# Patient Record
Sex: FEMALE | Race: WHITE | Employment: UNEMPLOYED | URBAN - METROPOLITAN AREA
[De-identification: names, ages, dates, MRNs, and addresses within clinical notes are randomized per-mention and may not be internally consistent; named-entity substitution may affect disease eponyms.]

---

## 2021-01-18 LAB
PAP SMEAR, EXTERNAL: NEGATIVE
PAP SMEAR, EXTERNAL: NEGATIVE

## 2022-08-19 LAB
MAMMOGRAPHY, EXTERNAL: NEGATIVE
MAMMOGRAPHY, EXTERNAL: NORMAL

## 2022-08-22 LAB
MAMMOGRAPHY, EXTERNAL: NEGATIVE
MAMMOGRAPHY, EXTERNAL: NORMAL

## 2023-01-18 ENCOUNTER — OFFICE VISIT (OUTPATIENT)
Dept: FAMILY MEDICINE CLINIC | Age: 58
End: 2023-01-18
Payer: MEDICARE

## 2023-01-18 VITALS
OXYGEN SATURATION: 95 % | HEART RATE: 107 BPM | BODY MASS INDEX: 20.39 KG/M2 | RESPIRATION RATE: 16 BRPM | WEIGHT: 108 LBS | DIASTOLIC BLOOD PRESSURE: 75 MMHG | SYSTOLIC BLOOD PRESSURE: 125 MMHG | HEIGHT: 61 IN

## 2023-01-18 DIAGNOSIS — I10 PRIMARY HYPERTENSION: ICD-10-CM

## 2023-01-18 DIAGNOSIS — K74.60 CIRRHOSIS OF LIVER WITHOUT ASCITES, UNSPECIFIED HEPATIC CIRRHOSIS TYPE (HCC): ICD-10-CM

## 2023-01-18 DIAGNOSIS — F33.0 MILD EPISODE OF RECURRENT MAJOR DEPRESSIVE DISORDER (HCC): ICD-10-CM

## 2023-01-18 DIAGNOSIS — K21.9 GASTROESOPHAGEAL REFLUX DISEASE WITHOUT ESOPHAGITIS: ICD-10-CM

## 2023-01-18 DIAGNOSIS — Z11.4 ENCOUNTER FOR SCREENING FOR HUMAN IMMUNODEFICIENCY VIRUS (HIV): ICD-10-CM

## 2023-01-18 DIAGNOSIS — J44.1 CHRONIC OBSTRUCTIVE PULMONARY DISEASE WITH ACUTE EXACERBATION (HCC): Primary | ICD-10-CM

## 2023-01-18 DIAGNOSIS — E78.2 MIXED HYPERLIPIDEMIA: ICD-10-CM

## 2023-01-18 DIAGNOSIS — Z11.59 ENCOUNTER FOR HEPATITIS C SCREENING TEST FOR LOW RISK PATIENT: ICD-10-CM

## 2023-01-18 DIAGNOSIS — B37.0 THRUSH: ICD-10-CM

## 2023-01-18 DIAGNOSIS — Z13.1 ENCOUNTER FOR SCREENING EXAMINATION FOR IMPAIRED GLUCOSE REGULATION AND DIABETES MELLITUS: ICD-10-CM

## 2023-01-18 DIAGNOSIS — R53.83 OTHER FATIGUE: ICD-10-CM

## 2023-01-18 PROBLEM — L65.9 ALOPECIA: Status: ACTIVE | Noted: 2023-01-18

## 2023-01-18 PROBLEM — Z72.0 TOBACCO ABUSE: Status: ACTIVE | Noted: 2023-01-18

## 2023-01-18 PROBLEM — F32.9 MAJOR DEPRESSIVE DISORDER: Status: ACTIVE | Noted: 2023-01-18

## 2023-01-18 PROBLEM — E55.9 VITAMIN D DEFICIENCY: Status: ACTIVE | Noted: 2023-01-18

## 2023-01-18 PROBLEM — K44.9 HIATAL HERNIA: Status: ACTIVE | Noted: 2023-01-18

## 2023-01-18 PROBLEM — N83.202 CYST OF OVARY, LEFT: Status: ACTIVE | Noted: 2023-01-18

## 2023-01-18 PROBLEM — K42.9 UMBILICAL HERNIA: Status: ACTIVE | Noted: 2023-01-18

## 2023-01-18 LAB
A/G RATIO: 1.6 (ref 1.1–2.2)
ALBUMIN SERPL-MCNC: 4.6 G/DL (ref 3.4–5)
ALP BLD-CCNC: 87 U/L (ref 40–129)
ALT SERPL-CCNC: 53 U/L (ref 10–40)
ANION GAP SERPL CALCULATED.3IONS-SCNC: 12 MMOL/L (ref 3–16)
AST SERPL-CCNC: 44 U/L (ref 15–37)
BILIRUB SERPL-MCNC: 0.6 MG/DL (ref 0–1)
BUN BLDV-MCNC: 18 MG/DL (ref 7–20)
CALCIUM SERPL-MCNC: 9.8 MG/DL (ref 8.3–10.6)
CHLORIDE BLD-SCNC: 96 MMOL/L (ref 99–110)
CO2: 29 MMOL/L (ref 21–32)
CREAT SERPL-MCNC: 0.8 MG/DL (ref 0.6–1.1)
GFR SERPL CREATININE-BSD FRML MDRD: >60 ML/MIN/{1.73_M2}
GLUCOSE BLD-MCNC: 88 MG/DL (ref 70–99)
HCT VFR BLD CALC: 42.7 % (ref 36–48)
HEMOGLOBIN: 14.4 G/DL (ref 12–16)
HEPATITIS C ANTIBODY INTERPRETATION: NORMAL
MCH RBC QN AUTO: 33.7 PG (ref 26–34)
MCHC RBC AUTO-ENTMCNC: 33.7 G/DL (ref 31–36)
MCV RBC AUTO: 100 FL (ref 80–100)
PDW BLD-RTO: 13.4 % (ref 12.4–15.4)
PLATELET # BLD: 197 K/UL (ref 135–450)
PMV BLD AUTO: 8.7 FL (ref 5–10.5)
POTASSIUM SERPL-SCNC: 4.3 MMOL/L (ref 3.5–5.1)
RBC # BLD: 4.27 M/UL (ref 4–5.2)
REASON FOR REJECTION: NORMAL
REJECTED TEST: NORMAL
SODIUM BLD-SCNC: 137 MMOL/L (ref 136–145)
TOTAL PROTEIN: 7.5 G/DL (ref 6.4–8.2)
TSH REFLEX: 0.66 UIU/ML (ref 0.27–4.2)
WBC # BLD: 8.6 K/UL (ref 4–11)

## 2023-01-18 PROCEDURE — 1036F TOBACCO NON-USER: CPT

## 2023-01-18 PROCEDURE — 3023F SPIROM DOC REV: CPT

## 2023-01-18 PROCEDURE — 3017F COLORECTAL CA SCREEN DOC REV: CPT

## 2023-01-18 PROCEDURE — 36415 COLL VENOUS BLD VENIPUNCTURE: CPT

## 2023-01-18 PROCEDURE — 99204 OFFICE O/P NEW MOD 45 MIN: CPT

## 2023-01-18 PROCEDURE — G8482 FLU IMMUNIZE ORDER/ADMIN: HCPCS

## 2023-01-18 PROCEDURE — G8420 CALC BMI NORM PARAMETERS: HCPCS

## 2023-01-18 PROCEDURE — 3074F SYST BP LT 130 MM HG: CPT

## 2023-01-18 PROCEDURE — 3078F DIAST BP <80 MM HG: CPT

## 2023-01-18 PROCEDURE — G8427 DOCREV CUR MEDS BY ELIG CLIN: HCPCS

## 2023-01-18 RX ORDER — AZITHROMYCIN 250 MG/1
250 TABLET, FILM COATED ORAL SEE ADMIN INSTRUCTIONS
Qty: 6 TABLET | Refills: 0 | Status: SHIPPED | OUTPATIENT
Start: 2023-01-18 | End: 2023-01-23

## 2023-01-18 RX ORDER — AZELASTINE 1 MG/ML
SPRAY, METERED NASAL
COMMUNITY
Start: 2023-01-02

## 2023-01-18 RX ORDER — TRAZODONE HYDROCHLORIDE 100 MG/1
TABLET ORAL
COMMUNITY
Start: 2022-11-10

## 2023-01-18 RX ORDER — METHYLPREDNISOLONE 4 MG/1
TABLET ORAL
Qty: 1 KIT | Refills: 0 | Status: SHIPPED | OUTPATIENT
Start: 2023-01-18 | End: 2023-01-24

## 2023-01-18 RX ORDER — LEVALBUTEROL INHALATION SOLUTION 0.31 MG/3ML
0.31 SOLUTION RESPIRATORY (INHALATION) EVERY 4 HOURS PRN
Qty: 3 ML | Refills: 2 | Status: SHIPPED | OUTPATIENT
Start: 2023-01-18 | End: 2023-02-17

## 2023-01-18 RX ORDER — FLUTICASONE PROPIONATE AND SALMETEROL XINAFOATE 230; 21 UG/1; UG/1
AEROSOL, METERED RESPIRATORY (INHALATION)
COMMUNITY
Start: 2022-11-30

## 2023-01-18 RX ORDER — OMEPRAZOLE 20 MG/1
20 CAPSULE, DELAYED RELEASE ORAL DAILY
COMMUNITY

## 2023-01-18 RX ORDER — FUROSEMIDE 40 MG/1
40 TABLET ORAL DAILY
COMMUNITY
Start: 2022-11-09

## 2023-01-18 RX ORDER — SPIRONOLACTONE 25 MG/1
TABLET ORAL
COMMUNITY
Start: 2023-01-15

## 2023-01-18 RX ORDER — OMEPRAZOLE 20 MG/1
CAPSULE, DELAYED RELEASE ORAL
COMMUNITY
Start: 2022-11-13

## 2023-01-18 RX ORDER — TIOTROPIUM BROMIDE INHALATION SPRAY 3.12 UG/1
SPRAY, METERED RESPIRATORY (INHALATION)
COMMUNITY
Start: 2023-01-10

## 2023-01-18 RX ORDER — FLUTICASONE PROPIONATE 50 MCG
SPRAY, SUSPENSION (ML) NASAL
COMMUNITY
Start: 2023-01-10

## 2023-01-18 ASSESSMENT — PATIENT HEALTH QUESTIONNAIRE - PHQ9
10. IF YOU CHECKED OFF ANY PROBLEMS, HOW DIFFICULT HAVE THESE PROBLEMS MADE IT FOR YOU TO DO YOUR WORK, TAKE CARE OF THINGS AT HOME, OR GET ALONG WITH OTHER PEOPLE: 0
6. FEELING BAD ABOUT YOURSELF - OR THAT YOU ARE A FAILURE OR HAVE LET YOURSELF OR YOUR FAMILY DOWN: 0
7. TROUBLE CONCENTRATING ON THINGS, SUCH AS READING THE NEWSPAPER OR WATCHING TELEVISION: 0
SUM OF ALL RESPONSES TO PHQ9 QUESTIONS 1 & 2: 0
SUM OF ALL RESPONSES TO PHQ QUESTIONS 1-9: 0
2. FEELING DOWN, DEPRESSED OR HOPELESS: 0
1. LITTLE INTEREST OR PLEASURE IN DOING THINGS: 0
9. THOUGHTS THAT YOU WOULD BE BETTER OFF DEAD, OR OF HURTING YOURSELF: 0
8. MOVING OR SPEAKING SO SLOWLY THAT OTHER PEOPLE COULD HAVE NOTICED. OR THE OPPOSITE, BEING SO FIGETY OR RESTLESS THAT YOU HAVE BEEN MOVING AROUND A LOT MORE THAN USUAL: 0
4. FEELING TIRED OR HAVING LITTLE ENERGY: 0
3. TROUBLE FALLING OR STAYING ASLEEP: 0
5. POOR APPETITE OR OVEREATING: 0
SUM OF ALL RESPONSES TO PHQ QUESTIONS 1-9: 0

## 2023-01-18 ASSESSMENT — ENCOUNTER SYMPTOMS
DIARRHEA: 0
COLOR CHANGE: 0
SORE THROAT: 0
COUGH: 1
BLOOD IN STOOL: 0
CONSTIPATION: 0
SHORTNESS OF BREATH: 1
WHEEZING: 1
ABDOMINAL PAIN: 0

## 2023-01-18 NOTE — Clinical Note
She want to see if she can get a discount on her electric bill due to oxygen use? I have not heard of this one. Any help would be great.

## 2023-01-18 NOTE — PROGRESS NOTES
Britton Newton (:  1965) is a 62 y.o. female,New patient, here for evaluation of the following chief complaint(s):  Establish Care (Pt is here to establish care)         ASSESSMENT/PLAN:  1. Chronic obstructive pulmonary disease with acute exacerbation (HCC)  -     CBC; Future  -     [facility]  -     albuterol (PROVENTIL) (5 MG/ML) 0.5% nebulizer solution; Take 1 mL by nebulization 4 times daily as needed for Wheezing, Disp-120 each, R-3Normal  -     methylPREDNISolone (MEDROL DOSEPACK) 4 MG tablet; Take by mouth., Disp-1 kit, R-0Normal  -     azithromycin (ZITHROMAX) 250 MG tablet; Take 1 tablet by mouth See Admin Instructions for 5 days 500mg on day 1 followed by 250mg on days 2 - 5, Disp-6 tablet, R-0Normal  -     Handicap Placard MISC; Starting Wed 2023, Disp-1 each, R-0, Print  -     Comprehensive Metabolic Panel; Future  -Patient is currently dealing with OPD exacerbation we will plan to treat with medications listed above. Given patient's significant history of COPD we will plan to reestablish patient with pulmonology. Patient was previously seeing a pulmonologist when in [place]. At this time given patient's COVID past exposure we will plan to treat for COPD exacerbation with medications listed above. We will plan to reorder patient's nebulizer and home oxygen that she needs new orders and a new machine since moving to [place] from [place]. Patient understands plan at this time patient is agreeable at this time. Patient also would like to have disability paperwork filled out. Did have lengthy discussion about about filling these papers out. .  2. Mixed hyperlipidemia  -     Lipid Panel; Future  . Patient has not had lab work to check for her hyperlipidemia plan to recheck at this time. 3. Other fatigue  -     TSH with Reflex; Future  - Patient does endorse having some significant fatigue. Patient does have a history of depression.   Patient has not had recent thyroid level checked. We will plan to check thyroid at this time to further evaluate fatigue  4. Thrush  -     nystatin (MYCOSTATIN) 231693 UNIT/ML suspension; Take 5 mLs by mouth 4 times daily for 10 days Retain in mouth as long as possible, Oral, 4 TIMES DAILY Starting Wed 1/18/2023, Until Sat 1/28/2023, For 10 days, Disp-200 mL, R-0, Normal  - Patient has oral thrush for over use of albuterol inhaler. We will plan to use medication listed above. Patient was educated on proper technique of using medication. Patient is agreeable understands plan at this time  5. Mild episode of recurrent major depressive disorder (HCC)  - Depressive disorder is currently well managed without medication. Did spend time discussing with patient regarding symptoms. Patient is doing well without medication we will continue to reevaluate patient's mood. 6. Gastroesophageal reflux disease without esophagitis  - Patient's GERD is currently well managed on omeprazole 20 mg. We will continue current dose of medication at this time. 7. Primary hypertension  - Patient's hypertension in office is well managed today. Patient is currently not on any medications for blood pressure management. Patient does however remain on Lasix and spironolactone. After review of patient's past medical records it seems that patient was placed on these medications due to cirrhosis of liver. Plan to continue to monitor electrolytes and assess needs of medications. Patient was educated to continue checking her blood pressure at home and keeping a log and present these at the office at next appointment. 8. Cirrhosis of liver without ascites, unspecified hepatic cirrhosis type ([facility] [address]  - We will plan to draw a CMP at this time given patient has not had lab work. Patient remains on Lasix and spironolactone. We will plan to refer patient to GI specialist for further evaluation of liver cirrhosis.   9. Encounter for screening for human immunodeficiency virus (HIV)  -     HIV Screen; Future  10. Encounter for hepatitis C screening test for low risk patient  -     Hepatitis C Antibody; Future  11. Encounter for screening examination for impaired glucose regulation and diabetes mellitus  -     Hemoglobin A1C; Future      Return in about 1 week (around 1/25/2023) for COPD. Subjective   SUBJECTIVE/OBJECTIVE:  HPI  Patient presents the office today as a new patient here to establish care. Patient states that she is recently moved to [place] from [place]. Patient endorses having significant COPD which requires oxygen at baseline. Patient states that she is approximately on 4 to 5 L of oxygen at her home dose. Patient recently endorses having COVID. Patient endorses still having some respiratory issues following COVID. She endorses 6 significant coughing and shortness of breath. Patient states that she has been using her albuterol as prescribed. Patient states that she has been following and using her her routine maintenance inhalers as recommended. Patient has multiple complaints in office today. Patient would like to establish care. Patient states that she would also need a handicap placard given her recent move from [place] to [place]. Patient also has some significant hip discomfort. After review of chart patient was being seen by previous provider for these issues. Patient had imaging completed of her hip and pelvis. Patient states that she is not using any more oxygen than she normally would. Patient states that she needs refills on a few medications. Patient has no other acute concerns at this time. Review of Systems   HENT:  Negative for congestion and sore throat. Respiratory:  Positive for cough, shortness of breath and wheezing. Cardiovascular:  Negative for chest pain and leg swelling. Gastrointestinal:  Negative for abdominal pain, blood in stool, constipation and diarrhea.    Endocrine: Negative for cold intolerance and heat intolerance. Genitourinary:  Negative for difficulty urinating, hematuria and urgency. Musculoskeletal:  Negative for arthralgias and gait problem. Skin:  Negative for color change. Neurological:  Negative for dizziness, seizures, light-headedness and headaches. Psychiatric/Behavioral:  Negative for agitation and confusion. The patient is not nervous/anxious. Objective   Physical Exam  Constitutional:       General: She is not in acute distress. Appearance: Normal appearance. HENT:      Right Ear: Tympanic membrane normal.      Left Ear: Tympanic membrane normal.   Eyes:      Pupils: Pupils are equal, round, and reactive to light. Cardiovascular:      Rate and Rhythm: Normal rate and regular rhythm. Pulmonary:      Effort: Pulmonary effort is normal.      Breath sounds: Wheezing present. Abdominal:      General: Abdomen is flat. Bowel sounds are normal.      Palpations: Abdomen is soft. Musculoskeletal:         General: Normal range of motion. Skin:     General: Skin is warm. Neurological:      General: No focal deficit present. Mental Status: She is alert. Psychiatric:         Mood and Affect: Mood normal.         Behavior: Behavior normal.         Judgment: Judgment normal.          This note was generated completely or in part utilizing Dragon dictation speech recognition software. Occasionally, words are mistranscribed and despite editing, the text may contain inaccuracies due to incorrect word recognition. If further clarification is needed please contact the office at [phone]. An electronic signature was used to authenticate this note.     --ZARI Mauricio - CNP

## 2023-01-19 LAB
CHOLESTEROL, TOTAL: 251 MG/DL (ref 0–199)
ESTIMATED AVERAGE GLUCOSE: 105.4 MG/DL
HBA1C MFR BLD: 5.3 %
HDLC SERPL-MCNC: 71 MG/DL (ref 40–60)
LDL CHOLESTEROL CALCULATED: 163 MG/DL
TRIGL SERPL-MCNC: 83 MG/DL (ref 0–150)
VLDLC SERPL CALC-MCNC: 17 MG/DL

## 2023-01-20 LAB
HIV AG/AB: NORMAL
HIV ANTIGEN: NORMAL
HIV-1 ANTIBODY: NORMAL
HIV-2 AB: NORMAL

## 2023-01-26 ENCOUNTER — TELEPHONE (OUTPATIENT)
Dept: FAMILY MEDICINE CLINIC | Age: 58
End: 2023-01-26

## 2023-01-26 ENCOUNTER — OFFICE VISIT (OUTPATIENT)
Dept: FAMILY MEDICINE CLINIC | Age: 58
End: 2023-01-26
Payer: MEDICARE

## 2023-01-26 VITALS
BODY MASS INDEX: 20.39 KG/M2 | RESPIRATION RATE: 16 BRPM | DIASTOLIC BLOOD PRESSURE: 74 MMHG | OXYGEN SATURATION: 95 % | HEART RATE: 101 BPM | WEIGHT: 108 LBS | SYSTOLIC BLOOD PRESSURE: 123 MMHG | HEIGHT: 61 IN

## 2023-01-26 DIAGNOSIS — J43.9 PULMONARY EMPHYSEMA, UNSPECIFIED EMPHYSEMA TYPE (HCC): Primary | ICD-10-CM

## 2023-01-26 DIAGNOSIS — K74.60 CIRRHOSIS OF LIVER WITHOUT ASCITES, UNSPECIFIED HEPATIC CIRRHOSIS TYPE (HCC): ICD-10-CM

## 2023-01-26 PROCEDURE — G8420 CALC BMI NORM PARAMETERS: HCPCS

## 2023-01-26 PROCEDURE — 3017F COLORECTAL CA SCREEN DOC REV: CPT

## 2023-01-26 PROCEDURE — G8427 DOCREV CUR MEDS BY ELIG CLIN: HCPCS

## 2023-01-26 PROCEDURE — 1036F TOBACCO NON-USER: CPT

## 2023-01-26 PROCEDURE — 3074F SYST BP LT 130 MM HG: CPT

## 2023-01-26 PROCEDURE — 3023F SPIROM DOC REV: CPT

## 2023-01-26 PROCEDURE — G8482 FLU IMMUNIZE ORDER/ADMIN: HCPCS

## 2023-01-26 PROCEDURE — 99213 OFFICE O/P EST LOW 20 MIN: CPT

## 2023-01-26 PROCEDURE — 3078F DIAST BP <80 MM HG: CPT

## 2023-01-26 ASSESSMENT — ENCOUNTER SYMPTOMS
COLOR CHANGE: 0
ABDOMINAL PAIN: 0
CONSTIPATION: 0
COUGH: 0
SORE THROAT: 0
BLOOD IN STOOL: 0
SHORTNESS OF BREATH: 0
DIARRHEA: 0
WHEEZING: 0

## 2023-01-26 NOTE — TELEPHONE ENCOUNTER
Patient called in regards to her car handicap sticker. Patient stated that the [employer] would not except it because it needs to have a timeframe listed. Patient stated that if it stated \"permament\" then it would be approved. She stated if we print this off, she will come pick it up in the next few days.

## 2023-01-26 NOTE — PROGRESS NOTES
Chitra Ernst (:  1965) is a 62 y.o. female,Established patient, here for evaluation of the following chief complaint(s):  COPD (Pt is here for 1 week follow up )         ASSESSMENT/PLAN:  1. Pulmonary emphysema, unspecified emphysema type ([address]  -Patient is doing much better after being seen in the office 1 week ago for COPD exacerbation. Patient is breathing much easier. Patient endorses having minimal to no coughing. Patient still remains on her current dose of home oxygen of 4 to 5 L. Patient states that she is doing well with this currently. Patient does have an upcoming appointment with pulmonology. Do not plan to change medication and or inhaler regimen at this time. We will leave further adjustments to be had by pulmonology. Did consider breztri/Trelegy in this patient. Patient still having ongoing issues. Would consider switching inhaler regimen to include breztri/Trelegy. 2.  Hepatic cirrhosis  - Did give patient referral to GI specialist for previous diagnosis of hepatic cirrhosis. Patient still remains on Lasix and spironolactone. Patient's CMP was unremarkable. Patient did have some slight elevations in ALT and AST. Patient will continue to follow-up with referral placed to GI. Patient did notably have a negative hep C screening. Did spend time reviewing past and previous notes. Did spend time reviewing previous labs. Did spend time having lengthy discussion with patient regarding her previous specialist notes. Patient is still having medical records faxed office from her previous providers in [place]. Spent time reviewing the records. Return in about 3 months (around 2023) for COPD. Subjective   SUBJECTIVE/OBJECTIVE:  HPI  Patient presents the office today for follow-up regarding acute COPD exacerbation and thrush. Patient states that she is doing much better than she previously was. Patient is breathing much more easily.   Patient states that she is still can continue to use her nebulizer inhaler as well as inhalers. Patient states that she is currently doing well with her inhaler regimen. Patient states that her thrush has resolved with the nystatin. Overall patient is doing much better. Patient does endorse she has a pulmonology appointment upcoming. Patient has no other acute complaints at this time. Review of Systems   HENT:  Negative for congestion and sore throat. Respiratory:  Negative for cough, shortness of breath and wheezing. Cardiovascular:  Negative for chest pain and leg swelling. Gastrointestinal:  Negative for abdominal pain, blood in stool, constipation and diarrhea. Endocrine: Negative for cold intolerance and heat intolerance. Genitourinary:  Negative for difficulty urinating, hematuria and urgency. Musculoskeletal:  Negative for arthralgias and gait problem. Skin:  Negative for color change. Neurological:  Negative for dizziness, seizures, light-headedness and headaches. Psychiatric/Behavioral:  Negative for agitation and confusion. The patient is not nervous/anxious. Objective   Physical Exam  Vitals reviewed. Constitutional:       General: She is not in acute distress. Appearance: Normal appearance. HENT:      Right Ear: Tympanic membrane normal.      Left Ear: Tympanic membrane normal.   Eyes:      Pupils: Pupils are equal, round, and reactive to light. Cardiovascular:      Rate and Rhythm: Normal rate and regular rhythm. Pulmonary:      Effort: Pulmonary effort is normal.      Breath sounds: Normal breath sounds. Abdominal:      General: Abdomen is flat. Bowel sounds are normal.      Palpations: Abdomen is soft. Musculoskeletal:         General: Normal range of motion. Skin:     General: Skin is warm. Neurological:      General: No focal deficit present. Mental Status: She is alert.    Psychiatric:         Mood and Affect: Mood normal.         Behavior: Behavior normal. Judgment: Judgment normal.          This note was generated completely or in part utilizing Dragon dictation speech recognition software. Occasionally, words are mistranscribed and despite editing, the text may contain inaccuracies due to incorrect word recognition. If further clarification is needed please contact the office at [phone]. An electronic signature was used to authenticate this note.     --ZARI Casper - CNP

## 2023-01-27 ENCOUNTER — TELEMEDICINE (OUTPATIENT)
Dept: FAMILY MEDICINE CLINIC | Age: 58
End: 2023-01-27
Payer: MEDICARE

## 2023-01-27 DIAGNOSIS — Z00.00 INITIAL MEDICARE ANNUAL WELLNESS VISIT: Primary | ICD-10-CM

## 2023-01-27 PROCEDURE — G0438 PPPS, INITIAL VISIT: HCPCS

## 2023-01-27 PROCEDURE — 3017F COLORECTAL CA SCREEN DOC REV: CPT

## 2023-01-27 PROCEDURE — G8482 FLU IMMUNIZE ORDER/ADMIN: HCPCS

## 2023-01-27 RX ORDER — OMEPRAZOLE 20 MG/1
CAPSULE, DELAYED RELEASE ORAL
Qty: 90 CAPSULE | Refills: 3 | Status: SHIPPED | OUTPATIENT
Start: 2023-01-27

## 2023-01-27 RX ORDER — SPIRONOLACTONE 25 MG/1
25 TABLET ORAL DAILY
Qty: 90 TABLET | Refills: 0 | Status: SHIPPED | OUTPATIENT
Start: 2023-01-27

## 2023-01-27 ASSESSMENT — LIFESTYLE VARIABLES
HOW MANY STANDARD DRINKS CONTAINING ALCOHOL DO YOU HAVE ON A TYPICAL DAY: PATIENT DOES NOT DRINK
HOW OFTEN DO YOU HAVE A DRINK CONTAINING ALCOHOL: NEVER

## 2023-01-27 ASSESSMENT — PATIENT HEALTH QUESTIONNAIRE - PHQ9
SUM OF ALL RESPONSES TO PHQ QUESTIONS 1-9: 2
5. POOR APPETITE OR OVEREATING: 0
SUM OF ALL RESPONSES TO PHQ9 QUESTIONS 1 & 2: 2
4. FEELING TIRED OR HAVING LITTLE ENERGY: 0
9. THOUGHTS THAT YOU WOULD BE BETTER OFF DEAD, OR OF HURTING YOURSELF: 0
3. TROUBLE FALLING OR STAYING ASLEEP: 0
1. LITTLE INTEREST OR PLEASURE IN DOING THINGS: 1
2. FEELING DOWN, DEPRESSED OR HOPELESS: 1
10. IF YOU CHECKED OFF ANY PROBLEMS, HOW DIFFICULT HAVE THESE PROBLEMS MADE IT FOR YOU TO DO YOUR WORK, TAKE CARE OF THINGS AT HOME, OR GET ALONG WITH OTHER PEOPLE: 0
SUM OF ALL RESPONSES TO PHQ QUESTIONS 1-9: 2
6. FEELING BAD ABOUT YOURSELF - OR THAT YOU ARE A FAILURE OR HAVE LET YOURSELF OR YOUR FAMILY DOWN: 0
8. MOVING OR SPEAKING SO SLOWLY THAT OTHER PEOPLE COULD HAVE NOTICED. OR THE OPPOSITE, BEING SO FIGETY OR RESTLESS THAT YOU HAVE BEEN MOVING AROUND A LOT MORE THAN USUAL: 0
7. TROUBLE CONCENTRATING ON THINGS, SUCH AS READING THE NEWSPAPER OR WATCHING TELEVISION: 0

## 2023-01-27 NOTE — TELEPHONE ENCOUNTER
Refill Request       Last Seen: Last Seen Department: 1/26/2023  Last Seen by PCP: 1/26/2023    Last Written: 11/13/2022         Next Appointment:   Future Appointments   Date Time Provider Department Center   1/27/2023  8:00 AM Andrew Dillow, APRN - CNP [facility] [place] [facility]   3/29/2023  9:00 AM Markus Rhodes MD [facility]   5/1/2023  8:30 AM Andrew Dillow, APRN - CNP [facility] [place] [facility]           Requested Prescriptions     Pending Prescriptions Disp Refills    omeprazole (PRILOSEC) 20 MG delayed release capsule [Pharmacy Med Name: OMEPRAZOLE DR CAPS 20MG] 90 capsule 3     Sig: TAKE 1 CAPSULE DAILY

## 2023-01-27 NOTE — PATIENT INSTRUCTIONS
Preventing Falls: Care Instructions  Overview     Getting around your home safely can be a challenge if you have injuries or health problems that make it easy for you to fall. Loose rugs and furniture in walkways are among the dangers for many older people who have problems walking or who have poor eyesight. People who have conditions such as arthritis, osteoporosis, or dementia also have to be careful not to fall. You can make your home safer with a few simple measures. Follow-up care is a key part of your treatment and safety. Be sure to make and go to all appointments, and call your doctor if you are having problems. It's also a good idea to know your test results and keep a list of the medicines you take. How can you care for yourself at home? Taking care of yourself  Exercise regularly to improve your strength, muscle tone, and balance. Walk if you can. Swimming may be a good choice if you cannot walk easily. Have your vision and hearing checked each year or any time you notice a change. If you have trouble seeing and hearing, you might not be able to avoid objects and could lose your balance. Know the side effects of the medicines you take. Ask your doctor or pharmacist whether the medicines you take can affect your balance. Sleeping pills or sedatives can affect your balance. Limit the amount of alcohol you drink. Alcohol can impair your balance and other senses. Ask your doctor whether calluses or corns on your feet need to be removed. If you wear loose-fitting shoes because of calluses or corns, you can lose your balance and fall. Talk to your doctor if you have numbness in your feet. You may get dizzy if you do not drink enough water. To prevent dehydration, drink plenty of fluids. Choose water and other clear liquids. If you have kidney, heart, or liver disease and have to limit fluids, talk with your doctor before you increase the amount of fluids you drink.   Preventing falls at home  Remove raised doorway thresholds, throw rugs, and clutter. Repair loose carpet or raised areas in the floor. Move furniture and electrical cords to keep them out of walking paths. Use nonskid floor wax, and wipe up spills right away, especially on ceramic tile floors. If you use a walker or cane, put rubber tips on it. If you use crutches, clean the bottoms of them regularly with an abrasive pad, such as steel wool. Keep your house well lit, especially stairways, porches, and outside walkways. Use night-lights in areas such as hallways and bathrooms. Add extra light switches or use remote switches (such as switches that go on or off when you clap your hands) to make it easier to turn lights on if you have to get up during the night. Install sturdy handrails on stairways. Move items in your cabinets so that the things you use a lot are on the lower shelves (about waist level). Keep a cordless phone and a flashlight with new batteries by your bed. If possible, put a phone in each of the main rooms of your house, or carry a cell phone in case you fall and cannot reach a phone. Or, you can wear a device around your neck or wrist. You push a button that sends a signal for help. Wear low-heeled shoes that fit well and give your feet good support. Use footwear with nonskid soles. Check the heels and soles of your shoes for wear. Repair or replace worn heels or soles. Do not wear socks without shoes on smooth floors, such as wood. Walk on the grass when the sidewalks are slippery. If you live in an area that gets snow and ice in the winter, sprinkle salt on slippery steps and sidewalks. Or ask a family member or friend to do this for you. Preventing falls in the bath  Install grab bars and nonskid mats inside and outside your shower or tub and near the toilet and sinks. Use shower chairs and bath benches. Use a hand-held shower head that will allow you to sit while showering.   Get into a tub or shower by putting the weaker leg in first. Get out of a tub or shower with your strong side first.  Repair loose toilet seats and consider installing a raised toilet seat to make getting on and off the toilet easier. Keep your bathroom door unlocked while you are in the shower. Where can you learn more? Go to http://www.barrera.com/ and enter G117 to learn more about \"Preventing Falls: Care Instructions. \"  Current as of: May 4, 2022               Content Version: 13.5  © 5216-5617 Healthwise, Incorporated. Care instructions adapted under license by [facility] ([facility]). If you have questions about a medical condition or this instruction, always ask your healthcare professional. [address] any warranty or liability for your use of this information. Learning About Emotional Support  When do you need emotional support? You might find getting support from others helpful when you have a long-term health problem. Often people feel alone, confused, or scared when coping with an illness. But you aren't alone. Other people are going through the same thing you are and know how you feel. Talking with others about your feelings can help you feel better. Your family and friends can give you support. So can your doctor, a support group, or a Oriental orthodox. If you have a support network, you will not feel as alone. You will learn new ways to deal with your situation, and you may try harder to overcome it. Where you can get support  Family and friends: They can help you cope by giving you comfort and encouragement. Counseling: Professional counseling can help you cope with situations that interfere with your life and cause stress. Counseling can help you understand and deal with your illness. Your doctor: Find a doctor you trust and feel comfortable with. Be open and honest about your fears and concerns. Your doctor can help you get the right medical treatments, including counseling.   Spiritual or Confucianism groups: They can provide comfort and may be able to help you find counseling or other social support services. Social groups: They can help you meet new people and get involved in activities you enjoy. Community support groups: In a support group, you can talk to others who have dealt with the same problems or illness as you. You can encourage one another and learn ways to cope with tough emotions. How to find a support group  Ask your doctor, counselor, or other health professional for suggestions. Contact your local Lutheran, Hinduism, Mosque, or other Confucianism group. Ask your family and friends. Ask people who have the same health concerns. Go online. Forums and blogs let you read messages from others and leave your own messages. You can exchange stories, vent your frustrations, and ask and answer questions. Contact a city, state, or national group that provides support for your health concerns. Your library or community center may have a list of these groups. Or you can look for information online. Look for a support group that works for you. Ask yourself if you prefer structure and would like a , or if you would like a less formal group. Do you prefer face-to-face meetings? Or do you feel more secure in online chat rooms or forums? Supportive relationships  A supportive relationship includes emotional support such as love, trust, and understanding, as well as advice and concrete help, such as help managing your time. Reach out to others  Family and friends can help you. Ask them to:  Listen to you and give you encouragement. This can keep you from feeling hopeless or alone. Help with small daily tasks or with bigger problems. A helping hand can keep you from feeling overwhelmed. Help you manage a health problem. For example, ask them to go to doctor visits with you. Your loved ones can offer support by being involved in your medical care.   Respect your relationships  A good relationship is also a two-way street. You count on help from others, but they also count on you. Know your friends' limits. You don't have to see or call your friends every day. If you are going through a rough patch, ask friends if you can contact them outside of the usual boundaries. Don't always complain or talk about yourself. Know when it's time to stop talking and listen or just enjoy your friend's company. Know that good friends can be a bad influence. For example, if a friend encourages you to drink when you know it will harm you, you may want to end the friendship. Where can you learn more? Go to http://www.woods.com/ and enter G092 to learn more about \"Learning About Emotional Support. \"  Current as of: February 9, 2022               Content Version: 13.5  © 0986-3158 [employer]. Care instructions adapted under license by [facility] ([facility]). If you have questions about a medical condition or this instruction, always ask your healthcare professional. [address] any warranty or liability for your use of this information. Learning About Stress  What is stress? Stress is what you feel when you have to handle more than you are used to. Stress is a fact of life for most people, and it affects everyone differently. What causes stress for you may not be stressful for someone else. A lot of things can cause stress. You may feel stress when you go on a job interview, take a test, or run a race. This kind of short-term stress is normal and even useful. It can help you if you need to work hard or react quickly. For example, stress can help you finish an important job on time. Stress also can last a long time. Long-term stress is caused by stressful situations or events. Examples of long-term stress include long-term health problems, ongoing problems at work, or conflicts in your family. Long-term stress can harm your health.   How does stress affect your health? When you are stressed, your body responds as though you are in danger. It makes hormones that speed up your heart, make you breathe faster, and give you a burst of energy. This is called the fight-or-flight stress response. If the stress is over quickly, your body goes back to normal and no harm is done. But if stress happens too often or lasts too long, it can have bad effects. Long-term stress can make you more likely to get sick, and it can make symptoms of some diseases worse. If you tense up when you are stressed, you may develop neck, shoulder, or low back pain. Stress is linked to high blood pressure and heart disease. Stress also harms your emotional health. It can make you hensley, tense, or depressed. Your relationships may suffer, and you may not do well at work or school. What can you do to manage stress? How to relax your mind   Write. It may help to write about things that are bothering you. This helps you find out how much stress you feel and what is causing it. When you know this, you can find better ways to cope. Let your feelings out. Talk, laugh, cry, and express anger when you need to. Talking with friends, family, a counselor, or a member of the clergy about your feelings is a healthy way to relieve stress. Do something you enjoy. For example, listen to music or go to a movie. Practice your hobby or do volunteer work. Meditate. This can help you relax, because you are not worrying about what happened before or what may happen in the future. Do guided imagery. Imagine yourself in any setting that helps you feel calm. You can use audiotapes, books, or a teacher to guide you. How to relax your body   Do something active. Exercise or activity can help reduce stress. Walking is a great way to get started. Even everyday activities such as housecleaning or yard work can help. Do breathing exercises.  For example:  From a standing position, bend forward from the waist with your knees slightly bent. Let your arms dangle close to the floor. Breathe in slowly and deeply as you return to a standing position. Roll up slowly and lift your head last.  Hold your breath for just a few seconds in the standing position. Breathe out slowly and bend forward from the waist.  Try yoga or franklin chi. These techniques combine exercise and meditation. You may need some training at first to learn them. What can you do to prevent stress? Manage your time. This helps you find time to do the things you want and need to do. Get enough sleep. Your body recovers from the stresses of the day while you are sleeping. Get support. Your family, friends, and community can make a difference in how you experience stress. Where can you learn more? Go to http://www.barrera.com/ and enter N032 to learn more about \"Learning About Stress. \"  Current as of: October 6, 2021               Content Version: 13.5  © 3259-7969 [employer]. Care instructions adapted under license by [facility] ([facility]). If you have questions about a medical condition or this instruction, always ask your healthcare professional. [place] [address] any warranty or liability for your use of this information. Hearing Loss: Care Instructions  Overview     Hearing loss is a sudden or slow decrease in how well you hear. It can range from mild to severe. Permanent hearing loss can occur with aging. It also can happen when you are exposed long-term to loud noise. Examples include listening to loud music, riding motorcycles, or being around other loud machines. Hearing loss can affect your work and home life. It can make you feel lonely or depressed. You may feel that you have lost your independence. But hearing aids and other devices can help you hear better and feel connected to others. Follow-up care is a key part of your treatment and safety.  Be sure to make and go to all appointments, and call your doctor if you are having problems. It's also a good idea to know your test results and keep a list of the medicines you take. How can you care for yourself at home? Avoid loud noises whenever possible. This helps keep your hearing from getting worse. Always wear hearing protection around loud noises. Wear a hearing aid as directed. See a professional who can help you pick a hearing aid that fits you. Have hearing tests as your doctor suggests. They can show whether your hearing has changed. Your hearing aid may need to be adjusted. Use other devices as needed. These may include:  Telephone amplifiers and hearing aids that can connect to a television, stereo, radio, or microphone. Devices that use lights or vibrations. These alert you to the doorbell, a ringing telephone, or a baby monitor. Television closed-captioning. This shows the words at the bottom of the screen. Most new TVs can do this. TTY (text telephone). This lets you type messages back and forth on the telephone instead of talking or listening. These devices are also called TDD. When messages are typed on the keyboard, they are sent over the phone line to a receiving TTY. The message is shown on a monitor. Use text messaging, social media, and email if it is hard for you to communicate by telephone. Try to learn a listening technique called speechreading. It is not lipreading. You pay attention to people's gestures, expressions, posture, and tone of voice. These clues can help you understand what a person is saying. Face the person you are talking to, and have them face you. Make sure the lighting is good. You need to see the other person's face clearly. Think about counseling if you need help to adjust to your hearing loss. When should you call for help? Watch closely for changes in your health, and be sure to contact your doctor if:    You think your hearing is getting worse.     You have new symptoms, such as dizziness or nausea.    Where can you learn more? Go to http://www.barrera.com/ and enter R798 to learn more about \"Hearing Loss: Care Instructions. \"  Current as of: May 4, 2022               Content Version: 13.5  © 7972-1443 Healthwise, Incorporated. Care instructions adapted under license by [facility] ([facility]). If you have questions about a medical condition or this instruction, always ask your healthcare professional. [address] any warranty or liability for your use of this information. Learning About Vision Tests  What are vision tests? The four most common vision tests are visual acuity tests, refraction, visual field tests, and color vision tests. Visual acuity (sharpness) tests  These tests are used: To see if you need glasses or contact lenses. To monitor an eye problem. To check an eye injury. Visual acuity tests are done as part of routine exams. You may also have this test when you get your 's license or apply for some types of jobs. Visual field tests  These tests are used: To check for vision loss in any area of your range of vision. To screen for certain eye diseases. To look for nerve damage after a stroke, head injury, or other problem that could reduce blood flow to the brain. Refraction and color tests  A refraction test is done to find the right prescription for glasses and contact lenses. A color vision test is done to check for color blindness. Color vision is often tested as part of a routine exam. You may also have this test when you apply for a job where recognizing different colors is important, such as , electronics, or the [employer]. How are vision tests done? Visual acuity test   You cover one eye at a time. You read aloud from a wall chart across the room. You read aloud from a small card that you hold in your hand. Refraction   You look into a special device.   The device puts lenses of different strengths in front of each eye to see how strong your glasses or contact lenses need to be. Visual field tests   Your doctor may have you look through special machines. Or your doctor may simply have you stare straight ahead while they move a finger into and out of your field of vision. Color vision test   You look at pieces of printed test patterns in various colors. You say what number or symbol you see. Your doctor may have you trace the number or symbol using a pointer. How do these tests feel? There is very little chance of having a problem from this test. If dilating drops are used for a vision test, they may make the eyes sting and cause a medicine taste in the mouth. Follow-up care is a key part of your treatment and safety. Be sure to make and go to all appointments, and call your doctor if you are having problems. It's also a good idea to know your test results and keep a list of the medicines you take. Where can you learn more? Go to http://www.barrera.com/ and enter G551 to learn more about \"Learning About Vision Tests. \"  Current as of: October 12, 2022               Content Version: 13.5  © 0815-9009 Healthwise, [employer]. Care instructions adapted under license by [facility] ([facility]). If you have questions about a medical condition or this instruction, always ask your healthcare professional. [address] any warranty or liability for your use of this information. Advance Directives: Care Instructions  Overview  An advance directive is a legal way to state your wishes at the end of your life. It tells your family and your doctor what to do if you can't say what you want. There are two main types of advance directives. You can change them any time your wishes change. Living will. This form tells your family and your doctor your wishes about life support and other treatment. The form is also called a declaration. Medical power of .   This form lets you name a person to make treatment decisions for you when you can't speak for yourself. This person is called a health care agent (health care proxy, health care surrogate). The form is also called a durable power of  for health care. If you do not have an advance directive, decisions about your medical care may be made by a family member, or by a doctor or a  who doesn't know you. It may help to think of an advance directive as a gift to the people who care for you. If you have one, they won't have to make tough decisions by themselves. For more information, including forms for your state, see the [address] website (www.caringinfo.org/planning/advance-directives/). Follow-up care is a key part of your treatment and safety. Be sure to make and go to all appointments, and call your doctor if you are having problems. It's also a good idea to know your test results and keep a list of the medicines you take. What should you include in an advance directive? Many states have a unique advance directive form. (It may ask you to address specific issues.) Or you might use a universal form that's approved by many states. If your form doesn't tell you what to address, it may be hard to know what to include in your advance directive. Use the questions below to help you get started. Who do you want to make decisions about your medical care if you are not able to? What life-support measures do you want if you have a serious illness that gets worse over time or can't be cured? What are you most afraid of that might happen? (Maybe you're afraid of having pain, losing your independence, or being kept alive by machines.)  Where would you prefer to die? (Your home? A hospital? A nursing home?)  Do you want to donate your organs when you die? Do you want certain Yarsani practices performed before you die? When should you call for help? Be sure to contact your doctor if you have any questions. Where can you learn more?   Go to http://www.woods.com/ and enter R264 to learn more about \"Advance Directives: Care Instructions. \"  Current as of: June 16, 2022               Content Version: 13.5  © 4441-4532 Healthwise, Incorporated. Care instructions adapted under license by [facility] ([facility]). If you have questions about a medical condition or this instruction, always ask your healthcare professional. [place] [address] any warranty or liability for your use of this information. Personalized Preventive Plan for Isabelle Bustos - 1/27/2023  Medicare offers a range of preventive health benefits. Some of the tests and screenings are paid in full while other may be subject to a deductible, co-insurance, and/or copay. Some of these benefits include a comprehensive review of your medical history including lifestyle, illnesses that may run in your family, and various assessments and screenings as appropriate. After reviewing your medical record and screening and assessments performed today your provider may have ordered immunizations, labs, imaging, and/or referrals for you. A list of these orders (if applicable) as well as your Preventive Care list are included within your After Visit Summary for your review. Other Preventive Recommendations:    A preventive eye exam performed by an eye specialist is recommended every 1-2 years to screen for glaucoma; cataracts, macular degeneration, and other eye disorders. A preventive dental visit is recommended every 6 months. Try to get at least 150 minutes of exercise per week or 10,000 steps per day on a pedometer . Order or download the FREE \"Exercise & Physical Activity: Your Everyday Guide\" from [employer]. Call 4-189.451.5591 or search [employer] online. You need 9901-1981 mg of calcium and 3118-8070 IU of vitamin D per day.  It is possible to meet your calcium requirement with diet alone, but a vitamin D supplement is usually necessary to meet this goal.  When exposed to the sun, use a sunscreen that protects against both UVA and UVB radiation with an SPF of 30 or greater. Reapply every 2 to 3 hours or after sweating, drying off with a towel, or swimming. Always wear a seat belt when traveling in a car. Always wear a helmet when riding a bicycle or motorcycle.

## 2023-01-27 NOTE — PROGRESS NOTES
Medicare Annual Wellness Visit    Tony Lima is here for Medicare AWV    Assessment & Plan   Initial Medicare annual wellness visit    Recommendations for Preventive Services Due: see orders and patient instructions/AVS.  Recommended screening schedule for the next 5-10 years is provided to the patient in written form: see Patient Instructions/AVS.     Return for Medicare Annual Wellness Visit in 1 year. Subjective       Patient's complete Health Risk Assessment and screening values have been reviewed and are found in Flowsheets. The following problems were reviewed today and where indicated follow up appointments were made and/or referrals ordered. Positive Risk Factor Screenings with Interventions:    Fall Risk:  Do you feel unsteady or are you worried about falling? : no  2 or more falls in past year?: (!) yes  Fall with injury in past year?: no     Interventions:    See AVS for additional education material           Self-assessment of health: In general, how would you say your health is?: (!) Poor    Interventions:  Patient has multiple chronic illnesses. Patient is doing better at managing since she is moved to [place] and establish care with multiple specialist including PCP    General HRA Questions:  Select all that apply: (!) Stress    Stress Interventions:  Patient is currently going through traumatic life events. We will continue to monitor patient's mood and stress level. Patient was educated she can always reach out if she feels that she needs more help or support.        Weight and Activity:  Physical Activity: Inactive    Days of Exercise per Week: 0 days    Minutes of Exercise per Session: 0 min     On average, how many days per week do you engage in moderate to strenuous exercise (like a brisk walk)?: 0 days  Have you lost any weight without trying in the past 3 months?: No       Inactivity Interventions:  Recommendations: patient agrees to exercise for at least 150 minutes/week, patient agrees to wear a pedometer and walk at least 10,000 steps/day       Hearing Screen:  Do you or your family notice any trouble with your hearing that hasn't been managed with hearing aids?: (!) Yes    Interventions:  Referred to Audiology    Vision Screen:  Do you have difficulty driving, watching TV, or doing any of your daily activities because of your eyesight?: (!) Yes  Have you had an eye exam within the past year?: Yes  No results found. Interventions:   Patient encouraged to make appointment with their eye specialist      Advanced Directives:  Do you have a Living Will?: (!) No    Intervention:  has NO advanced directive - information provided                       Objective      Patient-Reported Vitals  BP Observations: No, remote/electronic monitoring device was not used or able to be verified  Patient-Reported Weight: 108lb  Patient-Reported Height: 5'1            Allergies   Allergen Reactions    Doxycycline Hyclate Nausea And Vomiting     Prior to Visit Medications    Medication Sig Taking?  Authorizing Provider   azelastine (ASTELIN) 0.1 % nasal spray  Yes Historical Provider, MD   fluticasone (FLONASE) 50 MCG/ACT nasal spray  Yes Historical Provider, MD Kvng Plunkett 721-37 MCG/ACT inhaler  Yes Historical Provider, MD   furosemide (LASIX) 40 MG tablet 40 mg daily Yes Historical Provider, MD   omeprazole (PRILOSEC) 20 MG delayed release capsule  Yes Historical Provider, MD   spironolactone (ALDACTONE) 25 MG tablet  Yes Historical Provider, MD Ibanez Pleasant 2.5 MCG/ACT AERS inhaler  Yes Historical Provider, MD   traZODone (DESYREL) 100 MG tablet  Yes Historical Provider, MD   albuterol (PROVENTIL) (5 MG/ML) 0.5% nebulizer solution Take 1 mL by nebulization 4 times daily as needed for Wheezing Yes ZARI Stokes CNP   Handicap Placard MISC by Does not apply route Yes ZARI Stokes CNP   nystatin (MYCOSTATIN) 927311 UNIT/ML suspension Take 5 mLs by mouth 4 times daily for 10 days Retain in mouth as long as possible Yes ZARI Galloway CNP   omeprazole (PRILOSEC) 20 MG delayed release capsule Take 20 mg by mouth daily Yes Historical Provider, MD   levalbuterol (XOPENEX) 0.31 MG/3ML nebulization Take 3 mLs by nebulization every 4 hours as needed for Wheezing Yes ZARI Galloway CNP       CareTeam (Including outside providers/suppliers regularly involved in providing care):   Patient Care Team:  ZARI Galloway CNP as PCP - General (Nurse Practitioner)  ZARI Galloway CNP as PCP - [facility] Empaneled Provider     Reviewed and updated this visit:  Tobacco  Allergies  Meds  Problems  Med Hx  Surg Hx  Soc Hx  Fam Hx               Clide Denton, was evaluated through a synchronous (real-time) audio-video encounter. The patient (or guardian if applicable) is aware that this is a billable service, which includes applicable co-pays. This Virtual Visit was conducted with patient's (and/or legal guardian's) consent. The visit was conducted pursuant to the emergency declaration under the [address] [place] [address], [address] [place] [address] authority and the [email or web address] General Act. Patient identification was verified, and a caregiver was present when appropriate. The patient was located at Home: [address] [place] [address]. Provider was located at [facility] ([place] [address]): [address] [place] [address],[address]  [place],  [facility].

## 2023-01-27 NOTE — TELEPHONE ENCOUNTER
Refill Request     Last Seen: Last Seen Department: 1/27/2023  Last Seen by PCP: 1/27/2023            Next Appointment:   Future Appointments   Date Time Provider Queta Asif   3/29/2023  9:00 AM MD COLLEEN Glass   5/1/2023  8:30 AM Dc Moore APRN - CNP [place] [facility] [facility]OL[facility]           Requested Prescriptions     Pending Prescriptions Disp Refills    spironolactone (ALDACTONE) 25 MG tablet 90 tablet 0     Sig: Take 1 tablet by mouth

## 2023-01-30 DIAGNOSIS — K74.60 CIRRHOSIS OF LIVER WITHOUT ASCITES, UNSPECIFIED HEPATIC CIRRHOSIS TYPE (HCC): ICD-10-CM

## 2023-01-30 DIAGNOSIS — J43.9 PULMONARY EMPHYSEMA, UNSPECIFIED EMPHYSEMA TYPE (HCC): Primary | ICD-10-CM

## 2023-01-30 RX ORDER — TRAZODONE HYDROCHLORIDE 100 MG/1
100 TABLET ORAL NIGHTLY
Qty: 90 TABLET | Refills: 0 | Status: SHIPPED | OUTPATIENT
Start: 2023-01-30

## 2023-02-18 ENCOUNTER — APPOINTMENT (OUTPATIENT)
Dept: GENERAL RADIOLOGY | Age: 58
DRG: 336 | End: 2023-02-18
Payer: MEDICARE

## 2023-02-18 ENCOUNTER — HOSPITAL ENCOUNTER (INPATIENT)
Age: 58
LOS: 9 days | Discharge: HOME HEALTH | DRG: 336 | End: 2023-02-27
Attending: STUDENT IN AN ORGANIZED HEALTH CARE EDUCATION/TRAINING PROGRAM | Admitting: INTERNAL MEDICINE
Payer: MEDICARE

## 2023-02-18 ENCOUNTER — APPOINTMENT (OUTPATIENT)
Dept: CT IMAGING | Age: 58
DRG: 336 | End: 2023-02-18
Payer: MEDICARE

## 2023-02-18 DIAGNOSIS — N17.9 AKI (ACUTE KIDNEY INJURY) (HCC): ICD-10-CM

## 2023-02-18 DIAGNOSIS — E87.1 HYPONATREMIA: ICD-10-CM

## 2023-02-18 DIAGNOSIS — K56.609 SMALL BOWEL OBSTRUCTION (HCC): Primary | ICD-10-CM

## 2023-02-18 DIAGNOSIS — R11.2 NAUSEA AND VOMITING, UNSPECIFIED VOMITING TYPE: ICD-10-CM

## 2023-02-18 DIAGNOSIS — D72.829 LEUKOCYTOSIS, UNSPECIFIED TYPE: ICD-10-CM

## 2023-02-18 LAB
A/G RATIO: 1.4 (ref 1.1–2.2)
ALBUMIN SERPL-MCNC: 4.9 G/DL (ref 3.4–5)
ALP BLD-CCNC: 96 U/L (ref 40–129)
ALT SERPL-CCNC: 42 U/L (ref 10–40)
ANION GAP SERPL CALCULATED.3IONS-SCNC: 19 MMOL/L (ref 3–16)
ANISOCYTOSIS: ABNORMAL
AST SERPL-CCNC: 42 U/L (ref 15–37)
BANDED NEUTROPHILS RELATIVE PERCENT: 27 % (ref 0–7)
BASOPHILS ABSOLUTE: 0 K/UL (ref 0–0.2)
BASOPHILS RELATIVE PERCENT: 0 %
BILIRUB SERPL-MCNC: 0.8 MG/DL (ref 0–1)
BUN BLDV-MCNC: 67 MG/DL (ref 7–20)
CALCIUM SERPL-MCNC: 11.3 MG/DL (ref 8.3–10.6)
CHLORIDE BLD-SCNC: 69 MMOL/L (ref 99–110)
CO2: 39 MMOL/L (ref 21–32)
CREAT SERPL-MCNC: 2.8 MG/DL (ref 0.6–1.1)
EOSINOPHILS ABSOLUTE: 0 K/UL (ref 0–0.6)
EOSINOPHILS RELATIVE PERCENT: 0 %
GFR SERPL CREATININE-BSD FRML MDRD: 19 ML/MIN/{1.73_M2}
GLUCOSE BLD-MCNC: 137 MG/DL (ref 70–99)
HCT VFR BLD CALC: 47.9 % (ref 36–48)
HEMOGLOBIN: 15.9 G/DL (ref 12–16)
INFLUENZA A: NOT DETECTED
INFLUENZA B: NOT DETECTED
LACTIC ACID: 1.7 MMOL/L (ref 0.4–2)
LYMPHOCYTES ABSOLUTE: 0.9 K/UL (ref 1–5.1)
LYMPHOCYTES RELATIVE PERCENT: 5 %
MACROCYTES: ABNORMAL
MCH RBC QN AUTO: 32.3 PG (ref 26–34)
MCHC RBC AUTO-ENTMCNC: 33.2 G/DL (ref 31–36)
MCV RBC AUTO: 97 FL (ref 80–100)
MICROCYTES: ABNORMAL
MONOCYTES ABSOLUTE: 1.8 K/UL (ref 0–1.3)
MONOCYTES RELATIVE PERCENT: 10 %
NEUTROPHILS ABSOLUTE: 15.4 K/UL (ref 1.7–7.7)
NEUTROPHILS RELATIVE PERCENT: 58 %
PDW BLD-RTO: 13.1 % (ref 12.4–15.4)
PLATELET # BLD: 231 K/UL (ref 135–450)
PMV BLD AUTO: 8.5 FL (ref 5–10.5)
POIKILOCYTES: ABNORMAL
POLYCHROMASIA: ABNORMAL
POTASSIUM REFLEX MAGNESIUM: 3.6 MMOL/L (ref 3.5–5.1)
PROCALCITONIN: 1.57 NG/ML (ref 0–0.15)
RBC # BLD: 4.93 M/UL (ref 4–5.2)
SARS-COV-2 RNA, RT PCR: NOT DETECTED
SODIUM BLD-SCNC: 127 MMOL/L (ref 136–145)
TOTAL PROTEIN: 8.5 G/DL (ref 6.4–8.2)
TROPONIN: 0.01 NG/ML
WBC # BLD: 18.1 K/UL (ref 4–11)

## 2023-02-18 PROCEDURE — 6360000002 HC RX W HCPCS: Performed by: HOSPITALIST

## 2023-02-18 PROCEDURE — 96375 TX/PRO/DX INJ NEW DRUG ADDON: CPT

## 2023-02-18 PROCEDURE — C9113 INJ PANTOPRAZOLE SODIUM, VIA: HCPCS | Performed by: HOSPITALIST

## 2023-02-18 PROCEDURE — 85025 COMPLETE CBC W/AUTO DIFF WBC: CPT

## 2023-02-18 PROCEDURE — 84484 ASSAY OF TROPONIN QUANT: CPT

## 2023-02-18 PROCEDURE — 94640 AIRWAY INHALATION TREATMENT: CPT

## 2023-02-18 PROCEDURE — 87040 BLOOD CULTURE FOR BACTERIA: CPT

## 2023-02-18 PROCEDURE — 36415 COLL VENOUS BLD VENIPUNCTURE: CPT

## 2023-02-18 PROCEDURE — 6360000002 HC RX W HCPCS: Performed by: STUDENT IN AN ORGANIZED HEALTH CARE EDUCATION/TRAINING PROGRAM

## 2023-02-18 PROCEDURE — 6370000000 HC RX 637 (ALT 250 FOR IP): Performed by: HOSPITALIST

## 2023-02-18 PROCEDURE — 96376 TX/PRO/DX INJ SAME DRUG ADON: CPT

## 2023-02-18 PROCEDURE — 74018 RADEX ABDOMEN 1 VIEW: CPT

## 2023-02-18 PROCEDURE — 2580000003 HC RX 258: Performed by: HOSPITALIST

## 2023-02-18 PROCEDURE — 2580000003 HC RX 258: Performed by: STUDENT IN AN ORGANIZED HEALTH CARE EDUCATION/TRAINING PROGRAM

## 2023-02-18 PROCEDURE — 96374 THER/PROPH/DIAG INJ IV PUSH: CPT

## 2023-02-18 PROCEDURE — 80053 COMPREHEN METABOLIC PANEL: CPT

## 2023-02-18 PROCEDURE — 1200000000 HC SEMI PRIVATE

## 2023-02-18 PROCEDURE — 2700000000 HC OXYGEN THERAPY PER DAY

## 2023-02-18 PROCEDURE — 71250 CT THORAX DX C-: CPT

## 2023-02-18 PROCEDURE — 84145 PROCALCITONIN (PCT): CPT

## 2023-02-18 PROCEDURE — 96361 HYDRATE IV INFUSION ADD-ON: CPT

## 2023-02-18 PROCEDURE — 87636 SARSCOV2 & INF A&B AMP PRB: CPT

## 2023-02-18 PROCEDURE — 83605 ASSAY OF LACTIC ACID: CPT

## 2023-02-18 PROCEDURE — 99285 EMERGENCY DEPT VISIT HI MDM: CPT

## 2023-02-18 RX ORDER — ACETAMINOPHEN 650 MG/1
650 SUPPOSITORY RECTAL EVERY 6 HOURS PRN
Status: DISCONTINUED | OUTPATIENT
Start: 2023-02-18 | End: 2023-02-27 | Stop reason: HOSPADM

## 2023-02-18 RX ORDER — 0.9 % SODIUM CHLORIDE 0.9 %
1000 INTRAVENOUS SOLUTION INTRAVENOUS ONCE
Status: COMPLETED | OUTPATIENT
Start: 2023-02-18 | End: 2023-02-18

## 2023-02-18 RX ORDER — ONDANSETRON 2 MG/ML
4 INJECTION INTRAMUSCULAR; INTRAVENOUS ONCE
Status: COMPLETED | OUTPATIENT
Start: 2023-02-18 | End: 2023-02-18

## 2023-02-18 RX ORDER — ACETAMINOPHEN 325 MG/1
650 TABLET ORAL EVERY 6 HOURS PRN
Status: DISCONTINUED | OUTPATIENT
Start: 2023-02-18 | End: 2023-02-27 | Stop reason: HOSPADM

## 2023-02-18 RX ORDER — SODIUM CHLORIDE 9 MG/ML
INJECTION, SOLUTION INTRAVENOUS CONTINUOUS
Status: DISCONTINUED | OUTPATIENT
Start: 2023-02-18 | End: 2023-02-21

## 2023-02-18 RX ORDER — SODIUM CHLORIDE 9 MG/ML
INJECTION, SOLUTION INTRAVENOUS PRN
Status: DISCONTINUED | OUTPATIENT
Start: 2023-02-18 | End: 2023-02-27 | Stop reason: HOSPADM

## 2023-02-18 RX ORDER — ONDANSETRON 4 MG/1
4 TABLET, ORALLY DISINTEGRATING ORAL EVERY 8 HOURS PRN
Status: DISCONTINUED | OUTPATIENT
Start: 2023-02-18 | End: 2023-02-27 | Stop reason: HOSPADM

## 2023-02-18 RX ORDER — TRAZODONE HYDROCHLORIDE 50 MG/1
100 TABLET ORAL NIGHTLY
Status: DISCONTINUED | OUTPATIENT
Start: 2023-02-18 | End: 2023-02-27 | Stop reason: HOSPADM

## 2023-02-18 RX ORDER — SODIUM CHLORIDE 0.9 % (FLUSH) 0.9 %
5-40 SYRINGE (ML) INJECTION EVERY 12 HOURS SCHEDULED
Status: DISCONTINUED | OUTPATIENT
Start: 2023-02-18 | End: 2023-02-27 | Stop reason: HOSPADM

## 2023-02-18 RX ORDER — LEVALBUTEROL 1.25 MG/.5ML
0.31 SOLUTION, CONCENTRATE RESPIRATORY (INHALATION) EVERY 4 HOURS PRN
Status: DISCONTINUED | OUTPATIENT
Start: 2023-02-18 | End: 2023-02-24

## 2023-02-18 RX ORDER — FLUTICASONE PROPIONATE 50 MCG
1 SPRAY, SUSPENSION (ML) NASAL DAILY
Status: DISCONTINUED | OUTPATIENT
Start: 2023-02-18 | End: 2023-02-27 | Stop reason: HOSPADM

## 2023-02-18 RX ORDER — MORPHINE SULFATE 2 MG/ML
2 INJECTION, SOLUTION INTRAMUSCULAR; INTRAVENOUS EVERY 4 HOURS PRN
Status: DISPENSED | OUTPATIENT
Start: 2023-02-18 | End: 2023-02-21

## 2023-02-18 RX ORDER — HEPARIN SODIUM 5000 [USP'U]/ML
5000 INJECTION, SOLUTION INTRAVENOUS; SUBCUTANEOUS 2 TIMES DAILY
Status: DISCONTINUED | OUTPATIENT
Start: 2023-02-18 | End: 2023-02-27 | Stop reason: HOSPADM

## 2023-02-18 RX ORDER — MORPHINE SULFATE 4 MG/ML
4 INJECTION, SOLUTION INTRAMUSCULAR; INTRAVENOUS ONCE
Status: COMPLETED | OUTPATIENT
Start: 2023-02-18 | End: 2023-02-18

## 2023-02-18 RX ORDER — PANTOPRAZOLE SODIUM 40 MG/10ML
40 INJECTION, POWDER, LYOPHILIZED, FOR SOLUTION INTRAVENOUS DAILY
Status: DISCONTINUED | OUTPATIENT
Start: 2023-02-18 | End: 2023-02-24

## 2023-02-18 RX ORDER — SODIUM CHLORIDE 0.9 % (FLUSH) 0.9 %
5-40 SYRINGE (ML) INJECTION PRN
Status: DISCONTINUED | OUTPATIENT
Start: 2023-02-18 | End: 2023-02-27 | Stop reason: HOSPADM

## 2023-02-18 RX ORDER — POLYETHYLENE GLYCOL 3350 17 G/17G
17 POWDER, FOR SOLUTION ORAL DAILY PRN
Status: DISCONTINUED | OUTPATIENT
Start: 2023-02-18 | End: 2023-02-27 | Stop reason: HOSPADM

## 2023-02-18 RX ORDER — ONDANSETRON 2 MG/ML
4 INJECTION INTRAMUSCULAR; INTRAVENOUS EVERY 6 HOURS PRN
Status: DISCONTINUED | OUTPATIENT
Start: 2023-02-18 | End: 2023-02-27 | Stop reason: HOSPADM

## 2023-02-18 RX ADMIN — HEPARIN SODIUM 5000 UNITS: 5000 INJECTION INTRAVENOUS; SUBCUTANEOUS at 20:00

## 2023-02-18 RX ADMIN — Medication 10 ML: at 20:01

## 2023-02-18 RX ADMIN — HYDROMORPHONE HYDROCHLORIDE 1 MG: 1 INJECTION, SOLUTION INTRAMUSCULAR; INTRAVENOUS; SUBCUTANEOUS at 12:31

## 2023-02-18 RX ADMIN — ONDANSETRON 4 MG: 2 INJECTION INTRAMUSCULAR; INTRAVENOUS at 12:31

## 2023-02-18 RX ADMIN — HYDROMORPHONE HYDROCHLORIDE 1 MG: 1 INJECTION, SOLUTION INTRAMUSCULAR; INTRAVENOUS; SUBCUTANEOUS at 14:02

## 2023-02-18 RX ADMIN — FLUTICASONE PROPIONATE 1 SPRAY: 50 SPRAY, METERED NASAL at 17:08

## 2023-02-18 RX ADMIN — MORPHINE SULFATE 2 MG: 2 INJECTION, SOLUTION INTRAMUSCULAR; INTRAVENOUS at 22:01

## 2023-02-18 RX ADMIN — MORPHINE SULFATE 2 MG: 2 INJECTION, SOLUTION INTRAMUSCULAR; INTRAVENOUS at 18:09

## 2023-02-18 RX ADMIN — PANTOPRAZOLE SODIUM 40 MG: 40 INJECTION, POWDER, FOR SOLUTION INTRAVENOUS at 17:08

## 2023-02-18 RX ADMIN — Medication 1 LOZENGE: at 19:57

## 2023-02-18 RX ADMIN — Medication 1 LOZENGE: at 22:02

## 2023-02-18 RX ADMIN — SODIUM CHLORIDE 1000 ML: 9 INJECTION, SOLUTION INTRAVENOUS at 10:38

## 2023-02-18 RX ADMIN — ONDANSETRON 4 MG: 2 INJECTION INTRAMUSCULAR; INTRAVENOUS at 10:41

## 2023-02-18 RX ADMIN — Medication 2 PUFF: at 19:39

## 2023-02-18 RX ADMIN — PIPERACILLIN AND TAZOBACTAM 3375 MG: 3; .375 INJECTION, POWDER, LYOPHILIZED, FOR SOLUTION INTRAVENOUS at 18:13

## 2023-02-18 RX ADMIN — MORPHINE SULFATE 4 MG: 4 INJECTION, SOLUTION INTRAMUSCULAR; INTRAVENOUS at 10:41

## 2023-02-18 RX ADMIN — VANCOMYCIN HYDROCHLORIDE 750 MG: 750 INJECTION, POWDER, LYOPHILIZED, FOR SOLUTION INTRAVENOUS at 16:27

## 2023-02-18 RX ADMIN — SODIUM CHLORIDE: 9 INJECTION, SOLUTION INTRAVENOUS at 16:23

## 2023-02-18 ASSESSMENT — PAIN DESCRIPTION - LOCATION
LOCATION: ABDOMEN
LOCATION: THROAT
LOCATION: THROAT

## 2023-02-18 ASSESSMENT — LIFESTYLE VARIABLES
HOW OFTEN DO YOU HAVE A DRINK CONTAINING ALCOHOL: NEVER
HOW MANY STANDARD DRINKS CONTAINING ALCOHOL DO YOU HAVE ON A TYPICAL DAY: PATIENT DOES NOT DRINK

## 2023-02-18 ASSESSMENT — PAIN DESCRIPTION - PAIN TYPE: TYPE: ACUTE PAIN

## 2023-02-18 ASSESSMENT — PAIN DESCRIPTION - ORIENTATION
ORIENTATION: MID
ORIENTATION: RIGHT;LEFT;LOWER;UPPER
ORIENTATION: MID
ORIENTATION: RIGHT;LEFT

## 2023-02-18 ASSESSMENT — PAIN SCALES - GENERAL
PAINLEVEL_OUTOF10: 6
PAINLEVEL_OUTOF10: 9
PAINLEVEL_OUTOF10: 5
PAINLEVEL_OUTOF10: 9
PAINLEVEL_OUTOF10: 9
PAINLEVEL_OUTOF10: 2
PAINLEVEL_OUTOF10: 9
PAINLEVEL_OUTOF10: 9
PAINLEVEL_OUTOF10: 10
PAINLEVEL_OUTOF10: 10

## 2023-02-18 ASSESSMENT — PAIN - FUNCTIONAL ASSESSMENT
PAIN_FUNCTIONAL_ASSESSMENT: 0-10
PAIN_FUNCTIONAL_ASSESSMENT: PREVENTS OR INTERFERES SOME ACTIVE ACTIVITIES AND ADLS
PAIN_FUNCTIONAL_ASSESSMENT: ACTIVITIES ARE NOT PREVENTED
PAIN_FUNCTIONAL_ASSESSMENT: ACTIVITIES ARE NOT PREVENTED

## 2023-02-18 ASSESSMENT — PAIN DESCRIPTION - DESCRIPTORS
DESCRIPTORS: DISCOMFORT
DESCRIPTORS: SORE;SHARP
DESCRIPTORS: ACHING;SHARP
DESCRIPTORS: DISCOMFORT

## 2023-02-18 NOTE — ED NOTES
SBAR report called to [facility] RN   Questions answered to satisfaction      Mckenzie Mukherjee, KRISTIN  02/18/23 4053

## 2023-02-18 NOTE — LETTER
Prudence Brunson accompanied Sean Cardona to the emergency department on 2/22/2023. Patient was seen in the hospital from 2/18/2023 - 2/27/2023. If you have any questions or concerns, please don't hesitate to call.       Dr Angela Palmer  [phone]

## 2023-02-18 NOTE — ED PROVIDER NOTES
Emergency Department Provider Note  Location: [facility]  ED  2/18/2023     Patient Identification  Jean-Pierre Mireles is a 62 y.o. female    Chief Complaint  Abdominal Pain, Nausea, and Chills (Patient reports symptoms to n/v abd cramping chills but no fevers since Tuesday. Patient hx of COPD baseline wears 5L)      Mode of Arrival  private car    HPI  (History provided by patient)  This is a 62 y.o. female with a PMH significant for HLD, COPD, cirrhosis  presented today for shortness of breath, generalized abdominal pain, nausea and vomiting. Symptoms have been ongoing since Tuesday. She was seen at urgent care earlier today and was sent here. She reports abdominal pain is cramping, generalized with no alleviating or aggravating factors. She is also feeling more short of breath than baseline. She wears 5 L of supplemental oxygen normally for COPD and has not had to increase. She reports having the sensation to cough but it was not productive. She denies any associated fever. Denies any chest pain. Reports that last bowel movement was Monday, still passing gas. Denies any dysuria, hematuria or change in urinary frequency. Denies any syncopal events. Denies any new numbness or tingling, or unilateral weakness. ROS  Review of Systems   All other systems reviewed and are negative. I have reviewed the following nursing documentation:  Allergies: Allergies   Allergen Reactions    Doxycycline Hyclate Nausea And Vomiting       Past medical history:  has no past medical history on file. Past surgical history:  has a past surgical history that includes Tubal ligation and Thumb arthroscopy. Home medications:   Prior to Admission medications    Medication Sig Start Date End Date Taking?  Authorizing Provider   traZODone (DESYREL) 100 MG tablet Take 1 tablet by mouth nightly 1/30/23   ZARI Sarabia CNP   omeprazole (PRILOSEC) 20 MG delayed release capsule TAKE 1 CAPSULE DAILY 1/27/23   ZARI Hannon CNP   spironolactone (ALDACTONE) 25 MG tablet Take 1 tablet by mouth daily 1/27/23   ZARI Hannon CNP   azelastine (ASTELIN) 0.1 % nasal spray  1/2/23   Historical Provider, MD   fluticasone [facility]) 50 MCG/ACT nasal spray  1/10/23   Historical Provider, MD Kvng Plunkett [phone] MCG/ACT inhaler  11/30/22   Historical Provider, MD   furosemide (LASIX) 40 MG tablet 40 mg daily 11/9/22   Historical Provider, MD   SPIRIVA RESPIMAT 2.5 MCG/ACT AERS inhaler  1/10/23   Historical Provider, MD   albuterol (PROVENTIL) (5 MG/ML) 0.5% nebulizer solution Take 1 mL by nebulization 4 times daily as needed for Wheezing 1/18/23   ZARI Hannon CNP   Handicap Placard MISC by Does not apply route 1/18/23   ZARI Hannon CNP   levalbuterol [facility]) 0.31 MG/3ML nebulization Take 3 mLs by nebulization every 4 hours as needed for Wheezing 1/18/23 2/17/23  ZARI Hannon CNP       Social history:  reports that she has quit smoking. Her smoking use included cigarettes. She has never used smokeless tobacco. She reports that she does not currently use alcohol. Family history:  No family history on file. Exam  ED Triage Vitals   BP Temp Temp src Pulse Resp SpO2 Height Weight   -- -- -- -- -- -- -- --     Physical Exam  Vitals and nursing note reviewed. Constitutional:       General: She is not in acute distress. HENT:      Head: Normocephalic and atraumatic. Mouth/Throat:      Pharynx: Oropharynx is clear. Eyes:      General: No scleral icterus. Cardiovascular:      Rate and Rhythm: Normal rate and regular rhythm. Heart sounds: No murmur heard. Pulmonary:      Breath sounds: Wheezing and rhonchi present. Abdominal:      General: Abdomen is flat. Bowel sounds are normal.      Palpations: There is no hepatomegaly. Tenderness: There is generalized abdominal tenderness. There is no guarding or rebound. Hernia: No hernia is present.    Skin: Findings: No erythema. Neurological:      General: No focal deficit present. Mental Status: She is alert. Cranial Nerves: No cranial nerve deficit.    Psychiatric:         Mood and Affect: Mood normal.         Behavior: Behavior normal.           MDM/ED Course  ED Medication Orders (From admission, onward)      Start Ordered     Status Ordering Provider    02/18/23 1245 02/18/23 1243  piperacillin-tazobactam (ZOSYN) 3,375 mg in sodium chloride 0.9 % 50 mL IVPB (mini-bag)  ONCE        Question:  Antimicrobial Indications  Answer:  Intra-Abdominal Infection    Acknowledged [facility]    02/18/23 1245 02/18/23 1243  vancomycin (VANCOCIN) 750 mg in sodium chloride 0.9 % 250 mL IVPB  ONCE        Question:  Antimicrobial Indications  Answer:  Sepsis of Unknown Etiology    Acknowledged [facility]    02/18/23 1230 02/18/23 1222  HYDROmorphone (DILAUDID) injection 1 mg  ONCE         Last MAR action: Given - by Tretha Riedel on 02/18/23 at [address] [facility]    02/18/23 1230 02/18/23 1222  ondansetron (ZOFRAN) injection 4 mg  ONCE         Last MAR action: Given - by Tretha Riedel on 02/18/23 at [address] [facility]    02/18/23 1045 02/18/23 1031  0.9 % sodium chloride bolus  ONCE         Last MAR action: Stopped - by Tretha Riedel on 02/18/23 at [facility]    02/18/23 1045 02/18/23 1031  ondansetron (ZOFRAN) injection 4 mg  ONCE         Last MAR action: Given - by Dino Nolasco on 02/18/23 at [address]    02/18/23 1045 02/18/23 1031  morphine sulfate (PF) injection 4 mg  ONCE         Last MAR action: Given - by Dino Killer on 02/18/23 at [facility] [address]              Radiology  CT CHEST ABDOMEN PELVIS WO CONTRAST Additional Contrast? None    Result Date: 2/18/2023  EXAMINATION: CT OF THE CHEST, ABDOMEN, AND PELVIS WITHOUT CONTRAST 2/18/2023 11:32 am TECHNIQUE: CT of the chest, abdomen and pelvis was performed without the administration of intravenous contrast. Multiplanar reformatted images are provided for review. Automated exposure control, iterative reconstruction, and/or weight based adjustment of the mA/kV was utilized to reduce the radiation dose to as low as reasonably achievable. COMPARISON: None HISTORY: ORDERING SYSTEM PROVIDED HISTORY: shortness of breath TECHNOLOGIST PROVIDED HISTORY: Reason for exam:->shortness of breath Additional Contrast?->None Reason for Exam: N/V/C since Tuesday, known COPD, sob Relevant Medical/Surgical History: ovaries removed, tubal ligation, smoked x 30 yrs. Quit 10 yrs ago FINDINGS: Chest: Mediastinum: Heart is normal in size. There is moderate atherosclerotic calcification coronary arteries. Aorta and pulmonary arteries are normal. No lymph node enlargement. Normal thyroid. Large hiatal hernia. Lungs/pleura: Peripheral opacification of bronchioles in the left lower lobe with no associated airspace change. There is atelectasis in the left lower lobe adjacent to the hiatal hernia. Additional bands of atelectasis in the right upper and middle lobe that are minimal.  No acute airspace finding is otherwise noted. No suspicious mass or nodule. Mild centrilobular emphysema. Soft Tissues/Bones: Age indeterminate compression fracture to the superior endplate of T8. No retropulsion or associated spinal canal stenosis. There is also a remote fracture of the lateral right 7th rib with callus formation at the adjacent 8th rib. Remote fractures are also seen at the left 6th through 8th ribs. Abdomen/Pelvis: Organs: Cholelithiasis without inflammation or biliary dilatation. The liver, pancreas and spleen are unremarkable. Kidneys and adrenal glands are normal. GI/Bowel: The distal stomach is normal.  There is mild dilation of the duodenum with diffuse dilation of the jejunum and proximal ileum with an apparent transition point in the right lower quadrant/pelvis. Definitive transition and etiology is difficult to confirm.   There is decompression of the distal ileum. No appendix is identified. Stool and air are seen throughout the colon with diffuse diverticular changes but no inflammation. Pelvis: The bladder is unremarkable. The uterus and adnexa are unremarkable. Peritoneum/Retroperitoneum: The aorta tapers normally. No lymph node enlargement. Bones/Soft Tissues: Questionable mild compression fracture to the superior endplate of D61. No acute skeletal finding in the abdomen or pelvis. There is a remote healed fracture of the left pelvis involving the superior and inferior pubic rami. 1. Small-bowel obstruction with indeterminate etiology and an apparent transition in the right lower quadrant/pelvis. There is no pattern of pneumatosis or perforation. 2. Decompression of the distal ileum with stool and air in the colon. This may indicate incomplete obstruction or a recently developing pattern of obstruction. 3. Large hiatal hernia contributing to atelectasis in the left lower lobe. Peripheral bronchial opacification in the left lower lobe could potentially be in association, although mucoid impaction is suspected. 4. Age indeterminate compression fractures that are likely remote at both T8 and T12. Additional remote fractures of the ribs and left pelvis are noted.         Labs  Results for orders placed or performed during the hospital encounter of 02/18/23   COVID-19 & Influenza Combo    Specimen: Nasopharyngeal Swab   Result Value Ref Range    SARS-CoV-2 RNA, RT PCR NOT DETECTED NOT DETECTED    INFLUENZA A NOT DETECTED NOT DETECTED    INFLUENZA B NOT DETECTED NOT DETECTED   CBC with Auto Differential   Result Value Ref Range    WBC 18.1 (H) 4.0 - 11.0 K/uL    RBC 4.93 4.00 - 5.20 M/uL    Hemoglobin 15.9 12.0 - 16.0 g/dL    Hematocrit 47.9 36.0 - 48.0 %    MCV 97.0 80.0 - 100.0 fL    MCH 32.3 26.0 - 34.0 pg    MCHC 33.2 31.0 - 36.0 g/dL    RDW 13.1 12.4 - 15.4 %    Platelets 289 003 - 197 K/uL    MPV 8.5 5.0 - 10.5 fL Neutrophils % 58.0 %    Lymphocytes % 5.0 %    Monocytes % 10.0 %    Eosinophils % 0.0 %    Basophils % 0.0 %    Neutrophils Absolute 15.4 (H) 1.7 - 7.7 K/uL    Lymphocytes Absolute 0.9 (L) 1.0 - 5.1 K/uL    Monocytes Absolute 1.8 (H) 0.0 - 1.3 K/uL    Eosinophils Absolute 0.0 0.0 - 0.6 K/uL    Basophils Absolute 0.0 0.0 - 0.2 K/uL    Bands Relative 27 (H) 0 - 7 %    Anisocytosis Occasional (A)     Macrocytes Occasional (A)     Microcytes Occasional (A)     Polychromasia Occasional (A)     Poikilocytes Occasional (A)    CMP w/ Reflex to MG   Result Value Ref Range    Sodium 127 (L) 136 - 145 mmol/L    Potassium reflex Magnesium 3.6 3.5 - 5.1 mmol/L    Chloride 69 (L) 99 - 110 mmol/L    CO2 39 (H) 21 - 32 mmol/L    Anion Gap 19 (H) 3 - 16    Glucose 137 (H) 70 - 99 mg/dL    BUN 67 (H) 7 - 20 mg/dL    Creatinine 2.8 (H) 0.6 - 1.1 mg/dL    Est, Glom Filt Rate 19 (A) >60    Calcium 11.3 (H) 8.3 - 10.6 mg/dL    Total Protein 8.5 (H) 6.4 - 8.2 g/dL    Albumin 4.9 3.4 - 5.0 g/dL    Albumin/Globulin Ratio 1.4 1.1 - 2.2    Total Bilirubin 0.8 0.0 - 1.0 mg/dL    Alkaline Phosphatase 96 40 - 129 U/L    ALT 42 (H) 10 - 40 U/L    AST 42 (H) 15 - 37 U/L   Lactic Acid   Result Value Ref Range    Lactic Acid 1.7 0.4 - 2.0 mmol/L   Troponin   Result Value Ref Range    Troponin 0.01 <0.01 ng/mL   Procalcitonin   Result Value Ref Range    Procalcitonin 1.57 (H) 0.00 - 0.15 ng/mL         - Patient seen and evaluated in room 25.  62 y.o. female presented for shortness of breath and vomiting  Given history and exam my differential diagnosis includes but is not limited to ACS, COPD exacerbation, pneumonia, COVID-19, influenza, pneumothorax, esophageal spasm, pericarditis. I also considered intra-abdominal infection or inflammatory process given generalized abdominal tenderness. I obtain labs and imaging studies as noted below    - Patient was placed on telemetry during her ED stay and no malignant dysrhythmia observed.   - Pertinent old records reviewed. -Patient has primary care follow-up  -Social determinants include former tobacco use. No history of alcohol use. No history of depression. At baseline she is not active. No food or housing insecurity at this time. - Patient was given 4 mg of IV morphine, 4 mg of IV Zofran, 1 L of IV fluids, 1 mg IV Dilaudid, 4 mg IV Zofran in the ED. Upon reassessment, patient reports improved symptoms. Denies any significant pain. She has improved. Diagnostic studies reviewed and interpreted by me   - CT chest abdomen pelvis without contrast obtained due to poor GFR of 19. This was notable for small bowel obstruction and apparent transition in the right lower quadrant and pelvis. No evidence of perforation. There is a large hiatal hernia contributing to atelectasis in the left lower lobe. Age-indeterminate compression fractures at both T8 and T12.   - Lab:   CBC with a leukocytosis to 18.1, normal hemoglobin and hematocrit, normal platelets  CMP with hyponatremia to 127, hyperglycemic to 137, low chloride at 69, CO2 of 39, elevated BUN to 67, creatinine of 2.8 and GFR of 19. She has elevated calcium to 11.3, chronically elevated and stable ALT and AST, normal bilirubin. COVID and influenza negative  Troponin normal at 0.01  Lactic acid is normal at 1.7  COVID and influenza not detected  Elevated procalcitonin to 1.57    I consulted general surgery and spoke with Dr. Con Francis. Recommending NG tube. SEP-1 CORE MEASURE DATA      Sepsis Criteria   Severe Sepsis Criteria   Septic Shock Criteria       Must meet 2:    []Temp >100.9 F (38.3 C) or < 96.8 F (36 C)  [x]HR > 90  []RR > 20  [x]WBC > 12 or < 4 or 10% bands    AND:    [] Infection Confirmed or Suspected.      Must meet 1:    []Lactate > 2       or   []Signs of Organ Dysfunction:    - SBP < 90 or MAP < 65  -Creatinine > 2 or increased from baseline  -Urine Output < 0.5 ml/kg/hr  -Bilirubin > 2  -INR > 1.5 (not anticoagulated)  -Platelets < 100,000  -Acute Respiratory Failure as evidenced by new need for NIPPV or mechanical ventilation   Must meet 1:    []Lactate > 4        or   []SBP < 90 or MAP < 65 for at least two readings in the first hour after fluid bolus administration    []Vasopressors initiated (if hypotension persists after fluid resuscitation)   Patient Vitals for the past 6 hrs:   BP Temp Pulse Resp SpO2 Height Weight Weight Method Percent Weight Change   02/18/23 1015 107/73 98 °F (36.7 °C) (!) 109 18 91 % 5' 1\" (1.549 m) 107 lb (48.5 kg) Stated 0   02/18/23 1209 113/75 -- 92 10 95 % -- -- -- --   02/18/23 1225 107/74 -- -- -- 93 % -- -- -- --      Recent Labs     02/18/23  1042   WBC 18.1*   LACTA 1.7   CREATININE 2.8*   BILITOT 0.8                 Repeat lactate level: not indicated due to initial lactate < 2    Reassessment Exam: I have reassessed tissue perfusion and hemodynamic status after fluid bolus at this date/time:  1:31 PM        - I discussed the results with patient. We agreed to admit. Patient with acute small bowel obstruction which is likely attributing to   To presentation. Tachycardia resolved after pain control. Patient is meeting SIRS criteria therefore he did start on broad-spectrum antibiotics although no localizing source of infection at this time. I feel that symptoms could be explained by small bowel obstruction and vomiting. Clinical Impression:  1. Small bowel obstruction ([facility] [address]    2. Nausea and vomiting, unspecified vomiting type    3. Leukocytosis, unspecified type    4. ELIJAH (acute kidney injury) ([facility] [address]    5. Hyponatremia          Disposition:  Admit to med/surg floor in stable condition. Blood pressure 97/74, pulse 94, temperature 98 °F (36.7 °C), temperature source Oral, resp. rate 11, height 5' 1\" (1.549 m), weight 107 lb (48.5 kg), SpO2 93 %. Patient was given scripts for the following medications. I counseled patient how to take these medications.    New Prescriptions    No medications on file       Disposition referral (if applicable):  No follow-up provider specified. Total critical care time is 33 minutes, which excludes separately billable procedures and updating family. Time spent is specifically for management of the presenting complaint and symptoms initially, direct bedside care, reevaluation, review of records, and consultation. There was a high probability of clinically significant life-threatening deterioration in the patient's condition, which required my urgent intervention. This chart was generated in part by using Dragon Dictation system and may contain errors related to that system including errors in grammar, punctuation, and spelling, as well as words and phrases that may be inappropriate. If there are any questions or concerns please feel free to contact the dictating provider for clarification.      Gibson Pacheco MD  [address] [facility]        Gibson Pacheco MD  02/18/23 5205

## 2023-02-18 NOTE — PROGRESS NOTES
Pt arrived to C326 via stretcher from ED. Admission assessment completed. Pt A&O x4. VSS. Pt educated on importance of calling out for help, pt verbalizes understanding. Pt oriented to room/call light/visiting hours. Denies any needs at this time. Bed locked and in lowest position. Call light within reach. Will continue to monitor.  Electronically signed by Mervat Hope RN on 2/18/2023 at 5:49 PM

## 2023-02-18 NOTE — RT PROTOCOL NOTE
RT Inhaler-Nebulizer Bronchodilator Protocol Note    There is a bronchodilator order in the chart from a provider indicating to follow the RT Bronchodilator Protocol and there is an Initiate RT Inhaler-Nebulizer Bronchodilator Protocol order as well (see protocol at bottom of note). CXR Findings:  No results found. The findings from the last RT Protocol Assessment were as follows:   History Pulmonary Disease: Chronic pulmonary disease  Respiratory Pattern: Regular pattern and RR 12-20 bpm  Breath Sounds: Clear breath sounds  Cough: Strong, spontaneous, non-productive  Indication for Bronchodilator Therapy: On home bronchodilators  Bronchodilator Assessment Score: 2    Aerosolized bronchodilator medication orders have been revised according to the RT Inhaler-Nebulizer Bronchodilator Protocol below. Respiratory Therapist to perform RT Therapy Protocol Assessment initially then follow the protocol. Repeat RT Therapy Protocol Assessment PRN for score 0-3 or on second treatment, BID, and PRN for scores above 3. No Indications - adjust the frequency to every 6 hours PRN wheezing or bronchospasm, if no treatments needed after 48 hours then discontinue using Per Protocol order mode. If indication present, adjust the RT bronchodilator orders based on the Bronchodilator Assessment Score as indicated below. Use Inhaler orders unless patient has one or more of the following: on home nebulizer, not able to hold breath for 10 seconds, is not alert and oriented, cannot activate and use MDI correctly, or respiratory rate 25 breaths per minute or more, then use the equivalent nebulizer order(s) with same Frequency and PRN reasons based on the score. If a patient is on this medication at home then do not decrease Frequency below that used at home.     0-3 - enter or revise RT bronchodilator order(s) to equivalent RT Bronchodilator order with Frequency of every 4 hours PRN for wheezing or increased work of breathing using Per Protocol order mode. 4-6 - enter or revise RT Bronchodilator order(s) to two equivalent RT bronchodilator orders with one order with BID Frequency and one order with Frequency of every 4 hours PRN wheezing or increased work of breathing using Per Protocol order mode. 7-10 - enter or revise RT Bronchodilator order(s) to two equivalent RT bronchodilator orders with one order with TID Frequency and one order with Frequency of every 4 hours PRN wheezing or increased work of breathing using Per Protocol order mode. 11-13 - enter or revise RT Bronchodilator order(s) to one equivalent RT bronchodilator order with QID Frequency and an Albuterol order with Frequency of every 4 hours PRN wheezing or increased work of breathing using Per Protocol order mode. Greater than 13 - enter or revise RT Bronchodilator order(s) to one equivalent RT bronchodilator order with every 4 hours Frequency and an Albuterol order with Frequency of every 2 hours PRN wheezing or increased work of breathing using Per Protocol order mode. RT to enter RT Home Evaluation for COPD & MDI Assessment order using Per Protocol order mode.     Electronically signed by Dontae Alvares RCP on 2/18/2023 at 3:28 PM

## 2023-02-18 NOTE — CONSULTS
Consult placed    Charles:shiv  Date:2/18/2023,  Time:3:25 PM        Electronically signed by Maurice Goodpasture on 2/18/2023 at 3:25 PM

## 2023-02-18 NOTE — H&P
Hospital Medicine History & Physical      PCP: ZARI Ordoñez CNP    Date of Admission: 2/18/2023    Date of Service: Pt seen/examined on 2/18/2023 and Admitted to Inpatient with expected LOS greater than two midnights due to medical therapy. Chief Complaint: Nausea vomiting      History Of Present Illness:      62 y.o. female who presented to [facility] with nausea vomiting since Tuesday patient stated she is having nausea vomiting every 1-2 hours and no BM since last Monday patient denies any fever complains of chills no chest pain chronic shortness of breath on oxygen 5 L no cough no sputum patient stated that usually she has a bowel movement 2-3 times per day. In the emergency room patient was noted to have leukocytosis, hyponatremia, acute kidney injury, elevated calcium, CT of the abdomen and pelvis with a small bowel obstruction hospitalist was consulted for admission General surgery consulted. Past Medical History:      No past medical history on file. Past Surgical History:          Procedure Laterality Date    THUMB ARTHROSCOPY      TUBAL LIGATION         Medications Prior to Admission:      Prior to Admission medications    Medication Sig Start Date End Date Taking?  Authorizing Provider   traZODone (DESYREL) 100 MG tablet Take 1 tablet by mouth nightly 1/30/23   Lawence Line, ZARI - CNP   omeprazole (PRILOSEC) 20 MG delayed release capsule TAKE 1 CAPSULE DAILY 1/27/23   [facility], ZARI - CNP   spironolactone (ALDACTONE) 25 MG tablet Take 1 tablet by mouth daily 1/27/23   Lawence Line, ZARI - CNP   azelastine (ASTELIN) 0.1 % nasal spray  1/2/23   Historical Provider, MD   fluticasone [facility]) 50 MCG/ACT nasal spray  1/10/23   Historical Provider, MD Kvng Plunkett 235-84 MCG/ACT inhaler  11/30/22   Historical Provider, MD   furosemide (LASIX) 40 MG tablet 40 mg daily 11/9/22   Historical Provider, MD   SPIRIVA RESPIMAT 2.5 MCG/ACT AERS inhaler  1/10/23   Historical Provider, MD   albuterol (PROVENTIL) (5 MG/ML) 0.5% nebulizer solution Take 1 mL by nebulization 4 times daily as needed for Wheezing 1/18/23   Michelle Jimenez APRN Gary ROWLAND   Handicap Placard MISC by Does not apply route 1/18/23   Michelle Jimenez APRN - CNP   levalbuterol Liddie Calico) 0.31 MG/3ML nebulization Take 3 mLs by nebulization every 4 hours as needed for Wheezing 1/18/23 2/17/23  ZARI Quinonez CNP       Allergies:  Doxycycline hyclate    Social History:      The patient currently lives alone    TOBACCO:   reports that she has quit smoking. Her smoking use included cigarettes. She has never used smokeless tobacco.  ETOH:   reports that she does not currently use alcohol. E-cigarette/Vaping       Questions Responses    E-cigarette/Vaping Use     Start Date     Passive Exposure     Quit Date     Counseling Given     Comments               Family History:       Reviewed and negative in regards to presenting illness/complaint. No family history on file. REVIEW OF SYSTEMS COMPLETED:   Pertinent positives as noted in the HPI. All other systems reviewed and negative. PHYSICAL EXAM PERFORMED:    BP 97/74   Pulse 94   Temp 98 °F (36.7 °C) (Oral)   Resp 11   Ht 5' 1\" (1.549 m)   Wt 107 lb (48.5 kg)   SpO2 93%   BMI 20.22 kg/m²     General appearance:  No apparent distress, appears stated age and cooperative. HEENT:  Normal cephalic, atraumatic without obvious deformity. Pupils equal, round, and reactive to light. Extra ocular muscles intact. Conjunctivae/corneas clear. Neck: Supple, with full range of motion. No jugular venous distention. Trachea midline. Respiratory:  Normal respiratory effort. Clear to auscultation, bilaterally without Rales/Wheezes/Rhonchi. Cardiovascular:  Regular rate and rhythm with normal S1/S2 without murmurs, rubs or gallops. Abdomen: Soft, non-tender, non-distended with normal bowel sounds. Musculoskeletal:  No clubbing, cyanosis or edema bilaterally.   Full range of motion without deformity. Skin: Skin color, texture, turgor normal.  No rashes or lesions. Neurologic:  Neurovascularly intact without any focal sensory/motor deficits. Cranial nerves: II-XII intact, grossly non-focal.  Psychiatric:  Alert and oriented, thought content appropriate, normal insight  Capillary Refill: Brisk,3 seconds, normal  Peripheral Pulses: +2 palpable, equal bilaterally       Labs:     Recent Labs     02/18/23  1042   WBC 18.1*   HGB 15.9   HCT 47.9        Recent Labs     02/18/23  1042   *   K 3.6   CL 69*   CO2 39*   BUN 67*   CREATININE 2.8*   CALCIUM 11.3*     Recent Labs     02/18/23  1042   AST 42*   ALT 42*   BILITOT 0.8   ALKPHOS 96     No results for input(s): INR in the last 72 hours. Recent Labs     02/18/23  1042   TROPONINI 0.01       Urinalysis:    No results found for: Gwynda Fess, BACTERIA, RBCUA, BLOODU, [address], [place] [address]    Radiology:     CXR: I have reviewed the CXR with the following interpretation:   EKG:  I have reviewed the EKG with the following interpretation:     CT CHEST ABDOMEN PELVIS WO CONTRAST Additional Contrast? None   Final Result   1. Small-bowel obstruction with indeterminate etiology and an apparent   transition in the right lower quadrant/pelvis. There is no pattern of   pneumatosis or perforation. 2. Decompression of the distal ileum with stool and air in the colon. This   may indicate incomplete obstruction or a recently developing pattern of   obstruction. 3. Large hiatal hernia contributing to atelectasis in the left lower lobe. Peripheral bronchial opacification in the left lower lobe could potentially   be in association, although mucoid impaction is suspected. 4. Age indeterminate compression fractures that are likely remote at both T8   and T12. Additional remote fractures of the ribs and left pelvis are noted.          XR ABDOMEN (KUB) (SINGLE AP VIEW)    (Results Pending)       Consults:    IP CONSULT TO GENERAL SURGERY  IP CONSULT TO GENERAL SURGERY    ASSESSMENT:    Active Hospital Problems    Diagnosis Date Noted    Small bowel obstruction [facility]) [Z96.814] 02/18/2023     Priority: Medium   Acute kidney injury  Hyponatremia  COPD with chronic respiratory failure on home oxygen      PLAN:  This is a 60-year-old female admitted with a nausea vomiting since Tuesday, no BM since last Monday diagnosis of small bowel obstruction we will admit patient to [facility] 5 floor continue with IV hydration, n.p.o., surgery consulted by ER physician. Acute kidney injury with hyponatremia, hypercalcemia check daily BMP continue with IV hydration. GERD continue with IV PPI. COPD with chronic respiratory failure on home oxygen 5 L continue with the supplemental oxygen continue with inhalers monitor closely. DVT Prophylaxis: Lovenox subcu  Diet: No diet orders on file  Code Status: No Order    PT/OT Eval Status: Not indicated    Zaida Helton MD    Thank you ZARI Adame CNP for the opportunity to be involved in this patient's care. If you have any questions or concerns please feel free to contact me at [phone].

## 2023-02-18 NOTE — ED NOTES
Writer called phlebotomy at this time for 2 sets of blood cultures d/t patient being a hard stick for blood work.      Devin Harvey RN  02/18/23 [id number]

## 2023-02-19 ENCOUNTER — APPOINTMENT (OUTPATIENT)
Dept: GENERAL RADIOLOGY | Age: 58
DRG: 336 | End: 2023-02-19
Payer: MEDICARE

## 2023-02-19 LAB
ANION GAP SERPL CALCULATED.3IONS-SCNC: 10 MMOL/L (ref 3–16)
BASOPHILS ABSOLUTE: 0 K/UL (ref 0–0.2)
BASOPHILS RELATIVE PERCENT: 0.4 %
BUN BLDV-MCNC: 46 MG/DL (ref 7–20)
CALCIUM SERPL-MCNC: 9.5 MG/DL (ref 8.3–10.6)
CHLORIDE BLD-SCNC: 81 MMOL/L (ref 99–110)
CO2: 37 MMOL/L (ref 21–32)
CREAT SERPL-MCNC: 1.1 MG/DL (ref 0.6–1.1)
EOSINOPHILS ABSOLUTE: 0 K/UL (ref 0–0.6)
EOSINOPHILS RELATIVE PERCENT: 0.1 %
GFR SERPL CREATININE-BSD FRML MDRD: 58 ML/MIN/{1.73_M2}
GLUCOSE BLD-MCNC: 83 MG/DL (ref 70–99)
HCT VFR BLD CALC: 40.3 % (ref 36–48)
HEMOGLOBIN: 13.5 G/DL (ref 12–16)
LYMPHOCYTES ABSOLUTE: 1.1 K/UL (ref 1–5.1)
LYMPHOCYTES RELATIVE PERCENT: 12.1 %
MAGNESIUM: 2.1 MG/DL (ref 1.8–2.4)
MCH RBC QN AUTO: 32.7 PG (ref 26–34)
MCHC RBC AUTO-ENTMCNC: 33.4 G/DL (ref 31–36)
MCV RBC AUTO: 97.7 FL (ref 80–100)
MONOCYTES ABSOLUTE: 1 K/UL (ref 0–1.3)
MONOCYTES RELATIVE PERCENT: 10.4 %
NEUTROPHILS ABSOLUTE: 7.1 K/UL (ref 1.7–7.7)
NEUTROPHILS RELATIVE PERCENT: 77 %
PDW BLD-RTO: 12.7 % (ref 12.4–15.4)
PLATELET # BLD: 158 K/UL (ref 135–450)
PMV BLD AUTO: 8.7 FL (ref 5–10.5)
POTASSIUM REFLEX MAGNESIUM: 2.7 MMOL/L (ref 3.5–5.1)
POTASSIUM SERPL-SCNC: 3 MMOL/L (ref 3.5–5.1)
RBC # BLD: 4.12 M/UL (ref 4–5.2)
SODIUM BLD-SCNC: 128 MMOL/L (ref 136–145)
WBC # BLD: 9.2 K/UL (ref 4–11)

## 2023-02-19 PROCEDURE — C9113 INJ PANTOPRAZOLE SODIUM, VIA: HCPCS | Performed by: HOSPITALIST

## 2023-02-19 PROCEDURE — 99222 1ST HOSP IP/OBS MODERATE 55: CPT | Performed by: SURGERY

## 2023-02-19 PROCEDURE — 6360000002 HC RX W HCPCS: Performed by: INTERNAL MEDICINE

## 2023-02-19 PROCEDURE — 94761 N-INVAS EAR/PLS OXIMETRY MLT: CPT

## 2023-02-19 PROCEDURE — 84132 ASSAY OF SERUM POTASSIUM: CPT

## 2023-02-19 PROCEDURE — 85025 COMPLETE CBC W/AUTO DIFF WBC: CPT

## 2023-02-19 PROCEDURE — 6370000000 HC RX 637 (ALT 250 FOR IP): Performed by: HOSPITALIST

## 2023-02-19 PROCEDURE — 74018 RADEX ABDOMEN 1 VIEW: CPT

## 2023-02-19 PROCEDURE — 94640 AIRWAY INHALATION TREATMENT: CPT

## 2023-02-19 PROCEDURE — 80048 BASIC METABOLIC PNL TOTAL CA: CPT

## 2023-02-19 PROCEDURE — 2700000000 HC OXYGEN THERAPY PER DAY

## 2023-02-19 PROCEDURE — 83735 ASSAY OF MAGNESIUM: CPT

## 2023-02-19 PROCEDURE — 36415 COLL VENOUS BLD VENIPUNCTURE: CPT

## 2023-02-19 PROCEDURE — 6360000002 HC RX W HCPCS: Performed by: HOSPITALIST

## 2023-02-19 PROCEDURE — 1200000000 HC SEMI PRIVATE

## 2023-02-19 PROCEDURE — 2580000003 HC RX 258: Performed by: HOSPITALIST

## 2023-02-19 RX ORDER — POTASSIUM CHLORIDE 7.45 MG/ML
10 INJECTION INTRAVENOUS
Status: COMPLETED | OUTPATIENT
Start: 2023-02-19 | End: 2023-02-19

## 2023-02-19 RX ADMIN — Medication 1 LOZENGE: at 20:18

## 2023-02-19 RX ADMIN — SODIUM CHLORIDE: 9 INJECTION, SOLUTION INTRAVENOUS at 02:09

## 2023-02-19 RX ADMIN — TIOTROPIUM BROMIDE INHALATION SPRAY 2 PUFF: 3.12 SPRAY, METERED RESPIRATORY (INHALATION) at 07:19

## 2023-02-19 RX ADMIN — HEPARIN SODIUM 5000 UNITS: 5000 INJECTION INTRAVENOUS; SUBCUTANEOUS at 08:53

## 2023-02-19 RX ADMIN — Medication 1 LOZENGE: at 14:37

## 2023-02-19 RX ADMIN — MORPHINE SULFATE 2 MG: 2 INJECTION, SOLUTION INTRAMUSCULAR; INTRAVENOUS at 08:34

## 2023-02-19 RX ADMIN — TRAZODONE HYDROCHLORIDE 100 MG: 50 TABLET ORAL at 20:13

## 2023-02-19 RX ADMIN — POTASSIUM CHLORIDE 10 MEQ: 7.46 INJECTION, SOLUTION INTRAVENOUS at 13:41

## 2023-02-19 RX ADMIN — ONDANSETRON 4 MG: 2 INJECTION INTRAMUSCULAR; INTRAVENOUS at 09:08

## 2023-02-19 RX ADMIN — POTASSIUM CHLORIDE 10 MEQ: 7.46 INJECTION, SOLUTION INTRAVENOUS at 11:19

## 2023-02-19 RX ADMIN — POTASSIUM CHLORIDE 10 MEQ: 7.46 INJECTION, SOLUTION INTRAVENOUS at 12:35

## 2023-02-19 RX ADMIN — MORPHINE SULFATE 2 MG: 2 INJECTION, SOLUTION INTRAMUSCULAR; INTRAVENOUS at 21:06

## 2023-02-19 RX ADMIN — POTASSIUM CHLORIDE 10 MEQ: 7.46 INJECTION, SOLUTION INTRAVENOUS at 15:06

## 2023-02-19 RX ADMIN — Medication 2 PUFF: at 07:19

## 2023-02-19 RX ADMIN — ONDANSETRON 4 MG: 2 INJECTION INTRAMUSCULAR; INTRAVENOUS at 14:35

## 2023-02-19 RX ADMIN — ONDANSETRON 4 MG: 2 INJECTION INTRAMUSCULAR; INTRAVENOUS at 03:19

## 2023-02-19 RX ADMIN — HEPARIN SODIUM 5000 UNITS: 5000 INJECTION INTRAVENOUS; SUBCUTANEOUS at 20:13

## 2023-02-19 RX ADMIN — POTASSIUM CHLORIDE 10 MEQ: 7.46 INJECTION, SOLUTION INTRAVENOUS at 08:48

## 2023-02-19 RX ADMIN — Medication 10 ML: at 20:13

## 2023-02-19 RX ADMIN — MORPHINE SULFATE 2 MG: 2 INJECTION, SOLUTION INTRAMUSCULAR; INTRAVENOUS at 16:56

## 2023-02-19 RX ADMIN — FLUTICASONE PROPIONATE 1 SPRAY: 50 SPRAY, METERED NASAL at 08:36

## 2023-02-19 RX ADMIN — Medication 2 PUFF: at 20:35

## 2023-02-19 RX ADMIN — PANTOPRAZOLE SODIUM 40 MG: 40 INJECTION, POWDER, FOR SOLUTION INTRAVENOUS at 08:53

## 2023-02-19 RX ADMIN — CEFTRIAXONE SODIUM 1000 MG: 1 INJECTION, POWDER, FOR SOLUTION INTRAMUSCULAR; INTRAVENOUS at 14:17

## 2023-02-19 RX ADMIN — MORPHINE SULFATE 2 MG: 2 INJECTION, SOLUTION INTRAMUSCULAR; INTRAVENOUS at 12:36

## 2023-02-19 RX ADMIN — SODIUM CHLORIDE: 9 INJECTION, SOLUTION INTRAVENOUS at 22:39

## 2023-02-19 RX ADMIN — SODIUM CHLORIDE: 9 INJECTION, SOLUTION INTRAVENOUS at 11:21

## 2023-02-19 RX ADMIN — POTASSIUM CHLORIDE 10 MEQ: 7.46 INJECTION, SOLUTION INTRAVENOUS at 09:53

## 2023-02-19 RX ADMIN — Medication 1 LOZENGE: at 03:20

## 2023-02-19 RX ADMIN — Medication 1 LOZENGE: at 09:04

## 2023-02-19 RX ADMIN — MORPHINE SULFATE 2 MG: 2 INJECTION, SOLUTION INTRAMUSCULAR; INTRAVENOUS at 02:09

## 2023-02-19 ASSESSMENT — PAIN DESCRIPTION - LOCATION
LOCATION: ABDOMEN;HIP;BACK
LOCATION: HIP
LOCATION: THROAT
LOCATION: ABDOMEN
LOCATION: THROAT

## 2023-02-19 ASSESSMENT — PAIN - FUNCTIONAL ASSESSMENT
PAIN_FUNCTIONAL_ASSESSMENT: PREVENTS OR INTERFERES SOME ACTIVE ACTIVITIES AND ADLS
PAIN_FUNCTIONAL_ASSESSMENT: ACTIVITIES ARE NOT PREVENTED
PAIN_FUNCTIONAL_ASSESSMENT: ACTIVITIES ARE NOT PREVENTED
PAIN_FUNCTIONAL_ASSESSMENT: PREVENTS OR INTERFERES SOME ACTIVE ACTIVITIES AND ADLS
PAIN_FUNCTIONAL_ASSESSMENT: ACTIVITIES ARE NOT PREVENTED

## 2023-02-19 ASSESSMENT — PAIN DESCRIPTION - DESCRIPTORS
DESCRIPTORS: SORE
DESCRIPTORS: SHARP
DESCRIPTORS: DISCOMFORT
DESCRIPTORS: SORE
DESCRIPTORS: DISCOMFORT
DESCRIPTORS: ACHING
DESCRIPTORS: SHARP

## 2023-02-19 ASSESSMENT — PAIN SCALES - GENERAL
PAINLEVEL_OUTOF10: 2
PAINLEVEL_OUTOF10: 6
PAINLEVEL_OUTOF10: 10
PAINLEVEL_OUTOF10: 6
PAINLEVEL_OUTOF10: 7
PAINLEVEL_OUTOF10: 9
PAINLEVEL_OUTOF10: 8
PAINLEVEL_OUTOF10: 8
PAINLEVEL_OUTOF10: 9

## 2023-02-19 ASSESSMENT — PAIN DESCRIPTION - PAIN TYPE: TYPE: ACUTE PAIN

## 2023-02-19 ASSESSMENT — PAIN DESCRIPTION - ORIENTATION
ORIENTATION: MID
ORIENTATION: LEFT
ORIENTATION: RIGHT
ORIENTATION: RIGHT;LEFT;MID
ORIENTATION: UPPER;LOWER;RIGHT;LEFT

## 2023-02-19 NOTE — PLAN OF CARE
Problem: Pain  Goal: Verbalizes/displays adequate comfort level or baseline comfort level  2/19/2023 0720 by Jacquelyn Pittman RN  Outcome: Progressing  Flowsheets (Taken 2/18/2023 1943 by Kate Vale RN)  Verbalizes/displays adequate comfort level or baseline comfort level:   Encourage patient to monitor pain and request assistance   Assess pain using appropriate pain scale  2/18/2023 1742 by Jacquelyn Pittman RN  Outcome: Progressing     Problem: Safety - Adult  Goal: Free from fall injury  2/19/2023 0720 by Jacquelyn Pittman RN  Outcome: Progressing  2/18/2023 1742 by Jacquelyn Pittman RN  Outcome: Progressing

## 2023-02-19 NOTE — PROGRESS NOTES
Reeducated pt on importance/reasoning of NPO diet. Pt also provided education regarding turing side to side in bed. Along with not pulling on NGT.  Electronically signed by Lokesh Arthur RN on 2/19/2023 at 3:37 PM

## 2023-02-19 NOTE — CONSULTS
Department of General Surgery Consult    PATIENT NAME: Eddie Malik   YOB: 1965    ADMISSION DATE: 2/18/2023 10:06 AM      TODAY'S DATE: 2/19/2023    Reason for Consult:  sbo    Chief Complaint: emesis    Requesting Physician:  Mitra Noel    HISTORY OF PRESENT ILLNESS:              The patient is a 62 y.o. female who presents with abdominal pain along with nausea and emesis. Reports symptoms started early last week and waxed/waned but worsened yesterday. No bms since early last week and typically goes several times per day.     Past Medical History:        Diagnosis Date    Cirrhosis ([facility] [address]     COPD (chronic obstructive pulmonary disease) ([facility] [address]     Hyperlipidemia        Past Surgical History:        Procedure Laterality Date    THUMB ARTHROSCOPY      TUBAL LIGATION         Current Medications:   Current Facility-Administered Medications: potassium chloride 10 mEq/100 mL IVPB (Peripheral Line), 10 mEq, IntraVENous, Q1H  phenol 1.4 % mouth spray 1 spray, 1 spray, Mouth/Throat, Q2H PRN  mometasone-formoterol (DULERA) 100-5 MCG/ACT inhaler 2 puff, 2 puff, Inhalation, BID  albuterol (PROVENTIL) nebulizer solution 5 mg, 5 mg, Nebulization, 4x Daily PRN  fluticasone (FLONASE) 50 MCG/ACT nasal spray 1 spray, 1 spray, Each Nostril, Daily  levalbuterol (XOPENEX) nebulizer solution 0.3 mg, 0.3 mg, Nebulization, Q4H PRN  tiotropium (SPIRIVA RESPIMAT) 2.5 MCG/ACT inhaler 2 puff, 2 puff, Inhalation, Daily  traZODone (DESYREL) tablet 100 mg, 100 mg, Oral, Nightly  sodium chloride flush 0.9 % injection 5-40 mL, 5-40 mL, IntraVENous, 2 times per day  sodium chloride flush 0.9 % injection 5-40 mL, 5-40 mL, IntraVENous, PRN  0.9 % sodium chloride infusion, , IntraVENous, PRN  heparin (porcine) injection 5,000 Units, 5,000 Units, SubCUTAneous, BID  ondansetron (ZOFRAN-ODT) disintegrating tablet 4 mg, 4 mg, Oral, Q8H PRN **OR** ondansetron (ZOFRAN) injection 4 mg, 4 mg, IntraVENous, Q6H PRN  polyethylene glycol (GLYCOLAX) packet 17 g, 17 g, Oral, Daily PRN  acetaminophen (TYLENOL) tablet 650 mg, 650 mg, Oral, Q6H PRN **OR** acetaminophen (TYLENOL) suppository 650 mg, 650 mg, Rectal, Q6H PRN  0.9 % sodium chloride infusion, , IntraVENous, Continuous  pantoprazole (PROTONIX) injection 40 mg, 40 mg, IntraVENous, Daily  cefTRIAXone (ROCEPHIN) 1,000 mg in sodium chloride 0.9 % 50 mL IVPB (mini-bag), 1,000 mg, IntraVENous, Q24H  morphine (PF) injection 2 mg, 2 mg, IntraVENous, Q4H PRN  Benzocaine-Menthol (CEPACOL) 1 lozenge, 1 lozenge, Oral, Q2H PRN  Prior to Admission medications    Medication Sig Start Date End Date Taking? Authorizing Provider   traZODone (DESYREL) 100 MG tablet Take 1 tablet by mouth nightly 1/30/23   ZARI Swanson CNP   omeprazole (PRILOSEC) 20 MG delayed release capsule TAKE 1 CAPSULE DAILY 1/27/23   ZARI Swanson CNP   spironolactone (ALDACTONE) 25 MG tablet Take 1 tablet by mouth daily 1/27/23   ZARI Swanson CNP   azelastine (ASTELIN) 0.1 % nasal spray  1/2/23   Historical Provider, MD   fluticasone Brittany Francis) 50 MCG/ACT nasal spray  1/10/23   Historical Provider, MD Kvng Plunkett [phone] MCG/ACT inhaler  11/30/22   Historical Provider, MD   furosemide (LASIX) 40 MG tablet 40 mg daily 11/9/22   Historical Provider, MD   SPIRIVA RESPIMAT 2.5 MCG/ACT AERS inhaler  1/10/23   Historical Provider, MD   albuterol (PROVENTIL) (5 MG/ML) 0.5% nebulizer solution Take 1 mL by nebulization 4 times daily as needed for Wheezing 1/18/23   ZARI Swanson CNP   Handicap Placard MISC by Does not apply route 1/18/23   ZARI Swanson CNP   levalbuterol Yenifer Garden) 0.31 MG/3ML nebulization Take 3 mLs by nebulization every 4 hours as needed for Wheezing 1/18/23 2/17/23  ZARI Swanson - CNP        Allergies:  Doxycycline hyclate    Social History:   Social History     Socioeconomic History    Marital status:       Spouse name: Not on file    Number of children: Not on file Years of education: Not on file    Highest education level: Not on file   Occupational History    Not on file   Tobacco Use    Smoking status: Former     Types: Cigarettes    Smokeless tobacco: Never   Vaping Use    Vaping Use: Unknown   Substance and Sexual Activity    Alcohol use: Not Currently    Drug use: Yes     Types: Marijuana Vanessa Wilson)    Sexual activity: Not Currently   Other Topics Concern    Not on file   Social History Narrative    Not on file     Social Determinants of Health     Financial Resource Strain: Not on file   Food Insecurity: Not on file   Transportation Needs: Not on file   Physical Activity: Inactive    Days of Exercise per Week: 0 days    Minutes of Exercise per Session: 0 min   Stress: Not on file   Social Connections: Not on file   Intimate Partner Violence: Not on file   Housing Stability: Not on file         Family History:    No family history on file. REVIEW OF SYSTEMS:  CONSTITUTIONAL:  negative  HEENT:  negative  RESPIRATORY:  negative  CARDIOVASCULAR:  negative  GASTROINTESTINAL:  negative except for nausea, vomiting, and abdominal pain  GENITOURINARY:  negative  HEMATOLOGIC/LYMPHATIC:  negative  NEUROLOGICAL:  Negative  * All other ROS reviewed and negative. PHYSICAL EXAM:  VITALS:  BP 92/62   Pulse 84   Temp 98.5 °F (36.9 °C) (Oral)   Resp 17   Ht 5' 1\" (1.549 m)   Wt 98 lb 14.4 oz (44.9 kg)   SpO2 90%   BMI 18.69 kg/m²   24HR INTAKE/OUTPUT:    I/O last 3 completed shifts: In: 230 [P.O.:220; I.V.:10]  Out: 800 [Emesis/NG output:800]  No intake/output data recorded.       CONSTITUTIONAL:  alert, no apparent distress and normal weight  EYES:  PERRL, sclera clear  ENT:  Normocephalic,atraumatic, without obvious abnormality  NECK:  supple, symmetrical, trachea midline  LUNGS: Resp effort easy and unlabored, no crackles or wheezing  CARDIOVASCULAR:  NO JVD, regular rate   ABDOMEN:  , normal bowel sounds, soft, non-distended, mild tender  MUSCULOSKELETAL: No clubbing or cyanosis, 0+ pitting edema lower extremities  NEUROLOGIC:  Mental Status Exam:  Level of Alertness:   awake  PSYCHIATRIC:   person, place, time  SKIN:  no rashes    DATA:    CBC:   Recent Labs     02/18/23  1042 02/19/23  0514   WBC 18.1* 9.2   HGB 15.9 13.5   HCT 47.9 40.3    158     BMP:    Recent Labs     02/18/23  1042 02/19/23  0514   * 128*   K 3.6 2.7*   CL 69* 81*   CO2 39* 37*   BUN 67* 46*   CREATININE 2.8* 1.1   GLUCOSE 137* 83     Hepatic:   Recent Labs     02/18/23  1042   AST 42*   ALT 42*   BILITOT 0.8   ALKPHOS 96     Mag:      Recent Labs     02/19/23  0514   MG 2.10      Phos:   No results for input(s): PHOS in the last 72 hours. INR: No results for input(s): INR in the last 72 hours. Radiology Review: Images personally reviewed by me.    CT - sbo       IMPRESSION/RECOMMENDATIONS:    63 yo with sbo  Ng to be placed   AXR tomorrow  Continue ivf and iv pain meds if needed    Electronically signed by Anand Reagan MD     [address] Surgery  [id number]

## 2023-02-19 NOTE — PROGRESS NOTES
Patient has been educated on reasoning for NPO status, and reasoning for NGT to low wall suction. Patient has asked multiple staff members, multiple times, for ice water since teachings. RN gave patient a cup with mouth swabs to keep mouth moistened and patient consumed the water for the swabs.  RN will continue to reiterate NPO orders, and swabs will be individually moistened for use upon request.

## 2023-02-19 NOTE — PROGRESS NOTES
Shift assessment completed.  Pt A&Ox4; tearful,noncompliant,anxious. VSS. AM medications admin, see MAR. Pt NPO per order. Pt educated on importance of a NPO diet, being mindful of amount of ice intake; pt states 'we have to choose our battles\". NGT measuring at 15cm, writer attempted to advance NGT to original 51cm; pt became tearful and grabbing writer wrist, NGT removed at this time.  Denies any needs at this time. Bed locked and in lowest position. Call light within reach. Will continue to monitor. Electronically signed by Gisell Jaquez RN on 2/19/2023 at 10:54 AM

## 2023-02-19 NOTE — PROGRESS NOTES
Pt reeducated on NPO diet and importance of amount of ice intake.  Electronically signed by Shannen Aguilera RN on 2/19/2023 at 12:20 PM

## 2023-02-19 NOTE — PROGRESS NOTES
Hospitalist Progress Note      PCP: ZARI Zarco - CNP    Date of Admission: 2/18/2023    Chief Complaint: Nausea vomiting    Hospital Course: This is a 29-year-old female with a chronic respiratory failure on oxygen 5 L at home admitted with a nausea vomiting diagnosis small bowel obstruction NG tube in place surgery consulted and following. Subjective: Patient is lying in the bed stated she had a rough night last night unable to sleep denies any flatus no BM. Medications:  Reviewed    Infusion Medications    sodium chloride      sodium chloride 125 mL/hr at 02/19/23 0209     Scheduled Medications    potassium chloride  10 mEq IntraVENous Q1H    mometasone-formoterol  2 puff Inhalation BID    fluticasone  1 spray Each Nostril Daily    tiotropium  2 puff Inhalation Daily    traZODone  100 mg Oral Nightly    sodium chloride flush  5-40 mL IntraVENous 2 times per day    heparin (porcine)  5,000 Units SubCUTAneous BID    pantoprazole  40 mg IntraVENous Daily    cefTRIAXone (ROCEPHIN) IV  1,000 mg IntraVENous Q24H     PRN Meds: albuterol, levalbuterol, sodium chloride flush, sodium chloride, ondansetron **OR** ondansetron, polyethylene glycol, acetaminophen **OR** acetaminophen, morphine, Benzocaine-Menthol      Intake/Output Summary (Last 24 hours) at 2/19/2023 0930  Last data filed at 2/19/2023 0239  Gross per 24 hour   Intake 230 ml   Output 800 ml   Net -570 ml       Physical Exam Performed:    BP 92/62   Pulse 84   Temp 98.5 °F (36.9 °C) (Oral)   Resp 17   Ht 5' 1\" (1.549 m)   Wt 98 lb 14.4 oz (44.9 kg)   SpO2 90%   BMI 18.69 kg/m²     General appearance: No apparent distress, appears stated age and cooperative. HEENT: Pupils equal, round, and reactive to light. Conjunctivae/corneas clear. Neck: Supple, with full range of motion. No jugular venous distention. Trachea midline. Respiratory:  Normal respiratory effort.  Clear to auscultation, bilaterally without Rales/Wheezes/Rhonchi. Cardiovascular: Regular rate and rhythm with normal S1/S2 without murmurs, rubs or gallops. Abdomen: Soft, non-tender, non-distended with normal bowel sounds. Musculoskeletal: No clubbing, cyanosis or edema bilaterally. Full range of motion without deformity. Skin: Skin color, texture, turgor normal.  No rashes or lesions. Neurologic:  Neurovascularly intact without any focal sensory/motor deficits. Cranial nerves: II-XII intact, grossly non-focal.  Psychiatric: Alert and oriented, thought content appropriate, normal insight  Capillary Refill: Brisk, 3 seconds, normal   Peripheral Pulses: +2 palpable, equal bilaterally       Labs:   Recent Labs     02/18/23  1042 02/19/23  0514   WBC 18.1* 9.2   HGB 15.9 13.5   HCT 47.9 40.3    158     Recent Labs     02/18/23  1042 02/19/23  0514   * 128*   K 3.6 2.7*   CL 69* 81*   CO2 39* 37*   BUN 67* 46*   CREATININE 2.8* 1.1   CALCIUM 11.3* 9.5     Recent Labs     02/18/23  1042   AST 42*   ALT 42*   BILITOT 0.8   ALKPHOS 96     No results for input(s): INR in the last 72 hours. Recent Labs     02/18/23  1042   TROPONINI 0.01       Urinalysis:    No results found for: Merl Holland, BACTERIA, RBCUA, BLOODU, SPECGRAV, GLUCOSEU    Radiology:  XR ABDOMEN (KUB) (SINGLE AP VIEW)   Final Result   Enteric tube with the tip in the side hole likely located within the large   hiatal hernia is seen on the same day CT. Small-bowel obstruction seen on   the same day CT not well appreciated due to relative lack of intraluminal gas. CT CHEST ABDOMEN PELVIS WO CONTRAST Additional Contrast? None   Final Result   1. Small-bowel obstruction with indeterminate etiology and an apparent   transition in the right lower quadrant/pelvis. There is no pattern of   pneumatosis or perforation. 2. Decompression of the distal ileum with stool and air in the colon.   This   may indicate incomplete obstruction or a recently developing pattern of obstruction. 3. Large hiatal hernia contributing to atelectasis in the left lower lobe. Peripheral bronchial opacification in the left lower lobe could potentially   be in association, although mucoid impaction is suspected. 4. Age indeterminate compression fractures that are likely remote at both T8   and T12. Additional remote fractures of the ribs and left pelvis are noted. IP CONSULT TO GENERAL SURGERY  IP CONSULT TO GENERAL SURGERY    Assessment/Plan:    Active Hospital Problems    Diagnosis     Small bowel obstruction ([facility] [address] [K56.609]      Priority: Medium     This is a 66-year-old female admitted with a small bowel obstruction surgery consult appreciated continue with NG tube to low suction continue with IV hydration, pain management. Acute kidney injury, due to dehydration, hyponatremia, hypercalcemia improving continue with IV hydration check daily BMP. GERD continue with IV PPI. COPD with chronic respiratory failure at home on 5 L oxygen continue with the supplemental oxygen and inhalers.     DVT Prophylaxis: Lovenox subcu  Diet: Diet NPO  Code Status: Full Code  PT/OT Eval Status: Not indicated    Dispo -     Appropriate for A1 Discharge Unit: No      Elizabeth Wagner MD

## 2023-02-19 NOTE — PROGRESS NOTES
4 Eyes Skin Assessment     The patient is being assess for  Admission    I agree that 2 RN's have performed a thorough Head to Toe Skin Assessment on the patient. ALL assessment sites listed below have been assessed. Areas assessed by both nurses: YES  [x]   Head, Face, and Ears   [x]   Shoulders, Back, and Chest  [x]   Arms, Elbows, and Hands   [x]   Coccyx, Sacrum, and IschIum  [x]   Legs, Feet, and Heels        Does the Patient have Skin Breakdown?   YES; See ADL (DTI coccyx)         Dedrick Prevention initiated:  Yes   Wound Care Orders initiated: n/a (wound care consulted)      [address]  nurse consulted for Pressure Injury (Stage 3,4, Unstageable, DTI, NWPT, and Complex wounds), New and Established Ostomies:  Yes      Nurse 1 eSignature: Electronically signed by Mackenzie Salguero RN on 2/18/23 at 7:39 PM EST    **SHARE this note so that the co-signing nurse is able to place an eSignature**    Nurse 2 eSignature: Electronically signed by Gerhard Libman, RN on 2/18/23 at 8:03 PM EST

## 2023-02-20 ENCOUNTER — APPOINTMENT (OUTPATIENT)
Dept: GENERAL RADIOLOGY | Age: 58
DRG: 336 | End: 2023-02-20
Payer: MEDICARE

## 2023-02-20 LAB
A/G RATIO: 1.6 (ref 1.1–2.2)
ALBUMIN SERPL-MCNC: 3.6 G/DL (ref 3.4–5)
ALP BLD-CCNC: 57 U/L (ref 40–129)
ALT SERPL-CCNC: 24 U/L (ref 10–40)
ANION GAP SERPL CALCULATED.3IONS-SCNC: 14 MMOL/L (ref 3–16)
AST SERPL-CCNC: 26 U/L (ref 15–37)
BILIRUB SERPL-MCNC: 0.9 MG/DL (ref 0–1)
BUN BLDV-MCNC: 17 MG/DL (ref 7–20)
CALCIUM SERPL-MCNC: 9 MG/DL (ref 8.3–10.6)
CHLORIDE BLD-SCNC: 91 MMOL/L (ref 99–110)
CO2: 29 MMOL/L (ref 21–32)
CREAT SERPL-MCNC: 0.6 MG/DL (ref 0.6–1.1)
EKG ATRIAL RATE: 93 BPM
EKG DIAGNOSIS: NORMAL
EKG P AXIS: 79 DEGREES
EKG P-R INTERVAL: 136 MS
EKG Q-T INTERVAL: 398 MS
EKG QRS DURATION: 132 MS
EKG QTC CALCULATION (BAZETT): 494 MS
EKG R AXIS: -65 DEGREES
EKG T AXIS: 59 DEGREES
EKG VENTRICULAR RATE: 93 BPM
GFR SERPL CREATININE-BSD FRML MDRD: >60 ML/MIN/{1.73_M2}
GLUCOSE BLD-MCNC: 74 MG/DL (ref 70–99)
HCT VFR BLD CALC: 35.3 % (ref 36–48)
HEMOGLOBIN: 11.9 G/DL (ref 12–16)
MAGNESIUM: 1.8 MG/DL (ref 1.8–2.4)
MCH RBC QN AUTO: 33.5 PG (ref 26–34)
MCHC RBC AUTO-ENTMCNC: 33.7 G/DL (ref 31–36)
MCV RBC AUTO: 99.3 FL (ref 80–100)
PDW BLD-RTO: 12.6 % (ref 12.4–15.4)
PLATELET # BLD: 116 K/UL (ref 135–450)
PMV BLD AUTO: 8 FL (ref 5–10.5)
POTASSIUM REFLEX MAGNESIUM: 3 MMOL/L (ref 3.5–5.1)
RBC # BLD: 3.56 M/UL (ref 4–5.2)
SODIUM BLD-SCNC: 134 MMOL/L (ref 136–145)
TOTAL PROTEIN: 5.9 G/DL (ref 6.4–8.2)
TROPONIN: <0.01 NG/ML
WBC # BLD: 6.6 K/UL (ref 4–11)

## 2023-02-20 PROCEDURE — 74250 X-RAY XM SM INT 1CNTRST STD: CPT

## 2023-02-20 PROCEDURE — 93005 ELECTROCARDIOGRAM TRACING: CPT | Performed by: NURSE PRACTITIONER

## 2023-02-20 PROCEDURE — 2700000000 HC OXYGEN THERAPY PER DAY

## 2023-02-20 PROCEDURE — 2580000003 HC RX 258: Performed by: HOSPITALIST

## 2023-02-20 PROCEDURE — 36415 COLL VENOUS BLD VENIPUNCTURE: CPT

## 2023-02-20 PROCEDURE — 6370000000 HC RX 637 (ALT 250 FOR IP): Performed by: HOSPITALIST

## 2023-02-20 PROCEDURE — 83735 ASSAY OF MAGNESIUM: CPT

## 2023-02-20 PROCEDURE — 6360000002 HC RX W HCPCS: Performed by: HOSPITALIST

## 2023-02-20 PROCEDURE — 94761 N-INVAS EAR/PLS OXIMETRY MLT: CPT

## 2023-02-20 PROCEDURE — C9113 INJ PANTOPRAZOLE SODIUM, VIA: HCPCS | Performed by: HOSPITALIST

## 2023-02-20 PROCEDURE — 84484 ASSAY OF TROPONIN QUANT: CPT

## 2023-02-20 PROCEDURE — 74019 RADEX ABDOMEN 2 VIEWS: CPT

## 2023-02-20 PROCEDURE — 99232 SBSQ HOSP IP/OBS MODERATE 35: CPT | Performed by: SURGERY

## 2023-02-20 PROCEDURE — 74018 RADEX ABDOMEN 1 VIEW: CPT

## 2023-02-20 PROCEDURE — 85027 COMPLETE CBC AUTOMATED: CPT

## 2023-02-20 PROCEDURE — 80053 COMPREHEN METABOLIC PANEL: CPT

## 2023-02-20 PROCEDURE — 93010 ELECTROCARDIOGRAM REPORT: CPT | Performed by: INTERNAL MEDICINE

## 2023-02-20 PROCEDURE — 94640 AIRWAY INHALATION TREATMENT: CPT

## 2023-02-20 PROCEDURE — 1200000000 HC SEMI PRIVATE

## 2023-02-20 RX ORDER — POTASSIUM CHLORIDE 7.45 MG/ML
10 INJECTION INTRAVENOUS
Status: COMPLETED | OUTPATIENT
Start: 2023-02-20 | End: 2023-02-20

## 2023-02-20 RX ADMIN — MORPHINE SULFATE 2 MG: 2 INJECTION, SOLUTION INTRAMUSCULAR; INTRAVENOUS at 13:11

## 2023-02-20 RX ADMIN — Medication 1 LOZENGE: at 14:21

## 2023-02-20 RX ADMIN — Medication 1 LOZENGE: at 00:44

## 2023-02-20 RX ADMIN — Medication 1 LOZENGE: at 22:24

## 2023-02-20 RX ADMIN — POTASSIUM CHLORIDE 10 MEQ: 7.46 INJECTION, SOLUTION INTRAVENOUS at 11:16

## 2023-02-20 RX ADMIN — TIOTROPIUM BROMIDE INHALATION SPRAY 2 PUFF: 3.12 SPRAY, METERED RESPIRATORY (INHALATION) at 08:03

## 2023-02-20 RX ADMIN — Medication 1 LOZENGE: at 21:05

## 2023-02-20 RX ADMIN — SODIUM CHLORIDE: 9 INJECTION, SOLUTION INTRAVENOUS at 06:38

## 2023-02-20 RX ADMIN — MORPHINE SULFATE 2 MG: 2 INJECTION, SOLUTION INTRAMUSCULAR; INTRAVENOUS at 00:44

## 2023-02-20 RX ADMIN — TRAZODONE HYDROCHLORIDE 100 MG: 50 TABLET ORAL at 21:05

## 2023-02-20 RX ADMIN — MORPHINE SULFATE 2 MG: 2 INJECTION, SOLUTION INTRAMUSCULAR; INTRAVENOUS at 04:27

## 2023-02-20 RX ADMIN — HEPARIN SODIUM 5000 UNITS: 5000 INJECTION INTRAVENOUS; SUBCUTANEOUS at 21:04

## 2023-02-20 RX ADMIN — Medication 10 ML: at 08:45

## 2023-02-20 RX ADMIN — Medication 1 LOZENGE: at 11:01

## 2023-02-20 RX ADMIN — PANTOPRAZOLE SODIUM 40 MG: 40 INJECTION, POWDER, FOR SOLUTION INTRAVENOUS at 08:45

## 2023-02-20 RX ADMIN — FLUTICASONE PROPIONATE 1 SPRAY: 50 SPRAY, METERED NASAL at 08:44

## 2023-02-20 RX ADMIN — Medication 2 PUFF: at 19:23

## 2023-02-20 RX ADMIN — Medication 1 LOZENGE: at 07:19

## 2023-02-20 RX ADMIN — Medication 2 PUFF: at 08:03

## 2023-02-20 RX ADMIN — HEPARIN SODIUM 5000 UNITS: 5000 INJECTION INTRAVENOUS; SUBCUTANEOUS at 08:45

## 2023-02-20 RX ADMIN — MORPHINE SULFATE 2 MG: 2 INJECTION, SOLUTION INTRAMUSCULAR; INTRAVENOUS at 17:47

## 2023-02-20 RX ADMIN — CEFTRIAXONE SODIUM 1000 MG: 1 INJECTION, POWDER, FOR SOLUTION INTRAMUSCULAR; INTRAVENOUS at 14:28

## 2023-02-20 RX ADMIN — MORPHINE SULFATE 2 MG: 2 INJECTION, SOLUTION INTRAMUSCULAR; INTRAVENOUS at 21:53

## 2023-02-20 RX ADMIN — POTASSIUM CHLORIDE 10 MEQ: 7.46 INJECTION, SOLUTION INTRAVENOUS at 12:36

## 2023-02-20 RX ADMIN — Medication 1 LOZENGE: at 04:28

## 2023-02-20 RX ADMIN — MORPHINE SULFATE 2 MG: 2 INJECTION, SOLUTION INTRAMUSCULAR; INTRAVENOUS at 08:53

## 2023-02-20 RX ADMIN — SODIUM CHLORIDE: 9 INJECTION, SOLUTION INTRAVENOUS at 16:29

## 2023-02-20 RX ADMIN — POTASSIUM CHLORIDE 10 MEQ: 7.46 INJECTION, SOLUTION INTRAVENOUS at 14:31

## 2023-02-20 RX ADMIN — Medication 1 LOZENGE: at 18:43

## 2023-02-20 ASSESSMENT — PAIN DESCRIPTION - LOCATION
LOCATION: SHOULDER
LOCATION: ARM;HIP
LOCATION: THROAT
LOCATION: SHOULDER
LOCATION: THROAT
LOCATION: THROAT

## 2023-02-20 ASSESSMENT — PAIN SCALES - GENERAL
PAINLEVEL_OUTOF10: 9
PAINLEVEL_OUTOF10: 8
PAINLEVEL_OUTOF10: 0
PAINLEVEL_OUTOF10: 9
PAINLEVEL_OUTOF10: 5
PAINLEVEL_OUTOF10: 8
PAINLEVEL_OUTOF10: 9

## 2023-02-20 ASSESSMENT — PAIN DESCRIPTION - DESCRIPTORS
DESCRIPTORS: ACHING;SORE
DESCRIPTORS: ACHING
DESCRIPTORS: ACHING;SORE
DESCRIPTORS: ACHING
DESCRIPTORS: BURNING
DESCRIPTORS: ACHING

## 2023-02-20 ASSESSMENT — PAIN DESCRIPTION - PAIN TYPE: TYPE: ACUTE PAIN;CHRONIC PAIN

## 2023-02-20 ASSESSMENT — PAIN DESCRIPTION - ORIENTATION
ORIENTATION: LEFT
ORIENTATION: LEFT

## 2023-02-20 ASSESSMENT — PAIN - FUNCTIONAL ASSESSMENT
PAIN_FUNCTIONAL_ASSESSMENT: ACTIVITIES ARE NOT PREVENTED
PAIN_FUNCTIONAL_ASSESSMENT: PREVENTS OR INTERFERES SOME ACTIVE ACTIVITIES AND ADLS

## 2023-02-20 NOTE — PROGRESS NOTES
Hospitalist Progress Note      PCP: ZARI Day - CNP    Date of Admission: 2/18/2023    Chief Complaint: Nausea vomiting    Hospital Course: This is a 51-year-old female with  chronic respiratory failure on oxygen 5 L at home admitted with  nausea vomiting diagnosis small bowel obstruction NG tube in place surgery consulted and following. Subjective: Patient is sitting in a chair NG tube in place positive flatus yesterday denies any BM. Medications:  Reviewed    Infusion Medications    sodium chloride      sodium chloride 125 mL/hr at 02/20/23 9163     Scheduled Medications    mometasone-formoterol  2 puff Inhalation BID    fluticasone  1 spray Each Nostril Daily    tiotropium  2 puff Inhalation Daily    traZODone  100 mg Oral Nightly    sodium chloride flush  5-40 mL IntraVENous 2 times per day    heparin (porcine)  5,000 Units SubCUTAneous BID    pantoprazole  40 mg IntraVENous Daily    cefTRIAXone (ROCEPHIN) IV  1,000 mg IntraVENous Q24H     PRN Meds: albuterol, levalbuterol, sodium chloride flush, sodium chloride, ondansetron **OR** ondansetron, polyethylene glycol, acetaminophen **OR** acetaminophen, morphine, Benzocaine-Menthol      Intake/Output Summary (Last 24 hours) at 2/20/2023 0955  Last data filed at 2/20/2023 0316  Gross per 24 hour   Intake 4847.79 ml   Output 1060 ml   Net 3787.79 ml       Physical Exam Performed:    /64   Pulse 83   Temp 97.7 °F (36.5 °C) (Oral)   Resp 16   Ht 5' 1\" (1.549 m)   Wt 103 lb (46.7 kg)   SpO2 94%   BMI 19.46 kg/m²     General appearance: No apparent distress, appears stated age and cooperative. HEENT: Pupils equal, round, and reactive to light. Conjunctivae/corneas clear. Neck: Supple, with full range of motion. No jugular venous distention. Trachea midline. Respiratory:  Normal respiratory effort. Clear to auscultation, bilaterally without Rales/Wheezes/Rhonchi.   Cardiovascular: Regular rate and rhythm with normal S1/S2 without murmurs, rubs or gallops. Abdomen: Soft, non-tender, non-distended with diminished bowel sounds. Musculoskeletal: No clubbing, cyanosis or edema bilaterally. Full range of motion without deformity. Skin: Skin color, texture, turgor normal.  No rashes or lesions. Neurologic:  Neurovascularly intact without any focal sensory/motor deficits. Cranial nerves: II-XII intact, grossly non-focal.  Psychiatric: Alert and oriented, thought content appropriate, normal insight  Capillary Refill: Brisk, 3 seconds, normal   Peripheral Pulses: +2 palpable, equal bilaterally       Labs:   Recent Labs     02/18/23  1042 02/19/23  0514 02/20/23  0516   WBC 18.1* 9.2 6.6   HGB 15.9 13.5 11.9*   HCT 47.9 40.3 35.3*    158 116*     Recent Labs     02/18/23  1042 02/19/23  0514 02/19/23  1752 02/20/23  0516   * 128*  --  134*   K 3.6 2.7* 3.0* 3.0*   CL 69* 81*  --  91*   CO2 39* 37*  --  29   BUN 67* 46*  --  17   CREATININE 2.8* 1.1  --  0.6   CALCIUM 11.3* 9.5  --  9.0     Recent Labs     02/18/23  1042 02/20/23  0516   AST 42* 26   ALT 42* 24   BILITOT 0.8 0.9   ALKPHOS 96 57     No results for input(s): INR in the last 72 hours. Recent Labs     02/18/23  1042 02/20/23  0516   TROPONINI 0.01 <0.01       Urinalysis:    No results found for: NITRU, [address], BACTERIA, RBCUA, BLOODU, SPECGRAV, GLUCOSEU    Radiology:  XR ABDOMEN (2 VIEWS)   Final Result   History of small-bowel obstruction with stable dilated air-filled loops of   small bowel in comparison to the prior radiograph. XR ABDOMEN (KUB) (SINGLE AP VIEW)   Final Result   Tip of NG tube projects in left upper quadrant, presumably in hiatal hernia,. There is decreased gaseous distention compared to prior         XR ABDOMEN (KUB) (SINGLE AP VIEW)   Final Result   Enteric tube with the tip in the side hole likely located within the large   hiatal hernia is seen on the same day CT.   Small-bowel obstruction seen on   the same day CT not well appreciated due to relative lack of intraluminal gas. CT CHEST ABDOMEN PELVIS WO CONTRAST Additional Contrast? None   Final Result   1. Small-bowel obstruction with indeterminate etiology and an apparent   transition in the right lower quadrant/pelvis. There is no pattern of   pneumatosis or perforation. 2. Decompression of the distal ileum with stool and air in the colon. This   may indicate incomplete obstruction or a recently developing pattern of   obstruction. 3. Large hiatal hernia contributing to atelectasis in the left lower lobe. Peripheral bronchial opacification in the left lower lobe could potentially   be in association, although mucoid impaction is suspected. 4. Age indeterminate compression fractures that are likely remote at both T8   and T12. Additional remote fractures of the ribs and left pelvis are noted. [place] SMALL BOWEL FOLLOW THROUGH ONLY    (Results Pending)       IP CONSULT TO GENERAL SURGERY  IP CONSULT TO GENERAL SURGERY    Assessment/Plan:    Active Hospital Problems    Diagnosis     Small bowel obstruction ([facility] [address] [K56.609]      Priority: Medium     This is a 69-year-old female admitted with a small bowel obstruction surgery consult appreciated continue with NG tube to low suction continue with IV hydration, pain management. Leukocytosis improving on IV Rocephin. Small bowel follow-through today per surgery. Acute kidney injury, due to dehydration, hyponatremia, hypercalcemia improving continue with IV hydration check daily BMP. GERD continue with IV PPI. COPD with chronic respiratory failure at home on 5 L oxygen continue with the supplemental oxygen and inhalers. Hypokalemia supplemented with IV potassium.     DVT Prophylaxis: Lovenox subcu  Diet: Diet NPO Exceptions are: Ice Chips  Code Status: Full Code  PT/OT Eval Status: Not indicated    Dispo -once okay with surgery    Appropriate for A1 Discharge Unit: No      Bhargav Morrell MD

## 2023-02-20 NOTE — CARE COORDINATION
Case Management Assessment  Initial Evaluation    Date/Time of Evaluation: 2/20/2023 2:15 PM  Assessment Completed by: FREDY Hunter    If patient is discharged prior to next notation, then this note serves as note for discharge by case management. Patient Name: Indra Marie                   YOB: 1965  Diagnosis: Hyponatremia [E87.1]  Small bowel obstruction ([facility] [address] [K56.609]  ELIJAH (acute kidney injury) ([facility] [address] [N17.9]  Leukocytosis, unspecified type [D72.829]  Nausea and vomiting, unspecified vomiting type [R11.2]                   Date / Time: 2/18/2023 10:06 AM    Patient Admission Status: Inpatient   Readmission Risk (Low < 19, Mod (19-27), High > 27): Readmission Risk Score: 9.8    Current PCP: ZARI Gottlieb CNP  PCP verified by CM? Yes    Chart Reviewed: Yes      History Provided by: Patient  Patient Orientation: Alert and Oriented, Person, Place, Situation, Self    Patient Cognition: Alert    Hospitalization in the last 30 days (Readmission):  No    If yes, Readmission Assessment in CM Navigator will be completed. Advance Directives:      Code Status: Full Code   Patient's Primary Decision Maker is: Legal Next of Kin    Primary Decision MakerSaudarvin Webster - [phone]    Secondary Decision Maker: Kinjal King - Son-in-Law - [phone]    Discharge Planning:    Patient lives with: Alone Type of Home: House  Primary Care Giver: Self  Patient Support Systems include: Children, Family Members   Current Financial resources: Medicare  Current community resources: None  Current services prior to admission: Durable Medical Equipment, Other (Comment) (has private , lawn care, etc)            Current DME: Oxygen Therapy (Comment) (5 L baseline [employer], also has integon)            Type of Home Care services:  None    ADLS  Prior functional level: Independent in ADLs/IADLs  Current functional level:  Independent in ADLs/IADLs    Family can provide assistance at DC: Yes  Would you like Case Management to discuss the discharge plan with any other family members/significant others, and if so, who? No  Plans to Return to Present Housing: Yes  Potential Assistance needed at discharge: Other (Comment) (referral to [employer] for possible increased supports)  Patient expects to discharge to: [address] [place] [address] for transportation at discharge: Family    Financial    Payor: MEDICARE / Plan: MEDICARE PART A AND B / Product Type: *No Product type* /     Does insurance require precert for SNF: No    Potential assistance Purchasing Medications: No  Meds-to-Beds request: Yes      [address], [address] [phone] - f [phone]  [address] [address]  Phone: [phone] Fax: [phone]    [facility] [address] [address] [place] [phone] - F [phone]  [address] [facility] [place] [address]  Phone: [phone] Fax: [phone]      Notes:    Factors facilitating achievement of predicted outcomes: Family support, Motivated, Cooperative, Pleasant, and Sense of humor    Barriers to discharge: Medical complications    Additional Case Management Notes: Patient reports moved to [address] [place] [address],[address] in the last 6 months, Dtr lives three mins away and eats dinner with them daily. Patient reports wears 02 baseline is 5 L has portability. Referral made to [facility] re possible increased community supports     The Plan for Transition of Care is related to the following treatment goals of Hyponatremia [E87.1]  Small bowel obstruction ([address] [K56.609]  ELIJAH (acute kidney injury) ([address] [N17.9]  Leukocytosis, unspecified type [D72.829]  Nausea and vomiting, unspecified vomiting type [R11.2]    The Patient and/or Patient Representative Agree with the Discharge Plan?  Yes      Jemima Gentile [place]  Case Management Department  Ph: [phone] Fax: [phone]

## 2023-02-20 NOTE — PROGRESS NOTES
Assessment complete and charted. Pt continues to require education on NPO with ice status and POC progression. VSS on RA. Pt does continue to need frequent PRN IV morphine though she states it is not for her abd pain but rather for her R hip pain. This AM pt began to c/o more L shoulder and neck pain. Writer notified PM NP of this and previous troponin (with ELIJAH). Pt denies further needs at this time. Call light within reach, will continue to monitor.

## 2023-02-20 NOTE — CONSULTS
[address] [address] Continence Nurse  Consult Note       NAME:  Olean Councilman  MEDICAL RECORD NUMBER:  [id number]  AGE: 62 y.o. GENDER: female  : 1965  TODAY'S DATE:  2023    Subjective; Yes my bottom is sore. Reason for WOCN Evaluation and Assessment: Possible DTI     Wound Care to sign off Care. Please call if need to re evaluate. Olean Councilman is a 62 y.o. female referred by:   [] Physician  [x] Nursing  [] Other:     Wound Identification:stage 2 with shearing  Wound Type: pressure  Contributing Factors: chronic pressure and shear force    Wound History: 62 y.o. female who presented to [facility] with nausea vomiting since Tuesday patient stated she is having nausea vomiting every 1-2 hours and no BM since last Monday patient denies any fever complains of chills no chest pain chronic shortness of breath on oxygen 5 L no cough no sputum patient stated that usually she has a bowel movement 2-3 times per day. In the emergency room patient was noted to have leukocytosis, hyponatremia, acute kidney injury, elevated calcium, CT of the abdomen and pelvis with a small bowel obstruction hospitalist was consulted for admission General surgery consulted  Current Wound Care Treatment:  sacral heart to sacrum bony prominence    Patient Goal of Care:  [x] Wound Healing  [] Odor Control  [] Palliative Care  [x] Pain Control   [] Other:         PAST MEDICAL HISTORY        Diagnosis Date    Cirrhosis ([facility] [address]     COPD (chronic obstructive pulmonary disease) ([facility] [address]     Hyperlipidemia        PAST SURGICAL HISTORY    Past Surgical History:   Procedure Laterality Date    THUMB ARTHROSCOPY      TUBAL LIGATION         FAMILY HISTORY    No family history on file.     SOCIAL HISTORY    Social History     Tobacco Use    Smoking status: Former     Types: Cigarettes    Smokeless tobacco: Never   Vaping Use    Vaping Use: Unknown   Substance Use Topics    Alcohol use: Not Currently    Drug use: Yes     Types: Marijuana (Weed)       ALLERGIES    Allergies   Allergen Reactions    Doxycycline Hyclate Nausea And Vomiting       MEDICATIONS    No current facility-administered medications on file prior to encounter. Current Outpatient Medications on File Prior to Encounter   Medication Sig Dispense Refill    traZODone (DESYREL) 100 MG tablet Take 1 tablet by mouth nightly 90 tablet 0    omeprazole (PRILOSEC) 20 MG delayed release capsule TAKE 1 CAPSULE DAILY 90 capsule 3    spironolactone (ALDACTONE) 25 MG tablet Take 1 tablet by mouth daily 90 tablet 0    azelastine (ASTELIN) 0.1 % nasal spray       fluticasone (FLONASE) 50 MCG/ACT nasal spray       ADVAIR -21 MCG/ACT inhaler       furosemide (LASIX) 40 MG tablet 40 mg daily      SPIRIVA RESPIMAT 2.5 MCG/ACT AERS inhaler       albuterol (PROVENTIL) (5 MG/ML) 0.5% nebulizer solution Take 1 mL by nebulization 4 times daily as needed for Wheezing 120 each 3    Handicap Placard MISC by Does not apply route 1 each 0    levalbuterol (XOPENEX) 0.31 MG/3ML nebulization Take 3 mLs by nebulization every 4 hours as needed for Wheezing 3 mL 2       Objective; sitting cross legged in bed. /64   Pulse 83   Temp 97.7 °F (36.5 °C) (Oral)   Resp 18   Ht 5' 1\" (1.549 m)   Wt 103 lb (46.7 kg)   SpO2 94%   BMI 19.46 kg/m²     LABS:  WBC:    Lab Results   Component Value Date/Time    WBC 6.6 02/20/2023 05:16 AM     H/H:    Lab Results   Component Value Date/Time    HGB 11.9 02/20/2023 05:16 AM    HCT 35.3 02/20/2023 05:16 AM     PTT:  No results found for: APTT, PTT[APTT}  PT/INR:  No results found for: PROTIME, INR  HgBA1c:    Lab Results   Component Value Date/Time    LABA1C 5.3 01/18/2023 09:50 AM       Assessment; buttocks bi lateral and over sacrum red irritated dry.    Dedrick Risk Score: Dedrick Scale Score: 20    Patient Active Problem List   Diagnosis    Vitamin D deficiency    Mixed hyperlipidemia    Chronic obstructive pulmonary disease with acute exacerbation ([facility] [address]    Alopecia    Tobacco abuse    Major depressive disorder    Cyst of ovary, left    Gastroesophageal reflux disease without esophagitis    Primary hypertension    Hepatic cirrhosis (HCC)    Hiatal hernia    Umbilical hernia    Small bowel obstruction (HCC)       Measurements:  Wound 02/18/23 Coccyx Mid DTI (Active)   Wound Image   02/20/23 1304   Wound Etiology Pressure Stage 2 02/20/23 1304   Dressing Status Other (Comment) 02/20/23 1304   Wound Cleansed Soap and water 02/19/23 0920   Dressing/Treatment Zinc paste 02/20/23 1304   Offloading for Diabetic Foot Ulcers Offloading ordered 02/20/23 1304   Dressing Change Due 02/20/23 02/20/23 1304   Wound Length (cm) 13 cm 02/20/23 1304   Wound Width (cm) 18 cm 02/20/23 1304   Wound Depth (cm) 0.1 cm 02/20/23 1304   Wound Surface Area (cm^2) 234 cm^2 02/20/23 1304   Wound Volume (cm^3) 23.4 cm^3 02/20/23 1304   Distance Tunneling (cm) 0 cm 02/20/23 1304   Tunneling Position ___ O'Clock 0 02/20/23 1304   Undermining Starts ___ O'Clock 0 02/20/23 1304   Undermining Ends___ O'Clock 0 02/20/23 1304   Undermining Maxium Distance (cm) 0 02/20/23 1304   Wound Assessment Pink/red 02/20/23 1304   Drainage Amount None 02/20/23 1304   Odor None 02/20/23 1304   Sandy-wound Assessment Blanchable erythema 02/20/23 1304   Margins Undefined edges 02/20/23 1304   Wound Thickness Description not for Pressure Injury Partial thickness 02/20/23 1304   Number of days: 1              Response to treatment:  Well tolerated by patient. Pain Assessment:  Severity:  3 / 10  Quality of pain: aching, tender  Wound Pain Timing/Severity: intermittent  Premedicated: No    Plan   Plan of Care: Wound 02/18/23 Coccyx Mid DTI-Dressing/Treatment: Zinc paste (Foam to sacrum bony prominance)    Recommend;  Daily and PRN with sandy care cleanse buttocks/sacrum with bath wipes/ or normal saline, pat dry. Apply Zinc paste to reddened areas. Can leave sacral heart to bony prominences.     Wound Care to sign off Care. Please call if need to re evaluate. Specialty Bed Required : Yes   [x] Low Air Loss   [x] Pressure Redistribution  [] Fluid Immersion  [] Bariatric  [] Total Pressure Relief  [] Other:     Current Diet: Diet NPO Exceptions are: Ice Chips  Dietician consult:  Yes    Discharge Plan:  Placement for patient upon discharge: home with support    Patient appropriate for Outpatient [address]: No    Referrals:  [x]  / discharge planner following  [] [address] [facility]  [] Supplies  [] Other    Patient/Caregiver Teaching:Educated patient in importance of repositioning to prevent pressure areas from forming.     Level of patient/caregiver understanding able to:   [] Indicates understanding       [] Needs reinforcement  [] Unsuccessful      [x] Verbal Understanding  [] Demonstrated understanding       [] No evidence of learning  [] Refused teaching         [] N/A       Electronically signed by Teresa Blank RN, BSN on 2/20/2023 at 1:14 PM

## 2023-02-20 NOTE — PROGRESS NOTES
[facility] SURGERY    PATIENT NAME: Betty Villalobos     TODAY'S DATE: 2/20/2023    CHIEF COMPLAINT: abd pain    INTERVAL HISTORY/HPI:    Pt with less pain, no nausea, having flatus, no fevers. REVIEW OF SYSTEMS:  Pertinent positives and negatives as per interval history section    OBJECTIVE:  VITALS:  /64   Pulse 83   Temp 97.7 °F (36.5 °C) (Oral)   Resp 16   Ht 5' 1\" (1.549 m)   Wt 103 lb (46.7 kg)   SpO2 94%   BMI 19.46 kg/m²     INTAKE/OUTPUT:    I/O last 3 completed shifts: In: 4957.8 [P.O.:440; I.V.:3855.1; IV Piggyback:662.7]  Out: 1160 [Emesis/NG output:1160]  No intake/output data recorded. CONSTITUTIONAL:  awake and alert  LUNGS:  Respirations easy and unlabored, clear to auscultation  CARD:  regular rate and rhythm  ABDOMEN:  hypoactive bowel sounds, soft, non-distended, less tender     Data:  CBC:   Recent Labs     02/18/23  1042 02/19/23  0514 02/20/23  0516   WBC 18.1* 9.2 6.6   HGB 15.9 13.5 11.9*   HCT 47.9 40.3 35.3*    158 116*     BMP:    Recent Labs     02/18/23  1042 02/19/23  0514 02/19/23  1752 02/20/23  0516   * 128*  --  134*   K 3.6 2.7* 3.0* 3.0*   CL 69* 81*  --  91*   CO2 39* 37*  --  29   BUN 67* 46*  --  17   CREATININE 2.8* 1.1  --  0.6   GLUCOSE 137* 83  --  74     Hepatic:   Recent Labs     02/18/23  1042 02/20/23  0516   AST 42* 26   ALT 42* 24   BILITOT 0.8 0.9   ALKPHOS 96 57     Mag:      Recent Labs     02/19/23  0514 02/20/23  0516   MG 2.10 1.80      Phos:   No results for input(s): PHOS in the last 72 hours. INR: No results for input(s): INR in the last 72 hours. Radiology Review:  *Imaging personally reviewed by me.    AXR - dilated small bowel      ASSESSMENT AND PLAN:  63 yo with sbo  Taking lots of ice chips and water   Though small bowel is dilated on AXR she has less pain and is having flatus  Will get SBFT to see if resolving     Electronically signed by Bong Martin MD     [id number]

## 2023-02-20 NOTE — PROGRESS NOTES
Writer updated pt on lab and EKG results. No new orders from PM hospitalist. Pt states she still has L shoulder pain despite morphine and heat packs. Writer asked if she would like me to page for a lidocaine patch to see if that would help. Pt again raised her voice stating she won't ask for anything because we won't help her with anything. Writer asked what she meant by this and she states that she has asked for phenol spray for two days because her throat pain is uncontrolled. Writer attempted to remind pt that yesterday morning I asked her if she would like phenol spray since she c/o throat pain and she stated that the lozenges were working great hence I did not ask for the phenol spray. Pt began to scream at writer stating that we are lying about what she says. Writer reassured pt that we can page and see if phenol spray is now in stock as pt was previously informed that it is out of stock. Rx states phenol spray is on backorder but hurricane spray is in stock if AM provider would like to order this. Pt updated and reassured that we do not want her to be uncomfortable but  instead vocalize what she would like. Charge RN notified of pt complaints and aggressive behavior toward staff as this has been a concern voiced by the last three primary RNs.

## 2023-02-20 NOTE — PROGRESS NOTES
Pt called out asking for a straw. Writer reiterated that two staff members had noted her drinking from her melted ice cup tonight and have have removed the straws from bedside multiple times as a reminder not to drink liquids until cleared by surgery. Pt voiced understanding and stated that she just wants straws so that she can rinse her mouth after her inhalers. Writer informed pt that straws were in her lock box for RT to provide when inhalers are provided. Pt became verbally aggressive with staff again stating that she doesn't want to argue and understands what she needs. Writer again explained that we are just attempting to help her remember not to drink until cleared by surgery as she has asked multiple staff for drinks, multiple cups of ice, and even to advance her diet despite multiple attempts at reeducation.

## 2023-02-21 ENCOUNTER — APPOINTMENT (OUTPATIENT)
Dept: GENERAL RADIOLOGY | Age: 58
DRG: 336 | End: 2023-02-21
Payer: MEDICARE

## 2023-02-21 LAB
A/G RATIO: 1.7 (ref 1.1–2.2)
ALBUMIN SERPL-MCNC: 2.7 G/DL (ref 3.4–5)
ALP BLD-CCNC: 42 U/L (ref 40–129)
ALT SERPL-CCNC: 16 U/L (ref 10–40)
ANION GAP SERPL CALCULATED.3IONS-SCNC: 10 MMOL/L (ref 3–16)
AST SERPL-CCNC: 18 U/L (ref 15–37)
BILIRUB SERPL-MCNC: 0.5 MG/DL (ref 0–1)
BUN BLDV-MCNC: 5 MG/DL (ref 7–20)
CALCIUM SERPL-MCNC: 6.9 MG/DL (ref 8.3–10.6)
CHLORIDE BLD-SCNC: 103 MMOL/L (ref 99–110)
CO2: 25 MMOL/L (ref 21–32)
CREAT SERPL-MCNC: <0.5 MG/DL (ref 0.6–1.1)
GFR SERPL CREATININE-BSD FRML MDRD: >60 ML/MIN/{1.73_M2}
GLUCOSE BLD-MCNC: 127 MG/DL (ref 70–99)
GLUCOSE BLD-MCNC: 140 MG/DL (ref 70–99)
GLUCOSE BLD-MCNC: 75 MG/DL (ref 70–99)
HCT VFR BLD CALC: 28.4 % (ref 36–48)
HEMOGLOBIN: 9.7 G/DL (ref 12–16)
MAGNESIUM: 1.4 MG/DL (ref 1.8–2.4)
MCH RBC QN AUTO: 33.7 PG (ref 26–34)
MCHC RBC AUTO-ENTMCNC: 34.1 G/DL (ref 31–36)
MCV RBC AUTO: 98.8 FL (ref 80–100)
PDW BLD-RTO: 12.5 % (ref 12.4–15.4)
PERFORMED ON: ABNORMAL
PERFORMED ON: ABNORMAL
PLATELET # BLD: 89 K/UL (ref 135–450)
PLATELET SLIDE REVIEW: ABNORMAL
PMV BLD AUTO: 8.2 FL (ref 5–10.5)
POTASSIUM REFLEX MAGNESIUM: 3 MMOL/L (ref 3.5–5.1)
RBC # BLD: 2.87 M/UL (ref 4–5.2)
SLIDE REVIEW: ABNORMAL
SODIUM BLD-SCNC: 138 MMOL/L (ref 136–145)
TOTAL PROTEIN: 4.3 G/DL (ref 6.4–8.2)
WBC # BLD: 4.7 K/UL (ref 4–11)

## 2023-02-21 PROCEDURE — C9113 INJ PANTOPRAZOLE SODIUM, VIA: HCPCS | Performed by: HOSPITALIST

## 2023-02-21 PROCEDURE — 94640 AIRWAY INHALATION TREATMENT: CPT

## 2023-02-21 PROCEDURE — 6370000000 HC RX 637 (ALT 250 FOR IP): Performed by: INTERNAL MEDICINE

## 2023-02-21 PROCEDURE — 6360000002 HC RX W HCPCS: Performed by: INTERNAL MEDICINE

## 2023-02-21 PROCEDURE — 6360000002 HC RX W HCPCS: Performed by: SURGERY

## 2023-02-21 PROCEDURE — 2580000003 HC RX 258: Performed by: HOSPITALIST

## 2023-02-21 PROCEDURE — 99232 SBSQ HOSP IP/OBS MODERATE 35: CPT | Performed by: SURGERY

## 2023-02-21 PROCEDURE — 2700000000 HC OXYGEN THERAPY PER DAY

## 2023-02-21 PROCEDURE — 6360000002 HC RX W HCPCS: Performed by: HOSPITALIST

## 2023-02-21 PROCEDURE — 85027 COMPLETE CBC AUTOMATED: CPT

## 2023-02-21 PROCEDURE — 94761 N-INVAS EAR/PLS OXIMETRY MLT: CPT

## 2023-02-21 PROCEDURE — 74018 RADEX ABDOMEN 1 VIEW: CPT

## 2023-02-21 PROCEDURE — 36415 COLL VENOUS BLD VENIPUNCTURE: CPT

## 2023-02-21 PROCEDURE — 6370000000 HC RX 637 (ALT 250 FOR IP): Performed by: HOSPITALIST

## 2023-02-21 PROCEDURE — 83735 ASSAY OF MAGNESIUM: CPT

## 2023-02-21 PROCEDURE — 80053 COMPREHEN METABOLIC PANEL: CPT

## 2023-02-21 PROCEDURE — 1200000000 HC SEMI PRIVATE

## 2023-02-21 RX ORDER — OXYCODONE HYDROCHLORIDE AND ACETAMINOPHEN 5; 325 MG/1; MG/1
1 TABLET ORAL EVERY 4 HOURS PRN
Status: DISCONTINUED | OUTPATIENT
Start: 2023-02-21 | End: 2023-02-24

## 2023-02-21 RX ORDER — POTASSIUM CHLORIDE 20 MEQ/1
40 TABLET, EXTENDED RELEASE ORAL 2 TIMES DAILY
Status: COMPLETED | OUTPATIENT
Start: 2023-02-21 | End: 2023-02-21

## 2023-02-21 RX ORDER — OXYCODONE HYDROCHLORIDE AND ACETAMINOPHEN 5; 325 MG/1; MG/1
2 TABLET ORAL EVERY 4 HOURS PRN
Status: DISCONTINUED | OUTPATIENT
Start: 2023-02-21 | End: 2023-02-24

## 2023-02-21 RX ORDER — MAGNESIUM SULFATE IN WATER 40 MG/ML
2000 INJECTION, SOLUTION INTRAVENOUS ONCE
Status: COMPLETED | OUTPATIENT
Start: 2023-02-21 | End: 2023-02-21

## 2023-02-21 RX ADMIN — POTASSIUM CHLORIDE 40 MEQ: 1500 TABLET, EXTENDED RELEASE ORAL at 20:16

## 2023-02-21 RX ADMIN — MORPHINE SULFATE 2 MG: 2 INJECTION, SOLUTION INTRAMUSCULAR; INTRAVENOUS at 10:57

## 2023-02-21 RX ADMIN — Medication 1 LOZENGE: at 08:30

## 2023-02-21 RX ADMIN — ACETAMINOPHEN 650 MG: 325 TABLET ORAL at 08:35

## 2023-02-21 RX ADMIN — Medication 1 LOZENGE: at 00:48

## 2023-02-21 RX ADMIN — MORPHINE SULFATE 2 MG: 2 INJECTION, SOLUTION INTRAMUSCULAR; INTRAVENOUS at 05:57

## 2023-02-21 RX ADMIN — TRAZODONE HYDROCHLORIDE 100 MG: 50 TABLET ORAL at 20:16

## 2023-02-21 RX ADMIN — Medication 10 ML: at 08:35

## 2023-02-21 RX ADMIN — Medication 10 ML: at 20:16

## 2023-02-21 RX ADMIN — ALBUTEROL SULFATE 2.5 MG: 2.5 SOLUTION RESPIRATORY (INHALATION) at 20:27

## 2023-02-21 RX ADMIN — OXYCODONE AND ACETAMINOPHEN 2 TABLET: 5; 325 TABLET ORAL at 20:16

## 2023-02-21 RX ADMIN — Medication 2 PUFF: at 20:26

## 2023-02-21 RX ADMIN — SODIUM CHLORIDE: 9 INJECTION, SOLUTION INTRAVENOUS at 08:45

## 2023-02-21 RX ADMIN — POTASSIUM CHLORIDE 40 MEQ: 1500 TABLET, EXTENDED RELEASE ORAL at 11:33

## 2023-02-21 RX ADMIN — HEPARIN SODIUM 5000 UNITS: 5000 INJECTION INTRAVENOUS; SUBCUTANEOUS at 08:32

## 2023-02-21 RX ADMIN — CEFTRIAXONE SODIUM 1000 MG: 1 INJECTION, POWDER, FOR SOLUTION INTRAMUSCULAR; INTRAVENOUS at 14:38

## 2023-02-21 RX ADMIN — OXYCODONE AND ACETAMINOPHEN 2 TABLET: 5; 325 TABLET ORAL at 15:51

## 2023-02-21 RX ADMIN — Medication 1 LOZENGE: at 10:42

## 2023-02-21 RX ADMIN — Medication 1 LOZENGE: at 03:57

## 2023-02-21 RX ADMIN — SODIUM CHLORIDE: 9 INJECTION, SOLUTION INTRAVENOUS at 00:45

## 2023-02-21 RX ADMIN — MORPHINE SULFATE 2 MG: 2 INJECTION, SOLUTION INTRAMUSCULAR; INTRAVENOUS at 01:59

## 2023-02-21 RX ADMIN — PANTOPRAZOLE SODIUM 40 MG: 40 INJECTION, POWDER, FOR SOLUTION INTRAVENOUS at 08:33

## 2023-02-21 RX ADMIN — TIOTROPIUM BROMIDE INHALATION SPRAY 2 PUFF: 3.12 SPRAY, METERED RESPIRATORY (INHALATION) at 08:15

## 2023-02-21 RX ADMIN — Medication 1 LOZENGE: at 19:50

## 2023-02-21 RX ADMIN — FLUTICASONE PROPIONATE 1 SPRAY: 50 SPRAY, METERED NASAL at 08:38

## 2023-02-21 RX ADMIN — ALBUTEROL SULFATE 5 MG: 2.5 SOLUTION RESPIRATORY (INHALATION) at 16:46

## 2023-02-21 RX ADMIN — Medication 2 PUFF: at 08:16

## 2023-02-21 RX ADMIN — ALBUTEROL SULFATE 2.5 MG: 2.5 SOLUTION RESPIRATORY (INHALATION) at 04:08

## 2023-02-21 RX ADMIN — MAGNESIUM SULFATE HEPTAHYDRATE 2000 MG: 40 INJECTION, SOLUTION INTRAVENOUS at 11:38

## 2023-02-21 ASSESSMENT — PAIN DESCRIPTION - ORIENTATION
ORIENTATION: RIGHT
ORIENTATION: MID
ORIENTATION: RIGHT;MID
ORIENTATION: LEFT;MID

## 2023-02-21 ASSESSMENT — PAIN DESCRIPTION - DESCRIPTORS
DESCRIPTORS: ACHING;SHARP;SHOOTING
DESCRIPTORS: ACHING;SORE
DESCRIPTORS: ACHING
DESCRIPTORS: ACHING;SORE

## 2023-02-21 ASSESSMENT — PAIN SCALES - GENERAL
PAINLEVEL_OUTOF10: 0
PAINLEVEL_OUTOF10: 9
PAINLEVEL_OUTOF10: 9
PAINLEVEL_OUTOF10: 4
PAINLEVEL_OUTOF10: 3
PAINLEVEL_OUTOF10: 7
PAINLEVEL_OUTOF10: 9
PAINLEVEL_OUTOF10: 8
PAINLEVEL_OUTOF10: 10

## 2023-02-21 ASSESSMENT — PAIN - FUNCTIONAL ASSESSMENT
PAIN_FUNCTIONAL_ASSESSMENT: ACTIVITIES ARE NOT PREVENTED
PAIN_FUNCTIONAL_ASSESSMENT: ACTIVITIES ARE NOT PREVENTED

## 2023-02-21 ASSESSMENT — PAIN DESCRIPTION - LOCATION
LOCATION: THROAT
LOCATION: HIP
LOCATION: HIP
LOCATION: THROAT;NECK;SHOULDER
LOCATION: HIP;NECK;SHOULDER

## 2023-02-21 ASSESSMENT — PAIN DESCRIPTION - PAIN TYPE: TYPE: ACUTE PAIN;CHRONIC PAIN

## 2023-02-21 NOTE — PLAN OF CARE
Problem: Pain  Goal: Verbalizes/displays adequate comfort level or baseline comfort level  Outcome: Progressing  Flowsheets (Taken 2/21/2023 0123)  Verbalizes/displays adequate comfort level or baseline comfort level:   Encourage patient to monitor pain and request assistance   Assess pain using appropriate pain scale   Administer analgesics based on type and severity of pain and evaluate response   Implement non-pharmacological measures as appropriate and evaluate response

## 2023-02-21 NOTE — PROGRESS NOTES
Hospitalist Progress Note      PCP: Teresa Hairston, APRN - CNP    Date of Admission: 2/18/2023    Chief Complaint: Nausea vomiting    Hospital Course: This is a 61-year-old female with  chronic respiratory failure on oxygen 5 L at home admitted with  nausea vomiting diagnosis small bowel obstruction NG tube in place surgery consulted and following. Subjective: Patient is sitting in bed. NG tube out. Tolerating liquids. Flatus. Medications:  Reviewed    Infusion Medications    sodium chloride       Scheduled Medications    potassium chloride  40 mEq Oral BID    mometasone-formoterol  2 puff Inhalation BID    fluticasone  1 spray Each Nostril Daily    tiotropium  2 puff Inhalation Daily    traZODone  100 mg Oral Nightly    sodium chloride flush  5-40 mL IntraVENous 2 times per day    heparin (porcine)  5,000 Units SubCUTAneous BID    pantoprazole  40 mg IntraVENous Daily    cefTRIAXone (ROCEPHIN) IV  1,000 mg IntraVENous Q24H     PRN Meds: oxyCODONE-acetaminophen **OR** oxyCODONE-acetaminophen, albuterol, levalbuterol, sodium chloride flush, sodium chloride, ondansetron **OR** ondansetron, polyethylene glycol, acetaminophen **OR** acetaminophen, Benzocaine-Menthol      Intake/Output Summary (Last 24 hours) at 2/21/2023 1542  Last data filed at 2/21/2023 1442  Gross per 24 hour   Intake 5125.82 ml   Output 1300 ml   Net 3825.82 ml         Physical Exam Performed:    /72   Pulse (!) 103   Temp 98.5 °F (36.9 °C) (Oral)   Resp 18   Ht 5' 1\" (1.549 m)   Wt 108 lb 4.8 oz (49.1 kg)   SpO2 95%   BMI 20.46 kg/m²     General appearance: No apparent distress, appears stated age and cooperative. HEENT: Pupils equal, round, and reactive to light. Conjunctivae/corneas clear. Neck: Supple, with full range of motion. No jugular venous distention. Trachea midline. Respiratory:  Normal respiratory effort. Clear to auscultation, bilaterally without Rales/Wheezes/Rhonchi.   Cardiovascular: Regular rate and rhythm with normal S1/S2 without murmurs, rubs or gallops. Abdomen: Soft, non-tender, non-distended with bowel sounds. Musculoskeletal: No clubbing, cyanosis or edema bilaterally. Full range of motion without deformity. Skin: Skin color, texture, turgor normal.  No rashes or lesions. Neurologic:  Neurovascularly intact without any focal sensory/motor deficits. Cranial nerves: II-XII intact, grossly non-focal.  Psychiatric: Alert and oriented, thought content appropriate, normal insight  Capillary Refill: Brisk, 3 seconds, normal   Peripheral Pulses: +2 palpable, equal bilaterally       Labs:   Recent Labs     02/19/23  0514 02/20/23  0516 02/21/23  0531   WBC 9.2 6.6 4.7   HGB 13.5 11.9* 9.7*   HCT 40.3 35.3* 28.4*    116* 89*       Recent Labs     02/19/23  0514 02/19/23  1752 02/20/23  0516 02/21/23  0531   *  --  134* 138   K 2.7* 3.0* 3.0* 3.0*   CL 81*  --  91* 103   CO2 37*  --  29 25   BUN 46*  --  17 5*   CREATININE 1.1  --  0.6 <0.5*   CALCIUM 9.5  --  9.0 6.9*       Recent Labs     02/20/23  0516 02/21/23  0531   AST 26 18   ALT 24 16   BILITOT 0.9 0.5   ALKPHOS 57 42       No results for input(s): INR in the last 72 hours. Recent Labs     02/20/23  0516   TROPONINI <0.01         Urinalysis:    No results found for: Jaynee Imus, BACTERIA, RBCUA, BLOODU, SPECGRAV, GLUCOSEU    Radiology:  XR ABDOMEN (KUB) (SINGLE AP VIEW)   Final Result   Slight decrease in small bowel dilatation. There is likely a small amount of contrast entering the cecum         XR ABDOMEN (KUB) (SINGLE AP VIEW)   Final Result   Dilated small bowel loops filled with contrast.  Nonvisualization of large   bowel yet. FL SMALL BOWEL FOLLOW THROUGH ONLY   Preliminary Result   1. Persistent findings consistent with presence of small bowel obstruction.    Plain film examination of the abdomen will be obtained at 10 PM this evening   and 7 AM on 2/21/2023 to evaluate movement of contrast material.   2. Moderate-sized hiatal hernia. 3. Prominence of folds within the proximal aspect of the stomach. While   finding in part may be on the basis of incomplete distention, true fold   thickening including changes of underlying gastritis also in the differential   diagnosis as described above. XR ABDOMEN (2 VIEWS)   Final Result   History of small-bowel obstruction with stable dilated air-filled loops of   small bowel in comparison to the prior radiograph. XR ABDOMEN (KUB) (SINGLE AP VIEW)   Final Result   Tip of NG tube projects in left upper quadrant, presumably in hiatal hernia,. There is decreased gaseous distention compared to prior         XR ABDOMEN (KUB) (SINGLE AP VIEW)   Final Result   Enteric tube with the tip in the side hole likely located within the large   hiatal hernia is seen on the same day CT. Small-bowel obstruction seen on   the same day CT not well appreciated due to relative lack of intraluminal gas. CT CHEST ABDOMEN PELVIS WO CONTRAST Additional Contrast? None   Final Result   1. Small-bowel obstruction with indeterminate etiology and an apparent   transition in the right lower quadrant/pelvis. There is no pattern of   pneumatosis or perforation. 2. Decompression of the distal ileum with stool and air in the colon. This   may indicate incomplete obstruction or a recently developing pattern of   obstruction. 3. Large hiatal hernia contributing to atelectasis in the left lower lobe. Peripheral bronchial opacification in the left lower lobe could potentially   be in association, although mucoid impaction is suspected. 4. Age indeterminate compression fractures that are likely remote at both T8   and T12. Additional remote fractures of the ribs and left pelvis are noted.          XR ABDOMEN (2 VIEWS)    (Results Pending)       IP CONSULT TO GENERAL SURGERY  IP CONSULT TO GENERAL SURGERY    Assessment/Plan:    Active Hospital Problems    Diagnosis     Small bowel obstruction ([facility] [address] [U57.201]      Priority: Medium   small bowel obstruction surgery consult   Conservative management at this time  SBO, minimally improving clinically and radiographic   Patient insistent that she wants to go home ASAP  NGT removed for trial and clears     Acute kidney injury, due to dehydration,   Resolved, on clears, stop IVF    HypoKalemia - etiology clinically unable to determine. Follow serial labs and replace PRN. Reviewed and documented as above. HypoMagnesemia - etiology clinically unable to determine. Follow serial labs and replace PRN. Reviewed and documented as above. GERD continue with IV PPI. COPD with chronic respiratory failure at home on 5 L oxygen continue with the supplemental oxygen and inhalers. Normocytic anemia - recommend workup outpatient      DVT Prophylaxis: Lovenox subcu  Diet: ADULT DIET;  Clear Liquid  Code Status: Full Code  PT/OT Eval Status: Not indicated    Dispo -Goal to tolerate diet and to have BMs    Appropriate for A1 Discharge Unit: No      Tisha Coil, DO

## 2023-02-21 NOTE — PLAN OF CARE
Problem: Pain  Goal: Verbalizes/displays adequate comfort level or baseline comfort level  2/21/2023 1441 by Jose Alberto Loja RN  Outcome: Progressing  Flowsheets (Taken 2/21/2023 0123 by Virgie Harden RN)  Verbalizes/displays adequate comfort level or baseline comfort level:   Encourage patient to monitor pain and request assistance   Assess pain using appropriate pain scale   Administer analgesics based on type and severity of pain and evaluate response   Implement non-pharmacological measures as appropriate and evaluate response       Problem: Safety - Adult  Goal: Free from fall injury  Outcome: Progressing  Flowsheets (Taken 2/21/2023 1441)  Free From Fall Injury: Instruct family/caregiver on patient safety

## 2023-02-21 NOTE — PROGRESS NOTES
NGT removed from R nare. Tolerated well. Clear liquid diet ordered. Patient drinking without c/o nausea or abdominal pain.

## 2023-02-21 NOTE — PROGRESS NOTES
[facility] SURGERY    PATIENT NAME: Lily Raman     TODAY'S DATE: 2/21/2023    CHIEF COMPLAINT: hungry    INTERVAL HISTORY/HPI:    Pt c/o NGT discomfort. States she is passing small flatus, and feels urge to have BM but hasn't yet. Denies nausea/bloating when NGT clamped. She is anxious to go home. OBJECTIVE:  VITALS:  /77   Pulse 89   Temp 98.3 °F (36.8 °C) (Oral)   Resp 18   Ht 5' 1\" (1.549 m)   Wt 108 lb 4.8 oz (49.1 kg)   SpO2 95%   BMI 20.46 kg/m²     INTAKE/OUTPUT:    I/O last 3 completed shifts: In: 8381.7 [P.O.:240; I.V.:6942.7; NG/GT:100; IV Piggyback:1099.1]  Out: 2300 [Emesis/NG output:2300]  I/O this shift:  In: 1120.6 [P.O.:540; I.V.:580.6]  Out: 0     CONSTITUTIONAL:  awake and alert  LUNGS:     ABDOMEN:   , soft, distended, non-tender     Data:  CBC:   Recent Labs     02/19/23  0514 02/20/23  0516 02/21/23  0531   WBC 9.2 6.6 4.7   HGB 13.5 11.9* 9.7*   HCT 40.3 35.3* 28.4*    116* 89*     BMP:    Recent Labs     02/19/23  0514 02/19/23  1752 02/20/23  0516 02/21/23  0531   *  --  134* 138   K 2.7* 3.0* 3.0* 3.0*   CL 81*  --  91* 103   CO2 37*  --  29 25   BUN 46*  --  17 5*   CREATININE 1.1  --  0.6 <0.5*   GLUCOSE 83  --  74 75     Hepatic:   Recent Labs     02/20/23  0516 02/21/23  0531   AST 26 18   ALT 24 16   BILITOT 0.9 0.5   ALKPHOS 57 42     Mag:      Recent Labs     02/19/23  0514 02/20/23  0516 02/21/23  0531   MG 2.10 1.80 1.40*      Phos:   No results for input(s): PHOS in the last 72 hours. INR: No results for input(s): INR in the last 72 hours. Radiology Review:  Images reviewed by me  SBFT: 2/20/23  1. Persistent findings consistent with presence of small bowel obstruction. Plain film examination of the abdomen will be obtained at 10 PM this evening   and 7 AM on 2/21/2023 to evaluate movement of contrast material.   2. Moderate-sized hiatal hernia. 3. Prominence of folds within the proximal aspect of the stomach.   While   finding in part may be on the basis of incomplete distention, true fold   thickening including changes of underlying gastritis also in the differential   diagnosis as described above     AXR 2/21/23  ONE SUPINE XRAY VIEW(S) OF THE ABDOMEN       2/21/2023 7:10 am       COMPARISON:   2/20/2023       HISTORY:   ORDERING SYSTEM PROVIDED HISTORY: Small bowel obstruction. TECHNOLOGIST PROVIDED HISTORY:   Reason for exam:->Small bowel obstruction. Reason for Exam: sbft obstruction       FINDINGS:   Dilated small bowel loops are seen centrally filled with contrast.       Small bowel dilatation is slightly less pronounced. There is likely a small amount of contrast entering the cecum in the right   lower quadrant. Impression   Slight decrease in small bowel dilatation. There is likely a small amount of contrast entering the cecum       ASSESSMENT AND PLAN:  SBO, minimally improving clinically and radiographically. However, pt is insistent that she is improving and wants to go home as soon as possible   - will trial removing NGT and starting clears   - if tolerated, she might be able to d/c home soon. However, she could certainly fail to improve and need to consider additional interventions including surgery.     Electronically signed by Jaja Alejo MD

## 2023-02-21 NOTE — FLOWSHEET NOTE
02/21/23 0830   Assessment   Charting Type Shift assessment   Psychosocial   Psychosocial (WDL) WDL   Neurological   Neuro (WDL) WDL   Level of Consciousness 0   Cerro Coma Scale   Eye Opening 4   Best Verbal Response 5   Best Motor Response 6   Cerro Coma Scale Score 15   HEENT (Head, Ears, Eyes, Nose, & Throat)   HEENT (WDL) X   Right Eye Glasses; Impaired vision   Left Eye Glasses; Impaired vision   Nose Other (comment)  (NGT)   Throat Sore   Respiratory   Respiratory (WDL) X  (COPD baseline 02 5L)   Respiratory Pattern Regular   Respiratory Depth Normal   Respiratory Quality/Effort Unlabored;Dyspnea with exertion   Chest Assessment Chest expansion symmetrical;Trachea midline   L Breath Sounds Clear;Diminished   R Breath Sounds Clear;Diminished   Level of Activity/Mobility 0   Breath Sounds   Right Upper Lobe Clear;Diminished   Right Middle Lobe Diminished;Clear   Right Lower Lobe Clear;Diminished   Left Upper Lobe Diminished;Clear   Left Lower Lobe Clear;Diminished   Cardiac   Cardiac (WDL) X   Cardiac Regularity Regular   Heart Sounds S1, S2   Cardiac Rhythm Sinus rhythm;Sinus tachy   Gastrointestinal   Abdominal (WDL) X  (SBO with NGT)   Abdomen Inspection Flat;Soft   RUQ Bowel Sounds Active   LUQ Bowel Sounds Active   RLQ Bowel Sounds Active   LLQ Bowel Sounds Active   Tenderness Soft; No guarding;Tenderness   Passing Flatus Y   [REMOVED] NG/OG/NJ/NE Tube Nasogastric Right nostril   Removal Date/Time: 02/21/23 1049  Placement Date/Time: 02/19/23 1003   Present on Admission/Arrival: No  Inserted by: Paul Doe RN  Insertion attempts: 1  Tube Type: Nasogastric  Tube Location: Right nostril  Removal Reason: Per order  Post Removal Site Asse. ..    Securement device Tape   Status Suction-low continuous   NG/OG/NJ/NE External Measurement (cm) 54 cm   Drainage Appearance Green;Milky   Output (mL) 0 ml   Genitourinary   Genitourinary (WDL) WDL  (ELIJAH resolved)   Flank Tenderness   (none)   Suprapubic Tenderness No Dysuria (Pain/Burning w/Urination) No   Peripheral Vascular   Peripheral Vascular (WDL) WDL   Edema None   Skin Integumentary    Skin Integumentary (WDL) X   Skin Color Pale   Skin Condition/Temp Dry; Warm   Skin Integrity Wound (see LDA)   Wound Prevention/Protection Method Yes   Multiple Skin Integrity Sites No   Location Coccyx   Wound Offloading (Prevention Methods) Pillows;Repositioning   Dressing Present  Yes; Intact, not due to be changed   Location of Wound Prevention Coccyx   Orientation of Wound Prevention Mid   Skin Assessed Underneath Dressing This Shift Yes   Musculoskeletal   Musculoskeletal (WDL) X  (generalized weakness)   Wound 02/18/23 Coccyx Mid DTI   Date First Assessed/Time First Assessed: 02/18/23 1600   Present on Hospital Admission: Yes  Primary Wound Type: Pressure Injury  Location: Coccyx  Wound Location Orientation: Mid  Wound Description (Comments): DTI   Wound Etiology Pressure Stage 2   Dressing Status Clean;Dry; Intact   Dressing/Treatment Foam  (zinc)   Wound Assessment Pink/red   Drainage Amount None   Odor None   Lizeth-wound Assessment Blanchable erythema  (slow to emerald)   Margins Undefined edges   Wound Thickness Description not for Pressure Injury Partial thickness

## 2023-02-21 NOTE — PROGRESS NOTES
Shift assessment completed. Pt A&O, VSS on 5L oxygen per nasal cannula. Pt c/o pain in throat/neck/shoulder, PRN medication being given per MAR, along w/ PRN lozenges. NGT @ 54, intact, connected to continuous low wall suction, draining green/milky fluid. Mepilex applied to excoriation on coccyx, skin assessed underneath, intact redness, slow to emerald. Denies any further needs at this time. Bed locked and in lowest position. Call light & bedside table are within reach.

## 2023-02-21 NOTE — CARE COORDINATION
Hospital day 3: Patient on C3 re SBO care managed by IM and General sug. Patient from home alone, with good family support, dtr lives less than 5 mins away. Patient reports 5 L 02 baseline has portability. Plans to remove NG and advance diet with repeat A XR tomorrow. SW will follow for possible needs. FREDY Forde

## 2023-02-21 NOTE — PROGRESS NOTES
4 Eyes Skin Assessment     The patient is being assess for  Low aida    I agree that 2 RN's have performed a thorough Head to Toe Skin Assessment on the patient. ALL assessment sites listed below have been assessed. Areas assessed by both nurses: Prashant Jama RN and Lilian Austin RN   [x]   Head, Face, and Ears   [x]   Shoulders, Back, and Chest  [x]   Arms, Elbows, and Hands   [x]   Coccyx, Sacrum, and Ischum  [x]   Legs, Feet, and Heels        Does the Patient have Skin Breakdown?   Yes a wound was noted on the Admission Assessment and an WOUND LDA was Initiated documentation include the Lizeth-wound, Wound Assessment, Measurements, Dressing Treatment, Drainage, and Color\",         Aida Prevention initiated:  Yes   Wound Care Orders initiated:  Yes      [id number]  nurse consulted for Pressure Injury (Stage 3,4, Unstageable, DTI, NWPT, and Complex wounds):  Yes      Nurse 1 eSignature: Electronically signed by Mary Villegas RN on 2/21/23 at 1:07 PM EST    **SHARE this note so that the co-signing nurse is able to place an eSignature**    Nurse 2 eSignature: Electronically signed by Job Younger RN on 2/21/23 at 1:07 PM EST

## 2023-02-22 ENCOUNTER — TELEPHONE (OUTPATIENT)
Dept: FAMILY MEDICINE CLINIC | Age: 58
End: 2023-02-22

## 2023-02-22 ENCOUNTER — APPOINTMENT (OUTPATIENT)
Dept: GENERAL RADIOLOGY | Age: 58
DRG: 336 | End: 2023-02-22
Payer: MEDICARE

## 2023-02-22 ENCOUNTER — ANESTHESIA (OUTPATIENT)
Dept: OPERATING ROOM | Age: 58
End: 2023-02-22
Payer: MEDICARE

## 2023-02-22 ENCOUNTER — ANESTHESIA EVENT (OUTPATIENT)
Dept: OPERATING ROOM | Age: 58
End: 2023-02-22
Payer: MEDICARE

## 2023-02-22 LAB
A/G RATIO: 1.3 (ref 1.1–2.2)
ALBUMIN SERPL-MCNC: 3.6 G/DL (ref 3.4–5)
ALP BLD-CCNC: 65 U/L (ref 40–129)
ALT SERPL-CCNC: 24 U/L (ref 10–40)
ANION GAP SERPL CALCULATED.3IONS-SCNC: 7 MMOL/L (ref 3–16)
AST SERPL-CCNC: 26 U/L (ref 15–37)
BILIRUB SERPL-MCNC: 0.6 MG/DL (ref 0–1)
BLOOD CULTURE, ROUTINE: NORMAL
BUN BLDV-MCNC: 5 MG/DL (ref 7–20)
CALCIUM SERPL-MCNC: 9 MG/DL (ref 8.3–10.6)
CHLORIDE BLD-SCNC: 95 MMOL/L (ref 99–110)
CO2: 28 MMOL/L (ref 21–32)
CREAT SERPL-MCNC: <0.5 MG/DL (ref 0.6–1.1)
CULTURE, BLOOD 2: NORMAL
GFR SERPL CREATININE-BSD FRML MDRD: >60 ML/MIN/{1.73_M2}
GLUCOSE BLD-MCNC: 100 MG/DL (ref 70–99)
GLUCOSE BLD-MCNC: 114 MG/DL (ref 70–99)
GLUCOSE BLD-MCNC: 122 MG/DL (ref 70–99)
GLUCOSE BLD-MCNC: 129 MG/DL (ref 70–99)
GLUCOSE BLD-MCNC: 130 MG/DL (ref 70–99)
GLUCOSE BLD-MCNC: 134 MG/DL (ref 70–99)
HCT VFR BLD CALC: 38.7 % (ref 36–48)
HEMOGLOBIN: 13.1 G/DL (ref 12–16)
MAGNESIUM: 1.6 MG/DL (ref 1.8–2.4)
MCH RBC QN AUTO: 33.4 PG (ref 26–34)
MCHC RBC AUTO-ENTMCNC: 33.7 G/DL (ref 31–36)
MCV RBC AUTO: 98.9 FL (ref 80–100)
PDW BLD-RTO: 12.8 % (ref 12.4–15.4)
PERFORMED ON: ABNORMAL
PLATELET # BLD: 106 K/UL (ref 135–450)
PMV BLD AUTO: 8.1 FL (ref 5–10.5)
POTASSIUM REFLEX MAGNESIUM: 3.9 MMOL/L (ref 3.5–5.1)
RBC # BLD: 3.91 M/UL (ref 4–5.2)
SODIUM BLD-SCNC: 130 MMOL/L (ref 136–145)
TOTAL PROTEIN: 6.3 G/DL (ref 6.4–8.2)
WBC # BLD: 8.8 K/UL (ref 4–11)

## 2023-02-22 PROCEDURE — 94640 AIRWAY INHALATION TREATMENT: CPT

## 2023-02-22 PROCEDURE — 6370000000 HC RX 637 (ALT 250 FOR IP): Performed by: INTERNAL MEDICINE

## 2023-02-22 PROCEDURE — 2580000003 HC RX 258: Performed by: HOSPITALIST

## 2023-02-22 PROCEDURE — 83735 ASSAY OF MAGNESIUM: CPT

## 2023-02-22 PROCEDURE — 74019 RADEX ABDOMEN 2 VIEWS: CPT

## 2023-02-22 PROCEDURE — 2580000003 HC RX 258: Performed by: INTERNAL MEDICINE

## 2023-02-22 PROCEDURE — 6370000000 HC RX 637 (ALT 250 FOR IP): Performed by: HOSPITALIST

## 2023-02-22 PROCEDURE — 6360000002 HC RX W HCPCS: Performed by: NURSE ANESTHETIST, CERTIFIED REGISTERED

## 2023-02-22 PROCEDURE — 6360000002 HC RX W HCPCS: Performed by: SURGERY

## 2023-02-22 PROCEDURE — 36415 COLL VENOUS BLD VENIPUNCTURE: CPT

## 2023-02-22 PROCEDURE — C9113 INJ PANTOPRAZOLE SODIUM, VIA: HCPCS | Performed by: HOSPITALIST

## 2023-02-22 PROCEDURE — 0WQF0ZZ REPAIR ABDOMINAL WALL, OPEN APPROACH: ICD-10-PCS | Performed by: SURGERY

## 2023-02-22 PROCEDURE — 88313 SPECIAL STAINS GROUP 2: CPT

## 2023-02-22 PROCEDURE — 88307 TISSUE EXAM BY PATHOLOGIST: CPT

## 2023-02-22 PROCEDURE — 99232 SBSQ HOSP IP/OBS MODERATE 35: CPT | Performed by: SURGERY

## 2023-02-22 PROCEDURE — 6360000002 HC RX W HCPCS: Performed by: ANESTHESIOLOGY

## 2023-02-22 PROCEDURE — 6360000002 HC RX W HCPCS: Performed by: INTERNAL MEDICINE

## 2023-02-22 PROCEDURE — 85027 COMPLETE CBC AUTOMATED: CPT

## 2023-02-22 PROCEDURE — 2580000003 HC RX 258: Performed by: NURSE ANESTHETIST, CERTIFIED REGISTERED

## 2023-02-22 PROCEDURE — 1200000000 HC SEMI PRIVATE

## 2023-02-22 PROCEDURE — 7100000001 HC PACU RECOVERY - ADDTL 15 MIN: Performed by: SURGERY

## 2023-02-22 PROCEDURE — 6360000002 HC RX W HCPCS: Performed by: HOSPITALIST

## 2023-02-22 PROCEDURE — 44005 FREEING OF BOWEL ADHESION: CPT | Performed by: SURGERY

## 2023-02-22 PROCEDURE — 0DN80ZZ RELEASE SMALL INTESTINE, OPEN APPROACH: ICD-10-PCS | Performed by: SURGERY

## 2023-02-22 PROCEDURE — 3600000004 HC SURGERY LEVEL 4 BASE: Performed by: SURGERY

## 2023-02-22 PROCEDURE — 2500000003 HC RX 250 WO HCPCS: Performed by: NURSE ANESTHETIST, CERTIFIED REGISTERED

## 2023-02-22 PROCEDURE — 3700000000 HC ANESTHESIA ATTENDED CARE: Performed by: SURGERY

## 2023-02-22 PROCEDURE — 7100000000 HC PACU RECOVERY - FIRST 15 MIN: Performed by: SURGERY

## 2023-02-22 PROCEDURE — 2700000000 HC OXYGEN THERAPY PER DAY

## 2023-02-22 PROCEDURE — 94761 N-INVAS EAR/PLS OXIMETRY MLT: CPT

## 2023-02-22 PROCEDURE — 2709999900 HC NON-CHARGEABLE SUPPLY: Performed by: SURGERY

## 2023-02-22 PROCEDURE — 3600000014 HC SURGERY LEVEL 4 ADDTL 15MIN: Performed by: SURGERY

## 2023-02-22 PROCEDURE — 3700000001 HC ADD 15 MINUTES (ANESTHESIA): Performed by: SURGERY

## 2023-02-22 PROCEDURE — 80053 COMPREHEN METABOLIC PANEL: CPT

## 2023-02-22 RX ORDER — SODIUM CHLORIDE 9 MG/ML
INJECTION, SOLUTION INTRAVENOUS CONTINUOUS
Status: DISCONTINUED | OUTPATIENT
Start: 2023-02-22 | End: 2023-02-23

## 2023-02-22 RX ORDER — MAGNESIUM SULFATE IN WATER 40 MG/ML
2000 INJECTION, SOLUTION INTRAVENOUS ONCE
Status: COMPLETED | OUTPATIENT
Start: 2023-02-22 | End: 2023-02-22

## 2023-02-22 RX ORDER — PHENYLEPHRINE HCL IN 0.9% NACL 1 MG/10 ML
SYRINGE (ML) INTRAVENOUS PRN
Status: DISCONTINUED | OUTPATIENT
Start: 2023-02-22 | End: 2023-02-22 | Stop reason: SDUPTHER

## 2023-02-22 RX ORDER — MIDAZOLAM HYDROCHLORIDE 1 MG/ML
INJECTION INTRAMUSCULAR; INTRAVENOUS PRN
Status: DISCONTINUED | OUTPATIENT
Start: 2023-02-22 | End: 2023-02-22 | Stop reason: SDUPTHER

## 2023-02-22 RX ORDER — LIDOCAINE HYDROCHLORIDE 20 MG/ML
INJECTION, SOLUTION EPIDURAL; INFILTRATION; INTRACAUDAL; PERINEURAL PRN
Status: DISCONTINUED | OUTPATIENT
Start: 2023-02-22 | End: 2023-02-22 | Stop reason: SDUPTHER

## 2023-02-22 RX ORDER — ONDANSETRON 2 MG/ML
INJECTION INTRAMUSCULAR; INTRAVENOUS PRN
Status: DISCONTINUED | OUTPATIENT
Start: 2023-02-22 | End: 2023-02-22 | Stop reason: SDUPTHER

## 2023-02-22 RX ORDER — SODIUM CHLORIDE 9 MG/ML
INJECTION, SOLUTION INTRAVENOUS CONTINUOUS PRN
Status: DISCONTINUED | OUTPATIENT
Start: 2023-02-22 | End: 2023-02-22 | Stop reason: SDUPTHER

## 2023-02-22 RX ORDER — SUCCINYLCHOLINE CHLORIDE 20 MG/ML
INJECTION INTRAMUSCULAR; INTRAVENOUS PRN
Status: DISCONTINUED | OUTPATIENT
Start: 2023-02-22 | End: 2023-02-22 | Stop reason: SDUPTHER

## 2023-02-22 RX ORDER — SODIUM CHLORIDE 9 MG/ML
25 INJECTION, SOLUTION INTRAVENOUS PRN
Status: DISCONTINUED | OUTPATIENT
Start: 2023-02-22 | End: 2023-02-22 | Stop reason: HOSPADM

## 2023-02-22 RX ORDER — PROPOFOL 10 MG/ML
INJECTION, EMULSION INTRAVENOUS PRN
Status: DISCONTINUED | OUTPATIENT
Start: 2023-02-22 | End: 2023-02-22 | Stop reason: SDUPTHER

## 2023-02-22 RX ORDER — MIDAZOLAM HYDROCHLORIDE 1 MG/ML
1 INJECTION INTRAMUSCULAR; INTRAVENOUS EVERY 30 MIN PRN
Status: DISCONTINUED | OUTPATIENT
Start: 2023-02-22 | End: 2023-02-24

## 2023-02-22 RX ORDER — SODIUM CHLORIDE 0.9 % (FLUSH) 0.9 %
5-40 SYRINGE (ML) INJECTION EVERY 12 HOURS SCHEDULED
Status: DISCONTINUED | OUTPATIENT
Start: 2023-02-22 | End: 2023-02-22 | Stop reason: HOSPADM

## 2023-02-22 RX ORDER — CEFAZOLIN SODIUM IN 0.9 % NACL 2 G/100 ML
2000 PLASTIC BAG, INJECTION (ML) INTRAVENOUS ONCE
Status: COMPLETED | OUTPATIENT
Start: 2023-02-22 | End: 2023-02-22

## 2023-02-22 RX ORDER — OXYCODONE HYDROCHLORIDE 5 MG/1
5 TABLET ORAL PRN
Status: DISCONTINUED | OUTPATIENT
Start: 2023-02-22 | End: 2023-02-22 | Stop reason: HOSPADM

## 2023-02-22 RX ORDER — ONDANSETRON 2 MG/ML
4 INJECTION INTRAMUSCULAR; INTRAVENOUS
Status: DISCONTINUED | OUTPATIENT
Start: 2023-02-22 | End: 2023-02-22 | Stop reason: HOSPADM

## 2023-02-22 RX ORDER — ROCURONIUM BROMIDE 10 MG/ML
INJECTION, SOLUTION INTRAVENOUS PRN
Status: DISCONTINUED | OUTPATIENT
Start: 2023-02-22 | End: 2023-02-22 | Stop reason: SDUPTHER

## 2023-02-22 RX ORDER — DEXAMETHASONE SODIUM PHOSPHATE 4 MG/ML
INJECTION, SOLUTION INTRA-ARTICULAR; INTRALESIONAL; INTRAMUSCULAR; INTRAVENOUS; SOFT TISSUE PRN
Status: DISCONTINUED | OUTPATIENT
Start: 2023-02-22 | End: 2023-02-22 | Stop reason: SDUPTHER

## 2023-02-22 RX ORDER — MEPERIDINE HYDROCHLORIDE 50 MG/ML
12.5 INJECTION INTRAMUSCULAR; INTRAVENOUS; SUBCUTANEOUS EVERY 5 MIN PRN
Status: DISCONTINUED | OUTPATIENT
Start: 2023-02-22 | End: 2023-02-22 | Stop reason: HOSPADM

## 2023-02-22 RX ORDER — FENTANYL CITRATE 50 UG/ML
INJECTION, SOLUTION INTRAMUSCULAR; INTRAVENOUS PRN
Status: DISCONTINUED | OUTPATIENT
Start: 2023-02-22 | End: 2023-02-22 | Stop reason: SDUPTHER

## 2023-02-22 RX ORDER — SODIUM CHLORIDE 0.9 % (FLUSH) 0.9 %
5-40 SYRINGE (ML) INJECTION PRN
Status: DISCONTINUED | OUTPATIENT
Start: 2023-02-22 | End: 2023-02-22 | Stop reason: HOSPADM

## 2023-02-22 RX ORDER — OXYCODONE HYDROCHLORIDE 5 MG/1
10 TABLET ORAL PRN
Status: DISCONTINUED | OUTPATIENT
Start: 2023-02-22 | End: 2023-02-22 | Stop reason: HOSPADM

## 2023-02-22 RX ADMIN — LIDOCAINE HYDROCHLORIDE 60 MG: 20 INJECTION, SOLUTION EPIDURAL; INFILTRATION; INTRACAUDAL; PERINEURAL at 16:18

## 2023-02-22 RX ADMIN — Medication 100 MCG: at 16:43

## 2023-02-22 RX ADMIN — TIOTROPIUM BROMIDE INHALATION SPRAY 2 PUFF: 3.12 SPRAY, METERED RESPIRATORY (INHALATION) at 08:30

## 2023-02-22 RX ADMIN — MIDAZOLAM HYDROCHLORIDE 2 MG: 2 INJECTION, SOLUTION INTRAMUSCULAR; INTRAVENOUS at 16:09

## 2023-02-22 RX ADMIN — LEVALBUTEROL HYDROCHLORIDE 0.3 MG: 1.25 SOLUTION, CONCENTRATE RESPIRATORY (INHALATION) at 03:58

## 2023-02-22 RX ADMIN — Medication 1 LOZENGE: at 05:46

## 2023-02-22 RX ADMIN — FENTANYL CITRATE 50 MCG: 50 INJECTION INTRAMUSCULAR; INTRAVENOUS at 17:17

## 2023-02-22 RX ADMIN — ONDANSETRON 4 MG: 2 INJECTION INTRAMUSCULAR; INTRAVENOUS at 03:07

## 2023-02-22 RX ADMIN — CEFTRIAXONE SODIUM 1000 MG: 1 INJECTION, POWDER, FOR SOLUTION INTRAMUSCULAR; INTRAVENOUS at 15:37

## 2023-02-22 RX ADMIN — SODIUM CHLORIDE: 9 INJECTION, SOLUTION INTRAVENOUS at 15:35

## 2023-02-22 RX ADMIN — OXYCODONE AND ACETAMINOPHEN 2 TABLET: 5; 325 TABLET ORAL at 09:09

## 2023-02-22 RX ADMIN — Medication 2 PUFF: at 08:30

## 2023-02-22 RX ADMIN — ONDANSETRON 4 MG: 2 INJECTION INTRAMUSCULAR; INTRAVENOUS at 09:09

## 2023-02-22 RX ADMIN — MAGNESIUM SULFATE HEPTAHYDRATE 2000 MG: 40 INJECTION, SOLUTION INTRAVENOUS at 13:24

## 2023-02-22 RX ADMIN — OXYCODONE AND ACETAMINOPHEN 2 TABLET: 5; 325 TABLET ORAL at 00:18

## 2023-02-22 RX ADMIN — Medication 100 MCG: at 16:27

## 2023-02-22 RX ADMIN — Medication 2 PUFF: at 19:45

## 2023-02-22 RX ADMIN — Medication 1 LOZENGE: at 20:52

## 2023-02-22 RX ADMIN — SODIUM CHLORIDE: 9 INJECTION, SOLUTION INTRAVENOUS at 23:26

## 2023-02-22 RX ADMIN — Medication 100 MCG: at 16:33

## 2023-02-22 RX ADMIN — Medication 100 MCG: at 16:57

## 2023-02-22 RX ADMIN — FENTANYL CITRATE 50 MCG: 50 INJECTION INTRAMUSCULAR; INTRAVENOUS at 16:34

## 2023-02-22 RX ADMIN — Medication 10 ML: at 09:10

## 2023-02-22 RX ADMIN — Medication 100 MCG: at 16:45

## 2023-02-22 RX ADMIN — HYDROMORPHONE HYDROCHLORIDE 0.5 MG: 0.5 INJECTION, SOLUTION INTRAMUSCULAR; INTRAVENOUS; SUBCUTANEOUS at 17:56

## 2023-02-22 RX ADMIN — OXYCODONE AND ACETAMINOPHEN 2 TABLET: 5; 325 TABLET ORAL at 22:15

## 2023-02-22 RX ADMIN — FLUTICASONE PROPIONATE 1 SPRAY: 50 SPRAY, METERED NASAL at 09:10

## 2023-02-22 RX ADMIN — SUCCINYLCHOLINE CHLORIDE 140 MG: 20 INJECTION, SOLUTION INTRAMUSCULAR; INTRAVENOUS at 16:18

## 2023-02-22 RX ADMIN — Medication 100 MCG: at 16:52

## 2023-02-22 RX ADMIN — Medication 100 MCG: at 16:23

## 2023-02-22 RX ADMIN — HYDROMORPHONE HYDROCHLORIDE 0.5 MG: 0.5 INJECTION, SOLUTION INTRAMUSCULAR; INTRAVENOUS; SUBCUTANEOUS at 17:34

## 2023-02-22 RX ADMIN — ROCURONIUM BROMIDE 10 MG: 10 SOLUTION INTRAVENOUS at 16:18

## 2023-02-22 RX ADMIN — Medication 100 MCG: at 16:22

## 2023-02-22 RX ADMIN — ONDANSETRON 4 MG: 2 INJECTION INTRAMUSCULAR; INTRAVENOUS at 16:45

## 2023-02-22 RX ADMIN — DEXAMETHASONE SODIUM PHOSPHATE 8 MG: 4 INJECTION, SOLUTION INTRAMUSCULAR; INTRAVENOUS at 16:45

## 2023-02-22 RX ADMIN — PANTOPRAZOLE SODIUM 40 MG: 40 INJECTION, POWDER, FOR SOLUTION INTRAVENOUS at 09:09

## 2023-02-22 RX ADMIN — Medication 2000 MG: at 16:30

## 2023-02-22 RX ADMIN — MIDAZOLAM 1 MG: 1 INJECTION INTRAMUSCULAR; INTRAVENOUS at 14:58

## 2023-02-22 RX ADMIN — SUGAMMADEX 200 MG: 100 INJECTION, SOLUTION INTRAVENOUS at 17:17

## 2023-02-22 RX ADMIN — ROCURONIUM BROMIDE 40 MG: 10 SOLUTION INTRAVENOUS at 16:32

## 2023-02-22 RX ADMIN — LEVALBUTEROL HYDROCHLORIDE 0.3 MG: 1.25 SOLUTION, CONCENTRATE RESPIRATORY (INHALATION) at 08:31

## 2023-02-22 RX ADMIN — SODIUM CHLORIDE: 9 INJECTION, SOLUTION INTRAVENOUS at 16:30

## 2023-02-22 RX ADMIN — PROPOFOL 150 MG: 10 INJECTION, EMULSION INTRAVENOUS at 16:18

## 2023-02-22 RX ADMIN — HYDROMORPHONE HYDROCHLORIDE 0.5 MG: 0.5 INJECTION, SOLUTION INTRAMUSCULAR; INTRAVENOUS; SUBCUTANEOUS at 17:43

## 2023-02-22 ASSESSMENT — PAIN DESCRIPTION - DESCRIPTORS
DESCRIPTORS: ACHING;STABBING
DESCRIPTORS: ACHING;SHARP
DESCRIPTORS: ACHING
DESCRIPTORS: STABBING;ACHING
DESCRIPTORS: ACHING
DESCRIPTORS: ACHING

## 2023-02-22 ASSESSMENT — PAIN SCALES - GENERAL
PAINLEVEL_OUTOF10: 8
PAINLEVEL_OUTOF10: 10
PAINLEVEL_OUTOF10: 9
PAINLEVEL_OUTOF10: 9
PAINLEVEL_OUTOF10: 10
PAINLEVEL_OUTOF10: 8

## 2023-02-22 ASSESSMENT — PAIN DESCRIPTION - PAIN TYPE
TYPE: ACUTE PAIN;CHRONIC PAIN
TYPE: ACUTE PAIN;SURGICAL PAIN

## 2023-02-22 ASSESSMENT — PAIN DESCRIPTION - LOCATION
LOCATION: THROAT
LOCATION: HIP;ABDOMEN
LOCATION: ABDOMEN
LOCATION: HIP
LOCATION: THROAT
LOCATION: ABDOMEN

## 2023-02-22 ASSESSMENT — ENCOUNTER SYMPTOMS: SHORTNESS OF BREATH: 1

## 2023-02-22 ASSESSMENT — PAIN DESCRIPTION - ORIENTATION
ORIENTATION: RIGHT;MID;LEFT
ORIENTATION: MID
ORIENTATION: RIGHT;LEFT;ANTERIOR
ORIENTATION: MID
ORIENTATION: RIGHT

## 2023-02-22 ASSESSMENT — LIFESTYLE VARIABLES: SMOKING_STATUS: 0

## 2023-02-22 ASSESSMENT — PAIN DESCRIPTION - ONSET: ONSET: ON-GOING

## 2023-02-22 ASSESSMENT — PAIN DESCRIPTION - FREQUENCY: FREQUENCY: CONTINUOUS

## 2023-02-22 NOTE — ANESTHESIA POSTPROCEDURE EVALUATION
Department of Anesthesiology  Postprocedure Note    Patient: Teddy Peters  MRN: [id number]  YOB: 1965  Date of evaluation: 2/22/2023      Procedure Summary     Date: 02/22/23 Room / Location: [address] [place] [address]    Anesthesia Start: 4262 Anesthesia Stop: 1658    Procedure: UMBILLICAL HERNIA REPAIR (Abdomen) Diagnosis:       Small bowel obstruction ([address]      (SMALL BOWEL OBSTRUCTION)    Surgeons: Lauryn Meadows MD Responsible Provider: Davidson Rizzo MD    Anesthesia Type: general ASA Status: 3          Anesthesia Type: No value filed.     Ladonna Phase I: Ladonna Score: 9    Ladonna Phase II:    Vitals:    02/22/23 0833 02/22/23 0845 02/22/23 1138 02/22/23 1145   BP:  103/68  97/62   Pulse: (!) 104 (!) 105  92   Resp:  18  18   Temp:  98.9 °F (37.2 °C)  99.1 °F (37.3 °C)   TempSrc:  Oral  Oral   SpO2: 94% 93%  93%   Weight:       Height:   5' 1\" (1.549 m)        Anesthesia Post Evaluation    Patient location during evaluation: bedside  Patient participation: complete - patient participated  Level of consciousness: awake and alert  Airway patency: patent  Nausea & Vomiting: no nausea  Complications: no  Cardiovascular status: hemodynamically stable  Respiratory status: acceptable and nasal cannula (AS PREOP)  Hydration status: euvolemic

## 2023-02-22 NOTE — PROGRESS NOTES
Hospitalist Progress Note      PCP: Michelle Jimenez, APRN - CNP    Date of Admission: 2/18/2023    Chief Complaint: Nausea vomiting    Hospital Course: This is a 66-year-old female with  chronic respiratory failure on oxygen 5 L at home admitted with  nausea vomiting diagnosis small bowel obstruction NG tube in place surgery consulted and following. Subjective: Patient with worsening abdominal pain. Nausea and unable to drink any clears for breakfast. Hypoactive bowel sounds. Vomiting as well this AM     Overnight also had nose bleed from R nare    Medications:  Reviewed    Infusion Medications    sodium chloride       Scheduled Medications    ceFAZolin  2,000 mg IntraVENous Once    mometasone-formoterol  2 puff Inhalation BID    fluticasone  1 spray Each Nostril Daily    tiotropium  2 puff Inhalation Daily    traZODone  100 mg Oral Nightly    sodium chloride flush  5-40 mL IntraVENous 2 times per day    heparin (porcine)  5,000 Units SubCUTAneous BID    pantoprazole  40 mg IntraVENous Daily    cefTRIAXone (ROCEPHIN) IV  1,000 mg IntraVENous Q24H     PRN Meds: midazolam, oxyCODONE-acetaminophen **OR** oxyCODONE-acetaminophen, albuterol, levalbuterol, sodium chloride flush, sodium chloride, ondansetron **OR** ondansetron, polyethylene glycol, acetaminophen **OR** acetaminophen, Benzocaine-Menthol      Intake/Output Summary (Last 24 hours) at 2/22/2023 1608  Last data filed at 2/22/2023 1586  Gross per 24 hour   Intake 1293.4 ml   Output --   Net 1293.4 ml         Physical Exam Performed:    BP 97/62   Pulse 92   Temp 99.1 °F (37.3 °C) (Oral)   Resp 18   Ht 5' 1\" (1.549 m)   Wt 111 lb 3.2 oz (50.4 kg)   SpO2 93%   BMI 21.01 kg/m²     General appearance: mild distress, appears stated age and cooperative. HEENT: Pupils equal, round, and reactive to light. Conjunctivae/corneas clear. Neck: Supple, with full range of motion. No jugular venous distention. Trachea midline.   Respiratory:  Normal respiratory effort. Clear to auscultation, bilaterally without Rales/Wheezes/Rhonchi. Cardiovascular: Regular rate and rhythm with normal S1/S2 without murmurs, rubs or gallops. Abdomen: Distended, tenderness, hypoactive bowel sounds, soft   Musculoskeletal: No clubbing, cyanosis or edema bilaterally. Full range of motion without deformity. Skin: Skin color, texture, turgor normal.  No rashes or lesions. Neurologic:  Neurovascularly intact without any focal sensory/motor deficits. Cranial nerves: II-XII intact, grossly non-focal.  Psychiatric: Alert and oriented, thought content appropriate, normal insight  Capillary Refill: Brisk, 3 seconds, normal   Peripheral Pulses: +2 palpable, equal bilaterally       Labs:   Recent Labs     02/20/23  0516 02/21/23  0531 02/22/23  0504   WBC 6.6 4.7 8.8   HGB 11.9* 9.7* 13.1   HCT 35.3* 28.4* 38.7   * 89* 106*       Recent Labs     02/20/23 0516 02/21/23  0531 02/22/23  0504   * 138 130*   K 3.0* 3.0* 3.9   CL 91* 103 95*   CO2 29 25 28   BUN 17 5* 5*   CREATININE 0.6 <0.5* <0.5*   CALCIUM 9.0 6.9* 9.0       Recent Labs     02/20/23  0516 02/21/23  0531 02/22/23  0504   AST 26 18 26   ALT 24 16 24   BILITOT 0.9 0.5 0.6   ALKPHOS 57 42 65       No results for input(s): INR in the last 72 hours. Recent Labs     02/20/23  0516   TROPONINI <0.01         Urinalysis:    No results found for: Oral Clapper, BACTERIA, RBCUA, BLOODU, SPECGRAV, GLUCOSEU    Radiology:  XR ABDOMEN (2 VIEWS)   Final Result   Slow progressive transit of enteric contrast into the colon with dilution of   the more proximal contrast column. Persistent dilation may represent ileus   or a partial small bowel obstruction. XR ABDOMEN (KUB) (SINGLE AP VIEW)   Final Result   Slight decrease in small bowel dilatation.       There is likely a small amount of contrast entering the cecum         XR ABDOMEN (KUB) (SINGLE AP VIEW)   Final Result   Dilated small bowel loops filled with contrast. Nonvisualization of large   bowel yet. FL SMALL BOWEL FOLLOW THROUGH ONLY   Preliminary Result   1. Persistent findings consistent with presence of small bowel obstruction. Plain film examination of the abdomen will be obtained at 10 PM this evening   and 7 AM on 2/21/2023 to evaluate movement of contrast material.   2. Moderate-sized hiatal hernia. 3. Prominence of folds within the proximal aspect of the stomach. While   finding in part may be on the basis of incomplete distention, true fold   thickening including changes of underlying gastritis also in the differential   diagnosis as described above. XR ABDOMEN (2 VIEWS)   Final Result   History of small-bowel obstruction with stable dilated air-filled loops of   small bowel in comparison to the prior radiograph. XR ABDOMEN (KUB) (SINGLE AP VIEW)   Final Result   Tip of NG tube projects in left upper quadrant, presumably in hiatal hernia,. There is decreased gaseous distention compared to prior         XR ABDOMEN (KUB) (SINGLE AP VIEW)   Final Result   Enteric tube with the tip in the side hole likely located within the large   hiatal hernia is seen on the same day CT. Small-bowel obstruction seen on   the same day CT not well appreciated due to relative lack of intraluminal gas. CT CHEST ABDOMEN PELVIS WO CONTRAST Additional Contrast? None   Final Result   1. Small-bowel obstruction with indeterminate etiology and an apparent   transition in the right lower quadrant/pelvis. There is no pattern of   pneumatosis or perforation. 2. Decompression of the distal ileum with stool and air in the colon. This   may indicate incomplete obstruction or a recently developing pattern of   obstruction. 3. Large hiatal hernia contributing to atelectasis in the left lower lobe. Peripheral bronchial opacification in the left lower lobe could potentially   be in association, although mucoid impaction is suspected.    4. Age indeterminate compression fractures that are likely remote at both T8   and T12. Additional remote fractures of the ribs and left pelvis are noted. IP CONSULT TO GENERAL SURGERY  IP CONSULT TO GENERAL SURGERY    Assessment/Plan:    Active Hospital Problems    Diagnosis     Small bowel obstruction ([facility] [address] [K56.609]      Priority: Medium   small bowel obstruction surgery consult   Failed to resolve with bowel rest  High degree of obstruction on CT a/p  Surgery planned this afternoon       Acute kidney injury, due to dehydration,   Resolved  May need to resume IVF after surgery     HypoKalemia - etiology clinically unable to determine. Follow serial labs and replace PRN. Reviewed and documented as above. HypoMagnesemia - etiology clinically unable to determine. Follow serial labs and replace PRN. Reviewed and documented as above. GERD continue with IV PPI. COPD with chronic respiratory failure at home on 5 L oxygen continue with the supplemental oxygen and inhalers.     Normocytic anemia - recommend workup outpatient      DVT Prophylaxis: Lovenox subcu  Diet: Diet NPO Exceptions are: Sips of Water with Meds  Code Status: Full Code  PT/OT Eval Status: Not indicated    Dispo -Pending surgery results     Appropriate for A1 Discharge Unit: Yue Rajput, DO

## 2023-02-22 NOTE — CARE COORDINATION
Hospital day 4: Patient on C3 re SBO care managed by IM and General surgery. Patient plans for OR this date. SW will follow for post Op needs 5 L 02 baseline, has supportive family. FREDY Pritchard

## 2023-02-22 NOTE — PROGRESS NOTES
Perfect served Dr. Anthony Collier if we are going to continue accuchecks every 4 hours as ordered. Patient is already on Clear liquid diet and is not diabetic. Awaiting response.

## 2023-02-22 NOTE — FLOWSHEET NOTE
Patient c/o Nausea, Unable to drink any clears for breakfast. Abdominal soft, rounded with pain 9/10; cramping, bloating. Bowel sounds hypoactive x4. Abdomen tender across lower quadrants and at umbilicus. Zofran given, patient vomited after she was turned on her side for a skin assessment. HOB 45 degrees. Patient states she is having reflux. Protonix administered as ordered. Patient wants to know if she can have omeprazole ordered. Heparin held d/t low platelets. Patient reports she awoke last night with a nose bleed from RICHARD petersen. Writer received in report this am that RN did not see any blood when patient called her into the room. Message sent to Dr. Khushi Lentz, [place].    02/22/23 0686   Assessment   Charting Type Shift assessment   Psychosocial   Psychosocial (WDL) WDL   Neurological   Neuro (WDL) WDL   Level of Consciousness 0   [place] Coma Scale   Eye Opening 4   Best Verbal Response 5   Best Motor Response 6   [place] Coma Scale Score 15   HEENT (Head, Ears, Eyes, Nose, & Throat)   HEENT (WDL) X   Right Eye Glasses; Impaired vision   Left Eye Glasses; Impaired vision   Nose Other (comment)  (soreness from NGT.)   Throat Sore   Respiratory   Respiratory (WDL) X  (COPD baseline 02 5L)   Respiratory Interventions H.O.B. elevated   Respiratory Pattern Regular   Respiratory Depth Normal   Respiratory Quality/Effort Unlabored;Dyspnea with exertion   Chest Assessment Chest expansion symmetrical;Trachea midline   L Breath Sounds Clear;Diminished   R Breath Sounds Clear;Diminished   Level of Activity/Mobility 0   Breath Sounds   Right Upper Lobe Clear;Diminished   Right Middle Lobe Diminished;Clear   Right Lower Lobe Clear;Diminished   Left Upper Lobe Diminished;Clear   Left Lower Lobe Clear;Diminished   Cardiac   Cardiac (WDL) X   Cardiac Regularity Regular   Heart Sounds S1, S2   Cardiac Rhythm Sinus rhythm;Sinus tachy   Gastrointestinal   Abdominal (WDL) X  (SBO)   Abdomen Inspection Soft;Rounded   RUQ Bowel Sounds Hypoactive   LUQ Bowel Sounds Hypoactive   RLQ Bowel Sounds Hypoactive   LLQ Bowel Sounds Hypoactive   GI Symptoms Constipation;Cramping;Bloating; Other (Comment); Nausea  (\"acid reflux\")   Tenderness Soft; No guarding;Tenderness  (overall abdomen tender, especially across lower abdomen and umbilicus)   Passing Flatus Y   Genitourinary   Genitourinary (WDL) WDL   Flank Tenderness   (none)   Suprapubic Tenderness No   Dysuria (Pain/Burning w/Urination) No   Difficulty Urinating/Starting Stream No   Urine Assessment   Urinary Status Voiding; Bathroom privileges   Urinary Incontinence Absent   Peripheral Vascular   Peripheral Vascular (WDL) WDL   Edema None   Skin Integumentary    Skin Integumentary (WDL) X   Skin Color Pink   Skin Condition/Temp Dry; Warm   Skin Integrity Wound (see LDA)   Wound Prevention/Protection Method Yes   Multiple Skin Integrity Sites Yes   Location coccyx   Wound Offloading (Prevention Methods) Repositioning; Foam silicone   Location of Wound Prevention Coccyx   Orientation of Wound Prevention Mid   Skin Assessed Underneath Dressing This Shift Yes   Skin Integrity Site 2   Skin Integrity Location 2 Ecchymosis   Location 2 scattered   Assessed this shift Yes   Care Plan - Skin/Tissue Integrity Goals   Skin Integrity Remains Intact Monitor for areas of redness and/or skin breakdown   Musculoskeletal   Musculoskeletal (WDL) X  (generalized weakness)   Wound 02/18/23 Coccyx Mid DTI   Date First Assessed/Time First Assessed: 02/18/23 1600   Present on Hospital Admission: Yes  Primary Wound Type: Pressure Injury  Location: Coccyx  Wound Location Orientation: Mid  Wound Description (Comments): DTI   Wound Etiology Pressure Stage 2   Dressing Status Clean;Dry; Intact   Dressing/Treatment Foam  (zinc)   Wound Assessment Pink/red   Drainage Amount None   Odor None   Lizeth-wound Assessment Blanchable erythema  (slow to emerald)   Margins Undefined edges   Wound Thickness Description not for Pressure Injury Partial thickness

## 2023-02-22 NOTE — PROGRESS NOTES
Comprehensive Nutrition Assessment    Type and Reason for Visit:  Wound    Nutrition Recommendations/Plan:   NPO per MD, advance diet as tolerated  Recommend check TG, Mg, Phos, CMP now if not done in last 24 hours. Bag 1: Clinimix 5/20 starting at 42 mL/hr  Physician/LIP to monitor closely and correct lytes (Phos,Mg,K+) d/t risk of refeeding syndrome  Bag 2: As long as electrolytes WNL, advance Clinimix 5/20 to goal rate of 60 mL/hr  Recommend 250 mL 20% lipids 2 times per week  Recommend FSBS, monitor glucose, need for insulin  Pharmacy to adjust MVI and Trace Elements as needed   When PN to be discontinued, cut rate by 50% and let current bag run out. Monitor nutrition adequacy, pertinent labs, bowel habits, wt changes, and clinical progress     Malnutrition Assessment:  Malnutrition Status: At risk for malnutrition (Comment) (02/22/23 1156)    Context:  Acute Illness     Findings of the 6 clinical characteristics of malnutrition:  Energy Intake:  75% or less of estimated energy requirements for 7 or more days    Nutrition Assessment:    Positive Wound Screen: Pt with hx of COPD admitted d/t small bowel obstruction. Also has a Stage 2 pressure injury to mid coccyx. NGT was place 2/19 for suction, removed 2/21. Pt reports no appetite currently and 1.5 weeks PTA. Stating before she got sick her appetite was great. NPO today, previously on clear liquid diet 2/21. Pt reports not tolerating clear liquids well, causing nausea. Wt trending up since admission, +8.3L since admit. Pt reports -107lb. Stating her wrists feel smaller than usual when wrapping her other hand around it. Monitor d/t NPO/clear liquid status x 4 day and xlap today. Likely not to advance PO diet, consider TPN if needed. Will continue to monitor. Nutrition Related Findings:    Na 130. Stage 2 pressure injury to coccyx.  Wound Type: Pressure Injury, Stage II       Current Nutrition Intake & Therapies:    Average Meal Intake: NPO  Average Supplements Intake: NPO  Diet NPO Exceptions are: Sips of Water with Meds  Parenteral Nutrition Recommendations: Clinimix 5/20 with a goal rate of 60ml/hr to provide 1440ml TV, 1267kcals, and 72g protein with additonal 250ml of lipids twice a week to add an additional 143kcals per day. GIR = 4.0. Anthropometric Measures:  Height: 5' 1\" (154.9 cm)  Ideal Body Weight (IBW): 105 lbs (48 kg)       Current Body Weight: 111 lb 3.2 oz (50.4 kg), 105.9 % IBW. Weight Source: Standing Scale  Current BMI (kg/m2): 21  Usual Body Weight:  (103-107lb per pt)                   BMI Categories: Normal Weight (BMI 18.5-24. 9)    Estimated Daily Nutrient Needs:  Energy Requirements Based On: Kcal/kg (25-30kcal/kg)  Weight Used for Energy Requirements: Current  Energy (kcal/day): 9362-2345  Weight Used for Protein Requirements: Current (1.2-1.5g/kg)  Protein (g/day): 60-76g  Method Used for Fluid Requirements: 1 ml/kcal  Fluid (ml/day): 2244-7249 ml    Nutrition Diagnosis:   Inadequate oral intake related to inadequate protein-energy intake as evidenced by NPO or clear liquid status due to medical condition, poor intake prior to admission, constipation, wounds, nausea    Nutrition Interventions:   Food and/or Nutrient Delivery: Continue NPO (Start oral diet per MD as tolerated)  Nutrition Education/Counseling: No recommendation at this time  Coordination of Nutrition Care: Continue to monitor while inpatient       Goals:     Goals: PO intake 50% or greater, prior to discharge       Nutrition Monitoring and Evaluation:   Behavioral-Environmental Outcomes: None Identified  Food/Nutrient Intake Outcomes: Food and Nutrient Intake, Supplement Intake, Diet Advancement/Tolerance  Physical Signs/Symptoms Outcomes: Biochemical Data, Nutrition Focused Physical Findings, Weight, Nausea or Vomiting    Discharge Planning:     Too soon to determine     Jolynnkenan Maloney: Office: [phone]; [address]: [id number]

## 2023-02-22 NOTE — PROGRESS NOTES
[facility] SURGERY    PATIENT NAME: Olean Councilman     TODAY'S DATE: 2/22/2023    CHIEF COMPLAINT: I feel worse    INTERVAL HISTORY/HPI:    Pt notes worsening abdominal pain, nausea, anorexia. No BM or flatus. OBJECTIVE:  VITALS:  BP 97/62   Pulse 92   Temp 99.1 °F (37.3 °C) (Oral)   Resp 18   Ht 5' 1\" (1.549 m)   Wt 111 lb 3.2 oz (50.4 kg)   SpO2 93%   BMI 21.01 kg/m²     INTAKE/OUTPUT:    I/O last 3 completed shifts: In: 4918.9 [P.O.:2150; I.V.:2184. 3; NG/GT:100; IV Piggyback:484.5]  Out: 1300 [Emesis/NG output:1300]  No intake/output data recorded. CONSTITUTIONAL:  fatigued and alert  LUNGS:     ABDOMEN:   , soft, distended, tenderness noted diffusely but no rebound or guarding     Data:  CBC:   Recent Labs     02/20/23  0516 02/21/23  0531 02/22/23  0504   WBC 6.6 4.7 8.8   HGB 11.9* 9.7* 13.1   HCT 35.3* 28.4* 38.7   * 89* 106*     BMP:    Recent Labs     02/20/23  0516 02/21/23  0531 02/22/23  0504   * 138 130*   K 3.0* 3.0* 3.9   CL 91* 103 95*   CO2 29 25 28   BUN 17 5* 5*   CREATININE 0.6 <0.5* <0.5*   GLUCOSE 74 75 134*     Hepatic:   Recent Labs     02/20/23  0516 02/21/23  0531 02/22/23  0504   AST 26 18 26   ALT 24 16 24   BILITOT 0.9 0.5 0.6   ALKPHOS 57 42 65     Mag:      Recent Labs     02/20/23  0516 02/21/23  0531 02/22/23  0504   MG 1.80 1.40* 1.60*      Phos:   No results for input(s): PHOS in the last 72 hours. INR: No results for input(s): INR in the last 72 hours. Radiology Review:  Images reviewed by hans ZEPEDA -  Slow progressive transit of enteric contrast into the colon with dilution of   the more proximal contrast column. Persistent dilation may represent ileus   or a partial small bowel obstruction. ASSESSMENT AND PLAN:  Persistent SBO, failing to resolve w/ bowel rest.  I think, based on lack of improvement and the fairly high-degree of obstruction seen on her admission CT, that she should proceed with surgical management of this obstruction. We discussed the goals of surgical therapy, including lysis of adhesions and possible need for bowel resection. She inquires about repairing her umbilical incisional hernia as well as her left inguinal hernia. I indicated that we should be able to address the umbilical defect with our laparotomy, but I would not recommend attempting inguinal repair (with usual mesh placement) due to fear of mesh infection. Patient appears to understand these issues, asks appropriate questions (including about risks and complications such as bleeding, infection, injury to surrounding structure, and need for further surgery ), and agrees to proceed.     Electronically signed by Adeola Rivera MD

## 2023-02-22 NOTE — PROGRESS NOTES
Pt a/o. VSS. Shift assessment updated and documented. Patient is taking Prilosec 20mg daily at home, left a note for the doctor to order it during morning rounds; will also hand this information over to the dayshift nurse.

## 2023-02-22 NOTE — PROGRESS NOTES
4 Eyes Skin Assessment     The patient is being assess for  Low aida    I agree that 2 RN's have performed a thorough Head to Toe Skin Assessment on the patient. ALL assessment sites listed below have been assessed. Areas assessed by both nurses:   Razia Arellano RN  [x]   Head, Face, and Ears   [x]   Shoulders, Back, and Chest  [x]   Arms, Elbows, and Hands   [x]   Coccyx, Sacrum, and Ischum  [x]   Legs, Feet, and Heels        Does the Patient have Skin Breakdown?   Yes a wound was noted on the Admission Assessment and an WOUND LDA was Initiated documentation include the Lizeth-wound, Wound Assessment, Measurements, Dressing Treatment, Drainage, and Color\",         Aida Prevention initiated:  Yes   Wound Care Orders initiated:  Yes      [id number]  nurse consulted for Pressure Injury (Stage 3,4, Unstageable, DTI, NWPT, and Complex wounds):  Yes      Nurse 1 eSignature: Electronically signed by Marino Puckett RN on 2/22/23 at 9:29 AM EST    **SHARE this note so that the co-signing nurse is able to place an eSignature**    Nurse 2 eSignature: Electronically signed by Kedar Akins RN on 2/22/23 at 2:57 PM EST

## 2023-02-22 NOTE — ANESTHESIA PRE PROCEDURE
Department of Anesthesiology  Preprocedure Note       Name:  Andrea Pablo   Age:  62 y.o.  :  1965                                          MRN:  [id number]         Date:  2023      Surgeon: Margot Voss):  Eliza Bone MD    Procedure: Procedure(s):  EXPLORATORY LAPAROTOMY LYSIS OF ADHESIONS POSSIBLE SMALL BOWEL RESECTION    Medications prior to admission:   Prior to Admission medications    Medication Sig Start Date End Date Taking?  Authorizing Provider   traZODone (DESYREL) 100 MG tablet Take 1 tablet by mouth nightly 23   ZARI Quiñones CNP   omeprazole (PRILOSEC) 20 MG delayed release capsule TAKE 1 CAPSULE DAILY 23   ZARI Quiñones CNP   spironolactone (ALDACTONE) 25 MG tablet Take 1 tablet by mouth daily 23   ZARI Quiñones CNP   azelastine (ASTELIN) 0.1 % nasal spray  23   Historical Provider, MD   fluticasone [facility]) 50 MCG/ACT nasal spray  1/10/23   Historical Provider, MD Kvng Plunkett 844-15 MCG/ACT inhaler  22   Historical Provider, MD   furosemide (LASIX) 40 MG tablet 40 mg daily 22   Historical Provider, MD   SPIRIVA RESPIMAT 2.5 MCG/ACT AERS inhaler  1/10/23   Historical Provider, MD   albuterol (PROVENTIL) (5 MG/ML) 0.5% nebulizer solution Take 1 mL by nebulization 4 times daily as needed for Wheezing 23   ZARI Quiñones CNP   Handicap Placard MISC by Does not apply route 23   ZARI Quiñones CNP   levalbuterol Dolly Ramirez) 0.31 MG/3ML nebulization Take 3 mLs by nebulization every 4 hours as needed for Wheezing 23  ZARI Quiñones CNP       Current medications:    Current Facility-Administered Medications   Medication Dose Route Frequency Provider Last Rate Last Admin    magnesium sulfate 2000 mg in 50 mL IVPB premix  2,000 mg IntraVENous Once Abel Elijahess, DO 25 mL/hr at 23 1324 2,000 mg at 23 1324    oxyCODONE-acetaminophen (PERCOCET) 5-325 MG per tablet 1 tablet  1 tablet Oral Q4H PRN Osmar Espinosa DO        Or    oxyCODONE-acetaminophen (PERCOCET) 5-325 MG per tablet 2 tablet  2 tablet Oral Q4H PRN Abel Wiggins DO   2 tablet at 02/22/23 0909    mometasone-formoterol (DULERA) 100-5 MCG/ACT inhaler 2 puff  2 puff Inhalation BID Lieutenant Luly MD   2 puff at 02/22/23 0830    albuterol (PROVENTIL) nebulizer solution 5 mg  5 mg Nebulization 4x Daily PRN Lieutenant Luly MD   2.5 mg at 02/21/23 2027    fluticasone (FLONASE) 50 MCG/ACT nasal spray 1 spray  1 spray Each Nostril Daily Lieutenant Luly MD   1 spray at 02/22/23 0910    levalbuterol (XOPENEX) nebulizer solution 0.3 mg  0.3 mg Nebulization Q4H PRN Lieutenant Luly MD   0.3 mg at 02/22/23 0831    tiotropium (SPIRIVA RESPIMAT) 2.5 MCG/ACT inhaler 2 puff  2 puff Inhalation Daily Lieutenant Luly MD   2 puff at 02/22/23 0830    traZODone (DESYREL) tablet 100 mg  100 mg Oral Nightly Lieutenant Luly MD   100 mg at 02/21/23 2016    sodium chloride flush 0.9 % injection 5-40 mL  5-40 mL IntraVENous 2 times per day Lieutenant Luly MD   10 mL at 02/22/23 0910    sodium chloride flush 0.9 % injection 5-40 mL  5-40 mL IntraVENous PRN Lieutenant Luly MD        0.9 % sodium chloride infusion   IntraVENous PRN Lieutenant Luly MD        heparin (porcine) injection 5,000 Units  5,000 Units SubCUTAneous BID Lieutenant Luly MD   5,000 Units at 02/21/23 0832    ondansetron (ZOFRAN-ODT) disintegrating tablet 4 mg  4 mg Oral Q8H PRN Lieutenant Luly MD        Or    [facility]ron [facility]) injection 4 mg  4 mg IntraVENous Q6H PRN Lieutenant Luly MD   4 mg at 02/22/23 0909    polyethylene glycol (GLYCOLAX) packet 17 g  17 g Oral Daily PRN Lieutenant Luly MD        acetaminophen (TYLENOL) tablet 650 mg  650 mg Oral Q6H PRN Lieutenant Luly MD   650 mg at 02/21/23 5858    Or    acetaminophen (TYLENOL) suppository 650 mg  650 mg Rectal Q6H PRN Lieutenant Luly MD        pantoprazole ([address]) injection 40 mg  40 mg IntraVENous Daily Ynes Martell MD   40 mg at 02/22/23 0909    cefTRIAXone (ROCEPHIN) 1,000 mg in sodium chloride 0.9 % 50 mL IVPB (mini-bag)  1,000 mg IntraVENous Q24H Ynes Martell MD   Stopped at 02/21/23 1508    Benzocaine-Menthol (CEPACOL) 1 lozenge  1 lozenge Oral Q2H PRN Ynes Martell MD   1 lozenge at 02/22/23 0546       Allergies:     Allergies   Allergen Reactions    Doxycycline Hyclate Nausea And Vomiting       Problem List:    Patient Active Problem List   Diagnosis Code    Vitamin D deficiency E55.9    Mixed hyperlipidemia E78.2    Chronic obstructive pulmonary disease with acute exacerbation ([address] J44.1    Alopecia L65.9    Tobacco abuse Z72.0    Major depressive disorder F32.9    Cyst of ovary, left N83.202    Gastroesophageal reflux disease without esophagitis K21.9    Primary hypertension I10    Hepatic cirrhosis ([address] K74.60    Hiatal hernia D43.9    Umbilical hernia G27.2    Small bowel obstruction ([address] K56.609       Past Medical History:        Diagnosis Date    Cirrhosis ([address]     COPD (chronic obstructive pulmonary disease) ([address]     Hyperlipidemia        Past Surgical History:        Procedure Laterality Date    THUMB ARTHROSCOPY      TUBAL LIGATION         Social History:    Social History     Tobacco Use    Smoking status: Former     Types: Cigarettes    Smokeless tobacco: Never   Substance Use Topics    Alcohol use: Not Currently                                Counseling given: Not Answered      Vital Signs (Current):   Vitals:    02/22/23 0833 02/22/23 0845 02/22/23 1138 02/22/23 1145   BP:  103/68  97/62   Pulse: (!) 104 (!) 105  92   Resp:  18  18   Temp:  98.9 °F (37.2 °C)  99.1 °F (37.3 °C)   TempSrc:  Oral  Oral   SpO2: 94% 93%  93%   Weight:       Height:   5' 1\" (1.549 m)                                               BP Readings from Last 3 Encounters:   02/22/23 97/62   01/26/23 123/74   01/18/23 125/75       NPO Status: MN+, SEE MAR                                                                               BMI:   Wt Readings from Last 3 Encounters:   02/22/23 111 lb 3.2 oz (50.4 kg)   01/26/23 108 lb (49 kg)   01/18/23 108 lb (49 kg)     Body mass index is 21.01 kg/m².     CBC:   Lab Results   Component Value Date/Time    WBC 8.8 02/22/2023 05:04 AM    RBC 3.91 02/22/2023 05:04 AM    HGB 13.1 02/22/2023 05:04 AM    HCT 38.7 02/22/2023 05:04 AM    MCV 98.9 02/22/2023 05:04 AM    RDW 12.8 02/22/2023 05:04 AM     02/22/2023 05:04 AM       CMP:   Lab Results   Component Value Date/Time     02/22/2023 05:04 AM    K 3.9 02/22/2023 05:04 AM    CL 95 02/22/2023 05:04 AM    CO2 28 02/22/2023 05:04 AM    BUN 5 02/22/2023 05:04 AM    CREATININE <0.5 02/22/2023 05:04 AM    AGRATIO 1.3 02/22/2023 05:04 AM    LABGLOM >60 02/22/2023 05:04 AM    GLUCOSE 134 02/22/2023 05:04 AM    PROT 6.3 02/22/2023 05:04 AM    CALCIUM 9.0 02/22/2023 05:04 AM    BILITOT 0.6 02/22/2023 05:04 AM    ALKPHOS 65 02/22/2023 05:04 AM    AST 26 02/22/2023 05:04 AM    ALT 24 02/22/2023 05:04 AM       POC Tests:   Recent Labs     02/22/23  1224   POCGLU 122*       Coags: No results found for: PROTIME, INR, APTT    HCG (If Applicable): No results found for: PREGTESTUR, PREGSERUM, HCG, HCGQUANT     ABGs: No results found for: PHART, PO2ART, VIU8SGD, KWI6PEN, BEART, N4GBJJNC     Type & Screen (If Applicable):  No results found for: LABABO, LABRH    Drug/Infectious Status (If Applicable):  No results found for: HIV, HEPCAB    COVID-19 Screening (If Applicable):   Lab Results   Component Value Date/Time    COVID19 NOT DETECTED 02/18/2023 10:42 AM           Anesthesia Evaluation  Patient summary reviewed and Nursing notes reviewed no history of anesthetic complications:   Airway: Mallampati: III  TM distance: >3 FB   Neck ROM: full  Mouth opening: < 3 FB   Dental:          Pulmonary: breath sounds clear to auscultation  (+) COPD (HOME O2 4-5 CONT, DAILY/PRN INHALERS AND NEBS):  shortness of breath:      (-) asthma, recent URI, sleep apnea and not a current smoker          Patient did not smoke on day of surgery. Cardiovascular:    (+) hypertension:, LACKEY:, hyperlipidemia    (-) pacemaker, past MI, CAD, CABG/stent, dysrhythmias,  angina and  CHF    ECG reviewed  Rhythm: regular  Rate: normal                    Neuro/Psych:   (+) neuromuscular disease (CHRONIC PAIN R HIP):, psychiatric history (MJ USE, MEDICAL):depression/anxiety  (HX DEP.)   (-) seizures, TIA and CVA           GI/Hepatic/Renal:   (+) hiatal hernia, GERD: well controlled, liver disease (CIRRHOSIS/ FATTY):, renal disease (MEIR RESOLVED SINCE ADMIT):,      (-) bowel prep and no morbid obesity       Endo/Other:    (+) : arthritis:., electrolyte abnormalities, no malignancy/cancer. (-) diabetes mellitus, hypothyroidism, hyperthyroidism, blood dyscrasia, no malignancy/cancer               Abdominal:       Abdomen: soft and tender. Vascular: negative vascular ROS. Other Findings: NCO2          Anesthesia Plan      general     ASA 3     (DISCUSSED  POSS POP VENT/ICU, BRITT,NGT, SEC PIV/TRANSFUSION)  Induction: intravenous and rapid sequence. MIPS: Postoperative opioids intended and Prophylactic antiemetics administered. Anesthetic plan and risks discussed with patient and child/children. Use of blood products discussed with patient whom consented to blood products. Plan discussed with CRNA. This pre-anesthesia assessment may be used as a history and physical.    DOS STAFF ADDENDUM:    Pt seen and examined, chart reviewed (including anesthesia, drug and allergy history). No interval changes to history and physical examination. Anesthetic plan, risks, benefits, alternatives, and personnel involved discussed with patient. Patient verbalized an understanding and agrees to proceed.       Reyna Lovelace MD  February 22, 2023  2:11 PM      Indio Edwards Jenelle Villar MD   2/22/2023

## 2023-02-22 NOTE — PROGRESS NOTES
4 Eyes Skin Assessment     The patient is being assess for  Shift Handoff    I agree that 2 RN's have performed a thorough Head to Toe Skin Assessment on the patient. ALL assessment sites listed below have been assessed. Areas assessed by both nurses: RN Jordy Boothe and KRISTIN Bee  [x]   Head, Face, and Ears   [x]   Shoulders, Back, and Chest  [x]   Arms, Elbows, and Hands   [x]   Coccyx, Sacrum, and IschIum  [x]   Legs, Feet, and Heels        Does the Patient have Skin Breakdown?   Yes a wound was noted on the Admission Assessment and an WOUND LDA was Initiated documentation include the Lizeth-wound, Wound Assessment, Measurements, Dressing Treatment, Drainage, and Color\",         Dedrick Prevention initiated:  Yes   Wound Care Orders initiated:  Yes      [address]  nurse consulted for Pressure Injury (Stage 3,4, Unstageable, DTI, NWPT, and Complex wounds), New and Established Ostomies:  Yes      Nurse 1 eSignature: Electronically signed by Zoraida Tripathi RN on 2/21/23 at 10:09 PM EST    **SHARE this note so that the co-signing nurse is able to place an eSignature**    Nurse 2 eSignature: Electronically signed by Dimitri Moreno RN on 2/21/23 at 10:26 PM EST

## 2023-02-22 NOTE — PROGRESS NOTES
D: Patient here from OR via bed, taken to bay 2 in PACU, patient is awake and alert x 4, C/O of Abd pain 8/10. A: Medicated per doctors order, see MAR, call light is in reach, assessment completed and documented, discussed plan of care with patient who agreed.

## 2023-02-22 NOTE — TELEPHONE ENCOUNTER
Patient called to just update us on her hospital status. She stated that she is current at [facility], had a bowel obstruction, and will be having surgery today, 2/22/23.      TEAGAN

## 2023-02-22 NOTE — PLAN OF CARE
Problem: Pain  Goal: Verbalizes/displays adequate comfort level or baseline comfort level  2/21/2023 2228 by Manuela Wheeler RN  Outcome: Progressing  Flowsheets (Taken 2/21/2023 2228)  Verbalizes/displays adequate comfort level or baseline comfort level:   Encourage patient to monitor pain and request assistance   Administer analgesics based on type and severity of pain and evaluate response   Assess pain using appropriate pain scale   Implement non-pharmacological measures as appropriate and evaluate response     Problem: Safety - Adult  Goal: Free from fall injury  2/21/2023 2228 by Manuela Wheeler RN  Outcome: Progressing  Flowsheets (Taken 2/21/2023 2228)  Free From Fall Injury: Instruct family/caregiver on patient safety     Problem: Skin/Tissue Integrity  Goal: Absence of new skin breakdown  Description: 1. Monitor for areas of redness and/or skin breakdown  2. Assess vascular access sites hourly  3. Every 4-6 hours minimum:  Change oxygen saturation probe site  4. Every 4-6 hours:  If on nasal continuous positive airway pressure, respiratory therapy assess nares and determine need for appliance change or resting period.   Outcome: Progressing  Note: Applied zinc oxide paste and mepiplex on the coccyx

## 2023-02-23 LAB
ANION GAP SERPL CALCULATED.3IONS-SCNC: 10 MMOL/L (ref 3–16)
BASOPHILS ABSOLUTE: 0 K/UL (ref 0–0.2)
BASOPHILS RELATIVE PERCENT: 0 %
BUN BLDV-MCNC: 7 MG/DL (ref 7–20)
CALCIUM SERPL-MCNC: 8 MG/DL (ref 8.3–10.6)
CHLORIDE BLD-SCNC: 99 MMOL/L (ref 99–110)
CO2: 24 MMOL/L (ref 21–32)
CREAT SERPL-MCNC: <0.5 MG/DL (ref 0.6–1.1)
EOSINOPHILS ABSOLUTE: 0 K/UL (ref 0–0.6)
EOSINOPHILS RELATIVE PERCENT: 0 %
GFR SERPL CREATININE-BSD FRML MDRD: >60 ML/MIN/{1.73_M2}
GLUCOSE BLD-MCNC: 119 MG/DL (ref 70–99)
GLUCOSE BLD-MCNC: 135 MG/DL (ref 70–99)
GLUCOSE BLD-MCNC: 140 MG/DL (ref 70–99)
GLUCOSE BLD-MCNC: 140 MG/DL (ref 70–99)
GLUCOSE BLD-MCNC: 153 MG/DL (ref 70–99)
GLUCOSE BLD-MCNC: 162 MG/DL (ref 70–99)
GLUCOSE BLD-MCNC: 96 MG/DL (ref 70–99)
HCT VFR BLD CALC: 38 % (ref 36–48)
HEMOGLOBIN: 12.7 G/DL (ref 12–16)
LYMPHOCYTES ABSOLUTE: 0.4 K/UL (ref 1–5.1)
LYMPHOCYTES RELATIVE PERCENT: 4.8 %
MAGNESIUM: 1.7 MG/DL (ref 1.8–2.4)
MCH RBC QN AUTO: 33.3 PG (ref 26–34)
MCHC RBC AUTO-ENTMCNC: 33.5 G/DL (ref 31–36)
MCV RBC AUTO: 99.3 FL (ref 80–100)
MONOCYTES ABSOLUTE: 0.5 K/UL (ref 0–1.3)
MONOCYTES RELATIVE PERCENT: 5.5 %
NEUTROPHILS ABSOLUTE: 7.7 K/UL (ref 1.7–7.7)
NEUTROPHILS RELATIVE PERCENT: 89.7 %
PDW BLD-RTO: 12.9 % (ref 12.4–15.4)
PERFORMED ON: ABNORMAL
PERFORMED ON: NORMAL
PLATELET # BLD: 100 K/UL (ref 135–450)
PMV BLD AUTO: 8.4 FL (ref 5–10.5)
POTASSIUM SERPL-SCNC: 3.9 MMOL/L (ref 3.5–5.1)
RBC # BLD: 3.83 M/UL (ref 4–5.2)
SODIUM BLD-SCNC: 133 MMOL/L (ref 136–145)
WBC # BLD: 8.6 K/UL (ref 4–11)

## 2023-02-23 PROCEDURE — 2700000000 HC OXYGEN THERAPY PER DAY

## 2023-02-23 PROCEDURE — 2580000003 HC RX 258: Performed by: SURGERY

## 2023-02-23 PROCEDURE — 6360000002 HC RX W HCPCS: Performed by: SURGERY

## 2023-02-23 PROCEDURE — 83735 ASSAY OF MAGNESIUM: CPT

## 2023-02-23 PROCEDURE — 6370000000 HC RX 637 (ALT 250 FOR IP): Performed by: SURGERY

## 2023-02-23 PROCEDURE — 1200000000 HC SEMI PRIVATE

## 2023-02-23 PROCEDURE — 99024 POSTOP FOLLOW-UP VISIT: CPT | Performed by: SURGERY

## 2023-02-23 PROCEDURE — C9113 INJ PANTOPRAZOLE SODIUM, VIA: HCPCS | Performed by: SURGERY

## 2023-02-23 PROCEDURE — 94761 N-INVAS EAR/PLS OXIMETRY MLT: CPT

## 2023-02-23 PROCEDURE — 36415 COLL VENOUS BLD VENIPUNCTURE: CPT

## 2023-02-23 PROCEDURE — APPSS30 APP SPLIT SHARED TIME 16-30 MINUTES: Performed by: CLINICAL NURSE SPECIALIST

## 2023-02-23 PROCEDURE — 85025 COMPLETE CBC W/AUTO DIFF WBC: CPT

## 2023-02-23 PROCEDURE — 94640 AIRWAY INHALATION TREATMENT: CPT

## 2023-02-23 PROCEDURE — 80048 BASIC METABOLIC PNL TOTAL CA: CPT

## 2023-02-23 RX ADMIN — Medication 1 LOZENGE: at 02:30

## 2023-02-23 RX ADMIN — FLUTICASONE PROPIONATE 1 SPRAY: 50 SPRAY, METERED NASAL at 08:29

## 2023-02-23 RX ADMIN — Medication 1 LOZENGE: at 20:46

## 2023-02-23 RX ADMIN — Medication 10 ML: at 08:27

## 2023-02-23 RX ADMIN — HYDROMORPHONE HYDROCHLORIDE 0.5 MG: 0.5 INJECTION, SOLUTION INTRAMUSCULAR; INTRAVENOUS; SUBCUTANEOUS at 08:41

## 2023-02-23 RX ADMIN — OXYCODONE AND ACETAMINOPHEN 2 TABLET: 5; 325 TABLET ORAL at 06:29

## 2023-02-23 RX ADMIN — LEVALBUTEROL HYDROCHLORIDE 0.3 MG: 1.25 SOLUTION, CONCENTRATE RESPIRATORY (INHALATION) at 02:58

## 2023-02-23 RX ADMIN — HYDROMORPHONE HYDROCHLORIDE 0.5 MG: 0.5 INJECTION, SOLUTION INTRAMUSCULAR; INTRAVENOUS; SUBCUTANEOUS at 17:41

## 2023-02-23 RX ADMIN — Medication 1 LOZENGE: at 04:59

## 2023-02-23 RX ADMIN — HYDROMORPHONE HYDROCHLORIDE 0.5 MG: 0.5 INJECTION, SOLUTION INTRAMUSCULAR; INTRAVENOUS; SUBCUTANEOUS at 20:45

## 2023-02-23 RX ADMIN — Medication 10 ML: at 20:46

## 2023-02-23 RX ADMIN — ALBUTEROL SULFATE 2.5 MG: 2.5 SOLUTION RESPIRATORY (INHALATION) at 20:13

## 2023-02-23 RX ADMIN — Medication 2 PUFF: at 08:48

## 2023-02-23 RX ADMIN — CEFTRIAXONE SODIUM 1000 MG: 1 INJECTION, POWDER, FOR SOLUTION INTRAMUSCULAR; INTRAVENOUS at 14:07

## 2023-02-23 RX ADMIN — Medication 1 LOZENGE: at 00:10

## 2023-02-23 RX ADMIN — Medication 1 LOZENGE: at 12:08

## 2023-02-23 RX ADMIN — TIOTROPIUM BROMIDE INHALATION SPRAY 2 PUFF: 3.12 SPRAY, METERED RESPIRATORY (INHALATION) at 08:48

## 2023-02-23 RX ADMIN — OXYCODONE AND ACETAMINOPHEN 2 TABLET: 5; 325 TABLET ORAL at 02:29

## 2023-02-23 RX ADMIN — HYDROMORPHONE HYDROCHLORIDE 0.5 MG: 0.5 INJECTION, SOLUTION INTRAMUSCULAR; INTRAVENOUS; SUBCUTANEOUS at 12:10

## 2023-02-23 RX ADMIN — TRAZODONE HYDROCHLORIDE 100 MG: 50 TABLET ORAL at 20:45

## 2023-02-23 RX ADMIN — SODIUM CHLORIDE: 9 INJECTION, SOLUTION INTRAVENOUS at 08:33

## 2023-02-23 RX ADMIN — PANTOPRAZOLE SODIUM 40 MG: 40 INJECTION, POWDER, FOR SOLUTION INTRAVENOUS at 08:27

## 2023-02-23 RX ADMIN — Medication 2 PUFF: at 20:10

## 2023-02-23 ASSESSMENT — PAIN SCALES - GENERAL
PAINLEVEL_OUTOF10: 8
PAINLEVEL_OUTOF10: 10
PAINLEVEL_OUTOF10: 10
PAINLEVEL_OUTOF10: 9
PAINLEVEL_OUTOF10: 10
PAINLEVEL_OUTOF10: 2
PAINLEVEL_OUTOF10: 7
PAINLEVEL_OUTOF10: 10

## 2023-02-23 ASSESSMENT — PAIN DESCRIPTION - LOCATION
LOCATION: THROAT
LOCATION: ABDOMEN
LOCATION: THROAT
LOCATION: ABDOMEN;HIP
LOCATION: ABDOMEN;HIP
LOCATION: THROAT

## 2023-02-23 ASSESSMENT — PAIN DESCRIPTION - DESCRIPTORS
DESCRIPTORS: ACHING
DESCRIPTORS: ACHING
DESCRIPTORS: SORE
DESCRIPTORS: ACHING
DESCRIPTORS: ACHING

## 2023-02-23 ASSESSMENT — PAIN DESCRIPTION - ORIENTATION
ORIENTATION: RIGHT;LEFT
ORIENTATION: MID
ORIENTATION: RIGHT;LEFT

## 2023-02-23 ASSESSMENT — PAIN - FUNCTIONAL ASSESSMENT: PAIN_FUNCTIONAL_ASSESSMENT: ACTIVITIES ARE NOT PREVENTED

## 2023-02-23 ASSESSMENT — PAIN DESCRIPTION - PAIN TYPE: TYPE: ACUTE PAIN;SURGICAL PAIN

## 2023-02-23 NOTE — PROGRESS NOTES
Received patient on bed, with ongoing IV fluid on the left hand, normal saline locked on the right hand. NG tube at 62cm on the left nare connected to low suction draining bile color. On oxygen at 6LPM via nasal cannula. Assisted patient to the commode back into bed. Kept comfortable. Needs attended.

## 2023-02-23 NOTE — PROGRESS NOTES
[facility] GENERAL SURGERY    Surgery Progress Note           POD # 1    PATIENT NAME: Ynes De Paz     TODAY'S DATE: 2/23/2023    INTERVAL HISTORY:    Pt  reports she is feeling much better - abd pain much improved. Thirsty. OBJECTIVE:   VITALS:  /70   Pulse 91   Temp 98.2 °F (36.8 °C) (Oral)   Resp 18   Ht 5' 1\" (1.549 m)   Wt 111 lb 3.2 oz (50.4 kg)   SpO2 94%   BMI 21.01 kg/m²     INTAKE/OUTPUT:    I/O last 3 completed shifts: In: 2175 [P.O.:975; I.V.:1200]  Out: 125 [Urine:100; Blood:25]  No intake/output data recorded. CONSTITUTIONAL:  awake and alert  LUNGS:  no crackles or wheezing  ABDOMEN:   hypoactive bowel sounds, soft, non-distended, mildy tender   INCISION: clean, dry    Data:  CBC:   Recent Labs     02/21/23  0531 02/22/23  0504 02/23/23  0521   WBC 4.7 8.8 8.6   HGB 9.7* 13.1 12.7   HCT 28.4* 38.7 38.0   PLT 89* 106* 100*     BMP:    Recent Labs     02/21/23  0531 02/22/23  0504 02/23/23  0521    130* 133*   K 3.0* 3.9 3.9    95* 99   CO2 25 28 24   BUN 5* 5* 7   CREATININE <0.5* <0.5* <0.5*   GLUCOSE 75 134* 162*     Hepatic:   Recent Labs     02/21/23  0531 02/22/23  0504   AST 18 26   ALT 16 24   BILITOT 0.5 0.6   ALKPHOS 42 65     Mag:      Recent Labs     02/21/23  0531 02/22/23  0504 02/23/23  0521   MG 1.40* 1.60* 1.70*        ASSESSMENT AND PLAN:  62 y.o. female status post Exploratory laparotomy, lysis of adhesions, and  umbilical herniorrhaphy. GI: trial ngt clamping today - sips clears  Pain: continue with current orders  Activity: ambulate with assistance  Prophy: heparin, protonix    Electronically signed by ZARI Bartholomew - CNP     Surgery Staff    I have examined this patient, and read and agree with the note by Rj Leung CNP from today; more than half of the total time was spent by me on the encounter. Await return of bowel function.     Jaja Alejo MD

## 2023-02-23 NOTE — PROGRESS NOTES
4 Eyes Skin Assessment     The patient is being assess for  Post-Op Surgical    I agree that 2 RN's have performed a thorough Head to Toe Skin Assessment on the patient. ALL assessment sites listed below have been assessed. Areas assessed by both nurses: Eugene Rao RN student, Jessie Fall RN  [x]   Head, Face, and Ears   [x]   Shoulders, Back, and Chest  [x]   Arms, Elbows, and Hands   [x]   Coccyx, Sacrum, and Ischum  [x]   Legs, Feet, and Heels    No new areas. See Head to toe assessment. Does the Patient have Skin Breakdown?   Yes a wound was noted on the Admission Assessment and an WOUND LDA was Initiated documentation include the Lizeth-wound, Wound Assessment, Measurements, Dressing Treatment, Drainage, and Color\",         Dedrick Prevention initiated:  Yes   Wound Care Orders initiated:  Yes      [id number]  nurse consulted for Pressure Injury (Stage 3,4, Unstageable, DTI, NWPT, and Complex wounds):  Yes      Nurse 1 eSignature: Electronically signed by Sarthak Ordonez RN on 2/22/23 at 7:02 PM EST    **SHARE this note so that the co-signing nurse is able to place an eSignature**    Nurse 2 eSignature: Electronically signed by Radhames Urban RN on 2/22/23 at 7:15 PM EST

## 2023-02-23 NOTE — PLAN OF CARE
Problem: Pain  Goal: Verbalizes/displays adequate comfort level or baseline comfort level  Outcome: Progressing  Flowsheets (Taken 2/22/2023 1928)  Verbalizes/displays adequate comfort level or baseline comfort level:   Encourage patient to monitor pain and request assistance   Assess pain using appropriate pain scale   Administer analgesics based on type and severity of pain and evaluate response   Implement non-pharmacological measures as appropriate and evaluate response   Consider cultural and social influences on pain and pain management   Notify Licensed Independent Practitioner if interventions unsuccessful or patient reports new pain     Problem: Safety - Adult  Goal: Free from fall injury  Outcome: Progressing  Flowsheets (Taken 2/22/2023 1928)  Free From Fall Injury: Instruct family/caregiver on patient safety     Problem: Skin/Tissue Integrity  Goal: Absence of new skin breakdown  Description: 1. Monitor for areas of redness and/or skin breakdown  2.   Assess vascular access sites hourly  Outcome: Progressing     Problem: Nutrition Deficit:  Goal: Optimize nutritional status  Outcome: Progressing  Flowsheets (Taken 2/22/2023 1928)  Nutrient intake appropriate for improving, restoring, or maintaining nutritional needs:   Assess nutritional status and recommend course of action   Monitor oral intake, labs, and treatment plans   Recommend appropriate diets, oral nutritional supplements, and vitamin/mineral supplements   Order, calculate, and assess calorie counts as needed   Provide specific nutrition education to patient or family as appropriate     Problem: Discharge Planning  Goal: Discharge to home or other facility with appropriate resources  Outcome: Progressing  Flowsheets (Taken 2/22/2023 1841)  Discharge to home or other facility with appropriate resources:   Identify barriers to discharge with patient and caregiver   Arrange for needed discharge resources and transportation as appropriate Identify discharge learning needs (meds, wound care, etc)   Arrange for interpreters to assist at discharge as needed   Refer to discharge planning if patient needs post-hospital services based on physician order or complex needs related to functional status, cognitive ability or social support system

## 2023-02-23 NOTE — PROGRESS NOTES
As per TU Berg, give the heparin tonight, and Dr. Wiggins and the dayshift can discuss the parameters tomorrow.

## 2023-02-23 NOTE — CARE COORDINATION
Hospital day 5, POD 1: Patient on C3 re SBO care managed by IM and General Surgery. Patient on baseline 02. Patient with NG/NPO/IV ATB/ IV Dilaudid for pain control. Soto. SW will follow for DCP needs. FREDY Mohr

## 2023-02-23 NOTE — OP NOTE
[facility]                 [facility]                                OPERATIVE REPORT    PATIENT NAME: Bebe Sorensen                       :        1965  MED REC NO:   [id number]                          ROOM:       0326  ACCOUNT NO:   [de-identified]                           ADMIT DATE: 2023  PROVIDER:     Ashok Bernheim, MD    DATE OF PROCEDURE:  2023    PREOPERATIVE DIAGNOSIS:  Small bowel obstruction. POSTOPERATIVE DIAGNOSIS:  Small bowel obstruction. PROCEDURE PERFORMED:  Exploratory laparotomy, lysis of adhesions, and  umbilical herniorrhaphy. SURGEON:  Ashok Bernheim, MD    ANESTHESIA:  General.    ESTIMATED BLOOD LOSS:  Less than 50 mL. SPECIMENS:  None. COUNTS:  Sponge and needle counts were correct. INDICATIONS:  The patient is a 59-year-old female, who was admitted with  several days of symptomatic high-grade small bowel obstruction that was  attempted to be managed conservatively. However, she failed to  significantly improve and we now recommended proceeding with surgical  intervention. FINDINGS:  1. Distended, proximal small bowel loops with two adjacent thick band  adhesions causing a complete small bowel obstruction, lysed. No bowel  resection was required. 2.  Small 5 mm umbilical fascial defect with small amount of  incarcerated omentum. DESCRIPTION OF THE PROCEDURE:  After informed consent was obtained, the  patient was taken to the operating room and placed in the supine  position. Preoperative antibiotics were initiated in the holding area. Athrombic pumps were placed in the legs. General anesthesia was  administered without difficulty. The abdomen was prepped and draped in  a sterile fashion. A periumbilical midline incision was created sharply  and carried down through the abdominal wall layers into the peritoneal  cavity.   I immediately see a very thin almost string like band of  omentum being drawn up into a very small umbilical fascial defect. I  was able to reduce the incarcerated segment of omentum from the  umbilical hernia. Continued to explore the abdomen, demonstrated  significantly distended loops of bowel visible. The small bowel was  mobilized out of the abdominal cavity until eventually I could identify  a point of transition to completely collapsed distal ileum in the right  lower quadrant. The area of transition was notable for having thick cord like bands in  two separate spots adjacent to each other causing the obstruction. These bands were transected to freed the bowel and now the obstruction  was completely resolved. There was no ischemia or any other need for  bowel resection. The assistant returned the bowel back into the  abdominal cavity. I elected to decompress the proximal dilated small  bowel. A 3-0 pursestring was placed on the anterior mesenteric surface  of the jejunoileal junction region. An enterotomy was made inside the  pursestring and we now used the suction to decompress the bowel distally  and proximally of copious amounts of succus. The suction was then  removed and the pursestring suture was secured. Overlying 3-0 silk  Lembert sutures were then placed to further seal the enterotomy site. The bowels were returned back to the abdominal cavity. The nasogastric  tube was confirmed to be in position in the stomach. The omentum was  pulled down over the bowels. The midline fascia was then closed with a  running looped 0 PDS suture. The wound was copiously irrigated. Hemostasis was excellent. The incision was closed with 3-0 Vicryl  subcutaneous sutures, followed by a running 4-0 Monocryl subcuticular  stitch, and Dermabond. The patient was then extubated and taken to the  recovery room in stable condition.         Bolivar Franco MD    D: 02/22/2023 22:31:44       T: 02/23/2023 1:06:09     [id number]  Job#: [id number]     Doc#: [id number]    CC:

## 2023-02-23 NOTE — PROGRESS NOTES
Comprehensive Nutrition Assessment    Type and Reason for Visit:  Reassess    Nutrition Recommendations/Plan:   Clear liquids per general surgery  Ensure clear bid  Monitor need for education  Will monitor nutritional adequacy, nutrition-related labs, weights, BMs, and clinical progress      Malnutrition Assessment:  Malnutrition Status: At risk for malnutrition (Comment) (02/22/23 1156)    Context:  Acute Illness       Nutrition Assessment:    Follow up: NG clamped this am, clear liquids ordered by general surgery. Will monitor tolerance and progression. Due to low protein content of CL diet, will add Ensure clear bid. Nutrition Related Findings:    Na 133, Mg 1.7 on 2/23/23; last BM on 2/13/23 Wound Type: Pressure Injury, Stage II       Current Nutrition Intake & Therapies:    Average Meal Intake: Unable to assess  Average Supplements Intake: Unable to assess  ADULT DIET; Clear Liquid  ADULT ORAL NUTRITION SUPPLEMENT; Breakfast, Dinner; Clear Liquid Oral Supplement    Anthropometric Measures:  Height: 5' 1\" (154.9 cm)  Ideal Body Weight (IBW): 105 lbs (48 kg)       Current Body Weight: 111 lb 3.2 oz (50.4 kg), 105.9 % IBW. Weight Source: Standing Scale  Current BMI (kg/m2): 21  Usual Body Weight:  (103-107lb per pt)     Weight Adjustment For: No Adjustment                 BMI Categories: Normal Weight (BMI 18.5-24. 9)    Estimated Daily Nutrient Needs:  Energy Requirements Based On: Kcal/kg (25-30kcal/kg)  Weight Used for Energy Requirements: Current  Energy (kcal/day): 6174-3314  Weight Used for Protein Requirements: Current (1.2-1.5g/kg)  Protein (g/day): 60-76g  Method Used for Fluid Requirements: 1 ml/kcal  Fluid (ml/day): 3734-2970 ml    Nutrition Diagnosis:   Increased nutrient needs related to increase demand for energy/nutrients as evidenced by  (extended hospital stay)    Nutrition Interventions:   Food and/or Nutrient Delivery: Continue Current Diet, Start Oral Nutrition Supplement  Nutrition Education/Counseling: No recommendation at this time  Coordination of Nutrition Care: Continue to monitor while inpatient       Goals:     Goals: PO intake 75% or greater       Nutrition Monitoring and Evaluation:   Behavioral-Environmental Outcomes: None Identified  Food/Nutrient Intake Outcomes: Supplement Intake, Food and Nutrient Intake  Physical Signs/Symptoms Outcomes: Nutrition Focused Physical Findings, Biochemical Data    Discharge Planning:     Too soon to determine     MADDIE [address] [facility], [address] [place] [address],   Contact: [phone]

## 2023-02-23 NOTE — PROGRESS NOTES
Hospitalist Progress Note      PCP: Carlos Alberto Thompson, APRN - CNP    Date of Admission: 2/18/2023    Chief Complaint: Nausea vomiting    Hospital Course: This is a 15-year-old female with  chronic respiratory failure on oxygen 5 L at home admitted with  nausea vomiting diagnosis small bowel obstruction NG tube in place surgery consulted and following. Subjective:  Abdominal pain has much improved after surgery. Tolerating liquids. Medications:  Reviewed    Infusion Medications    sodium chloride 100 mL/hr at 02/23/23 0997    sodium chloride       Scheduled Medications    mometasone-formoterol  2 puff Inhalation BID    fluticasone  1 spray Each Nostril Daily    tiotropium  2 puff Inhalation Daily    traZODone  100 mg Oral Nightly    sodium chloride flush  5-40 mL IntraVENous 2 times per day    heparin (porcine)  5,000 Units SubCUTAneous BID    pantoprazole  40 mg IntraVENous Daily    cefTRIAXone (ROCEPHIN) IV  1,000 mg IntraVENous Q24H     PRN Meds: HYDROmorphone **OR** HYDROmorphone, midazolam, oxyCODONE-acetaminophen **OR** oxyCODONE-acetaminophen, albuterol, levalbuterol, sodium chloride flush, sodium chloride, ondansetron **OR** ondansetron, polyethylene glycol, acetaminophen **OR** acetaminophen, Benzocaine-Menthol      Intake/Output Summary (Last 24 hours) at 2/23/2023 0911  Last data filed at 2/23/2023 0631  Gross per 24 hour   Intake 1225 ml   Output 125 ml   Net 1100 ml         Physical Exam Performed:    /70   Pulse 91   Temp 98.2 °F (36.8 °C) (Oral)   Resp 18   Ht 5' 1\" (1.549 m)   Wt 111 lb 3.2 oz (50.4 kg)   SpO2 94%   BMI 21.01 kg/m²     General appearance: No distress, appears stated age and cooperative. HEENT: Pupils equal, round, and reactive to light. Conjunctivae/corneas clear. Neck: Supple, with full range of motion. No jugular venous distention. Trachea midline. Respiratory:  Normal respiratory effort.  Clear to auscultation, bilaterally without Rales/Wheezes/Rhonchi. Cardiovascular: Regular rate and rhythm with normal S1/S2 without murmurs, rubs or gallops. Abdomen: Not distended, nontender, regular bowel sounds, soft   Musculoskeletal: No clubbing, cyanosis or edema bilaterally. Full range of motion without deformity. Skin: Skin color, texture, turgor normal.  No rashes or lesions. Neurologic:  Neurovascularly intact without any focal sensory/motor deficits. Cranial nerves: II-XII intact, grossly non-focal.  Psychiatric: Alert and oriented, thought content appropriate, normal insight  Capillary Refill: Brisk, 3 seconds, normal   Peripheral Pulses: +2 palpable, equal bilaterally       Labs:   Recent Labs     02/21/23  0531 02/22/23  0504 02/23/23  0521   WBC 4.7 8.8 8.6   HGB 9.7* 13.1 12.7   HCT 28.4* 38.7 38.0   PLT 89* 106* 100*       Recent Labs     02/21/23  0531 02/22/23  0504 02/23/23  0521    130* 133*   K 3.0* 3.9 3.9    95* 99   CO2 25 28 24   BUN 5* 5* 7   CREATININE <0.5* <0.5* <0.5*   CALCIUM 6.9* 9.0 8.0*       Recent Labs     02/21/23  0531 02/22/23  0504   AST 18 26   ALT 16 24   BILITOT 0.5 0.6   ALKPHOS 42 65       No results for input(s): INR in the last 72 hours. No results for input(s): Janora Sauger in the last 72 hours. Urinalysis:    No results found for: Aries Fothergill, BACTERIA, RBCUA, BLOODU, [address]    Radiology:  XR ABDOMEN (2 VIEWS)   Final Result   Slow progressive transit of enteric contrast into the colon with dilution of   the more proximal contrast column. Persistent dilation may represent ileus   or a partial small bowel obstruction. XR ABDOMEN (KUB) (SINGLE AP VIEW)   Final Result   Slight decrease in small bowel dilatation. There is likely a small amount of contrast entering the cecum         XR ABDOMEN (KUB) (SINGLE AP VIEW)   Final Result   Dilated small bowel loops filled with contrast.  Nonvisualization of large   bowel yet.          FL SMALL BOWEL FOLLOW THROUGH ONLY   Preliminary Result   1. Persistent findings consistent with presence of small bowel obstruction.   Plain film examination of the abdomen will be obtained at 10 PM this evening   and 7 AM on 2/21/2023 to evaluate movement of contrast material.   2. Moderate-sized hiatal hernia.   3. Prominence of folds within the proximal aspect of the stomach.  While   finding in part may be on the basis of incomplete distention, true fold   thickening including changes of underlying gastritis also in the differential   diagnosis as described above.         XR ABDOMEN (2 VIEWS)   Final Result   History of small-bowel obstruction with stable dilated air-filled loops of   small bowel in comparison to the prior radiograph.         XR ABDOMEN (KUB) (SINGLE AP VIEW)   Final Result   Tip of NG tube projects in left upper quadrant, presumably in hiatal hernia,.   There is decreased gaseous distention compared to prior         XR ABDOMEN (KUB) (SINGLE AP VIEW)   Final Result   Enteric tube with the tip in the side hole likely located within the large   hiatal hernia is seen on the same day CT.  Small-bowel obstruction seen on   the same day CT not well appreciated due to relative lack of intraluminal gas.         CT CHEST ABDOMEN PELVIS WO CONTRAST Additional Contrast? None   Final Result   1. Small-bowel obstruction with indeterminate etiology and an apparent   transition in the right lower quadrant/pelvis.  There is no pattern of   pneumatosis or perforation.   2. Decompression of the distal ileum with stool and air in the colon.  This   may indicate incomplete obstruction or a recently developing pattern of   obstruction.   3. Large hiatal hernia contributing to atelectasis in the left lower lobe.   Peripheral bronchial opacification in the left lower lobe could potentially   be in association, although mucoid impaction is suspected.   4. Age indeterminate compression fractures that are likely remote at both T8   and T12.   Additional remote fractures of the ribs and left pelvis are noted. IP CONSULT TO GENERAL SURGERY  IP CONSULT TO GENERAL SURGERY    Assessment/Plan:    Active Hospital Problems    Diagnosis     Small bowel obstruction ([facility] [address] [K56.279]      Priority: Medium   small bowel obstruction surgery consult   Failed to resolve with bowel rest  High degree of obstruction on CT a/p  S/p exploratory laparotomy, lysis of adhesions and umbilical herniorrhaphy   NGT clamping today, sips clears    Acute kidney injury, due to dehydration,   Resolved  Hold on additional fluids today     HypoKalemia - etiology clinically unable to determine. Follow serial labs and replace PRN. Reviewed and documented as above. HypoMagnesemia - etiology clinically unable to determine. Follow serial labs and replace PRN. Reviewed and documented as above. GERD continue with IV PPI. COPD with chronic respiratory failure at home on 5 L oxygen continue with the supplemental oxygen and inhalers.     Normocytic anemia - recommend workup outpatient      DVT Prophylaxis: Lovenox subcu  Diet: Diet NPO Exceptions are: Sips of Water with Meds, Ice Chips, Popsicles  Code Status: Full Code  PT/OT Eval Status: Not indicated    Dispo -Pending surgery recs, patient needs to tolerate meals     Appropriate for A1 Discharge Unit: No      Jari Dakins, DO

## 2023-02-23 NOTE — PROGRESS NOTES
Opens eyes to name. Oriented x4. Titrated 02 to 6LPM NC d/t 02 sat 81% 5L. Currently 91 to 92% 6L. Patient taking oxygen off and 02 sat drops down to mid 80s. Redirected. Holding NGT and nodding off to sleep while in hand. instructed not to manipulate NGT; redirection ineffective d/t patient is sedated. Writer advised video monitoring will be turned on d/t her sedation and pulling at lines. Patient reports \"I can't be trusted\". Complete bed change d/t bile secretions. NGT L nare at 62cm at CLWS. IVF NS 100ml/hr.

## 2023-02-23 NOTE — PROGRESS NOTES
4 Eyes Skin Assessment     The patient is being assess for  Shift Handoff    I agree that 2 RN's have performed a thorough Head to Toe Skin Assessment on the patient. ALL assessment sites listed below have been assessed. Areas assessed by both nurses: KRISTIN Ugalde/KRISTIN Bee  [x]   Head, Face, and Ears   [x]   Shoulders, Back, and Chest  [x]   Arms, Elbows, and Hands   [x]   Coccyx, Sacrum, and IschIum  [x]   Legs, Feet, and Heels        Does the Patient have Skin Breakdown?   Yes LDA WOUND CARE was Initiated documentation include the Lizeth-wound, Wound Assessment, Measurements, Dressing Treatment, Drainage, and Color\",         Dedrick Prevention initiated:  Yes   Wound Care Orders initiated:  Yes      [address] nurse consulted for Pressure Injury (Stage 3,4, Unstageable, DTI, NWPT, and Complex wounds), New and Established Ostomies:  Yes      Nurse 1 eSignature: Electronically signed by Lizandro To RN on 2/23/23 at 2:52 AM EST    **SHARE this note so that the co-signing nurse is able to place an eSignature**    Nurse 2 eSignature: Electronically signed by Ruma Guzman RN on 2/24/23 at 10:45 PM EST

## 2023-02-23 NOTE — PROGRESS NOTES
Timoteo served NP Natalie Roberts if I will continue to give Heparin tonight since  told the dayshift that she will set parameters for platelet count. Current platelet count is 919. Awaiting response.

## 2023-02-23 NOTE — BRIEF OP NOTE
Brief Postoperative Note      Patient: Indra Marie  YOB: 1965  MRN: [id number]    Date of Procedure: 2/22/2023    Pre-Op Diagnosis: SMALL BOWEL OBSTRUCTION    Post-Op Diagnosis: Same       Procedure:  Exploratory laparotomy, lysis of adhesions, umbilical herniorrhaphy    Surgeon(s):  Chase Crow MD    Assistant:  * No surgical staff found *    Anesthesia: General    Estimated Blood Loss (mL): less than 50     Complications: None    Specimens:   ID Type Source Tests Collected by Time Destination   A : LEFT LIVER LOBE BIOPSY Specimen Abdomen SURGICAL PATHOLOGY Chase Crow MD 2/22/2023 1650        Implants:  * No implants in log *      Drains:   NG/OG/NJ/NE Tube Nasogastric 18 fr Left nostril (Active)   Surrounding Skin Clean, dry & intact 02/22/23 1841   Securement device Tape 02/22/23 1841   Status Suction-low intermittent 02/22/23 1841   NG/OG/NJ/NE External Measurement (cm) 62 cm 02/22/23 1841   Drainage Appearance Bile 02/22/23 1841       Urinary Catheter 02/22/23 2 Way (Active)       [REMOVED] NG/OG/NJ/NE Tube Nasogastric 16 fr Right nostril (Removed)   Surrounding Skin Clean, dry & intact 02/18/23 1943   Securement device Adhesive based [place] 02/18/23 1943   Status Suction-low continuous 02/18/23 1943   Placement Verified X-Ray (Initial) 02/18/23 1943   NG/OG/NJ/NE External Measurement (cm) 51 cm 02/18/23 1943   Drainage Appearance Brown 02/18/23 1943   Output (mL) 100 ml 02/18/23 1943       [REMOVED] NG/OG/NJ/NE Tube Nasogastric Right nostril (Removed)   Surrounding Skin Clean, dry & intact 02/21/23 0555   Securement device Tape 02/21/23 0830   Status Suction-low continuous 02/21/23 0830   Placement Verified X-Ray (Initial) 02/19/23 1440   NG/OG/NJ/NE External Measurement (cm) 54 cm 02/21/23 0830   Drainage Appearance Green;Milky 02/21/23 0830   Free Water/Flush (mL) 45 mL 02/21/23 0050   Output (mL) 0 ml 02/21/23 0848       Findings: distended proximal small bowel loops w/ two adjacent thick band adhesions causing complete SBO, lysed  Small, 5mm umbilical fascial defect with small amount of incarcerated omentum    Job#: [id number]    Electronically signed by Jimmy Fischer MD on 2/22/2023 at 10:19 PM

## 2023-02-23 NOTE — PLAN OF CARE
Problem: Pain  Goal: Verbalizes/displays adequate comfort level or baseline comfort level  2/23/2023 0121 by Kari Raza RN  Outcome: Progressing  Flowsheets (Taken 2/23/2023 0121)  Verbalizes/displays adequate comfort level or baseline comfort level:   Encourage patient to monitor pain and request assistance   Assess pain using appropriate pain scale   Administer analgesics based on type and severity of pain and evaluate response   Implement non-pharmacological measures as appropriate and evaluate response  Note: PRN Percocet given for hip and abdominal pain; PRN Cepacol given for sore throat     Problem: Safety - Adult  Goal: Free from fall injury  2/23/2023 0121 by Kari Raza RN  Outcome: Progressing  Flowsheets (Taken 2/23/2023 0121)  Free From Fall Injury: Instruct family/caregiver on patient safety     Problem: Skin/Tissue Integrity  Goal: Absence of new skin breakdown  Description: 1. Monitor for areas of redness and/or skin breakdown  2. Assess vascular access sites hourly  3. Every 4-6 hours minimum:  Change oxygen saturation probe site  4. Every 4-6 hours:  If on nasal continuous positive airway pressure, respiratory therapy assess nares and determine need for appliance change or resting period.   2/23/2023 0121 by Kari Raza RN  Outcome: Progressing  Note: Zinc oxide paste and Mepiplex applied

## 2023-02-24 LAB
ANION GAP SERPL CALCULATED.3IONS-SCNC: 8 MMOL/L (ref 3–16)
BASOPHILS ABSOLUTE: 0 K/UL (ref 0–0.2)
BASOPHILS RELATIVE PERCENT: 0.1 %
BUN BLDV-MCNC: 5 MG/DL (ref 7–20)
CALCIUM SERPL-MCNC: 8.4 MG/DL (ref 8.3–10.6)
CHLORIDE BLD-SCNC: 96 MMOL/L (ref 99–110)
CO2: 25 MMOL/L (ref 21–32)
CREAT SERPL-MCNC: <0.5 MG/DL (ref 0.6–1.1)
EOSINOPHILS ABSOLUTE: 0 K/UL (ref 0–0.6)
EOSINOPHILS RELATIVE PERCENT: 0.2 %
GFR SERPL CREATININE-BSD FRML MDRD: >60 ML/MIN/{1.73_M2}
GLUCOSE BLD-MCNC: 101 MG/DL (ref 70–99)
GLUCOSE BLD-MCNC: 116 MG/DL (ref 70–99)
GLUCOSE BLD-MCNC: 121 MG/DL (ref 70–99)
GLUCOSE BLD-MCNC: 93 MG/DL (ref 70–99)
GLUCOSE BLD-MCNC: 94 MG/DL (ref 70–99)
HCT VFR BLD CALC: 36.4 % (ref 36–48)
HEMOGLOBIN: 11.9 G/DL (ref 12–16)
LYMPHOCYTES ABSOLUTE: 1 K/UL (ref 1–5.1)
LYMPHOCYTES RELATIVE PERCENT: 11.6 %
MCH RBC QN AUTO: 32.7 PG (ref 26–34)
MCHC RBC AUTO-ENTMCNC: 32.8 G/DL (ref 31–36)
MCV RBC AUTO: 99.7 FL (ref 80–100)
MONOCYTES ABSOLUTE: 0.8 K/UL (ref 0–1.3)
MONOCYTES RELATIVE PERCENT: 8.9 %
NEUTROPHILS ABSOLUTE: 7 K/UL (ref 1.7–7.7)
NEUTROPHILS RELATIVE PERCENT: 79.2 %
PDW BLD-RTO: 12.8 % (ref 12.4–15.4)
PERFORMED ON: ABNORMAL
PERFORMED ON: NORMAL
PLATELET # BLD: 123 K/UL (ref 135–450)
PMV BLD AUTO: 8.8 FL (ref 5–10.5)
POTASSIUM SERPL-SCNC: 3.1 MMOL/L (ref 3.5–5.1)
RBC # BLD: 3.65 M/UL (ref 4–5.2)
SODIUM BLD-SCNC: 129 MMOL/L (ref 136–145)
WBC # BLD: 8.8 K/UL (ref 4–11)

## 2023-02-24 PROCEDURE — 6370000000 HC RX 637 (ALT 250 FOR IP): Performed by: SURGERY

## 2023-02-24 PROCEDURE — 6360000002 HC RX W HCPCS: Performed by: SURGERY

## 2023-02-24 PROCEDURE — 2700000000 HC OXYGEN THERAPY PER DAY

## 2023-02-24 PROCEDURE — C9113 INJ PANTOPRAZOLE SODIUM, VIA: HCPCS | Performed by: SURGERY

## 2023-02-24 PROCEDURE — 94640 AIRWAY INHALATION TREATMENT: CPT

## 2023-02-24 PROCEDURE — 94669 MECHANICAL CHEST WALL OSCILL: CPT

## 2023-02-24 PROCEDURE — 36415 COLL VENOUS BLD VENIPUNCTURE: CPT

## 2023-02-24 PROCEDURE — 94761 N-INVAS EAR/PLS OXIMETRY MLT: CPT

## 2023-02-24 PROCEDURE — 2580000003 HC RX 258: Performed by: SURGERY

## 2023-02-24 PROCEDURE — 6360000002 HC RX W HCPCS: Performed by: INTERNAL MEDICINE

## 2023-02-24 PROCEDURE — 99024 POSTOP FOLLOW-UP VISIT: CPT | Performed by: SURGERY

## 2023-02-24 PROCEDURE — 80048 BASIC METABOLIC PNL TOTAL CA: CPT

## 2023-02-24 PROCEDURE — 85025 COMPLETE CBC W/AUTO DIFF WBC: CPT

## 2023-02-24 PROCEDURE — 6370000000 HC RX 637 (ALT 250 FOR IP): Performed by: INTERNAL MEDICINE

## 2023-02-24 PROCEDURE — 1200000000 HC SEMI PRIVATE

## 2023-02-24 RX ORDER — LEVALBUTEROL 1.25 MG/.5ML
0.31 SOLUTION, CONCENTRATE RESPIRATORY (INHALATION)
Status: DISCONTINUED | OUTPATIENT
Start: 2023-02-24 | End: 2023-02-27 | Stop reason: HOSPADM

## 2023-02-24 RX ORDER — OXYCODONE HYDROCHLORIDE AND ACETAMINOPHEN 5; 325 MG/1; MG/1
2 TABLET ORAL EVERY 4 HOURS PRN
Status: DISCONTINUED | OUTPATIENT
Start: 2023-02-24 | End: 2023-02-27 | Stop reason: HOSPADM

## 2023-02-24 RX ORDER — OXYCODONE HYDROCHLORIDE AND ACETAMINOPHEN 5; 325 MG/1; MG/1
1 TABLET ORAL EVERY 4 HOURS PRN
Status: DISCONTINUED | OUTPATIENT
Start: 2023-02-24 | End: 2023-02-27 | Stop reason: HOSPADM

## 2023-02-24 RX ORDER — POLYVINYL ALCOHOL 14 MG/ML
1 SOLUTION/ DROPS OPHTHALMIC PRN
Status: DISCONTINUED | OUTPATIENT
Start: 2023-02-24 | End: 2023-02-27 | Stop reason: HOSPADM

## 2023-02-24 RX ORDER — PANTOPRAZOLE SODIUM 40 MG/10ML
40 INJECTION, POWDER, LYOPHILIZED, FOR SOLUTION INTRAVENOUS DAILY
Status: DISCONTINUED | OUTPATIENT
Start: 2023-02-25 | End: 2023-02-27 | Stop reason: HOSPADM

## 2023-02-24 RX ORDER — PANTOPRAZOLE SODIUM 40 MG/10ML
40 INJECTION, POWDER, LYOPHILIZED, FOR SOLUTION INTRAVENOUS ONCE
Status: DISCONTINUED | OUTPATIENT
Start: 2023-02-24 | End: 2023-02-24

## 2023-02-24 RX ADMIN — HEPARIN SODIUM 5000 UNITS: 5000 INJECTION INTRAVENOUS; SUBCUTANEOUS at 21:18

## 2023-02-24 RX ADMIN — HYDROMORPHONE HYDROCHLORIDE 0.5 MG: 0.5 INJECTION, SOLUTION INTRAMUSCULAR; INTRAVENOUS; SUBCUTANEOUS at 04:26

## 2023-02-24 RX ADMIN — OXYCODONE AND ACETAMINOPHEN 2 TABLET: 5; 325 TABLET ORAL at 18:01

## 2023-02-24 RX ADMIN — Medication 10 ML: at 21:10

## 2023-02-24 RX ADMIN — OXYCODONE AND ACETAMINOPHEN 2 TABLET: 5; 325 TABLET ORAL at 14:10

## 2023-02-24 RX ADMIN — LEVALBUTEROL HYDROCHLORIDE 1.25 MG: 1.25 SOLUTION, CONCENTRATE RESPIRATORY (INHALATION) at 11:59

## 2023-02-24 RX ADMIN — TRAZODONE HYDROCHLORIDE 100 MG: 50 TABLET ORAL at 21:19

## 2023-02-24 RX ADMIN — HYDROMORPHONE HYDROCHLORIDE 0.5 MG: 0.5 INJECTION, SOLUTION INTRAMUSCULAR; INTRAVENOUS; SUBCUTANEOUS at 11:52

## 2023-02-24 RX ADMIN — CEFTRIAXONE SODIUM 1000 MG: 1 INJECTION, POWDER, FOR SOLUTION INTRAMUSCULAR; INTRAVENOUS at 13:33

## 2023-02-24 RX ADMIN — NYSTATIN 500000 UNITS: 100000 SUSPENSION ORAL at 17:04

## 2023-02-24 RX ADMIN — Medication 10 ML: at 09:05

## 2023-02-24 RX ADMIN — NYSTATIN 500000 UNITS: 100000 SUSPENSION ORAL at 21:19

## 2023-02-24 RX ADMIN — Medication 2 PUFF: at 08:05

## 2023-02-24 RX ADMIN — NYSTATIN 500000 UNITS: 100000 SUSPENSION ORAL at 11:52

## 2023-02-24 RX ADMIN — LEVALBUTEROL HYDROCHLORIDE 0.3 MG: 1.25 SOLUTION, CONCENTRATE RESPIRATORY (INHALATION) at 16:07

## 2023-02-24 RX ADMIN — Medication 1 LOZENGE: at 01:23

## 2023-02-24 RX ADMIN — Medication 1 LOZENGE: at 11:51

## 2023-02-24 RX ADMIN — Medication 2 PUFF: at 19:34

## 2023-02-24 RX ADMIN — HEPARIN SODIUM 5000 UNITS: 5000 INJECTION INTRAVENOUS; SUBCUTANEOUS at 11:36

## 2023-02-24 RX ADMIN — LEVALBUTEROL HYDROCHLORIDE 0.3 MG: 1.25 SOLUTION, CONCENTRATE RESPIRATORY (INHALATION) at 19:26

## 2023-02-24 RX ADMIN — LEVALBUTEROL HYDROCHLORIDE 1.25 MG: 1.25 SOLUTION, CONCENTRATE RESPIRATORY (INHALATION) at 08:02

## 2023-02-24 RX ADMIN — Medication 1 LOZENGE: at 09:03

## 2023-02-24 RX ADMIN — HYDROMORPHONE HYDROCHLORIDE 0.5 MG: 0.5 INJECTION, SOLUTION INTRAMUSCULAR; INTRAVENOUS; SUBCUTANEOUS at 09:04

## 2023-02-24 RX ADMIN — HYDROMORPHONE HYDROCHLORIDE 0.5 MG: 0.5 INJECTION, SOLUTION INTRAMUSCULAR; INTRAVENOUS; SUBCUTANEOUS at 21:09

## 2023-02-24 RX ADMIN — Medication 1 LOZENGE: at 04:27

## 2023-02-24 RX ADMIN — PANTOPRAZOLE SODIUM 40 MG: 40 INJECTION, POWDER, FOR SOLUTION INTRAVENOUS at 09:03

## 2023-02-24 RX ADMIN — FLUTICASONE PROPIONATE 1 SPRAY: 50 SPRAY, METERED NASAL at 09:05

## 2023-02-24 RX ADMIN — HYDROMORPHONE HYDROCHLORIDE 0.5 MG: 0.5 INJECTION, SOLUTION INTRAMUSCULAR; INTRAVENOUS; SUBCUTANEOUS at 00:36

## 2023-02-24 RX ADMIN — POLYETHYLENE GLYCOL 3350 17 G: 17 POWDER, FOR SOLUTION ORAL at 09:02

## 2023-02-24 RX ADMIN — TIOTROPIUM BROMIDE INHALATION SPRAY 2 PUFF: 3.12 SPRAY, METERED RESPIRATORY (INHALATION) at 08:05

## 2023-02-24 RX ADMIN — POTASSIUM BICARBONATE 40 MEQ: 782 TABLET, EFFERVESCENT ORAL at 11:27

## 2023-02-24 ASSESSMENT — PAIN SCALES - GENERAL
PAINLEVEL_OUTOF10: 10
PAINLEVEL_OUTOF10: 10
PAINLEVEL_OUTOF10: 9
PAINLEVEL_OUTOF10: 10
PAINLEVEL_OUTOF10: 7
PAINLEVEL_OUTOF10: 8
PAINLEVEL_OUTOF10: 7
PAINLEVEL_OUTOF10: 8
PAINLEVEL_OUTOF10: 10
PAINLEVEL_OUTOF10: 9
PAINLEVEL_OUTOF10: 7
PAINLEVEL_OUTOF10: 7

## 2023-02-24 ASSESSMENT — PAIN DESCRIPTION - LOCATION
LOCATION: ABDOMEN
LOCATION: THROAT;ABDOMEN
LOCATION: ABDOMEN;THROAT
LOCATION: ABDOMEN;THROAT

## 2023-02-24 ASSESSMENT — PAIN DESCRIPTION - DESCRIPTORS
DESCRIPTORS: ACHING

## 2023-02-24 NOTE — PLAN OF CARE
Problem: Pain  Goal: Verbalizes/displays adequate comfort level or baseline comfort level  Outcome: Progressing  Flowsheets (Taken 2/24/2023 5630)  Verbalizes/displays adequate comfort level or baseline comfort level:   Encourage patient to monitor pain and request assistance   Assess pain using appropriate pain scale   Administer analgesics based on type and severity of pain and evaluate response     Problem: Safety - Adult  Goal: Free from fall injury  Outcome: Progressing

## 2023-02-24 NOTE — CARE COORDINATION
Hospital day 6/ POD 2: Patient on C3 re SBO care managed by IM and General Surgery. Patient from home with dtr whom lives close by. Patient with NGT dc this date. Patient on clear diet.  FREDY Arauz

## 2023-02-24 NOTE — PROGRESS NOTES
Pt A&Ox4. VSS. Shift assessment completed. No reports of nausea, emesis- tolerates clear liquids. NG clamped all night. Some weeping note from abdominal midline incision- dry dressing on. Denies other needs. Wound to sacrum not open and blanchable. Call light within reach, bed in lowest position, wheels locked.

## 2023-02-24 NOTE — PROGRESS NOTES
Hospitalist Progress Note      PCP: ZARI Black - CNP    Date of Admission: 2/18/2023    Chief Complaint: Nausea vomiting    Hospital Course: This is a 60-year-old female with  chronic respiratory failure on oxygen 5 L at home admitted with  nausea vomiting diagnosis small bowel obstruction NG tube in place surgery consulted and following. Subjective:  Continues to tolerate liquid diet. NG tube pulled out during encounter. Complaining of throat pain. Medications:  Reviewed    Infusion Medications    sodium chloride       Scheduled Medications    nystatin  5 mL Oral 4x Daily    [START ON 2/25/2023] pantoprazole  40 mg IntraVENous Daily    levalbuterol  0.3 mg Nebulization Q4H WA    mometasone-formoterol  2 puff Inhalation BID    fluticasone  1 spray Each Nostril Daily    tiotropium  2 puff Inhalation Daily    traZODone  100 mg Oral Nightly    sodium chloride flush  5-40 mL IntraVENous 2 times per day    heparin (porcine)  5,000 Units SubCUTAneous BID    cefTRIAXone (ROCEPHIN) IV  1,000 mg IntraVENous Q24H     PRN Meds: polyvinyl alcohol, oxyCODONE-acetaminophen **OR** oxyCODONE-acetaminophen, HYDROmorphone **OR** HYDROmorphone, midazolam, albuterol, sodium chloride flush, sodium chloride, ondansetron **OR** ondansetron, polyethylene glycol, acetaminophen **OR** acetaminophen, Benzocaine-Menthol      Intake/Output Summary (Last 24 hours) at 2/24/2023 1353  Last data filed at 2/24/2023 0857  Gross per 24 hour   Intake 610 ml   Output --   Net 610 ml         Physical Exam Performed:    /69   Pulse (!) 107   Temp 99.8 °F (37.7 °C) (Oral)   Resp 18   Ht 5' 1\" (1.549 m)   Wt 111 lb 3.2 oz (50.4 kg)   SpO2 91%   BMI 21.01 kg/m²     General appearance: No distress, appears stated age and cooperative. HEENT: Pupils equal, round, and reactive to light. Conjunctivae/corneas clear. Early signs of thrush  Neck: Supple, with full range of motion. No jugular venous distention.  Trachea midline. Respiratory:  Normal respiratory effort. Clear to auscultation, bilaterally without Rales/Wheezes/Rhonchi. Cardiovascular: tachycardia with normal S1/S2 without murmurs, rubs or gallops. Abdomen: Not distended, nontender, regular bowel sounds, soft   Musculoskeletal: No clubbing, cyanosis or edema bilaterally. Full range of motion without deformity. Skin: clean incision. Serosanguinous drainage   Neurologic:  Neurovascularly intact without any focal sensory/motor deficits. Cranial nerves: II-XII intact, grossly non-focal.  Psychiatric: Alert and oriented, thought content appropriate, normal insight  Capillary Refill: Brisk, 3 seconds, normal   Peripheral Pulses: +2 palpable, equal bilaterally       Labs:   Recent Labs     02/22/23  0504 02/23/23  0521 02/24/23  0513   WBC 8.8 8.6 8.8   HGB 13.1 12.7 11.9*   HCT 38.7 38.0 36.4   * 100* 123*       Recent Labs     02/22/23  0504 02/23/23  0521 02/24/23  0513   * 133* 129*   K 3.9 3.9 3.1*   CL 95* 99 96*   CO2 28 24 25   BUN 5* 7 5*   CREATININE <0.5* <0.5* <0.5*   CALCIUM 9.0 8.0* 8.4       Recent Labs     02/22/23  0504   AST 26   ALT 24   BILITOT 0.6   ALKPHOS 65       No results for input(s): INR in the last 72 hours. No results for input(s): Robert Branden in the last 72 hours. Urinalysis:    No results found for: Oral Daciaer, BACTERIA, RBCUA, BLOODU, [address], [address]    Radiology:  XR ABDOMEN (2 VIEWS)   Final Result   Slow progressive transit of enteric contrast into the colon with dilution of   the more proximal contrast column. Persistent dilation may represent ileus   or a partial small bowel obstruction. XR ABDOMEN (KUB) (SINGLE AP VIEW)   Final Result   Slight decrease in small bowel dilatation. There is likely a small amount of contrast entering the cecum         XR ABDOMEN (KUB) (SINGLE AP VIEW)   Final Result   Dilated small bowel loops filled with contrast.  Nonvisualization of large   bowel yet. FL SMALL BOWEL FOLLOW THROUGH ONLY   Final Result   1. Persistent findings consistent with presence of small bowel obstruction. Plain film examination of the abdomen will be obtained at 10 PM this evening   and 7 AM on 2/21/2023 to evaluate movement of contrast material.   2. Moderate-sized hiatal hernia. 3. Prominence of folds within the proximal aspect of the stomach. While   finding in part may be on the basis of incomplete distention, true fold   thickening including changes of underlying gastritis also in the differential   diagnosis as described above. XR ABDOMEN (2 VIEWS)   Final Result   History of small-bowel obstruction with stable dilated air-filled loops of   small bowel in comparison to the prior radiograph. XR ABDOMEN (KUB) (SINGLE AP VIEW)   Final Result   Tip of NG tube projects in left upper quadrant, presumably in hiatal hernia,. There is decreased gaseous distention compared to prior         XR ABDOMEN (KUB) (SINGLE AP VIEW)   Final Result   Enteric tube with the tip in the side hole likely located within the large   hiatal hernia is seen on the same day CT. Small-bowel obstruction seen on   the same day CT not well appreciated due to relative lack of intraluminal gas. CT CHEST ABDOMEN PELVIS WO CONTRAST Additional Contrast? None   Final Result   1. Small-bowel obstruction with indeterminate etiology and an apparent   transition in the right lower quadrant/pelvis. There is no pattern of   pneumatosis or perforation. 2. Decompression of the distal ileum with stool and air in the colon. This   may indicate incomplete obstruction or a recently developing pattern of   obstruction. 3. Large hiatal hernia contributing to atelectasis in the left lower lobe. Peripheral bronchial opacification in the left lower lobe could potentially   be in association, although mucoid impaction is suspected.    4. Age indeterminate compression fractures that are likely remote at both T8   and T12. Additional remote fractures of the ribs and left pelvis are noted. IP CONSULT TO GENERAL SURGERY  IP CONSULT TO GENERAL SURGERY    Assessment/Plan:    Active Hospital Problems    Diagnosis     Small bowel obstruction ([facility] [address] [K56.609]      Priority: Medium   small bowel obstruction surgery consult   Failed to resolve with bowel rest  High degree of obstruction on CT a/p  S/p exploratory laparotomy, lysis of adhesions and umbilical herniorrhaphy   NGT, DC, continue clears for now     Acute kidney injury, due to dehydration,   Resolved  Hold on additional fluids today     HypoKalemia - etiology clinically unable to determine. Follow serial labs and replace PRN. Reviewed and documented as above. HypoMagnesemia - etiology clinically unable to determine. Follow serial labs and replace PRN. Reviewed and documented as above. GERD continue with IV PPI. COPD with chronic respiratory failure at home on 5 L oxygen continue with the supplemental oxygen and inhalers. Normocytic anemia - recommend workup outpatient    Thrush - nystatin oral rinse       DVT Prophylaxis: Lovenox subcu  Diet: ADULT DIET;  Clear Liquid  ADULT ORAL NUTRITION SUPPLEMENT; Breakfast, Dinner; Clear Liquid Oral Supplement  Code Status: Full Code  PT/OT Eval Status: Not indicated    Dispo -Pending surgery recs, patient needs to tolerate meals     Appropriate for A1 Discharge Unit: No      Nelia Abdirizak, DO

## 2023-02-24 NOTE — PROGRESS NOTES
Pt negra NG clamping schedule. Negra clear liquid diet w/out increase abd pain or nausea, pt states passing flatus and belching.

## 2023-02-24 NOTE — PROGRESS NOTES
[facility] GENERAL SURGERY    Surgery Progress Note           POD # 2    PATIENT NAME: Jina Amor     TODAY'S DATE: 2/24/2023    INTERVAL HISTORY:    Pt  continues to feel better, passing gas but no BM yet.  Good pain control.     OBJECTIVE:   VITALS:  /69   Pulse (!) 107   Temp 99.8 °F (37.7 °C) (Oral)   Resp 18   Ht 5' 1\" (1.549 m)   Wt 111 lb 3.2 oz (50.4 kg)   SpO2 91%   BMI 21.01 kg/m²     INTAKE/OUTPUT:    I/O last 3 completed shifts:  In: 1235 [P.O.:625; I.V.:610]  Out: -   I/O this shift:  In: 240 [P.O.:240]  Out: -               CONSTITUTIONAL:  awake and alert  LUNGS:  clear to auscultation  ABDOMEN:   hypoactive bowel sounds, soft, non-distended, non-tender   INCISION: clean, healing, sero-sanguinous drainage from central portion of incision, no erythema    Data:  CBC:   Recent Labs     02/22/23  0504 02/23/23  0521 02/24/23  0513   WBC 8.8 8.6 8.8   HGB 13.1 12.7 11.9*   HCT 38.7 38.0 36.4   * 100* 123*     BMP:    Recent Labs     02/22/23  0504 02/23/23  0521 02/24/23  0513   * 133* 129*   K 3.9 3.9 3.1*   CL 95* 99 96*   CO2 28 24 25   BUN 5* 7 5*   CREATININE <0.5* <0.5* <0.5*   GLUCOSE 134* 162* 94     Hepatic:   Recent Labs     02/22/23  0504   AST 26   ALT 24   BILITOT 0.6   ALKPHOS 65     Mag:      Recent Labs     02/22/23  0504 02/23/23  0521   MG 1.60* 1.70*      Phos:   No results for input(s): PHOS in the last 72 hours.   INR: No results for input(s): INR in the last 72 hours.      Radiology Review:       ASSESSMENT AND PLAN:  58 y.o. female status post Exploratory laparotomy, lysis of adhesions, and  umbilical herniorrhaphy.   - d/c NGT, continue clears for now   - cont up OOB   - Prophy - cont heparin, protonix         Electronically signed by JOSE ROBERTO CAMPBELL MD

## 2023-02-25 LAB
ANION GAP SERPL CALCULATED.3IONS-SCNC: 6 MMOL/L (ref 3–16)
BASOPHILS ABSOLUTE: 0 K/UL (ref 0–0.2)
BASOPHILS RELATIVE PERCENT: 0.3 %
BUN BLDV-MCNC: 6 MG/DL (ref 7–20)
CALCIUM SERPL-MCNC: 8.4 MG/DL (ref 8.3–10.6)
CHLORIDE BLD-SCNC: 98 MMOL/L (ref 99–110)
CO2: 27 MMOL/L (ref 21–32)
CREAT SERPL-MCNC: <0.5 MG/DL (ref 0.6–1.1)
EOSINOPHILS ABSOLUTE: 0 K/UL (ref 0–0.6)
EOSINOPHILS RELATIVE PERCENT: 0.4 %
GFR SERPL CREATININE-BSD FRML MDRD: >60 ML/MIN/{1.73_M2}
GLUCOSE BLD-MCNC: 115 MG/DL (ref 70–99)
GLUCOSE BLD-MCNC: 145 MG/DL (ref 70–99)
GLUCOSE BLD-MCNC: 94 MG/DL (ref 70–99)
GLUCOSE BLD-MCNC: 95 MG/DL (ref 70–99)
GLUCOSE BLD-MCNC: 96 MG/DL (ref 70–99)
GLUCOSE BLD-MCNC: 98 MG/DL (ref 70–99)
HCT VFR BLD CALC: 32.4 % (ref 36–48)
HEMOGLOBIN: 10.6 G/DL (ref 12–16)
LYMPHOCYTES ABSOLUTE: 0.7 K/UL (ref 1–5.1)
LYMPHOCYTES RELATIVE PERCENT: 7.9 %
MCH RBC QN AUTO: 32.6 PG (ref 26–34)
MCHC RBC AUTO-ENTMCNC: 32.7 G/DL (ref 31–36)
MCV RBC AUTO: 99.8 FL (ref 80–100)
MONOCYTES ABSOLUTE: 0.8 K/UL (ref 0–1.3)
MONOCYTES RELATIVE PERCENT: 9.6 %
NEUTROPHILS ABSOLUTE: 6.9 K/UL (ref 1.7–7.7)
NEUTROPHILS RELATIVE PERCENT: 81.8 %
PDW BLD-RTO: 12.9 % (ref 12.4–15.4)
PERFORMED ON: ABNORMAL
PERFORMED ON: ABNORMAL
PERFORMED ON: NORMAL
PLATELET # BLD: 126 K/UL (ref 135–450)
PMV BLD AUTO: 8.8 FL (ref 5–10.5)
POTASSIUM SERPL-SCNC: 4.4 MMOL/L (ref 3.5–5.1)
RBC # BLD: 3.25 M/UL (ref 4–5.2)
SODIUM BLD-SCNC: 131 MMOL/L (ref 136–145)
WBC # BLD: 8.5 K/UL (ref 4–11)

## 2023-02-25 PROCEDURE — 94761 N-INVAS EAR/PLS OXIMETRY MLT: CPT

## 2023-02-25 PROCEDURE — 2700000000 HC OXYGEN THERAPY PER DAY

## 2023-02-25 PROCEDURE — 6360000002 HC RX W HCPCS: Performed by: INTERNAL MEDICINE

## 2023-02-25 PROCEDURE — 6370000000 HC RX 637 (ALT 250 FOR IP): Performed by: SURGERY

## 2023-02-25 PROCEDURE — 94640 AIRWAY INHALATION TREATMENT: CPT

## 2023-02-25 PROCEDURE — C9113 INJ PANTOPRAZOLE SODIUM, VIA: HCPCS | Performed by: INTERNAL MEDICINE

## 2023-02-25 PROCEDURE — 99024 POSTOP FOLLOW-UP VISIT: CPT | Performed by: SURGERY

## 2023-02-25 PROCEDURE — 36415 COLL VENOUS BLD VENIPUNCTURE: CPT

## 2023-02-25 PROCEDURE — 80048 BASIC METABOLIC PNL TOTAL CA: CPT

## 2023-02-25 PROCEDURE — 2580000003 HC RX 258: Performed by: SURGERY

## 2023-02-25 PROCEDURE — 6360000002 HC RX W HCPCS: Performed by: SURGERY

## 2023-02-25 PROCEDURE — 6370000000 HC RX 637 (ALT 250 FOR IP): Performed by: INTERNAL MEDICINE

## 2023-02-25 PROCEDURE — 1200000000 HC SEMI PRIVATE

## 2023-02-25 PROCEDURE — 94669 MECHANICAL CHEST WALL OSCILL: CPT

## 2023-02-25 PROCEDURE — 85025 COMPLETE CBC W/AUTO DIFF WBC: CPT

## 2023-02-25 RX ADMIN — LEVALBUTEROL HYDROCHLORIDE 0.3 MG: 1.25 SOLUTION, CONCENTRATE RESPIRATORY (INHALATION) at 16:07

## 2023-02-25 RX ADMIN — HEPARIN SODIUM 5000 UNITS: 5000 INJECTION INTRAVENOUS; SUBCUTANEOUS at 20:19

## 2023-02-25 RX ADMIN — PANTOPRAZOLE SODIUM 40 MG: 40 INJECTION, POWDER, FOR SOLUTION INTRAVENOUS at 10:07

## 2023-02-25 RX ADMIN — TRAZODONE HYDROCHLORIDE 100 MG: 50 TABLET ORAL at 20:19

## 2023-02-25 RX ADMIN — NYSTATIN 500000 UNITS: 100000 SUSPENSION ORAL at 20:19

## 2023-02-25 RX ADMIN — Medication 10 ML: at 10:05

## 2023-02-25 RX ADMIN — LEVALBUTEROL HYDROCHLORIDE 0.3 MG: 1.25 SOLUTION, CONCENTRATE RESPIRATORY (INHALATION) at 08:42

## 2023-02-25 RX ADMIN — NYSTATIN 500000 UNITS: 100000 SUSPENSION ORAL at 14:10

## 2023-02-25 RX ADMIN — HYDROMORPHONE HYDROCHLORIDE 0.5 MG: 0.5 INJECTION, SOLUTION INTRAMUSCULAR; INTRAVENOUS; SUBCUTANEOUS at 15:28

## 2023-02-25 RX ADMIN — OXYCODONE AND ACETAMINOPHEN 2 TABLET: 5; 325 TABLET ORAL at 17:17

## 2023-02-25 RX ADMIN — TIOTROPIUM BROMIDE INHALATION SPRAY 2 PUFF: 3.12 SPRAY, METERED RESPIRATORY (INHALATION) at 08:42

## 2023-02-25 RX ADMIN — OXYCODONE AND ACETAMINOPHEN 2 TABLET: 5; 325 TABLET ORAL at 02:02

## 2023-02-25 RX ADMIN — HYDROMORPHONE HYDROCHLORIDE 0.5 MG: 0.5 INJECTION, SOLUTION INTRAMUSCULAR; INTRAVENOUS; SUBCUTANEOUS at 20:20

## 2023-02-25 RX ADMIN — HEPARIN SODIUM 5000 UNITS: 5000 INJECTION INTRAVENOUS; SUBCUTANEOUS at 10:20

## 2023-02-25 RX ADMIN — POLYETHYLENE GLYCOL 3350 17 G: 17 POWDER, FOR SOLUTION ORAL at 05:44

## 2023-02-25 RX ADMIN — Medication 1 LOZENGE: at 10:02

## 2023-02-25 RX ADMIN — Medication 2 PUFF: at 19:38

## 2023-02-25 RX ADMIN — NYSTATIN 500000 UNITS: 100000 SUSPENSION ORAL at 17:17

## 2023-02-25 RX ADMIN — POLYVINYL ALCOHOL 1 DROP: 14 SOLUTION/ DROPS OPHTHALMIC at 14:10

## 2023-02-25 RX ADMIN — LEVALBUTEROL HYDROCHLORIDE 0.3 MG: 1.25 SOLUTION, CONCENTRATE RESPIRATORY (INHALATION) at 19:38

## 2023-02-25 RX ADMIN — CEFTRIAXONE SODIUM 1000 MG: 1 INJECTION, POWDER, FOR SOLUTION INTRAMUSCULAR; INTRAVENOUS at 14:13

## 2023-02-25 RX ADMIN — LEVALBUTEROL HYDROCHLORIDE 0.3 MG: 1.25 SOLUTION, CONCENTRATE RESPIRATORY (INHALATION) at 11:50

## 2023-02-25 RX ADMIN — Medication 10 ML: at 20:28

## 2023-02-25 RX ADMIN — FLUTICASONE PROPIONATE 1 SPRAY: 50 SPRAY, METERED NASAL at 10:12

## 2023-02-25 RX ADMIN — Medication 2 PUFF: at 08:42

## 2023-02-25 RX ADMIN — OXYCODONE AND ACETAMINOPHEN 2 TABLET: 5; 325 TABLET ORAL at 10:04

## 2023-02-25 RX ADMIN — Medication 1 LOZENGE: at 05:47

## 2023-02-25 RX ADMIN — NYSTATIN 500000 UNITS: 100000 SUSPENSION ORAL at 10:06

## 2023-02-25 RX ADMIN — Medication 1 LOZENGE: at 15:35

## 2023-02-25 ASSESSMENT — PAIN DESCRIPTION - DESCRIPTORS
DESCRIPTORS: SORE
DESCRIPTORS: SORE

## 2023-02-25 ASSESSMENT — PAIN SCALES - GENERAL
PAINLEVEL_OUTOF10: 7
PAINLEVEL_OUTOF10: 4
PAINLEVEL_OUTOF10: 9
PAINLEVEL_OUTOF10: 10
PAINLEVEL_OUTOF10: 0
PAINLEVEL_OUTOF10: 3
PAINLEVEL_OUTOF10: 8
PAINLEVEL_OUTOF10: 7
PAINLEVEL_OUTOF10: 0

## 2023-02-25 ASSESSMENT — PAIN DESCRIPTION - LOCATION
LOCATION: INCISION;THROAT
LOCATION: THROAT;ABDOMEN
LOCATION: ABDOMEN;PELVIS
LOCATION: ABDOMEN
LOCATION: THROAT

## 2023-02-25 ASSESSMENT — PAIN DESCRIPTION - ORIENTATION: ORIENTATION: MID

## 2023-02-25 ASSESSMENT — PAIN - FUNCTIONAL ASSESSMENT: PAIN_FUNCTIONAL_ASSESSMENT: ACTIVITIES ARE NOT PREVENTED

## 2023-02-25 NOTE — PLAN OF CARE
Problem: Pain  Goal: Verbalizes/displays adequate comfort level or baseline comfort level  2/25/2023 0629 by Kiesha Marquez RN  Outcome: Progressing    Problem: Safety - Adult  Goal: Free from fall injury  2/25/2023 0629 by Kiesha Marquez RN  Outcome: Progressing     Problem: Skin/Tissue Integrity  Goal: Absence of new skin breakdown  Description: 1. Monitor for areas of redness and/or skin breakdown  2. Assess vascular access sites hourly  3. Every 4-6 hours minimum:  Change oxygen saturation probe site  4. Every 4-6 hours:  If on nasal continuous positive airway pressure, respiratory therapy assess nares and determine need for appliance change or resting period.   2/25/2023 4106 by Kiesha Marquez RN  Outcome: Progressing    Problem: Nutrition Deficit:  Goal: Optimize nutritional status  2/25/2023 0629 by Kiesha Marquez RN  Outcome: Progressing     Problem: Discharge Planning  Goal: Discharge to home or other facility with appropriate resources  2/25/2023 0629 by Kiesha Marquez RN  Outcome: Progressing     Problem: Respiratory - Adult  Goal: Achieves optimal ventilation and oxygenation  Outcome: Progressing     Problem: Skin/Tissue Integrity - Adult  Goal: Skin integrity remains intact  Outcome: Progressing     Problem: Musculoskeletal - Adult  Goal: Return ADL status to a safe level of function  Outcome: Progressing

## 2023-02-25 NOTE — PLAN OF CARE
Problem: Safety - Adult  Goal: Free from fall injury  2/25/2023 1247 by Donnell Guidry RN  Outcome: Progressing  Note: Pt is high fall risk. No falls sustained thus far this shift. Pt will remain free from falls. Call light within reach. Bed side table within reach. Wheels locked. Bed in lowest position. Pt declined bed alarm; independent with ADLs. Pt instructed to call out for assistance. Pt expressed understanding & calls out appropriately. Pt independent with toileting needs.   2/25/2023 0629 by Yanique Yu RN  Outcome: Progressing  2/25/2023 0627 by Yanique Yu RN  Outcome: Progressing

## 2023-02-25 NOTE — PROGRESS NOTES
Dressing to midline abd incision saturated. Purulent drainage noted. Cleansed. Replaced dressing with time and date. Surgery MD notified while rounding.     Electronically signed by Josse Figueroa RN on 2/25/2023 at 12:38 PM

## 2023-02-25 NOTE — PROGRESS NOTES
VSS, afebrile. Currently on 5L n/c saturating around 95%. Alert and oriented x4. Denies nausea. Tolerating diet. PRN pain med given with some relief. Abdominal incision dressing changed. No BM this shift. Voiding adequately. Up with standby assist.  Bed in lowest position, bed alarm in place, call light and belongings within reach, nonskid socks in place.

## 2023-02-25 NOTE — PROGRESS NOTES
[facility] GENERAL SURGERY    Surgery Progress Note           POD # 3    PATIENT NAME: Marylin Kong     TODAY'S DATE: 2/25/2023    INTERVAL HISTORY:    Pt  c/o some incisional drainage. Passing gas but no BM yet. OBJECTIVE:   VITALS:  /63   Pulse 99   Temp 98.7 °F (37.1 °C) (Oral)   Resp 17   Ht 5' 1\" (1.549 m)   Wt 111 lb 3.2 oz (50.4 kg)   SpO2 93%   BMI 21.01 kg/m²     INTAKE/OUTPUT:    I/O last 3 completed shifts: In: 992 [P.O.:840; I.V.:30]  Out: -   I/O this shift:  In: 10 [I.V.:10]  Out: -               CONSTITUTIONAL:  awake and alert  LUNGS:  clear to auscultation  ABDOMEN:   hypoactive bowel sounds, soft, non-distended   INCISION: clean with some serous drainage    Data:  CBC:   Recent Labs     02/23/23  0521 02/24/23  0513 02/25/23  0516   WBC 8.6 8.8 8.5   HGB 12.7 11.9* 10.6*   HCT 38.0 36.4 32.4*   * 123* 126*     BMP:    Recent Labs     02/23/23  0521 02/24/23  0513 02/25/23  0516   * 129* 131*   K 3.9 3.1* 4.4   CL 99 96* 98*   CO2 24 25 27   BUN 7 5* 6*   CREATININE <0.5* <0.5* <0.5*   GLUCOSE 162* 94 95     Hepatic: No results for input(s): AST, ALT, ALB, BILITOT, ALKPHOS in the last 72 hours. Mag:      Recent Labs     02/23/23  0521   MG 1.70*      Phos:   No results for input(s): PHOS in the last 72 hours. INR: No results for input(s): INR in the last 72 hours. Radiology Review:  NA    ASSESSMENT AND PLAN:  62 y.o. female status post EDSON. Advance to full iquids. Encourage activity.  Await better GI function to advance diet and discharge    Electronically signed by Jorge Mcclelland MD

## 2023-02-25 NOTE — PROGRESS NOTES
Hospitalist Progress Note      PCP: ZARI Rich - CNP    Date of Admission: 2/18/2023    Chief Complaint: Nausea vomiting    Hospital Course: This is a 79-year-old female with  chronic respiratory failure on oxygen 5 L at home admitted with  nausea vomiting diagnosis small bowel obstruction NG tube in place surgery consulted and following. Subjective:  Continues to tolerate liquid diet. Throat pain present but improving     Medications:  Reviewed    Infusion Medications    sodium chloride       Scheduled Medications    nystatin  5 mL Oral 4x Daily    pantoprazole  40 mg IntraVENous Daily    levalbuterol  0.3 mg Nebulization Q4H WA    mometasone-formoterol  2 puff Inhalation BID    fluticasone  1 spray Each Nostril Daily    tiotropium  2 puff Inhalation Daily    traZODone  100 mg Oral Nightly    sodium chloride flush  5-40 mL IntraVENous 2 times per day    heparin (porcine)  5,000 Units SubCUTAneous BID    cefTRIAXone (ROCEPHIN) IV  1,000 mg IntraVENous Q24H     PRN Meds: polyvinyl alcohol, oxyCODONE-acetaminophen **OR** oxyCODONE-acetaminophen, HYDROmorphone **OR** HYDROmorphone, albuterol, sodium chloride flush, sodium chloride, ondansetron **OR** ondansetron, polyethylene glycol, acetaminophen **OR** acetaminophen, Benzocaine-Menthol      Intake/Output Summary (Last 24 hours) at 2/25/2023 0955  Last data filed at 2/24/2023 2115  Gross per 24 hour   Intake 620 ml   Output --   Net 620 ml         Physical Exam Performed:    /73   Pulse (!) 101   Temp 98.8 °F (37.1 °C) (Oral)   Resp 17   Ht 5' 1\" (1.549 m)   Wt 111 lb 3.2 oz (50.4 kg)   SpO2 96%   BMI 21.01 kg/m²     General appearance: No distress, appears stated age and cooperative. HEENT: Pupils equal, round, and reactive to light. Conjunctivae/corneas clear. Early signs of thrush  Neck: Supple, with full range of motion. No jugular venous distention. Trachea midline. Respiratory:  Normal respiratory effort.  Clear to auscultation, bilaterally without Rales/Wheezes/Rhonchi. Cardiovascular: RRR with normal S1/S2 without murmurs, rubs or gallops. Abdomen: Not distended, nontender, regular bowel sounds, soft   Musculoskeletal: No clubbing, cyanosis or edema bilaterally. Full range of motion without deformity. Skin: clean incision. Serosanguinous drainage   Neurologic:  Neurovascularly intact without any focal sensory/motor deficits. Cranial nerves: II-XII intact, grossly non-focal.  Psychiatric: Alert and oriented, thought content appropriate, normal insight  Capillary Refill: Brisk, 3 seconds, normal   Peripheral Pulses: +2 palpable, equal bilaterally       Labs:   Recent Labs     02/23/23  0521 02/24/23  0513 02/25/23  0516   WBC 8.6 8.8 8.5   HGB 12.7 11.9* 10.6*   HCT 38.0 36.4 32.4*   * 123* 126*       Recent Labs     02/23/23  0521 02/24/23  0513 02/25/23  0516   * 129* 131*   K 3.9 3.1* 4.4   CL 99 96* 98*   CO2 24 25 27   BUN 7 5* 6*   CREATININE <0.5* <0.5* <0.5*   CALCIUM 8.0* 8.4 8.4       No results for input(s): AST, ALT, BILIDIR, BILITOT, ALKPHOS in the last 72 hours. No results for input(s): INR in the last 72 hours. No results for input(s): Rivera Fitting in the last 72 hours. Urinalysis:    No results found for: Bonilla Oliver, BACTERIA, RBCUA, BLOODU, [address], [place] [place] [address]    Radiology:  XR ABDOMEN (2 VIEWS)   Final Result   Slow progressive transit of enteric contrast into the colon with dilution of   the more proximal contrast column. Persistent dilation may represent ileus   or a partial small bowel obstruction. XR ABDOMEN (KUB) (SINGLE AP VIEW)   Final Result   Slight decrease in small bowel dilatation. There is likely a small amount of contrast entering the cecum         XR ABDOMEN (KUB) (SINGLE AP VIEW)   Final Result   Dilated small bowel loops filled with contrast.  Nonvisualization of large   bowel yet. FL SMALL BOWEL FOLLOW THROUGH ONLY   Final Result   1.  Persistent findings consistent with presence of small bowel obstruction. Plain film examination of the abdomen will be obtained at 10 PM this evening   and 7 AM on 2/21/2023 to evaluate movement of contrast material.   2. Moderate-sized hiatal hernia. 3. Prominence of folds within the proximal aspect of the stomach. While   finding in part may be on the basis of incomplete distention, true fold   thickening including changes of underlying gastritis also in the differential   diagnosis as described above. XR ABDOMEN (2 VIEWS)   Final Result   History of small-bowel obstruction with stable dilated air-filled loops of   small bowel in comparison to the prior radiograph. XR ABDOMEN (KUB) (SINGLE AP VIEW)   Final Result   Tip of NG tube projects in left upper quadrant, presumably in hiatal hernia,. There is decreased gaseous distention compared to prior         XR ABDOMEN (KUB) (SINGLE AP VIEW)   Final Result   Enteric tube with the tip in the side hole likely located within the large   hiatal hernia is seen on the same day CT. Small-bowel obstruction seen on   the same day CT not well appreciated due to relative lack of intraluminal gas. CT CHEST ABDOMEN PELVIS WO CONTRAST Additional Contrast? None   Final Result   1. Small-bowel obstruction with indeterminate etiology and an apparent   transition in the right lower quadrant/pelvis. There is no pattern of   pneumatosis or perforation. 2. Decompression of the distal ileum with stool and air in the colon. This   may indicate incomplete obstruction or a recently developing pattern of   obstruction. 3. Large hiatal hernia contributing to atelectasis in the left lower lobe. Peripheral bronchial opacification in the left lower lobe could potentially   be in association, although mucoid impaction is suspected. 4. Age indeterminate compression fractures that are likely remote at both T8   and T12.   Additional remote fractures of the ribs and left pelvis are noted. IP CONSULT TO GENERAL SURGERY  IP CONSULT TO GENERAL SURGERY    Assessment/Plan:    Active Hospital Problems    Diagnosis     Small bowel obstruction ([facility] [address] [K56.352]      Priority: Medium   small bowel obstruction surgery consult   Failed to resolve with bowel rest  High degree of obstruction on CT a/p  S/p exploratory laparotomy, lysis of adhesions and umbilical herniorrhaphy   NGT, DC, continue clears for now     Acute kidney injury, due to dehydration,   Resolved  Hold on additional fluids today     HypoKalemia - etiology clinically unable to determine. Follow serial labs and replace PRN. Reviewed and documented as above. HypoMagnesemia - etiology clinically unable to determine. Follow serial labs and replace PRN. Reviewed and documented as above. Hyponatremia - improving, continue to encourage PO intake     GERD continue with IV PPI. COPD with chronic respiratory failure at home on 5 L oxygen continue with the supplemental oxygen and inhalers. Normocytic anemia - recommend workup outpatient    Thrush - nystatin oral rinse       DVT Prophylaxis: Lovenox subcu  Diet: ADULT DIET; Clear Liquid  ADULT ORAL NUTRITION SUPPLEMENT; Breakfast, Dinner; Clear Liquid Oral Supplement  Code Status: Full Code  PT/OT Eval Status: Not indicated    Dispo -Pending surgery recs, patient needs to tolerate meals.  Likely here through the weekend with discharge Monday     Appropriate for A1 Discharge Unit: No      Deric Fitzpatrick, DO

## 2023-02-26 LAB
GLUCOSE BLD-MCNC: 109 MG/DL (ref 70–99)
GLUCOSE BLD-MCNC: 128 MG/DL (ref 70–99)
GLUCOSE BLD-MCNC: 128 MG/DL (ref 70–99)
GLUCOSE BLD-MCNC: 90 MG/DL (ref 70–99)
PERFORMED ON: ABNORMAL
PERFORMED ON: NORMAL

## 2023-02-26 PROCEDURE — C9113 INJ PANTOPRAZOLE SODIUM, VIA: HCPCS | Performed by: INTERNAL MEDICINE

## 2023-02-26 PROCEDURE — 6370000000 HC RX 637 (ALT 250 FOR IP): Performed by: SURGERY

## 2023-02-26 PROCEDURE — 6360000002 HC RX W HCPCS: Performed by: SURGERY

## 2023-02-26 PROCEDURE — 97116 GAIT TRAINING THERAPY: CPT

## 2023-02-26 PROCEDURE — 2700000000 HC OXYGEN THERAPY PER DAY

## 2023-02-26 PROCEDURE — 6360000002 HC RX W HCPCS: Performed by: INTERNAL MEDICINE

## 2023-02-26 PROCEDURE — 94761 N-INVAS EAR/PLS OXIMETRY MLT: CPT

## 2023-02-26 PROCEDURE — 94669 MECHANICAL CHEST WALL OSCILL: CPT

## 2023-02-26 PROCEDURE — 94640 AIRWAY INHALATION TREATMENT: CPT

## 2023-02-26 PROCEDURE — 97162 PT EVAL MOD COMPLEX 30 MIN: CPT

## 2023-02-26 PROCEDURE — 6370000000 HC RX 637 (ALT 250 FOR IP): Performed by: INTERNAL MEDICINE

## 2023-02-26 PROCEDURE — 1200000000 HC SEMI PRIVATE

## 2023-02-26 PROCEDURE — 2580000003 HC RX 258: Performed by: SURGERY

## 2023-02-26 PROCEDURE — 97530 THERAPEUTIC ACTIVITIES: CPT

## 2023-02-26 PROCEDURE — 99024 POSTOP FOLLOW-UP VISIT: CPT | Performed by: SURGERY

## 2023-02-26 RX ADMIN — Medication 2 PUFF: at 20:16

## 2023-02-26 RX ADMIN — NYSTATIN 500000 UNITS: 100000 SUSPENSION ORAL at 13:38

## 2023-02-26 RX ADMIN — PANTOPRAZOLE SODIUM 40 MG: 40 INJECTION, POWDER, FOR SOLUTION INTRAVENOUS at 07:35

## 2023-02-26 RX ADMIN — OXYCODONE AND ACETAMINOPHEN 2 TABLET: 5; 325 TABLET ORAL at 11:16

## 2023-02-26 RX ADMIN — LEVALBUTEROL HYDROCHLORIDE 0.3 MG: 1.25 SOLUTION, CONCENTRATE RESPIRATORY (INHALATION) at 20:11

## 2023-02-26 RX ADMIN — HEPARIN SODIUM 5000 UNITS: 5000 INJECTION INTRAVENOUS; SUBCUTANEOUS at 21:06

## 2023-02-26 RX ADMIN — Medication 10 ML: at 07:31

## 2023-02-26 RX ADMIN — Medication 1 LOZENGE: at 21:05

## 2023-02-26 RX ADMIN — HEPARIN SODIUM 5000 UNITS: 5000 INJECTION INTRAVENOUS; SUBCUTANEOUS at 07:59

## 2023-02-26 RX ADMIN — NYSTATIN 500000 UNITS: 100000 SUSPENSION ORAL at 16:56

## 2023-02-26 RX ADMIN — TIOTROPIUM BROMIDE INHALATION SPRAY 2 PUFF: 3.12 SPRAY, METERED RESPIRATORY (INHALATION) at 07:35

## 2023-02-26 RX ADMIN — LEVALBUTEROL HYDROCHLORIDE 0.3 MG: 1.25 SOLUTION, CONCENTRATE RESPIRATORY (INHALATION) at 07:35

## 2023-02-26 RX ADMIN — HYDROMORPHONE HYDROCHLORIDE 0.5 MG: 0.5 INJECTION, SOLUTION INTRAMUSCULAR; INTRAVENOUS; SUBCUTANEOUS at 17:29

## 2023-02-26 RX ADMIN — Medication 1 LOZENGE: at 15:49

## 2023-02-26 RX ADMIN — LEVALBUTEROL HYDROCHLORIDE 0.3 MG: 1.25 SOLUTION, CONCENTRATE RESPIRATORY (INHALATION) at 11:46

## 2023-02-26 RX ADMIN — FLUTICASONE PROPIONATE 1 SPRAY: 50 SPRAY, METERED NASAL at 08:01

## 2023-02-26 RX ADMIN — Medication 10 ML: at 21:07

## 2023-02-26 RX ADMIN — NYSTATIN 500000 UNITS: 100000 SUSPENSION ORAL at 07:59

## 2023-02-26 RX ADMIN — CEFTRIAXONE SODIUM 1000 MG: 1 INJECTION, POWDER, FOR SOLUTION INTRAMUSCULAR; INTRAVENOUS at 13:36

## 2023-02-26 RX ADMIN — LEVALBUTEROL HYDROCHLORIDE 0.3 MG: 1.25 SOLUTION, CONCENTRATE RESPIRATORY (INHALATION) at 15:51

## 2023-02-26 RX ADMIN — Medication 2 PUFF: at 07:35

## 2023-02-26 RX ADMIN — OXYCODONE AND ACETAMINOPHEN 2 TABLET: 5; 325 TABLET ORAL at 21:02

## 2023-02-26 RX ADMIN — HYDROMORPHONE HYDROCHLORIDE 0.5 MG: 0.5 INJECTION, SOLUTION INTRAMUSCULAR; INTRAVENOUS; SUBCUTANEOUS at 07:29

## 2023-02-26 RX ADMIN — TRAZODONE HYDROCHLORIDE 100 MG: 50 TABLET ORAL at 21:04

## 2023-02-26 RX ADMIN — POLYETHYLENE GLYCOL 3350 17 G: 17 POWDER, FOR SOLUTION ORAL at 09:19

## 2023-02-26 RX ADMIN — OXYCODONE AND ACETAMINOPHEN 2 TABLET: 5; 325 TABLET ORAL at 01:39

## 2023-02-26 RX ADMIN — NYSTATIN 500000 UNITS: 100000 SUSPENSION ORAL at 21:02

## 2023-02-26 RX ADMIN — HYDROMORPHONE HYDROCHLORIDE 0.5 MG: 0.5 INJECTION, SOLUTION INTRAMUSCULAR; INTRAVENOUS; SUBCUTANEOUS at 03:53

## 2023-02-26 RX ADMIN — OXYCODONE AND ACETAMINOPHEN 2 TABLET: 5; 325 TABLET ORAL at 15:38

## 2023-02-26 ASSESSMENT — PAIN DESCRIPTION - DESCRIPTORS
DESCRIPTORS: ACHING
DESCRIPTORS: SORE;STABBING
DESCRIPTORS: ACHING;SHARP
DESCRIPTORS: STABBING
DESCRIPTORS: ACHING;STABBING
DESCRIPTORS: ACHING
DESCRIPTORS: STABBING
DESCRIPTORS: ACHING

## 2023-02-26 ASSESSMENT — PAIN SCALES - GENERAL
PAINLEVEL_OUTOF10: 1
PAINLEVEL_OUTOF10: 3
PAINLEVEL_OUTOF10: 0
PAINLEVEL_OUTOF10: 10
PAINLEVEL_OUTOF10: 10
PAINLEVEL_OUTOF10: 1
PAINLEVEL_OUTOF10: 8
PAINLEVEL_OUTOF10: 10
PAINLEVEL_OUTOF10: 0
PAINLEVEL_OUTOF10: 9
PAINLEVEL_OUTOF10: 10

## 2023-02-26 ASSESSMENT — PAIN DESCRIPTION - LOCATION
LOCATION: ABDOMEN;INCISION
LOCATION: ABDOMEN;HIP
LOCATION: ABDOMEN
LOCATION: THROAT
LOCATION: THROAT
LOCATION: ABDOMEN;HIP
LOCATION: ABDOMEN;INCISION
LOCATION: HIP

## 2023-02-26 ASSESSMENT — PAIN DESCRIPTION - ORIENTATION
ORIENTATION: LOWER
ORIENTATION: MID
ORIENTATION: RIGHT
ORIENTATION: MID

## 2023-02-26 ASSESSMENT — PAIN - FUNCTIONAL ASSESSMENT
PAIN_FUNCTIONAL_ASSESSMENT: ACTIVITIES ARE NOT PREVENTED

## 2023-02-26 NOTE — PLAN OF CARE
Problem: Pain  Goal: Verbalizes/displays adequate comfort level or baseline comfort level  Outcome: Progressing     Problem: Safety - Adult  Goal: Free from fall injury  Outcome: Progressing     Problem: Skin/Tissue Integrity  Goal: Absence of new skin breakdown  Description: 1. Monitor for areas of redness and/or skin breakdown  2. Assess vascular access sites hourly  3. Every 4-6 hours minimum:  Change oxygen saturation probe site  4. Every 4-6 hours:  If on nasal continuous positive airway pressure, respiratory therapy assess nares and determine need for appliance change or resting period.   Outcome: Progressing     Problem: Nutrition Deficit:  Goal: Optimize nutritional status  Outcome: Progressing     Problem: Discharge Planning  Goal: Discharge to home or other facility with appropriate resources  Outcome: Progressing     Problem: Respiratory - Adult  Goal: Achieves optimal ventilation and oxygenation  Outcome: Progressing     Problem: Skin/Tissue Integrity - Adult  Goal: Skin integrity remains intact  Outcome: Progressing     Problem: Musculoskeletal - Adult  Goal: Return ADL status to a safe level of function  Outcome: Progressing

## 2023-02-26 NOTE — PROGRESS NOTES
Shift assessment completed and charted. VSS. A/O x4. Pt is independent. Pt has not had a bowel movement since 2/13/2023. All meds given per [facility]. No further needs at this time.

## 2023-02-26 NOTE — PROGRESS NOTES
[facility] GENERAL SURGERY    Surgery Progress Note           POD # 4    PATIENT NAME: Betty Villalobos     TODAY'S DATE: 2/26/2023    INTERVAL HISTORY:    Pt feels well except for some incisional drainage that is ongoing. OBJECTIVE:   VITALS:  /72   Pulse (!) 105   Temp 98.5 °F (36.9 °C) (Oral)   Resp 18   Ht 5' 1\" (1.549 m)   Wt 111 lb (50.3 kg)   SpO2 96%   BMI 20.97 kg/m²     INTAKE/OUTPUT:    I/O last 3 completed shifts: In: 150 [P.O.:120; I.V.:30]  Out: -   I/O this shift:  In: 10 [I.V.:10]  Out: -               CONSTITUTIONAL:  awake and alert  LUNGS:  clear to auscultation  ABDOMEN:   hypoactive bowel sounds, soft, non-distended   INCISION: Clean with serous drainage. I open her incision a little more to allow for better drainage. The fascia is intact    Data:  CBC:   Recent Labs     02/24/23  0513 02/25/23  0516   WBC 8.8 8.5   HGB 11.9* 10.6*   HCT 36.4 32.4*   * 126*     BMP:    Recent Labs     02/24/23  0513 02/25/23  0516   * 131*   K 3.1* 4.4   CL 96* 98*   CO2 25 27   BUN 5* 6*   CREATININE <0.5* <0.5*   GLUCOSE 94 95     Hepatic: No results for input(s): AST, ALT, ALB, BILITOT, ALKPHOS in the last 72 hours. Mag:    No results for input(s): MG in the last 72 hours. Phos:   No results for input(s): PHOS in the last 72 hours. INR: No results for input(s): INR in the last 72 hours. Radiology Review: N/A    ASSESSMENT AND PLAN:  62 y.o. female status post lysis of adhesion. Encourage activity. Hold diet as is awaiting better GI function.   Continue local wound care        Electronically signed by Hola Pate MD

## 2023-02-26 NOTE — PROGRESS NOTES
Hospitalist Progress Note      PCP: ZARI Cooney - CNP    Date of Admission: 2/18/2023    Chief Complaint: Nausea vomiting    Hospital Course: This is a 60-year-old female with  chronic respiratory failure on oxygen 5 L at home admitted with  nausea vomiting diagnosis small bowel obstruction NG tube in place surgery consulted and following. Subjective: Tolerating advanced diet. No BM yet    Medications:  Reviewed    Infusion Medications    sodium chloride       Scheduled Medications    nystatin  5 mL Oral 4x Daily    pantoprazole  40 mg IntraVENous Daily    levalbuterol  0.3 mg Nebulization Q4H WA    mometasone-formoterol  2 puff Inhalation BID    fluticasone  1 spray Each Nostril Daily    tiotropium  2 puff Inhalation Daily    traZODone  100 mg Oral Nightly    sodium chloride flush  5-40 mL IntraVENous 2 times per day    heparin (porcine)  5,000 Units SubCUTAneous BID    cefTRIAXone (ROCEPHIN) IV  1,000 mg IntraVENous Q24H     PRN Meds: polyvinyl alcohol, oxyCODONE-acetaminophen **OR** oxyCODONE-acetaminophen, HYDROmorphone **OR** HYDROmorphone, albuterol, sodium chloride flush, sodium chloride, ondansetron **OR** ondansetron, polyethylene glycol, acetaminophen **OR** acetaminophen, Benzocaine-Menthol      Intake/Output Summary (Last 24 hours) at 2/26/2023 1138  Last data filed at 2/26/2023 0731  Gross per 24 hour   Intake 10 ml   Output --   Net 10 ml         Physical Exam Performed:    /72   Pulse (!) 105   Temp 98.5 °F (36.9 °C) (Oral)   Resp 18   Ht 5' 1\" (1.549 m)   Wt 111 lb (50.3 kg)   SpO2 96%   BMI 20.97 kg/m²     General appearance: No distress, appears stated age and cooperative. HEENT: Pupils equal, round, and reactive to light. Conjunctivae/corneas clear. Early signs of thrush  Neck: Supple, with full range of motion. No jugular venous distention. Trachea midline. Respiratory:  Normal respiratory effort.  Clear to auscultation, bilaterally without Rales/Wheezes/Rhonchi. Cardiovascular: RRR with normal S1/S2 without murmurs, rubs or gallops. Abdomen: Not distended, nontender, regular bowel sounds, soft   Musculoskeletal: No clubbing, cyanosis or edema bilaterally. Full range of motion without deformity. Skin: clean incision. Serosanguinous drainage   Neurologic:  Neurovascularly intact without any focal sensory/motor deficits. Cranial nerves: II-XII intact, grossly non-focal.  Psychiatric: Alert and oriented, thought content appropriate, normal insight  Capillary Refill: Brisk, 3 seconds, normal   Peripheral Pulses: +2 palpable, equal bilaterally       Labs:   Recent Labs     02/24/23 0513 02/25/23  0516   WBC 8.8 8.5   HGB 11.9* 10.6*   HCT 36.4 32.4*   * 126*       Recent Labs     02/24/23 0513 02/25/23  0516   * 131*   K 3.1* 4.4   CL 96* 98*   CO2 25 27   BUN 5* 6*   CREATININE <0.5* <0.5*   CALCIUM 8.4 8.4       No results for input(s): AST, ALT, BILIDIR, BILITOT, ALKPHOS in the last 72 hours. No results for input(s): INR in the last 72 hours. No results for input(s): Murlene [place] in the last 72 hours. Urinalysis:    No results found for: Caicedo Graces, BACTERIA, RBCUA, BLOODU, [address], [address]    Radiology:  XR ABDOMEN (2 VIEWS)   Final Result   Slow progressive transit of enteric contrast into the colon with dilution of   the more proximal contrast column. Persistent dilation may represent ileus   or a partial small bowel obstruction. XR ABDOMEN (KUB) (SINGLE AP VIEW)   Final Result   Slight decrease in small bowel dilatation. There is likely a small amount of contrast entering the cecum         XR ABDOMEN (KUB) (SINGLE AP VIEW)   Final Result   Dilated small bowel loops filled with contrast.  Nonvisualization of large   bowel yet. FL SMALL BOWEL FOLLOW THROUGH ONLY   Final Result   1. Persistent findings consistent with presence of small bowel obstruction.    Plain film examination of the abdomen will be obtained at 10 PM this evening   and 7 AM on 2/21/2023 to evaluate movement of contrast material.   2. Moderate-sized hiatal hernia. 3. Prominence of folds within the proximal aspect of the stomach. While   finding in part may be on the basis of incomplete distention, true fold   thickening including changes of underlying gastritis also in the differential   diagnosis as described above. XR ABDOMEN (2 VIEWS)   Final Result   History of small-bowel obstruction with stable dilated air-filled loops of   small bowel in comparison to the prior radiograph. XR ABDOMEN (KUB) (SINGLE AP VIEW)   Final Result   Tip of NG tube projects in left upper quadrant, presumably in hiatal hernia,. There is decreased gaseous distention compared to prior         XR ABDOMEN (KUB) (SINGLE AP VIEW)   Final Result   Enteric tube with the tip in the side hole likely located within the large   hiatal hernia is seen on the same day CT. Small-bowel obstruction seen on   the same day CT not well appreciated due to relative lack of intraluminal gas. CT CHEST ABDOMEN PELVIS WO CONTRAST Additional Contrast? None   Final Result   1. Small-bowel obstruction with indeterminate etiology and an apparent   transition in the right lower quadrant/pelvis. There is no pattern of   pneumatosis or perforation. 2. Decompression of the distal ileum with stool and air in the colon. This   may indicate incomplete obstruction or a recently developing pattern of   obstruction. 3. Large hiatal hernia contributing to atelectasis in the left lower lobe. Peripheral bronchial opacification in the left lower lobe could potentially   be in association, although mucoid impaction is suspected. 4. Age indeterminate compression fractures that are likely remote at both T8   and T12. Additional remote fractures of the ribs and left pelvis are noted.              IP CONSULT TO GENERAL SURGERY  IP CONSULT TO GENERAL SURGERY    Assessment/Plan:    Active Hospital Problems    Diagnosis     Small bowel obstruction ([facility]) [K56.609]      Priority: Medium   small bowel obstruction surgery consult   Failed to resolve with bowel rest  High degree of obstruction on CT a/p  S/p exploratory laparotomy, lysis of adhesions and umbilical herniorrhaphy   NGT, DC, advancing diet  Bowel regimen per surgery     Acute kidney injury, due to dehydration,   Resolved  Hold on additional fluids     HypoKalemia - etiology clinically unable to determine.  Follow serial labs and replace PRN.  Reviewed and documented as above.    HypoMagnesemia - etiology clinically unable to determine.  Follow serial labs and replace PRN.  Reviewed and documented as above.     Hyponatremia - improving, continue to encourage PO intake     GERD continue with IV PPI.    COPD with chronic respiratory failure at home on 5 L oxygen continue with the supplemental oxygen and inhalers.    Normocytic anemia - recommend workup outpatient    Thrush - nystatin oral rinse       DVT Prophylaxis: Lovenox subcu  Diet: ADULT ORAL NUTRITION SUPPLEMENT; Breakfast, Dinner; Clear Liquid Oral Supplement  ADULT DIET; Regular  Code Status: Full Code  PT/OT Eval Status: ordered    Dispo -Pending surgery recs, patient needs to tolerate meals. Likely here through the weekend with discharge Monday     Appropriate for A1 Discharge Unit: No      Abel Wiggins, DO

## 2023-02-26 NOTE — PROGRESS NOTES
Physical Therapy  Facility/Department: [facility] C3 TELE/MED SURG/ONC  Physical Therapy Initial Assessment    Name: Jeny Royal  : 1965  MRN: [id number]  Date of Service: 2023    Discharge Recommendations:  Home with assist PRN   PT Equipment Recommendations  Equipment Needed: Yes  Mobility Devices: Wheelchair  Wheelchair: Light Weight (16 inch with leg rests)      Patient Diagnosis(es): The primary encounter diagnosis was Small bowel obstruction ([address] Diagnoses of Nausea and vomiting, unspecified vomiting type, Leukocytosis, unspecified type, ELIJAH (acute kidney injury) ([facility] [address] and Hyponatremia were also pertinent to this visit. Past Medical History:  has a past medical history of Cirrhosis ([facility] [address] COPD (chronic obstructive pulmonary disease) ([facility] [address] and Hyperlipidemia. Past Surgical History:  has a past surgical history that includes Tubal ligation; Thumb arthroscopy; and laparotomy (N/A, 2023). Assessment   Body Structures, Functions, Activity Limitations Requiring Skilled Therapeutic Intervention: Decreased functional mobility ; Decreased endurance;Decreased balance; Increased pain;Decreased strength  Assessment: Pt is a 63 y/o female who presents with SBO and s/p ex lap with EDSON. Pt typically lives alone but plans to discharge to her daughters home with 12 ACE. Pt currently requires SBA for transfers and short distance ambulation without device. Pt would benefit from continued skilled PT to address these limitations. Anticipate pt will be safe to return home with PRN A. Pt would benefit from a wheelchair for longer distances secondary to COPD (baseline 5L O2) and chronic hip pain.   Treatment Diagnosis: impaired functional mobility  Therapy Prognosis: Fair  Decision Making: Medium Complexity  Requires PT Follow-Up: Yes  Activity Tolerance  Activity Tolerance: Patient tolerated evaluation without incident     Plan   Physcial Therapy Plan  General Plan: 2-3 times per week  Current Treatment Recommendations: Strengthening, Balance training, Functional mobility training, Gait training, Safety education & training, Therapeutic activities, Patient/Caregiver education & training, Equipment evaluation, education, & procurement, Endurance training, Home exercise program, Stair training  Safety Devices  Type of Devices: Left in bed, Nurse notified, Patient at risk for falls, Call light within reach     Restrictions  Restrictions/Precautions  Restrictions/Precautions: Fall Risk     Subjective   Pain: Reports pain in R hip and abdomen but does not rate. \"It's much better now since I've had pain medicine. \"  General  Chart Reviewed: Yes  Patient assessed for rehabilitation services?: Yes  Family / Caregiver Present: No  Referring Practitioner: Aaliyah Ng DO  Referral Date : 02/26/23  Diagnosis: SBO s/p EDSON  Follows Commands: Within Functional Limits  Subjective  Subjective: Pt up in bathroom upon arrival. Agreeable to therapy. Reports a need for a wheelchair.          Social/Functional History  Social/Functional History  Lives With: Alone (plans to return to daughter's home at discharge)  Type of Home: House  Home Layout: One level  Home Access: Stairs to enter with rails  Entrance Stairs - Number of Steps: enters through garage; full flight of stairs up to main level  Entrance Stairs - Rails: Right  Bathroom Shower/Tub: Tub/Shower unit  Bathroom Toilet: Standard  Bathroom Equipment: Hand-held shower  Home Equipment: Cane (electric scooter)  Has the patient had two or more falls in the past year or any fall with injury in the past year?: No  ADL Assistance: Independent  Homemaking Responsibilities: Yes  Meal Prep Responsibility: Primary  Laundry Responsibility: Primary (has help if needed)  Cleaning Responsibility:  (has a )  [address]: Independent (short distances in house)  Transfer Assistance: Independent  Active : Yes (daughter can drive)  Vision/Hearing  Vision  Vision: Impaired  Vision Exceptions: Wears glasses at all times  Hearing  Hearing: Within functional limits      Objective   Heart Rate: (!) 110  Heart Rate Source: Monitor  BP: 107/71  BP Location: Right upper arm  BP Method: Automatic  Patient Position: Semi fowlers  MAP (Calculated): 83  Resp: 18  SpO2: 96 %  O2 Device: Nasal cannula  Comment: , SpO2 94% on 5L        Gross Assessment  AROM: Generally decreased, functional  Strength: Generally decreased, functional  Sensation: Intact                 Bed Mobility Training  Bed Mobility Training: No (up in bathroom upon approach and sitting EOB at end of session)  Balance  Sitting: Intact  Standing: High guard  Transfer Training  Transfer Training: Yes  Overall Level of Assistance: Supervision  Sit to Stand: Supervision (from commode and EOB)  Stand to Sit: Supervision  Gait Training  Gait Training: Yes  Gait  Overall Level of Assistance: Stand-by assistance  Speed/Ene: Pace decreased (< 100 feet/min)  Step Length: Left shortened;Right shortened  Gait Abnormalities: Path deviations (forward flexed posture, unsteady at times)  Distance (ft): 15 Feet (and 25 ft)  Assistive Device:  (no device; gait distance limited by fatigue and pain)                            AM-PAC Score  AM-PAC Inpatient Mobility Raw Score : 21 (02/26/23 1432)  AM-PAC Inpatient T-Scale Score : 50.25 (02/26/23 1432)  Mobility Inpatient CMS 0-100% Score: 28.97 (02/26/23 1432)  Mobility Inpatient CMS G-Code Modifier : CJ (02/26/23 1432)          Goals  Short Term Goals  Time Frame for Short Term Goals: 7 days (3/4/23)  Short Term Goal 1: Pt will perform bed mobility mod I  Short Term Goal 2: Pt will perform transfers mod I  Short Term Goal 3: Pt will ambulate 50 ft with appropriate AD and supervision  Short Term Goal 4: By 3/1/23, pt will tolerate 15 reps of LE ther ex for improved strength and activity tolerance  Short Term Goal 5: Pt will negotiate 12 steps with HR and supervision  Patient Goals Patient Goals : \"to go home and be able to get a wheelchair\"       Education  Patient Education  Education Given To: Patient  Education Provided: Role of Therapy;Plan of Care;Transfer Training;Equipment  Education Provided Comments: safety with mobility  Education Method: Verbal;Demonstration  Barriers to Learning: None  Education Outcome: Verbalized understanding      Therapy Time   Individual Concurrent Group Co-treatment   Time In 1250         Time Out 1323         Minutes 33         Timed Code Treatment Minutes: [address] [facility],   [facility]

## 2023-02-26 NOTE — PROGRESS NOTES
Resumed care from [facility]. Assessment completed and documented. VSS 5L o2 baseline. A/ox4. Denies pain. Redressed midline incision, did not remove packing- only removed old dressing and replaced it. Bladder scanned patient after patient voided which showed 68ml. Patient states \"it was easier to urinate that time. \" IV infiltrated again during rocephin administration. New IV placed on right forearm. Patient states she would rather not have fluids running [facility] since she has to carry her O2 cord around. Ambulates independently without assistive devices. Bed locked and in lowest position. Bedside table and call light within reach. Denies further needs at this time.

## 2023-02-27 VITALS
HEART RATE: 126 BPM | DIASTOLIC BLOOD PRESSURE: 78 MMHG | OXYGEN SATURATION: 91 % | RESPIRATION RATE: 18 BRPM | SYSTOLIC BLOOD PRESSURE: 134 MMHG | WEIGHT: 111 LBS | BODY MASS INDEX: 20.96 KG/M2 | HEIGHT: 61 IN | TEMPERATURE: 98.1 F

## 2023-02-27 PROBLEM — E44.0 MODERATE MALNUTRITION (HCC): Status: ACTIVE | Noted: 2023-02-27

## 2023-02-27 LAB
ANION GAP SERPL CALCULATED.3IONS-SCNC: 10 MMOL/L (ref 3–16)
BUN BLDV-MCNC: 7 MG/DL (ref 7–20)
CALCIUM SERPL-MCNC: 8.7 MG/DL (ref 8.3–10.6)
CHLORIDE BLD-SCNC: 97 MMOL/L (ref 99–110)
CO2: 26 MMOL/L (ref 21–32)
CREAT SERPL-MCNC: <0.5 MG/DL (ref 0.6–1.1)
GFR SERPL CREATININE-BSD FRML MDRD: >60 ML/MIN/{1.73_M2}
GLUCOSE BLD-MCNC: 103 MG/DL (ref 70–99)
POTASSIUM SERPL-SCNC: 3.9 MMOL/L (ref 3.5–5.1)
SODIUM BLD-SCNC: 133 MMOL/L (ref 136–145)

## 2023-02-27 PROCEDURE — 2580000003 HC RX 258: Performed by: SURGERY

## 2023-02-27 PROCEDURE — 2700000000 HC OXYGEN THERAPY PER DAY

## 2023-02-27 PROCEDURE — 94761 N-INVAS EAR/PLS OXIMETRY MLT: CPT

## 2023-02-27 PROCEDURE — 6370000000 HC RX 637 (ALT 250 FOR IP): Performed by: INTERNAL MEDICINE

## 2023-02-27 PROCEDURE — C9113 INJ PANTOPRAZOLE SODIUM, VIA: HCPCS | Performed by: INTERNAL MEDICINE

## 2023-02-27 PROCEDURE — 36415 COLL VENOUS BLD VENIPUNCTURE: CPT

## 2023-02-27 PROCEDURE — 6360000002 HC RX W HCPCS: Performed by: INTERNAL MEDICINE

## 2023-02-27 PROCEDURE — 97116 GAIT TRAINING THERAPY: CPT

## 2023-02-27 PROCEDURE — 6370000000 HC RX 637 (ALT 250 FOR IP): Performed by: SURGERY

## 2023-02-27 PROCEDURE — 97530 THERAPEUTIC ACTIVITIES: CPT

## 2023-02-27 PROCEDURE — 97535 SELF CARE MNGMENT TRAINING: CPT

## 2023-02-27 PROCEDURE — 99024 POSTOP FOLLOW-UP VISIT: CPT | Performed by: SURGERY

## 2023-02-27 PROCEDURE — 94640 AIRWAY INHALATION TREATMENT: CPT

## 2023-02-27 PROCEDURE — 97165 OT EVAL LOW COMPLEX 30 MIN: CPT

## 2023-02-27 PROCEDURE — 80048 BASIC METABOLIC PNL TOTAL CA: CPT

## 2023-02-27 PROCEDURE — 6360000002 HC RX W HCPCS: Performed by: SURGERY

## 2023-02-27 PROCEDURE — 94669 MECHANICAL CHEST WALL OSCILL: CPT

## 2023-02-27 RX ORDER — OXYCODONE HYDROCHLORIDE 5 MG/1
5 TABLET ORAL EVERY 6 HOURS PRN
Qty: 28 TABLET | Refills: 0 | Status: ON HOLD | OUTPATIENT
Start: 2023-02-27 | End: 2023-03-06

## 2023-02-27 RX ADMIN — OXYCODONE AND ACETAMINOPHEN 2 TABLET: 5; 325 TABLET ORAL at 12:10

## 2023-02-27 RX ADMIN — NYSTATIN 500000 UNITS: 100000 SUSPENSION ORAL at 12:10

## 2023-02-27 RX ADMIN — ONDANSETRON 4 MG: 2 INJECTION INTRAMUSCULAR; INTRAVENOUS at 00:43

## 2023-02-27 RX ADMIN — Medication 10 ML: at 08:32

## 2023-02-27 RX ADMIN — Medication 2 PUFF: at 08:07

## 2023-02-27 RX ADMIN — OXYCODONE AND ACETAMINOPHEN 2 TABLET: 5; 325 TABLET ORAL at 08:08

## 2023-02-27 RX ADMIN — POLYETHYLENE GLYCOL 3350 17 G: 17 POWDER, FOR SOLUTION ORAL at 09:47

## 2023-02-27 RX ADMIN — LEVALBUTEROL HYDROCHLORIDE 1.25 MG: 1.25 SOLUTION, CONCENTRATE RESPIRATORY (INHALATION) at 11:50

## 2023-02-27 RX ADMIN — FLUTICASONE PROPIONATE 1 SPRAY: 50 SPRAY, METERED NASAL at 08:32

## 2023-02-27 RX ADMIN — HYDROMORPHONE HYDROCHLORIDE 0.5 MG: 0.5 INJECTION, SOLUTION INTRAMUSCULAR; INTRAVENOUS; SUBCUTANEOUS at 00:44

## 2023-02-27 RX ADMIN — HEPARIN SODIUM 5000 UNITS: 5000 INJECTION INTRAVENOUS; SUBCUTANEOUS at 08:31

## 2023-02-27 RX ADMIN — LEVALBUTEROL HYDROCHLORIDE 1.25 MG: 1.25 SOLUTION, CONCENTRATE RESPIRATORY (INHALATION) at 08:07

## 2023-02-27 RX ADMIN — PANTOPRAZOLE SODIUM 40 MG: 40 INJECTION, POWDER, FOR SOLUTION INTRAVENOUS at 09:55

## 2023-02-27 RX ADMIN — TIOTROPIUM BROMIDE INHALATION SPRAY 2 PUFF: 3.12 SPRAY, METERED RESPIRATORY (INHALATION) at 08:07

## 2023-02-27 RX ADMIN — CEFTRIAXONE SODIUM 1000 MG: 1 INJECTION, POWDER, FOR SOLUTION INTRAMUSCULAR; INTRAVENOUS at 14:55

## 2023-02-27 RX ADMIN — NYSTATIN 500000 UNITS: 100000 SUSPENSION ORAL at 08:31

## 2023-02-27 ASSESSMENT — PAIN SCALES - GENERAL
PAINLEVEL_OUTOF10: 10
PAINLEVEL_OUTOF10: 9
PAINLEVEL_OUTOF10: 8
PAINLEVEL_OUTOF10: 10
PAINLEVEL_OUTOF10: 8

## 2023-02-27 ASSESSMENT — PAIN - FUNCTIONAL ASSESSMENT
PAIN_FUNCTIONAL_ASSESSMENT: ACTIVITIES ARE NOT PREVENTED

## 2023-02-27 ASSESSMENT — PAIN DESCRIPTION - LOCATION
LOCATION: ABDOMEN;BACK
LOCATION: ABDOMEN
LOCATION: ABDOMEN
LOCATION: ABDOMEN;BACK
LOCATION: ABDOMEN;BACK

## 2023-02-27 ASSESSMENT — PAIN DESCRIPTION - DESCRIPTORS
DESCRIPTORS: ACHING
DESCRIPTORS: CRAMPING;ACHING
DESCRIPTORS: ACHING

## 2023-02-27 ASSESSMENT — PAIN DESCRIPTION - ORIENTATION
ORIENTATION: MID

## 2023-02-27 NOTE — PROGRESS NOTES
Hospitalist Progress Note      PCP: Sourav Guajardo, APRN - CNP    Date of Admission: 2/18/2023    Chief Complaint: Nausea vomiting    Hospital Course: This is a 51-year-old female with  chronic respiratory failure on oxygen 5 L at home admitted with  nausea vomiting diagnosis small bowel obstruction NG tube in place surgery consulted and following. Subjective: Tolerating advanced diet. No BM yet    Medications:  Reviewed    Infusion Medications    sodium chloride       Scheduled Medications    nystatin  5 mL Oral 4x Daily    pantoprazole  40 mg IntraVENous Daily    levalbuterol  0.3 mg Nebulization Q4H WA    mometasone-formoterol  2 puff Inhalation BID    fluticasone  1 spray Each Nostril Daily    tiotropium  2 puff Inhalation Daily    traZODone  100 mg Oral Nightly    sodium chloride flush  5-40 mL IntraVENous 2 times per day    heparin (porcine)  5,000 Units SubCUTAneous BID    cefTRIAXone (ROCEPHIN) IV  1,000 mg IntraVENous Q24H     PRN Meds: polyvinyl alcohol, oxyCODONE-acetaminophen **OR** oxyCODONE-acetaminophen, HYDROmorphone **OR** HYDROmorphone, albuterol, sodium chloride flush, sodium chloride, ondansetron **OR** ondansetron, polyethylene glycol, acetaminophen **OR** acetaminophen, Benzocaine-Menthol      Intake/Output Summary (Last 24 hours) at 2/27/2023 1701  Last data filed at 2/27/2023 0403  Gross per 24 hour   Intake --   Output 550 ml   Net -550 ml       Physical Exam Performed:    /78   Pulse (!) 126   Temp 98.1 °F (36.7 °C) (Oral)   Resp 18   Ht 5' 1\" (1.549 m)   Wt 111 lb (50.3 kg)   SpO2 91%   BMI 20.97 kg/m²     General appearance: No distress, appears stated age and cooperative. HEENT: Pupils equal, round, and reactive to light. Conjunctivae/corneas clear. Early signs of thrush  Neck: Supple, with full range of motion. No jugular venous distention. Trachea midline. Respiratory:  Normal respiratory effort.  Clear to auscultation, bilaterally without Rales/Wheezes/Rhonchi. Cardiovascular: RRR with normal S1/S2 without murmurs, rubs or gallops. Abdomen: Not distended, nontender, regular bowel sounds, soft   Musculoskeletal: No clubbing, cyanosis or edema bilaterally. Full range of motion without deformity. Skin: clean incision. Serosanguinous drainage   Neurologic:  Neurovascularly intact without any focal sensory/motor deficits. Cranial nerves: II-XII intact, grossly non-focal.  Psychiatric: Alert and oriented, thought content appropriate, normal insight  Capillary Refill: Brisk, 3 seconds, normal   Peripheral Pulses: +2 palpable, equal bilaterally       Labs:   Recent Labs     02/25/23  0516   WBC 8.5   HGB 10.6*   HCT 32.4*   *     Recent Labs     02/25/23  0516 02/27/23  0432   * 133*   K 4.4 3.9   CL 98* 97*   CO2 27 26   BUN 6* 7   CREATININE <0.5* <0.5*   CALCIUM 8.4 8.7     No results for input(s): AST, ALT, BILIDIR, BILITOT, ALKPHOS in the last 72 hours. No results for input(s): INR in the last 72 hours. No results for input(s): Jame Rifle in the last 72 hours. Urinalysis:    No results found for: Merl Hibbs, BACTERIA, RBCUA, BLOODU, [address], [address]    Radiology:  XR ABDOMEN (2 VIEWS)   Final Result   Slow progressive transit of enteric contrast into the colon with dilution of   the more proximal contrast column. Persistent dilation may represent ileus   or a partial small bowel obstruction. XR ABDOMEN (KUB) (SINGLE AP VIEW)   Final Result   Slight decrease in small bowel dilatation. There is likely a small amount of contrast entering the cecum         XR ABDOMEN (KUB) (SINGLE AP VIEW)   Final Result   Dilated small bowel loops filled with contrast.  Nonvisualization of large   bowel yet. FL SMALL BOWEL FOLLOW THROUGH ONLY   Final Result   1. Persistent findings consistent with presence of small bowel obstruction.    Plain film examination of the abdomen will be obtained at 10 PM this evening and 7 AM on 2/21/2023 to evaluate movement of contrast material.   2. Moderate-sized hiatal hernia. 3. Prominence of folds within the proximal aspect of the stomach. While   finding in part may be on the basis of incomplete distention, true fold   thickening including changes of underlying gastritis also in the differential   diagnosis as described above. XR ABDOMEN (2 VIEWS)   Final Result   History of small-bowel obstruction with stable dilated air-filled loops of   small bowel in comparison to the prior radiograph. XR ABDOMEN (KUB) (SINGLE AP VIEW)   Final Result   Tip of NG tube projects in left upper quadrant, presumably in hiatal hernia,. There is decreased gaseous distention compared to prior         XR ABDOMEN (KUB) (SINGLE AP VIEW)   Final Result   Enteric tube with the tip in the side hole likely located within the large   hiatal hernia is seen on the same day CT. Small-bowel obstruction seen on   the same day CT not well appreciated due to relative lack of intraluminal gas. CT CHEST ABDOMEN PELVIS WO CONTRAST Additional Contrast? None   Final Result   1. Small-bowel obstruction with indeterminate etiology and an apparent   transition in the right lower quadrant/pelvis. There is no pattern of   pneumatosis or perforation. 2. Decompression of the distal ileum with stool and air in the colon. This   may indicate incomplete obstruction or a recently developing pattern of   obstruction. 3. Large hiatal hernia contributing to atelectasis in the left lower lobe. Peripheral bronchial opacification in the left lower lobe could potentially   be in association, although mucoid impaction is suspected. 4. Age indeterminate compression fractures that are likely remote at both T8   and T12. Additional remote fractures of the ribs and left pelvis are noted.              IP CONSULT TO GENERAL SURGERY  IP CONSULT TO GENERAL SURGERY  IP CONSULT TO HOME CARE NEEDS    Assessment/Plan:    Active Hospital Problems    Diagnosis     Moderate malnutrition ([facility] [address] [E44.0]      Priority: Medium    [facility] bowel obstruction ([facility] [K56.506]      Priority: Medium     Small bowel obstruction  Surgery consult   Failed to resolve with bowel rest  High degree of obstruction on CT a/p  S/p exploratory laparotomy, lysis of adhesions and umbilical herniorrhaphy   Tolerating diet  Bowel regimen per surgery     Acute kidney injury, due to dehydration,   Resolved  Hold on additional fluids     HypoKalemia - monitor and replete    HypoMagnesemia - monitor and replete    Hyponatremia - improving, continue to encourage PO intake     GERD continue with IV PPI. COPD with chronic respiratory failure at home on 5 L oxygen continue with the supplemental oxygen and inhalers. Normocytic anemia - recommend workup outpatient    Thrush - nystatin oral rinse     DVT Prophylaxis: Lovenox subcu  Diet: ADULT DIET;  Regular  ADULT ORAL NUTRITION SUPPLEMENT; Breakfast, Dinner; Standard High Calorie/High Protein Oral Supplement  Code Status: Full Code  PT/OT Eval Status: ordered    Dispo - home today per surgery    Appropriate for A1 Discharge Unit: No      Jerryl Aschoff, MD

## 2023-02-27 NOTE — PLAN OF CARE
Problem: Pain  Goal: Verbalizes/displays adequate comfort level or baseline comfort level  2/27/2023 1001 by Malachi John LPN  Outcome: Progressing  2/26/2023 2351 by Jones Solo RN  Outcome: Progressing  2/26/2023 2351 by Jones Solo RN  Outcome: Progressing     Problem: Safety - Adult  Goal: Free from fall injury  2/27/2023 1001 by Malachi John LPN  Outcome: Progressing  2/26/2023 2351 by Jones Solo RN  Outcome: Progressing  2/26/2023 2351 by Jones Solo RN  Outcome: Progressing     Problem: Skin/Tissue Integrity  Goal: Absence of new skin breakdown  Description: 1. Monitor for areas of redness and/or skin breakdown  2. Assess vascular access sites hourly  3. Every 4-6 hours minimum:  Change oxygen saturation probe site  4. Every 4-6 hours:  If on nasal continuous positive airway pressure, respiratory therapy assess nares and determine need for appliance change or resting period.   2/27/2023 1001 by Malachi John LPN  Outcome: Progressing  2/26/2023 2351 by Jones Solo RN  Outcome: Progressing  2/26/2023 2351 by Jones Solo RN  Outcome: Progressing     Problem: Nutrition Deficit:  Goal: Optimize nutritional status  2/27/2023 1001 by Malachi John LPN  Outcome: Progressing  2/26/2023 2351 by Jones Solo RN  Outcome: Progressing  2/26/2023 2351 by Jones Solo RN  Outcome: Progressing     Problem: Discharge Planning  Goal: Discharge to home or other facility with appropriate resources  2/27/2023 1001 by Malachi John LPN  Outcome: Progressing  2/26/2023 2351 by Jones Solo RN  Outcome: Progressing  2/26/2023 2351 by Jones Solo RN  Outcome: Progressing     Problem: Respiratory - Adult  Goal: Achieves optimal ventilation and oxygenation  2/27/2023 1001 by Malachi John LPN  Outcome: Progressing  2/26/2023 2351 by Jones Solo RN  Outcome: Progressing  2/26/2023 2351 by Jones Solo RN  Outcome: Progressing     Problem: Skin/Tissue Integrity - Adult  Goal: Skin integrity remains intact  2/27/2023 1001 by Parmjit Gtz LPN  Outcome: Progressing  2/26/2023 2351 by Symone Hutchison RN  Outcome: Progressing  2/26/2023 2351 by Symone Hutchison RN  Outcome: Progressing  Flowsheets (Taken 2/26/2023 1613 by Marina Ventura RN)  Skin Integrity Remains Intact: Monitor for areas of redness and/or skin breakdown     Problem: Musculoskeletal - Adult  Goal: Return ADL status to a safe level of function  2/27/2023 1001 by Parmjit Gtz LPN  Outcome: Progressing  2/26/2023 2351 by Symone Hutchison RN  Outcome: Progressing  2/26/2023 2351 by Symone Hutchison RN  Outcome: Progressing

## 2023-02-27 NOTE — PROGRESS NOTES
Shift assessment completed and charted. VSS. A/O x4. Pt c/o abd pain. Still no bowel movement since 2/13/2023. All meds given per [facility]. No further needs at this time.

## 2023-02-27 NOTE — PROGRESS NOTES
Patient sitting up on the toilet, transitioning back to bed. Once back to bed, note that dressing to abdomen was dry and intact with no leaking at the time of assessment. Discussed light edema noted to the left foot opposed to the right. No pain. No redness. Denies any discomfort in the extremity. Will pass for surgeon in the a.m. Requested food that was in the refrigerator. Food was provided. Pain medication was given for abdomen and back. Comfort measures was provided. Denies any additional needs at this time. Will continue to monitor. Call light in reach.

## 2023-02-27 NOTE — PROGRESS NOTES
Patient had requested pain medication earlier in the shift. Rested for a while around midnight the patient called out again and seemed more anxious requesting pain medication. She does seem to be dozing off in between asking for pain medication. She requests pain medication voicing her pain is a 10/10, however by the time you go back she had auditory snoring in which the pain medication was held. She has slept approximately 4 hours and expressed this morning that she was feeling better. Report given to Jimmie Nyhan, LPN in [employer].

## 2023-02-27 NOTE — CARE COORDINATION
CASE MANAGEMENT DISCHARGE SUMMARY    Discharge to: Home with PRN family supports, when son leave will dc to dtr home [address], [place] [id number] and skilled [facility] thru [address], PT, OT    IMM given: (date) 02/27/2023    [place] [id number] [facility] ordered/agency: WC to be delivered to room thru [employer], Patient has own portability in room     Transportation: via private car    Confirmed discharge plan with: Patient     Facility/Agency, name: [address] [place] [address] faxed Alba Hinds to pull    Confirmed with Reshma Bess LPN reports will send with start kit of wound care supplies, and 4 inch NC as report we discard her home tubing. Note: Discharging nurse to complete BOGDAN, reconcile AVS, and place final copy with patient's discharge packet.    FREDY Haddad

## 2023-02-27 NOTE — PLAN OF CARE
Problem: Pain  Goal: Verbalizes/displays adequate comfort level or baseline comfort level  2/27/2023 1546 by Parmjit Human LPN  Outcome: Completed  2/27/2023 1001 by Parmjit Human LPN  Outcome: Progressing     Problem: Safety - Adult  Goal: Free from fall injury  2/27/2023 1546 by Parmjit Human, LPN  Outcome: Completed  2/27/2023 1001 by Parmjit Human LPN  Outcome: Progressing     Problem: Skin/Tissue Integrity  Goal: Absence of new skin breakdown  Description: 1. Monitor for areas of redness and/or skin breakdown  2. Assess vascular access sites hourly  3. Every 4-6 hours minimum:  Change oxygen saturation probe site  4. Every 4-6 hours:  If on nasal continuous positive airway pressure, respiratory therapy assess nares and determine need for appliance change or resting period.   2/27/2023 1546 by Parmjit Human LPN  Outcome: Completed  2/27/2023 1001 by Parmjit Human LPN  Outcome: Progressing     Problem: Nutrition Deficit:  Goal: Optimize nutritional status  2/27/2023 1546 by Parmjit Human LPN  Outcome: Completed  2/27/2023 1001 by Parmjit Human LPN  Outcome: Progressing     Problem: Discharge Planning  Goal: Discharge to home or other facility with appropriate resources  2/27/2023 1546 by Parmjit Human LPN  Outcome: Completed  2/27/2023 1001 by Parmjit Human LPN  Outcome: Progressing     Problem: Respiratory - Adult  Goal: Achieves optimal ventilation and oxygenation  2/27/2023 1546 by Parmjit Human LPN  Outcome: Completed  2/27/2023 1001 by Parmjit Human LPN  Outcome: Progressing     Problem: Skin/Tissue Integrity - Adult  Goal: Skin integrity remains intact  2/27/2023 1546 by Parmjit Human, LPN  Outcome: Completed  2/27/2023 1001 by Parmjit Human LPN  Outcome: Progressing     Problem: Musculoskeletal - Adult  Goal: Return ADL status to a safe level of function  2/27/2023 1546 by Parmjit Human LPN  Outcome: Completed  2/27/2023 1001 by Parmjit Human LPN  Outcome: Progressing

## 2023-02-27 NOTE — PROGRESS NOTES
Physical Therapy  Facility/Department: [facility] C3 TELE/MED SURG/ONC  Daily Treatment Note  NAME: Deep Cuevas  : 1965  MRN: [id number]    Date of Service: 2023    Discharge Recommendations:  Home with assist PRN   PT Equipment Recommendations  Equipment Needed: Yes  Mobility Devices: Wheelchair  Wheelchair: Light Weight (16\" with leg rests)  Other: Recommend manual w/c for community mobility (pt currently unable to amb distances >40 feet 2* decreased endurance and increased work of breathing). Patient Diagnosis(es): The primary encounter diagnosis was Small bowel obstruction ([facility] [address] Diagnoses of Nausea and vomiting, unspecified vomiting type, Leukocytosis, unspecified type, ELIJAH (acute kidney injury) ([facility] [address] and Hyponatremia were also pertinent to this visit. Assessment   Assessment: Pt participated well with PT this session, ambulating increased distance compared to yesterday's PT session (~40 feet) at SBA/supervision level. Pt fatigues more quickly than usual with standing activity. Pt would benefit from manual w/c for community mobility, as she is unable to amb distances >40 feet without becoming very SOB. Recommend pt return home with assist PRN from family. Likely no home PT needs at D/C, as pt is able to perform transfers and amb x short distances at SBA/supervision level with good safety awareness. Activity Tolerance: Patient tolerated treatment well;Patient limited by endurance  Equipment Needed: Yes  Mobility Devices: Wheelchair  Other: Recommend manual w/c for community mobility (pt currently unable to amb distances >40 feet 2* decreased endurance and increased work of breathing). Plan    General Plan: 2-3 times per week  Specific Instructions for Next Treatment: Progress mobility as tolerated  Current Treatment Recommendations: Strengthening;Balance training;Functional mobility training;Gait training; Safety education & training; Therapeutic activities; Patient/Caregiver education & training;Equipment evaluation, education, & procurement; Endurance training;Home exercise program;Stair training     Restrictions  Restrictions/Precautions  Restrictions/Precautions: General Precautions  Position Activity Restriction  Other position/activity restrictions: Supplemental O2     Subjective    Subjective: Pt agreeable to work with PT this morning, pleasant and cooperative. Pain: Pt c/o acute-on-chronic pain in R hip, did not rate on 0-10 scale. Overall Orientation Status: Within Functional Limits    Objective   Vitals  Heart Rate: (!) 126  Heart Rate Source: Monitor  BP: 134/78  BP Location: Right upper arm  BP Method: Automatic  Patient Position: Sitting  MAP (Calculated): 97  Resp: 18  SpO2: 91 %  O2 Device: Nasal cannula (5L)  Comment: O2 dropped to 90% on 5L. Bed Mobility Training  Supine to Sit: Modified independent  Sit to Supine: Modified independent  Scooting: Modified independent    Balance  Sitting: Intact  Standing: Impaired  Standing - Static: Good  Standing - Dynamic: Fair (without AD)    Transfer Training  Sit to Stand: Supervision  Stand to Sit: Supervision    Gait Training  Overall Level of Assistance: Stand-by assistance;Supervision  Speed/Ene: Pace decreased (< 100 feet/min)  Step Length: Left shortened;Right shortened  Gait Abnormalities: Path deviations (forward flexed posture, mildly unsteady at times but no overt LOB, pt declines use of RW when offered)  Distance (ft): 40 Feet (amb distance limited by increased SOB/work of breathing and decreased stamina)  Assistive Device: Other (comment) (no AD)    Safety Devices  Type of Devices: Left in bed;Nurse notified; Patient at risk for falls;Call light within reach     Goals  Short Term Goals  Time Frame for Short Term Goals: 7 days (3/4/23)  Short Term Goal 1: Pt will perform bed mobility mod I - MET 2/27/23  Short Term Goal 2: Pt will perform transfers mod I  Short Term Goal 3: Pt will ambulate 50 ft with appropriate AD and supervision  Short Term Goal 4: By 3/1/23, pt will tolerate 15 reps of LE ther ex for improved strength and activity tolerance  Short Term Goal 5: Pt will negotiate 12 steps with HR and supervision  Patient Goals   Patient Goals : \"to go home and be able to get a wheelchair\"    Education  Patient Education  Education Given To: Patient  Education Provided: Role of Therapy;Plan of Care;Transfer Training;Equipment; Energy Conservation  Education Method: Demonstration;Verbal  Barriers to Learning: None  Education Outcome: Verbalized understanding;Demonstrated understanding    Therapy Time   Individual Concurrent Group Co-treatment   Time In 0833         Time Out 0900         Minutes 27         Timed Code Treatment Minutes: [id number] [address] #[id number]    If pt is unable to be seen after this session, please let this note serve as discharge summary. Please see case management note for discharge disposition. Thank you.

## 2023-02-27 NOTE — DISCHARGE INSTRUCTIONS
[facility] AND [place]    Deena Chavez. Christofer Acevedo M.D. [facility] [id number] [place] [address] [place]                [address] Morales M.D. [address] [facility], [address] [place] [address]         [place] [address] [facility] Deya Barboza M.D                         [phone]        [facility] Zuleyma Vela M.D. [facility]                                                          POST-OPERATIVE INSTRUCTIONS FOLLOWING ABDOMINAL SURGERY    Call the office to schedule your post-operative appointment with your surgeon for two (2) weeks. You will have pain medicine ordered. Take as directed. Your incision is closed with: White steri-strips; these will peel away in 7-10 days. Surgical glue   Staples, these will be removed in the office during your post-op  appointment. You may shower. Wash incisions gently, and pat them dry. Do not rub your incisions. General guidelines for activity:   Avoid strenuous activity or lifting anything heavier than 15 pounds. It is OK to be up walking around, and up and down stairs. Do what is comfortable: stop and rest when you feel tired. Drink plenty of fluids and stay on a bland diet for 2-3 days after surgery. Do NOT drive until after your first post-operative appointment and while taking your narcotic pain medicine     Watch for signs of infection:  Excessive warmth or bright redness around your incisions  Fever over 101.5    If you experience constipation:  Increase your water intake  Increase your activity, walking is best.  A stool softener or mild laxative may be necessary if you still have not had a bowel  movement ; call the office for further instructions.       Please take note: IF you do not take all of your narcotic pain medication, we ask that you dispose of these responsibly. Drug drop off boxes are located in the [facility] and [facility] emergency departments. These Med Safe return cabinets are available 24/7 in the emergency department lobbies. Hospital of office staff may NOT accept any medications to drop off in the cabinet.

## 2023-02-27 NOTE — PROGRESS NOTES
Occupational Therapy  Facility/Department: [facility] C3 TELE/MED SURG/ONC  Occupational Therapy Initial Assessment/Treatment/Discharge Summary  1x only    Name: Patricia Wheat  : 1965  MRN: [id number]  Date of Service: 2023    Discharge Recommendations:  Home with assist PRN      Equipment recs: Light Weight (16 inch with leg rests)  Pt would benefit from a w/c for seated ADLs as pt with poor standing level activity tolerance d/t COPD exacerbation and 5L 02 requirements. Patient Diagnosis(es): The primary encounter diagnosis was Small bowel obstruction ([facility] [address] Diagnoses of Nausea and vomiting, unspecified vomiting type, Leukocytosis, unspecified type, ELIJAH (acute kidney injury) ([facility] [address] and Hyponatremia were also pertinent to this visit. Past Medical History:  has a past medical history of Cirrhosis ([facility] [address] COPD (chronic obstructive pulmonary disease) ([facility] [address] and Hyperlipidemia. Past Surgical History:  has a past surgical history that includes Tubal ligation; Thumb arthroscopy; and laparotomy (N/A, 2023). Assessment   Performance deficits / Impairments: Decreased endurance  Patient admitted with SBO, s/p EDSON. Pt lives alone with daughter near by, states rather IPTA, however gets SOB and fatigues easy on 5L O2, sats 90% throughout session, cues for PBL. Pt reports does not want a shower chair for energy conservation/rest breaks with showers, but reports interested in w/c from community distances and seated rest breaks during cooking tasks. Patient with no further OT needs, will sign off.    Prognosis: Good  Decision Making: Low Complexity  No Skilled OT: No OT goals identified  REQUIRES OT FOLLOW-UP: No  Activity Tolerance  Activity Tolerance: Patient Tolerated treatment well;Patient limited by fatigue        Plan   Occupational Therapy Plan  Times Per Week: 1x  only     Restrictions  Restrictions/Precautions  Restrictions/Precautions: Fall Risk    Subjective   General  Chart Reviewed: Yes, Orders, Progress Notes, History and Physical  Patient assessed for rehabilitation services?: Yes  Family / Caregiver Present: No  Referring Practitioner: Magdiel Smith DO 2/26/23  Diagnosis: SBO s/p EDSON  Subjective  Subjective: Pt walking around room trying to move her tray table to eat breakfast in bed. Pt declined OOB to chair. Social/Functional History  Social/Functional History  Lives With: Spouse  Type of Home: House  Home Layout: One level  Home Access: Stairs to enter with rails  Entrance Stairs - Number of Steps: enters through garage; full flight of stairs up to main level  Entrance Stairs - Rails: Right  Bathroom Shower/Tub: Tub/Shower unit  Bathroom Toilet: Standard  Bathroom Equipment: Hand-held shower  Home Equipment: Cane (electric scooter)  Has the patient had two or more falls in the past year or any fall with injury in the past year?: No  ADL Assistance: Independent  Homemaking Responsibilities: Yes  Meal Prep Responsibility: Primary  Laundry Responsibility: Primary (has help if needed)  Cleaning Responsibility:  (has a )  Ambulation Assistance: Independent (short distances in house)  Transfer Assistance: Independent  Active : Yes (daughter can drive)  Occupation: Retired  IADL Comments: Pt reports have used shower chairs in the past, but reports feels like she doesnt need it and its more in the way. Objective   Heart Rate: (!) 126  Heart Rate Source: Monitor  BP: 134/78  BP Location: Right upper arm  BP Method: Automatic  Patient Position: Sitting  MAP (Calculated): 97  Resp: 18  SpO2: 91 %  O2 Device: Nasal cannula (5L)  Comment: O2 dropped to 90% on 5L. Pain in R hip, but does not rate numerically, does report its better than yesterday. Safety Devices  Type of Devices: Left in bed;Nurse notified; Patient at risk for falls;Call light within reach    Bed Mobility Training  Bed Mobility Training: Yes  Supine to Sit: Modified independent  Sit to Supine: Modified independent    Balance  Sitting: Intact  Standing: High guard  Transfer Training  Transfer Training: Yes  Sit to Stand: Supervision  Stand to Sit: Supervision  Gait  Overall Level of Assistance: Stand-by assistance     AROM: Within functional limits  PROM: Within functional limits  Strength: Within functional limits  Coordination: Within functional limits  Tone: Normal  Sensation: Intact    ADL  Feeding: Independent  Grooming: Independent  UE Dressing: Independent  LE Dressing: Independent  Toileting: Independent      Cognition  Overall Cognitive Status: WFL  Orientation  Overall Orientation Status: Within Functional Limits          Education Given To: Patient  Education Provided: Role of Therapy; ADL Adaptive Strategies  Education Method: Demonstration;Verbal  Barriers to Learning: None  Education Outcome: Verbalized understanding;Demonstrated understanding;Continued education needed      Disease specific:  Patient educated on pursed lip breathing to aid in recovery and energy conservation s/p therapeutic activity as related to patient's respiratory condition. Patient verbalized understanding of above education.       AM-PAC Score        AM-PAC Inpatient Daily Activity Raw Score: 20 (02/27/23 1004)  AM-PAC Inpatient ADL T-Scale Score : 42.03 (02/27/23 1004)  ADL Inpatient CMS 0-100% Score: 38.32 (02/27/23 1004)  ADL Inpatient CMS G-Code Modifier : CJ (02/27/23 1004)     Goals  Short Term Goals  Time Frame for Short Term Goals: 1 session  Short Term Goal 1: Pt will complete functional transfers with supervision-stg met 2/27  Patient Goals   Patient goals : \"to go home and qualify for a w/c for longer distance mobility and for rest breaks when cooking\"       Therapy Time   Individual Concurrent Group Co-treatment   Time In 0835         Time Out 0910         Minutes 35         Timed Code Treatment Minutes: 25 Minutes       Piyush Reaves OTR/L  Co-tx collaboration this date to safely meet goals and will have better occupational performance outcomes with in a co-treatment than 1:1 session.

## 2023-02-27 NOTE — CARE COORDINATION
[facility]    DC order noted, all docs needed have been faxed to [facility] for home care services.     Home care to see patient within 24-48 hrs    Orders faxed [facility]  Patient would like SN/PT; declined OT    Kaley Sharp RN, BSN CTN  [facility] [phone]

## 2023-02-27 NOTE — PROGRESS NOTES
Comprehensive Nutrition Assessment    Type and Reason for Visit:  Reassess    Nutrition Recommendations/Plan:   Continue regular diet  Encourage po intakes  Continue Ensure ONS  Monitor po intakes, nutrition adequacy, weights, pertinent labs, BMs     Malnutrition Assessment:  Malnutrition Status: Moderate malnutrition (02/27/23 1314)    Context:  Acute Illness     Findings of the 6 clinical characteristics of malnutrition:  Energy Intake:  75% or less of estimated energy requirements for 7 or more days  Weight Loss:  Unable to assess     Body Fat Loss:  Mild body fat loss Orbital   Muscle Mass Loss:  Mild muscle mass loss Temples (temporalis), Clavicles (pectoralis & deltoids)  Fluid Accumulation:  No significant fluid accumulation     Strength:  Not Performed    Nutrition Assessment:    Follow up: Pt improving some from a nutrition standpoint AEB pt NG d/c'd and diet advanced to regular. Pt reports that her appetite is not large. PO intakes 1-75% per EMR. Pt tolerating diet. Pt reports that she has been drinking her Ensure. Provided coupons for Ensure. Plans to d/c today. Answered pt's diet questions and provided contact information. Will monitor. Nutrition Related Findings:    Na 133 mmol/L. Last BM 2/13 Wound Type: Pressure Injury, Stage II       Current Nutrition Intake & Therapies:    Average Meal Intake: 1-25%, 26-50%, 51-75%  Average Supplements Intake: 1-25%, 26-50%, 51-75%  ADULT DIET; Regular  ADULT ORAL NUTRITION SUPPLEMENT; Breakfast, Dinner; Standard High Calorie/High Protein Oral Supplement    Anthropometric Measures:  Height: 5' 1\" (154.9 cm)  Ideal Body Weight (IBW): 105 lbs (48 kg)       Current Body Weight: 111 lb (50.3 kg), 105.9 % IBW. Weight Source: Bed Scale  Current BMI (kg/m2): 21  Usual Body Weight:  (103-107lb per pt)     Weight Adjustment For: No Adjustment                 BMI Categories: Normal Weight (BMI 18.5-24. 9)    Estimated Daily Nutrient Needs:  Energy Requirements Based On: Kcal/kg (25-30kcal/kg)  Weight Used for Energy Requirements: Current  Energy (kcal/day): 8837-5745  Weight Used for Protein Requirements: Current (1.2-1.5g/kg)  Protein (g/day): 60-76g  Method Used for Fluid Requirements: 1 ml/kcal  Fluid (ml/day): 1233-5332 ml    Nutrition Diagnosis:   Increased nutrient needs related to increase demand for energy/nutrients as evidenced by  (extended hospital stay)    Nutrition Interventions:   Food and/or Nutrient Delivery: Continue Current Diet, Continue Oral Nutrition Supplement  Nutrition Education/Counseling: No recommendation at this time  Coordination of Nutrition Care: Continue to monitor while inpatient       Goals:  Previous Goal Met: Progressing toward Goal(s)  Goals: PO intake 50% or greater, prior to discharge       Nutrition Monitoring and Evaluation:   Behavioral-Environmental Outcomes: None Identified  Food/Nutrient Intake Outcomes: Food and Nutrient Intake, Supplement Intake  Physical Signs/Symptoms Outcomes: Weight, Biochemical Data, Constipation    Discharge Planning:    Continue current diet, Continue Oral Nutrition Supplement     Claire Ortega RD, LD  Contact: [phone]

## 2023-02-27 NOTE — PLAN OF CARE
Sitting up in bed, requested a sandwich that she had in the frig. Food provided as requested.      Problem: Nutrition Deficit:  Goal: Optimize nutritional status  2/26/2023 2351 by Stoney Mark RN  Outcome: Progressing  2/26/2023 2351 by Sotney Mark RN  Outcome: Progressing  2/26/2023 1009 by Angela Mclain LPN  Outcome: Progressing

## 2023-02-27 NOTE — PLAN OF CARE
Patient requires a wheelchair due to inability to complete ADLs at a standing level even with the assistance of cane, crutch or walker.

## 2023-02-27 NOTE — DISCHARGE INSTR - COC
Continuity of Care Form    Patient Name: Alex Bhakta   :  1965  MRN:  [id number]    Admit date:  2023  Discharge date:  ***    Code Status Order: Full Code   Advance Directives:   Advance Care Flowsheet Documentation       Date/Time Healthcare Directive Type of Healthcare Directive Copy in [address] Agent's Name Healthcare Agent's Phone Number    [phone] No, patient does not have an advance directive for healthcare treatment -- -- -- -- --            Admitting Physician:  No admitting provider for patient encounter. PCP: ZARI Chapin CNP    Discharging Nurse: [facility] Unit/Room#: 2666/8738-39  Discharging Unit Phone Number: ***    Emergency Contact:   Extended Emergency Contact Information  Primary Emergency Contact: [address] Phone: [phone]  Relation: Child   needed?  No  Secondary Emergency Contact: [address] [facility] Phone: 21 [phone]  Relation: Son-in-Law    Past Surgical History:  Past Surgical History:   Procedure Laterality Date    LAPAROTOMY N/A     UMBILLICAL HERNIA REPAIR performed by Tere Murcia MD at [address] [place] [address] ARTHROSCOPY      TUBAL LIGATION         Immunization History:   Immunization History   Administered Date(s) Administered    COVID-19, [address] [facility], (age 12y+), IM, 48 mcg/0.5 mL 2022    Influenza, FLUARIX, FLULAVAL, FLUZONE (age 10 mo+) AND AFLURIA, (age 1 y+), PF, 0.5mL 09/10/2022       Active Problems:  Patient Active Problem List   Diagnosis Code    Vitamin D deficiency E55.9    Mixed hyperlipidemia E78.2    Chronic obstructive pulmonary disease with acute exacerbation ([facility] [address] J44.1    Alopecia L65.9    Tobacco abuse Z72.0    Major depressive disorder F32.9    Cyst of ovary, left N83.202    Gastroesophageal reflux disease without esophagitis K21.9    Primary hypertension I10    Hepatic cirrhosis ([facility] [address] K74.60    Hiatal hernia L82.0    Umbilical hernia K42.9    Small bowel obstruction ([facility] [address] K56.609       Isolation/Infection:   Isolation            No Isolation          Patient Infection Status       Infection Onset Added Last Indicated Last Indicated By Review Planned Expiration Resolved Resolved By    None active    Resolved    COVID-19 (Rule Out) 23 COVID-19 & Influenza Combo (Ordered)   23 Rule-Out Test Resulted            Nurse Assessment:  Last Vital Signs: /78   Pulse (!) 126   Temp 98.1 °F (36.7 °C) (Oral)   Resp 18   Ht 5' 1\" (1.549 m)   Wt 111 lb (50.3 kg)   SpO2 91%   BMI 20.97 kg/m²     Last documented pain score (0-10 scale): Pain Level: 8  Last Weight:   Wt Readings from Last 1 Encounters:   23 111 lb (50.3 kg)     Mental Status:  {IP PT MENTAL STATUS:70979}    IV Access:  { BOGDAN IV ACCESS:342485992}    Nursing Mobility/ADLs:  Walking   {P DME DCSD:918457965}  Transfer  {P DME QGZA:167566930}  Bathing  {P DME YNSK:425566828}  Dressing  {P DME ZLT}  Toileting  {CHP DME EDNK:034357863}  Feeding  {P DME SUBR:738713646}  Med Admin  {P DME KTIA:464069662}  Med Delivery   { BOGDAN MED Delivery:253858042}    Wound Care Documentation and Therapy:  Wound 23 Coccyx Mid DTI (Active)   Wound Image   23 1304   Wound Etiology Pressure Stage 1 23   Dressing Status Clean;Dry; Intact 23   Wound Cleansed Other (Comment) 23 2030   Dressing/Treatment Foam 23   Offloading for Diabetic Foot Ulcers Offloading ordered 23   Dressing Change Due 23 1304   Wound Length (cm) 13 cm 23 1304   Wound Width (cm) 18 cm 23 1304   Wound Depth (cm) 0.1 cm 23 1304   Wound Surface Area (cm^2) 234 cm^2 23 1304   Wound Volume (cm^3) 23.4 cm^3 23 1304   Distance Tunneling (cm) 0 cm 23   Tunneling Position ___ O'Clock 0 23   Undermining Starts ___ O'Clock 0 23   Undermining Ends___ O'Clock 0 02/25/23 2000   Undermining Maxium Distance (cm) 0 02/25/23 2000   Wound Assessment Pink/red 02/25/23 2000   Drainage Amount None 02/25/23 2000   Odor None 02/25/23 2000   Lizeth-wound Assessment Blanchable erythema 02/25/23 2000   Margins Undefined edges 02/25/23 2000   Wound Thickness Description not for Pressure Injury Partial thickness 02/25/23 2000   Number of days: 8       Incision 02/22/23 Abdomen Lower;Medial (Active)   Dressing Status Clean; Intact;Dry 02/26/23 2356   Dressing Change Due 02/27/23 02/26/23 2356   Incision Cleansed Cleansed with saline 02/26/23 0409   Dressing/Treatment ABD pad;Gauze dressing/dressing sponge; Other (comment) 02/26/23 0409   Closure Surgical glue 02/26/23 0409   Margins Approximated 02/24/23 2115   Incision Assessment Other (Comment) 02/26/23 0409   Drainage Amount Moderate 02/26/23 2356   Drainage Description Purulent;Serous; Thick; Thin 02/26/23 2356   Odor Malodorous/putrid 02/26/23 2356   Lizeth-incision Assessment Ecchymosis;Fragile 02/26/23 2356   Number of days: 4        Elimination:  Continence: Bowel: Yes  Bladder: Yes  Urinary Catheter: None   Colostomy/Ileostomy/Ileal Conduit: No       Date of Last BM: 2/27/2023    Intake/Output Summary (Last 24 hours) at 2/27/2023 1105  Last data filed at 2/27/2023 0403  Gross per 24 hour   Intake --   Output 730 ml   Net -730 ml     I/O last 3 completed shifts: In: 10 [I.V.:10]  Out: 730 [Urine:730]    Safety Concerns: At Risk for Falls    Impairments/Disabilities:      None    Nutrition Therapy:  Current Nutrition Therapy:   - Oral Diet:  General    Routes of Feeding: Oral  Liquids: No Restrictions  Daily Fluid Restriction: no  Last Modified Barium Swallow with Video (Video Swallowing Test): not done    Treatments at the Time of Hospital Discharge:   Respiratory Treatments: oxygen, inhalers  Oxygen Therapy:  is on oxygen at 5 L/min per nasal cannula.   Ventilator:    - No ventilator support    Rehab Therapies: Physical Therapy and Nurse; HRS if patient agreeable  Weight Bearing Status/Restrictions: No weight bearing restrictions  Other Medical Equipment (for information only, NOT a DME order):  wheelchair  Other Treatments: [facility]: LEVEL 1 [address] [place] [facility] agency to establish plan of care for patient over 60 day period   Nursing  Initial home SN evaluation visit to occur within 24-48 hours for:  1)  medication management  2)  VS and clinical assessment  3)  S&S chronic disease exacerbation education + when to contact MD/NP  4)  care coordination  Medication Reconciliation during 1st SN visit    PT/OT   Evaluate with goal of regaining prior level of functioning   OT to evaluate if patient has [address] needs for personal care    PCP Visit scheduled within 7 days of hospital discharge   Telehealth-Homecare Vitals(If patient is agreeable and meets guidelines)      Patient's personal belongings (please select all that are sent with patient):  None    RN SIGNATURE:  Electronically signed by Lesly Robles LPN on 3/15/50 at 15:94 AM EST    CASE MANAGEMENT/SOCIAL WORK SECTION    Inpatient Status Date: 02/18/2023    Readmission Risk Assessment Score:  Readmission Risk              Risk of Unplanned Readmission:  15           Discharging to Facility/ [address]   [address] [address] [address] [place] [address]   [phone]   / signature: Electronically signed by FREDY Tse on 2/27/23 at 11:58 AM EST    PHYSICIAN SECTION    Prognosis: Good    Condition at Discharge: Stable    Rehab Potential (if transferring to Rehab): NA    Recommended Labs or Other Treatments After Discharge: Wound care, wheelchair    Physician Certification: I certify the above information and transfer of Gerber Lopez  is necessary for the continuing treatment of the diagnosis listed and that she requires [address] for less 30 days.      Update Admission H&P: No change in H&P    PHYSICIAN SIGNATURE:  Electronically signed by Manuelito Espinal MD on 2/27/23 at 11:09 AM EST

## 2023-02-27 NOTE — DISCHARGE SUMMARY
Surgery Discharge Summary    Patient Identification  Ynes De Paz is a 62 y.o. female. :  1965  Admit Date:  2023    Discharge date:   No discharge date for patient encounter. Disposition: home    Discharge Diagnoses:   Principal Problem:    Small bowel obstruction ([address]  Resolved Problems:    * No resolved hospital problems. *      Discharge condition: good    Discharge Medications:     Current Discharge Medication List        CONTINUE these medications which have NOT CHANGED    Details   traZODone (DESYREL) 100 MG tablet Take 1 tablet by mouth nightly  Qty: 90 tablet, Refills: 0      omeprazole (PRILOSEC) 20 MG delayed release capsule TAKE 1 CAPSULE DAILY  Qty: 90 capsule, Refills: 3    Comments: YOUR PATIENT HAS REQUESTED A REFILL OF THIS MEDICATION, PREVIOUSLY AUTHORIZED BY ANOTHER PRESCRIBER.       spironolactone (ALDACTONE) 25 MG tablet Take 1 tablet by mouth daily  Qty: 90 tablet, Refills: 0      azelastine (ASTELIN) 0.1 % nasal spray       fluticasone (FLONASE) 50 MCG/ACT nasal spray       ADVAIR -21 MCG/ACT inhaler       furosemide (LASIX) 40 MG tablet 40 mg daily      SPIRIVA RESPIMAT 2.5 MCG/ACT AERS inhaler       albuterol (PROVENTIL) (5 MG/ML) 0.5% nebulizer solution Take 1 mL by nebulization 4 times daily as needed for Wheezing  Qty: 120 each, Refills: 3    Associated Diagnoses: Chronic obstructive pulmonary disease with acute exacerbation (HCC)      Handicap Placard MISC by Does not apply route  Qty: 1 each, Refills: 0    Associated Diagnoses: Chronic obstructive pulmonary disease with acute exacerbation (HCC)      levalbuterol (XOPENEX) 0.31 MG/3ML nebulization Take 3 mLs by nebulization every 4 hours as needed for Wheezing  Qty: 3 mL, Refills: 2    Associated Diagnoses: Chronic obstructive pulmonary disease with acute exacerbation ([facility] [address]                Current Discharge Medication List        START taking these medications    Details oxyCODONE (ROXICODONE) 5 MG immediate release tablet Take 1 tablet by mouth every 6 hours as needed for Pain for up to 7 days. Intended supply: 7 days. Take lowest dose possible to manage pain Max Daily Amount: 20 mg  Qty: 28 tablet, Refills: 0    Comments: Reduce doses taken as pain becomes manageable  Associated Diagnoses: Small bowel obstruction (HCC)               Most Recent Labs:    CBC:   Recent Labs     02/25/23  0516   WBC 8.5   HGB 10.6*   HCT 32.4*   *     BMP:    Recent Labs     02/25/23  0516 02/27/23  0432   * 133*   K 4.4 3.9   CL 98* 97*   CO2 27 26   BUN 6* 7   CREATININE <0.5* <0.5*   GLUCOSE 95 103*     Hepatic: No results for input(s): AST, ALT, ALB, BILITOT, ALKPHOS in the last 72 hours. PT/INR:  No results for input(s): INR in the last 72 hours. Consults: none    Surgery: EDSON    Patient Instructions: Activity: no heavy lifting, pushing, pulling for 2 weeks, no driving for 1 weeks or while on analgesics  Diet: As tolerated  Follow-up with Dr. Lisy Powell in 2 weeks. The patient and/or family/patient representatives, were provided education regarding discharge instructions, ongoing treatment and follow-up. Details of information given to the patient may be found in the discharge instructions located in the EMR. HPI and Hospital Course:   Admitted with SBO not responding to non-operative management. Underwent surgery. Post-op course complicated by mild ileus and incsional drainage treated with loca care.  Home today with home care      Maxx Coker MD    [address] [facility] Surgery

## 2023-02-28 ENCOUNTER — ANESTHESIA EVENT (OUTPATIENT)
Dept: OPERATING ROOM | Age: 58
End: 2023-02-28
Payer: MEDICARE

## 2023-02-28 ENCOUNTER — HOSPITAL ENCOUNTER (INPATIENT)
Age: 58
LOS: 9 days | Discharge: HOME HEALTH | DRG: 907 | End: 2023-03-09
Attending: EMERGENCY MEDICINE | Admitting: SURGERY
Payer: MEDICARE

## 2023-02-28 ENCOUNTER — ANESTHESIA (OUTPATIENT)
Dept: OPERATING ROOM | Age: 58
End: 2023-02-28
Payer: MEDICARE

## 2023-02-28 DIAGNOSIS — R10.9 ABDOMINAL PAIN, UNSPECIFIED ABDOMINAL LOCATION: ICD-10-CM

## 2023-02-28 DIAGNOSIS — J44.9 CHRONIC OBSTRUCTIVE PULMONARY DISEASE, UNSPECIFIED COPD TYPE (HCC): ICD-10-CM

## 2023-02-28 DIAGNOSIS — T81.31XA PERIOPERATIVE DEHISCENCE OF ABDOMINAL WOUND WITH EVISCERATION, INITIAL ENCOUNTER: Primary | ICD-10-CM

## 2023-02-28 DIAGNOSIS — K56.609 SMALL BOWEL OBSTRUCTION (HCC): ICD-10-CM

## 2023-02-28 PROBLEM — T81.321A: Status: ACTIVE | Noted: 2023-02-28

## 2023-02-28 PROBLEM — T81.321A PERIOPERATIVE DEHISCENCE OF ABDOMINAL WOUND WITH EVISCERATION: Status: ACTIVE | Noted: 2023-02-28

## 2023-02-28 LAB
A/G RATIO: 0.9 (ref 1.1–2.2)
ALBUMIN SERPL-MCNC: 2.7 G/DL (ref 3.4–5)
ALP BLD-CCNC: 155 U/L (ref 40–129)
ALT SERPL-CCNC: 24 U/L (ref 10–40)
ANION GAP SERPL CALCULATED.3IONS-SCNC: 9 MMOL/L (ref 3–16)
AST SERPL-CCNC: 37 U/L (ref 15–37)
BASOPHILS ABSOLUTE: 0.1 K/UL (ref 0–0.2)
BASOPHILS RELATIVE PERCENT: 0.6 %
BILIRUB SERPL-MCNC: 0.4 MG/DL (ref 0–1)
BUN BLDV-MCNC: 6 MG/DL (ref 7–20)
CALCIUM SERPL-MCNC: 8.5 MG/DL (ref 8.3–10.6)
CHLORIDE BLD-SCNC: 101 MMOL/L (ref 99–110)
CO2: 25 MMOL/L (ref 21–32)
CREAT SERPL-MCNC: <0.5 MG/DL (ref 0.6–1.1)
EKG ATRIAL RATE: 101 BPM
EKG DIAGNOSIS: NORMAL
EKG P AXIS: 79 DEGREES
EKG P-R INTERVAL: 136 MS
EKG Q-T INTERVAL: 356 MS
EKG QRS DURATION: 118 MS
EKG QTC CALCULATION (BAZETT): 461 MS
EKG R AXIS: -59 DEGREES
EKG T AXIS: 39 DEGREES
EKG VENTRICULAR RATE: 101 BPM
EOSINOPHILS ABSOLUTE: 0.2 K/UL (ref 0–0.6)
EOSINOPHILS RELATIVE PERCENT: 1.6 %
GFR SERPL CREATININE-BSD FRML MDRD: >60 ML/MIN/{1.73_M2}
GLUCOSE BLD-MCNC: 116 MG/DL (ref 70–99)
HCT VFR BLD CALC: 34.4 % (ref 36–48)
HEMOGLOBIN: 11.4 G/DL (ref 12–16)
LYMPHOCYTES ABSOLUTE: 0.7 K/UL (ref 1–5.1)
LYMPHOCYTES RELATIVE PERCENT: 7.4 %
MCH RBC QN AUTO: 32.3 PG (ref 26–34)
MCHC RBC AUTO-ENTMCNC: 33.2 G/DL (ref 31–36)
MCV RBC AUTO: 97.3 FL (ref 80–100)
MONOCYTES ABSOLUTE: 0.7 K/UL (ref 0–1.3)
MONOCYTES RELATIVE PERCENT: 6.8 %
NEUTROPHILS ABSOLUTE: 8.1 K/UL (ref 1.7–7.7)
NEUTROPHILS RELATIVE PERCENT: 83.6 %
PDW BLD-RTO: 12.9 % (ref 12.4–15.4)
PLATELET # BLD: 189 K/UL (ref 135–450)
PMV BLD AUTO: 8.3 FL (ref 5–10.5)
POTASSIUM REFLEX MAGNESIUM: 4.6 MMOL/L (ref 3.5–5.1)
RBC # BLD: 3.53 M/UL (ref 4–5.2)
SODIUM BLD-SCNC: 135 MMOL/L (ref 136–145)
TOTAL PROTEIN: 5.6 G/DL (ref 6.4–8.2)
WBC # BLD: 9.7 K/UL (ref 4–11)

## 2023-02-28 PROCEDURE — 2580000003 HC RX 258: Performed by: SURGERY

## 2023-02-28 PROCEDURE — 2709999900 HC NON-CHARGEABLE SUPPLY: Performed by: SURGERY

## 2023-02-28 PROCEDURE — 94761 N-INVAS EAR/PLS OXIMETRY MLT: CPT

## 2023-02-28 PROCEDURE — 96375 TX/PRO/DX INJ NEW DRUG ADDON: CPT

## 2023-02-28 PROCEDURE — 96374 THER/PROPH/DIAG INJ IV PUSH: CPT

## 2023-02-28 PROCEDURE — 3600000003 HC SURGERY LEVEL 3 BASE: Performed by: SURGERY

## 2023-02-28 PROCEDURE — 6360000002 HC RX W HCPCS: Performed by: EMERGENCY MEDICINE

## 2023-02-28 PROCEDURE — 6370000000 HC RX 637 (ALT 250 FOR IP): Performed by: SURGERY

## 2023-02-28 PROCEDURE — 6370000000 HC RX 637 (ALT 250 FOR IP)

## 2023-02-28 PROCEDURE — 3600000013 HC SURGERY LEVEL 3 ADDTL 15MIN: Performed by: SURGERY

## 2023-02-28 PROCEDURE — 2700000000 HC OXYGEN THERAPY PER DAY

## 2023-02-28 PROCEDURE — 6360000002 HC RX W HCPCS: Performed by: SURGERY

## 2023-02-28 PROCEDURE — 85025 COMPLETE CBC W/AUTO DIFF WBC: CPT

## 2023-02-28 PROCEDURE — 3700000000 HC ANESTHESIA ATTENDED CARE: Performed by: SURGERY

## 2023-02-28 PROCEDURE — 80053 COMPREHEN METABOLIC PANEL: CPT

## 2023-02-28 PROCEDURE — 3700000001 HC ADD 15 MINUTES (ANESTHESIA): Performed by: SURGERY

## 2023-02-28 PROCEDURE — 7100000001 HC PACU RECOVERY - ADDTL 15 MIN: Performed by: SURGERY

## 2023-02-28 PROCEDURE — 2500000003 HC RX 250 WO HCPCS: Performed by: NURSE ANESTHETIST, CERTIFIED REGISTERED

## 2023-02-28 PROCEDURE — 6360000002 HC RX W HCPCS: Performed by: NURSE ANESTHETIST, CERTIFIED REGISTERED

## 2023-02-28 PROCEDURE — 7100000000 HC PACU RECOVERY - FIRST 15 MIN: Performed by: SURGERY

## 2023-02-28 PROCEDURE — 1200000000 HC SEMI PRIVATE

## 2023-02-28 PROCEDURE — 93005 ELECTROCARDIOGRAM TRACING: CPT | Performed by: EMERGENCY MEDICINE

## 2023-02-28 PROCEDURE — A4217 STERILE WATER/SALINE, 500 ML: HCPCS | Performed by: SURGERY

## 2023-02-28 PROCEDURE — 0JQ80ZZ REPAIR ABDOMEN SUBCUTANEOUS TISSUE AND FASCIA, OPEN APPROACH: ICD-10-PCS | Performed by: SURGERY

## 2023-02-28 PROCEDURE — 93010 ELECTROCARDIOGRAM REPORT: CPT | Performed by: INTERNAL MEDICINE

## 2023-02-28 PROCEDURE — 51702 INSERT TEMP BLADDER CATH: CPT

## 2023-02-28 PROCEDURE — 2580000003 HC RX 258: Performed by: NURSE ANESTHETIST, CERTIFIED REGISTERED

## 2023-02-28 PROCEDURE — 6360000002 HC RX W HCPCS: Performed by: ANESTHESIOLOGY

## 2023-02-28 PROCEDURE — 99285 EMERGENCY DEPT VISIT HI MDM: CPT

## 2023-02-28 PROCEDURE — 94640 AIRWAY INHALATION TREATMENT: CPT

## 2023-02-28 PROCEDURE — 99024 POSTOP FOLLOW-UP VISIT: CPT | Performed by: SURGERY

## 2023-02-28 PROCEDURE — 13160 SEC CLSR SURG WND/DEHSN XTN: CPT | Performed by: SURGERY

## 2023-02-28 RX ORDER — LEVALBUTEROL 1.25 MG/.5ML
0.31 SOLUTION, CONCENTRATE RESPIRATORY (INHALATION) EVERY 4 HOURS PRN
Status: DISCONTINUED | OUTPATIENT
Start: 2023-02-28 | End: 2023-03-03

## 2023-02-28 RX ORDER — MEPERIDINE HYDROCHLORIDE 50 MG/ML
12.5 INJECTION INTRAMUSCULAR; INTRAVENOUS; SUBCUTANEOUS EVERY 5 MIN PRN
Status: DISCONTINUED | OUTPATIENT
Start: 2023-02-28 | End: 2023-02-28 | Stop reason: HOSPADM

## 2023-02-28 RX ORDER — ONDANSETRON 2 MG/ML
INJECTION INTRAMUSCULAR; INTRAVENOUS PRN
Status: DISCONTINUED | OUTPATIENT
Start: 2023-02-28 | End: 2023-02-28 | Stop reason: SDUPTHER

## 2023-02-28 RX ORDER — OXYCODONE HYDROCHLORIDE 5 MG/1
5 TABLET ORAL EVERY 30 MIN PRN
Status: DISCONTINUED | OUTPATIENT
Start: 2023-02-28 | End: 2023-02-28 | Stop reason: HOSPADM

## 2023-02-28 RX ORDER — ONDANSETRON 2 MG/ML
4 INJECTION INTRAMUSCULAR; INTRAVENOUS
Status: DISCONTINUED | OUTPATIENT
Start: 2023-02-28 | End: 2023-02-28 | Stop reason: HOSPADM

## 2023-02-28 RX ORDER — DROPERIDOL 2.5 MG/ML
0.62 INJECTION, SOLUTION INTRAMUSCULAR; INTRAVENOUS
Status: DISCONTINUED | OUTPATIENT
Start: 2023-02-28 | End: 2023-02-28 | Stop reason: HOSPADM

## 2023-02-28 RX ORDER — MAGNESIUM HYDROXIDE 1200 MG/15ML
LIQUID ORAL CONTINUOUS PRN
Status: COMPLETED | OUTPATIENT
Start: 2023-02-28 | End: 2023-02-28

## 2023-02-28 RX ORDER — SODIUM CHLORIDE 0.9 % (FLUSH) 0.9 %
5-40 SYRINGE (ML) INJECTION EVERY 12 HOURS SCHEDULED
Status: DISCONTINUED | OUTPATIENT
Start: 2023-02-28 | End: 2023-02-28 | Stop reason: HOSPADM

## 2023-02-28 RX ORDER — PHENYLEPHRINE HCL IN 0.9% NACL 1 MG/10 ML
SYRINGE (ML) INTRAVENOUS PRN
Status: DISCONTINUED | OUTPATIENT
Start: 2023-02-28 | End: 2023-02-28 | Stop reason: SDUPTHER

## 2023-02-28 RX ORDER — KETOROLAC TROMETHAMINE 30 MG/ML
15 INJECTION, SOLUTION INTRAMUSCULAR; INTRAVENOUS
Status: COMPLETED | OUTPATIENT
Start: 2023-02-28 | End: 2023-02-28

## 2023-02-28 RX ORDER — SODIUM CHLORIDE 9 MG/ML
INJECTION, SOLUTION INTRAVENOUS CONTINUOUS
Status: DISCONTINUED | OUTPATIENT
Start: 2023-02-28 | End: 2023-03-03

## 2023-02-28 RX ORDER — ONDANSETRON 2 MG/ML
4 INJECTION INTRAMUSCULAR; INTRAVENOUS EVERY 6 HOURS PRN
Status: DISCONTINUED | OUTPATIENT
Start: 2023-02-28 | End: 2023-03-09 | Stop reason: HOSPADM

## 2023-02-28 RX ORDER — MORPHINE SULFATE 4 MG/ML
4 INJECTION, SOLUTION INTRAMUSCULAR; INTRAVENOUS ONCE
Status: COMPLETED | OUTPATIENT
Start: 2023-02-28 | End: 2023-02-28

## 2023-02-28 RX ORDER — ENOXAPARIN SODIUM 100 MG/ML
40 INJECTION SUBCUTANEOUS DAILY
Status: DISCONTINUED | OUTPATIENT
Start: 2023-03-01 | End: 2023-03-09 | Stop reason: HOSPADM

## 2023-02-28 RX ORDER — FENTANYL CITRATE 50 UG/ML
25 INJECTION, SOLUTION INTRAMUSCULAR; INTRAVENOUS EVERY 10 MIN PRN
Status: DISCONTINUED | OUTPATIENT
Start: 2023-02-28 | End: 2023-02-28

## 2023-02-28 RX ORDER — SUCCINYLCHOLINE CHLORIDE 20 MG/ML
INJECTION INTRAMUSCULAR; INTRAVENOUS PRN
Status: DISCONTINUED | OUTPATIENT
Start: 2023-02-28 | End: 2023-02-28 | Stop reason: SDUPTHER

## 2023-02-28 RX ORDER — ONDANSETRON 4 MG/1
4 TABLET, ORALLY DISINTEGRATING ORAL EVERY 8 HOURS PRN
Status: DISCONTINUED | OUTPATIENT
Start: 2023-02-28 | End: 2023-03-09 | Stop reason: HOSPADM

## 2023-02-28 RX ORDER — LIDOCAINE HYDROCHLORIDE 20 MG/ML
INJECTION, SOLUTION INFILTRATION; PERINEURAL PRN
Status: DISCONTINUED | OUTPATIENT
Start: 2023-02-28 | End: 2023-02-28 | Stop reason: SDUPTHER

## 2023-02-28 RX ORDER — SODIUM CHLORIDE 9 MG/ML
INJECTION, SOLUTION INTRAVENOUS PRN
Status: DISCONTINUED | OUTPATIENT
Start: 2023-02-28 | End: 2023-02-28 | Stop reason: HOSPADM

## 2023-02-28 RX ORDER — TRAZODONE HYDROCHLORIDE 50 MG/1
100 TABLET ORAL NIGHTLY
Status: DISCONTINUED | OUTPATIENT
Start: 2023-02-28 | End: 2023-03-09 | Stop reason: HOSPADM

## 2023-02-28 RX ORDER — SODIUM CHLORIDE 9 MG/ML
INJECTION, SOLUTION INTRAVENOUS PRN
Status: DISCONTINUED | OUTPATIENT
Start: 2023-02-28 | End: 2023-03-09 | Stop reason: HOSPADM

## 2023-02-28 RX ORDER — SODIUM CHLORIDE 0.9 % (FLUSH) 0.9 %
5-40 SYRINGE (ML) INJECTION PRN
Status: DISCONTINUED | OUTPATIENT
Start: 2023-02-28 | End: 2023-02-28 | Stop reason: HOSPADM

## 2023-02-28 RX ORDER — SODIUM CHLORIDE, SODIUM LACTATE, POTASSIUM CHLORIDE, CALCIUM CHLORIDE 600; 310; 30; 20 MG/100ML; MG/100ML; MG/100ML; MG/100ML
INJECTION, SOLUTION INTRAVENOUS CONTINUOUS PRN
Status: DISCONTINUED | OUTPATIENT
Start: 2023-02-28 | End: 2023-02-28 | Stop reason: SDUPTHER

## 2023-02-28 RX ORDER — ACETAMINOPHEN 500 MG
1000 TABLET ORAL
Status: DISCONTINUED | OUTPATIENT
Start: 2023-02-28 | End: 2023-02-28 | Stop reason: HOSPADM

## 2023-02-28 RX ORDER — SODIUM CHLORIDE 0.9 % (FLUSH) 0.9 %
5-40 SYRINGE (ML) INJECTION PRN
Status: DISCONTINUED | OUTPATIENT
Start: 2023-02-28 | End: 2023-03-09 | Stop reason: HOSPADM

## 2023-02-28 RX ORDER — HYDROMORPHONE HCL 110MG/55ML
PATIENT CONTROLLED ANALGESIA SYRINGE INTRAVENOUS PRN
Status: DISCONTINUED | OUTPATIENT
Start: 2023-02-28 | End: 2023-02-28 | Stop reason: SDUPTHER

## 2023-02-28 RX ORDER — PROPOFOL 10 MG/ML
INJECTION, EMULSION INTRAVENOUS PRN
Status: DISCONTINUED | OUTPATIENT
Start: 2023-02-28 | End: 2023-02-28 | Stop reason: SDUPTHER

## 2023-02-28 RX ORDER — CEFAZOLIN SODIUM IN 0.9 % NACL 2 G/100 ML
2000 PLASTIC BAG, INJECTION (ML) INTRAVENOUS ONCE
Status: COMPLETED | OUTPATIENT
Start: 2023-02-28 | End: 2023-02-28

## 2023-02-28 RX ORDER — PANTOPRAZOLE SODIUM 40 MG/10ML
40 INJECTION, POWDER, LYOPHILIZED, FOR SOLUTION INTRAVENOUS DAILY
Status: DISCONTINUED | OUTPATIENT
Start: 2023-02-28 | End: 2023-03-06

## 2023-02-28 RX ORDER — ROCURONIUM BROMIDE 10 MG/ML
INJECTION, SOLUTION INTRAVENOUS PRN
Status: DISCONTINUED | OUTPATIENT
Start: 2023-02-28 | End: 2023-02-28 | Stop reason: SDUPTHER

## 2023-02-28 RX ORDER — SODIUM CHLORIDE 0.9 % (FLUSH) 0.9 %
5-40 SYRINGE (ML) INJECTION EVERY 12 HOURS SCHEDULED
Status: DISCONTINUED | OUTPATIENT
Start: 2023-02-28 | End: 2023-03-09 | Stop reason: HOSPADM

## 2023-02-28 RX ORDER — ACETAMINOPHEN 325 MG/1
650 TABLET ORAL EVERY 4 HOURS PRN
Status: DISCONTINUED | OUTPATIENT
Start: 2023-02-28 | End: 2023-03-09 | Stop reason: HOSPADM

## 2023-02-28 RX ADMIN — HYDROMORPHONE HYDROCHLORIDE 1 MG: 1 INJECTION, SOLUTION INTRAMUSCULAR; INTRAVENOUS; SUBCUTANEOUS at 21:02

## 2023-02-28 RX ADMIN — Medication: at 19:54

## 2023-02-28 RX ADMIN — Medication 50 MCG: at 08:20

## 2023-02-28 RX ADMIN — HYDROMORPHONE HYDROCHLORIDE 0.2 MG: 2 INJECTION, SOLUTION INTRAMUSCULAR; INTRAVENOUS; SUBCUTANEOUS at 09:00

## 2023-02-28 RX ADMIN — Medication 100 MCG: at 08:10

## 2023-02-28 RX ADMIN — Medication 2000 MG: at 07:57

## 2023-02-28 RX ADMIN — SODIUM CHLORIDE, PRESERVATIVE FREE 10 ML: 5 INJECTION INTRAVENOUS at 19:56

## 2023-02-28 RX ADMIN — Medication 100 MCG: at 08:15

## 2023-02-28 RX ADMIN — TRAZODONE HYDROCHLORIDE 100 MG: 50 TABLET ORAL at 19:54

## 2023-02-28 RX ADMIN — HYDROMORPHONE HYDROCHLORIDE 1 MG: 1 INJECTION, SOLUTION INTRAMUSCULAR; INTRAVENOUS; SUBCUTANEOUS at 18:00

## 2023-02-28 RX ADMIN — ALBUTEROL SULFATE 5 MG: 2.5 SOLUTION RESPIRATORY (INHALATION) at 13:54

## 2023-02-28 RX ADMIN — HYDROMORPHONE HYDROCHLORIDE 0.5 MG: 0.5 INJECTION, SOLUTION INTRAMUSCULAR; INTRAVENOUS; SUBCUTANEOUS at 09:16

## 2023-02-28 RX ADMIN — ROCURONIUM BROMIDE 35 MG: 10 SOLUTION INTRAVENOUS at 08:10

## 2023-02-28 RX ADMIN — HYDROMORPHONE HYDROCHLORIDE 0.5 MG: 0.5 INJECTION, SOLUTION INTRAMUSCULAR; INTRAVENOUS; SUBCUTANEOUS at 15:59

## 2023-02-28 RX ADMIN — SODIUM CHLORIDE: 9 INJECTION, SOLUTION INTRAVENOUS at 11:53

## 2023-02-28 RX ADMIN — MORPHINE SULFATE 4 MG: 4 INJECTION, SOLUTION INTRAMUSCULAR; INTRAVENOUS at 05:10

## 2023-02-28 RX ADMIN — SUCCINYLCHOLINE CHLORIDE 80 MG: 20 INJECTION, SOLUTION INTRAMUSCULAR; INTRAVENOUS at 08:00

## 2023-02-28 RX ADMIN — HYDROMORPHONE HYDROCHLORIDE 0.5 MG: 0.5 INJECTION, SOLUTION INTRAMUSCULAR; INTRAVENOUS; SUBCUTANEOUS at 11:00

## 2023-02-28 RX ADMIN — ONDANSETRON 4 MG: 2 INJECTION INTRAMUSCULAR; INTRAVENOUS at 08:36

## 2023-02-28 RX ADMIN — Medication 2 PUFF: at 20:32

## 2023-02-28 RX ADMIN — HYDROMORPHONE HYDROCHLORIDE 0.5 MG: 0.5 INJECTION, SOLUTION INTRAMUSCULAR; INTRAVENOUS; SUBCUTANEOUS at 12:57

## 2023-02-28 RX ADMIN — KETOROLAC TROMETHAMINE 15 MG: 30 INJECTION, SOLUTION INTRAMUSCULAR; INTRAVENOUS at 09:35

## 2023-02-28 RX ADMIN — HYDROMORPHONE HYDROCHLORIDE 1 MG: 1 INJECTION, SOLUTION INTRAMUSCULAR; INTRAVENOUS; SUBCUTANEOUS at 05:24

## 2023-02-28 RX ADMIN — ROCURONIUM BROMIDE 5 MG: 10 SOLUTION INTRAVENOUS at 08:00

## 2023-02-28 RX ADMIN — PROPOFOL 150 MG: 10 INJECTION, EMULSION INTRAVENOUS at 08:00

## 2023-02-28 RX ADMIN — SUGAMMADEX 200 MG: 100 INJECTION, SOLUTION INTRAVENOUS at 08:53

## 2023-02-28 RX ADMIN — SODIUM CHLORIDE, SODIUM LACTATE, POTASSIUM CHLORIDE, AND CALCIUM CHLORIDE: .6; .31; .03; .02 INJECTION, SOLUTION INTRAVENOUS at 07:57

## 2023-02-28 RX ADMIN — HYDROMORPHONE HYDROCHLORIDE 0.5 MG: 0.5 INJECTION, SOLUTION INTRAMUSCULAR; INTRAVENOUS; SUBCUTANEOUS at 09:28

## 2023-02-28 RX ADMIN — LIDOCAINE HYDROCHLORIDE 50 MG: 20 INJECTION, SOLUTION INFILTRATION; PERINEURAL at 08:00

## 2023-02-28 ASSESSMENT — PAIN SCALES - GENERAL
PAINLEVEL_OUTOF10: 10
PAINLEVEL_OUTOF10: 10
PAINLEVEL_OUTOF10: 8
PAINLEVEL_OUTOF10: 8
PAINLEVEL_OUTOF10: 10
PAINLEVEL_OUTOF10: 6
PAINLEVEL_OUTOF10: 8
PAINLEVEL_OUTOF10: 4
PAINLEVEL_OUTOF10: 8
PAINLEVEL_OUTOF10: 10
PAINLEVEL_OUTOF10: 8
PAINLEVEL_OUTOF10: 9

## 2023-02-28 ASSESSMENT — ENCOUNTER SYMPTOMS
ABDOMINAL PAIN: 1
COUGH: 0
DIARRHEA: 0
VOMITING: 0
SHORTNESS OF BREATH: 0
CHEST TIGHTNESS: 0
RHINORRHEA: 0
BACK PAIN: 0

## 2023-02-28 ASSESSMENT — PAIN DESCRIPTION - ORIENTATION: ORIENTATION: MID;LOWER

## 2023-02-28 ASSESSMENT — PAIN DESCRIPTION - LOCATION
LOCATION: ABDOMEN

## 2023-02-28 ASSESSMENT — PAIN - FUNCTIONAL ASSESSMENT
PAIN_FUNCTIONAL_ASSESSMENT: 0-10
PAIN_FUNCTIONAL_ASSESSMENT: 0-10
PAIN_FUNCTIONAL_ASSESSMENT: PREVENTS OR INTERFERES SOME ACTIVE ACTIVITIES AND ADLS

## 2023-02-28 ASSESSMENT — PAIN DESCRIPTION - DESCRIPTORS
DESCRIPTORS: ACHING;SHARP
DESCRIPTORS: ACHING;DULL;SHOOTING;SHARP

## 2023-02-28 NOTE — BRIEF OP NOTE
Brief Postoperative Note      Patient: Tiesha Unger  YOB: 1965  MRN: [id number]    Date of Procedure: 2/28/2023    Pre-Op Diagnosis: ABDOMINAL WOUND DEHISCENCE    Post-Op Diagnosis: Same       Procedure(s):  ABDOMINAL  WOUND CLOSURE    Surgeon(s):  Yunier Raymundo MD    Assistant:  Surgical Assistant: Gracie Tom    Anesthesia: General    Estimated Blood Loss (mL): less than 50     Complications: None    Specimens:   * No specimens in log *    Implants:  * No implants in log *      Drains:   NG/OG/NJ/NE Tube Nasogastric 16 fr Right nostril (Active)       Urinary Catheter 02/28/23 Soto-Temperature (Active)       [REMOVED] NG/OG/NJ/NE Tube Nasogastric 16 fr Right nostril (Removed)   Surrounding Skin Clean, dry & intact 02/18/23 1943   Securement device Adhesive based [place] 02/18/23 1943   Status Suction-low continuous 02/18/23 1943   Placement Verified X-Ray (Initial) 02/18/23 1943   NG/OG/NJ/NE External Measurement (cm) 51 cm 02/18/23 1943   Drainage Appearance Brown 02/18/23 1943   Output (mL) 100 ml 02/18/23 1943       [REMOVED] NG/OG/NJ/NE Tube Nasogastric Right nostril (Removed)   Surrounding Skin Clean, dry & intact 02/21/23 0555   Securement device Tape 02/21/23 0830   Status Suction-low continuous 02/21/23 0830   Placement Verified X-Ray (Initial) 02/19/23 1440   NG/OG/NJ/NE External Measurement (cm) 54 cm 02/21/23 0830   Drainage Appearance Green;Milky 02/21/23 0830   Free Water/Flush (mL) 45 mL 02/21/23 0050   Output (mL) 0 ml 02/21/23 0848       [REMOVED] NG/OG/NJ/NE Tube Nasogastric 18 fr Left nostril (Removed)   Surrounding Skin Clean, dry & intact 02/24/23 0954   Securement device Adhesive based [place] 02/24/23 0954   Status Clamped 02/24/23 0954   NG/OG/NJ/NE External Measurement (cm) 62 cm 02/24/23 0954   Drainage Appearance Bile 02/24/23 0954       [REMOVED] Urinary Catheter 02/22/23 2 Way (Removed)       Findings: wide fascial dehiscence w/ gross protrusion of loops of small bowel, all viable                 Reduction of bowel loops w/ fascial closure, retention suture placement    Job#: [id number]    Electronically signed by Janae Sterling MD on 3/1/2023 at 9:59 PM

## 2023-02-28 NOTE — CONSULTS
Consult placed    Neva Amanda  Date:2/28/2023,  Time:12:54 PM        Electronically signed by Fausto Zapata on 2/28/2023 at 12:54 PM

## 2023-02-28 NOTE — PROGRESS NOTES
4 Eyes Skin Assessment     The patient is being assess for  Admission    I agree that 2 RN's have performed a thorough Head to Toe Skin Assessment on the patient. ALL assessment sites listed below have been assessed. Areas assessed by both nurses: Andrew Michael RN   [x]   Head, Face, and Ears   [x]   Shoulders, Back, and Chest  [x]   Arms, Elbows, and Hands   [x]   Coccyx, Sacrum, and IschIum  [x]   Legs, Feet, and Heels        Does the Patient have Skin Breakdown?   No         Dedrick Prevention initiated:  No   Wound Care Orders initiated:  No      [facility] nurse consulted for Pressure Injury (Stage 3,4, Unstageable, DTI, NWPT, and Complex wounds), New and Established Ostomies:  No      Nurse 1 eSignature: Electronically signed by Maria Esther Chowdhury RN on 2/28/23 at 4:11 PM EST    **SHARE this note so that the co-signing nurse is able to place an eSignature**    Nurse 2 eSignature: Electronically signed by Loan Cha RN on 2/28/23 at 6:16 PM EST

## 2023-02-28 NOTE — ED PROVIDER NOTES
Emergency Department Provider Note  Location: [address] [place] [address]  ED  2/28/2023     Patient Identification  Tony Lima is a 62 y.o. female    Chief Complaint  Abdominal Pain (Pos op issues, intestines outside abdominal wall )      HPI    (History provided by patient)    Patient is a 62 y.o. female with a significant PMHx of recent mall bowel obstruction, postop on 2/18/2023, discharged home yesterday, presenting emerged department today with concerns of evisceration of small bowel. She had postop complications with mild ileus and incisional drainage, went home with home health care. Patient has COPD, wears 5 L baseline, and had been coughing overnight, when her \"bowels popped out. \"  Otherwise, she is quite uncomfortable on physical exam, mucosa is pink, however limited exam because gauze is dry down, tacked to bowel wall. Otherwise, denies any back pain or falls. No recent fevers. On chart review, patient is status post ex lap for SBO and takedown of adhesions 2/18/2023 with Dr. Emory Cavazos at this hospital.     I have reviewed the following nursing documentation:  Allergies: Allergies   Allergen Reactions    Doxycycline Hyclate Nausea And Vomiting       Past medical history:  has a past medical history of Cirrhosis ([address] COPD (chronic obstructive pulmonary disease) ([address] and Hyperlipidemia. Past surgical history:  has a past surgical history that includes Tubal ligation; Thumb arthroscopy; and laparotomy (N/A, 2/22/2023). Home medications:   Prior to Admission medications    Medication Sig Start Date End Date Taking? Authorizing Provider   oxyCODONE (ROXICODONE) 5 MG immediate release tablet Take 1 tablet by mouth every 6 hours as needed for Pain for up to 7 days. Intended supply: 7 days.  Take lowest dose possible to manage pain Max Daily Amount: 20 mg 2/27/23 3/6/23  Xu Sal MD   traZODone (DESYREL) 100 MG tablet Take 1 tablet by mouth nightly 1/30/23   ZARI Sosa - CNP omeprazole (PRILOSEC) 20 MG delayed release capsule TAKE 1 CAPSULE DAILY 1/27/23   ZARI Borja CNP   spironolactone (ALDACTONE) 25 MG tablet Take 1 tablet by mouth daily 1/27/23   ZARI Borja CNP   azelastine (ASTELIN) 0.1 % nasal spray  1/2/23   Historical Provider, MD   fluticasone [facility]) 50 MCG/ACT nasal spray  1/10/23   Historical Provider, MD Kvng Plunkett [phone] MCG/ACT inhaler  11/30/22   Historical Provider, MD   furosemide (LASIX) 40 MG tablet 40 mg daily 11/9/22   Historical Provider, MD   SPIRIVA RESPIMAT 2.5 MCG/ACT AERS inhaler  1/10/23   Historical Provider, MD   albuterol (PROVENTIL) (5 MG/ML) 0.5% nebulizer solution Take 1 mL by nebulization 4 times daily as needed for Wheezing 1/18/23   ZARI Borja CNP   Handicap Placard MISC by Does not apply route 1/18/23   ZARI Borja CNP   levalbuterol Ilya Bang) 0.31 MG/3ML nebulization Take 3 mLs by nebulization every 4 hours as needed for Wheezing 1/18/23 2/17/23  ZARI Borja CNP       Social history:  reports that she has quit smoking. Her smoking use included cigarettes. She has never used smokeless tobacco. She reports that she does not currently use alcohol. She reports current drug use. Drug: Marijuana Shantell Lasha). Family history:  No family history on file. ROS  Review of Systems   Constitutional:  Negative for activity change, appetite change, fatigue and fever. HENT:  Negative for congestion and rhinorrhea. Respiratory:  Negative for cough, chest tightness and shortness of breath. Cardiovascular:  Negative for chest pain and leg swelling. Gastrointestinal:  Positive for abdominal pain. Negative for diarrhea and vomiting. Genitourinary:  Negative for dysuria, flank pain and pelvic pain. Musculoskeletal:  Negative for back pain and neck pain. Skin:  Negative for rash and wound.      Exam  Vitals:    02/28/23 0448 02/28/23 0450 02/28/23 0522   BP:  114/74 126/77   Pulse:  (!) 111 (!) 118   Resp:  16 14   Temp: 99.6 °F (37.6 °C)     SpO2:  95% 98%   Weight: 111 lb (50.3 kg)     Height: 5' 1\" (1.549 m)           Physical Exam  Vitals reviewed. Constitutional:       General: She is not in acute distress. Appearance: Normal appearance. She is not ill-appearing. HENT:      Head: Normocephalic and atraumatic. Right Ear: External ear normal.      Left Ear: External ear normal.      Nose: Nose normal. No congestion. Mouth/Throat:      Mouth: Mucous membranes are moist.      Pharynx: Oropharynx is clear. Eyes:      General:         Right eye: No discharge. Left eye: No discharge. Conjunctiva/sclera: Conjunctivae normal.   Cardiovascular:      Rate and Rhythm: Normal rate and regular rhythm. Pulmonary:      Effort: Pulmonary effort is normal. No respiratory distress. Breath sounds: Normal breath sounds. Abdominal:      General: Abdomen is flat. There is no distension. Palpations: Abdomen is soft. Tenderness: There is abdominal tenderness. Musculoskeletal:         General: No swelling or tenderness. Cervical back: Normal range of motion and neck supple. Skin:     General: Skin is warm and dry. Neurological:      General: No focal deficit present. Mental Status: She is alert and oriented to person, place, and time.    Psychiatric:         Mood and Affect: Mood normal.         Behavior: Behavior normal.           ED Course    ED Medication Orders (From admission, onward)      Start Ordered     Status Ordering Provider    02/28/23 3962 02/28/23 0521  HYDROmorphone (DILAUDID) injection 1 mg  EVERY 3 HOURS PRN         Last MAR action: Given - by Abel Nguyen on 02/28/23 at 0524 MARISOL ESPARZA    02/28/23 0500 02/28/23 0450  morphine sulfate (PF) injection 4 mg  ONCE         Last MAR action: Given - by Sandie Weir on 02/28/23 at [facility] MARISOL Gottlieb            EKG PRE OP: EKG obtained today emergency department shows a sinus tachycardia with an incomplete right bundle branch block consistent with previous EKG. No ST segment elevation, ST segment depression, hyperacute T waves. Ventricular rate 101. Largely unchanged EKG from previous. Radiology  No results found.     Labs  Results for orders placed or performed during the hospital encounter of 02/28/23   CBC with Auto Differential   Result Value Ref Range    WBC 9.7 4.0 - 11.0 K/uL    RBC 3.53 (L) 4.00 - 5.20 M/uL    Hemoglobin 11.4 (L) 12.0 - 16.0 g/dL    Hematocrit 34.4 (L) 36.0 - 48.0 %    MCV 97.3 80.0 - 100.0 fL    MCH 32.3 26.0 - 34.0 pg    MCHC 33.2 31.0 - 36.0 g/dL    RDW 12.9 12.4 - 15.4 %    Platelets 894 285 - 179 K/uL    MPV 8.3 5.0 - 10.5 fL    Neutrophils % 83.6 %    Lymphocytes % 7.4 %    Monocytes % 6.8 %    Eosinophils % 1.6 %    Basophils % 0.6 %    Neutrophils Absolute 8.1 (H) 1.7 - 7.7 K/uL    Lymphocytes Absolute 0.7 (L) 1.0 - 5.1 K/uL    Monocytes Absolute 0.7 0.0 - 1.3 K/uL    Eosinophils Absolute 0.2 0.0 - 0.6 K/uL    Basophils Absolute 0.1 0.0 - 0.2 K/uL   CMP w/ Reflex to MG   Result Value Ref Range    Sodium 135 (L) 136 - 145 mmol/L    Potassium reflex Magnesium 4.6 3.5 - 5.1 mmol/L    Chloride 101 99 - 110 mmol/L    CO2 25 21 - 32 mmol/L    Anion Gap 9 3 - 16    Glucose 116 (H) 70 - 99 mg/dL    BUN 6 (L) 7 - 20 mg/dL    Creatinine <0.5 (L) 0.6 - 1.1 mg/dL    Est, Glom Filt Rate >60 >60    Calcium 8.5 8.3 - 10.6 mg/dL    Total Protein 5.6 (L) 6.4 - 8.2 g/dL    Albumin 2.7 (L) 3.4 - 5.0 g/dL    Albumin/Globulin Ratio 0.9 (L) 1.1 - 2.2    Total Bilirubin 0.4 0.0 - 1.0 mg/dL    Alkaline Phosphatase 155 (H) 40 - 129 U/L    ALT 24 10 - 40 U/L    AST 37 15 - 37 U/L   EKG 12 Lead   Result Value Ref Range    Ventricular Rate 101 BPM    Atrial Rate 101 BPM    P-R Interval 136 ms    QRS Duration 118 ms    Q-T Interval 356 ms    QTc Calculation (Bazett) 461 ms    P Axis 79 degrees    R Axis -59 degrees    T Axis 39 degrees    Diagnosis       Sinus tachycardiaIncomplete right bundle branch blockLeft anterior fascicular blockAnteroseptal infarct (cited on or before 20-FEB-2023)Abnormal ECGWhen compared with ECG of 20-FEB-2023 05:21,Incomplete right bundle branch block has replaced Right bundle branch blockQuestionable change in initial forces of Septal leads         Procedures  Procedures      MDM  Patient seen and evaluated. Relevant records reviewed. - Patient is a 62 y.o. female status post ex lap on 2/18, 10 days ago, presenting emergency department today with evisceration of small bowel. Wears 5 L baseline, COPD, cough through the night, and has bowel through incision. Emergent call placed to Dr. Fouzia Buenrostro, and he will plan for admission with hospitalist as she was recently discharged yesterday from hospitalist team, and plan for OR this morning. Diet NPO. Initial management includes pain control. Further, wet gauze with sterile water applied to the bowel. Otherwise, laboratory evaluation today shows baseline anemia, 11.4. No leukocytosis. CMP largely unremarkable and unchanged from recent labs inpatient. As this patient requires further evaluation and management as above, this patient will be admitted to the hospital for subsequent care. I spoke with Dr. Janet Perdomo who accepts patient for admission. They are available to place admission orders. Medications   HYDROmorphone (DILAUDID) injection 1 mg (1 mg IntraVENous Given 2/28/23 0524)   morphine sulfate (PF) injection 4 mg (4 mg IntraVENous Given 2/28/23 0510)       - I discussed the results, including any incidental findings, with patient and family member patient and son. Questions answered. Patient/family agreeable to plan and express understanding of plan. Disposition:  Admit to telemetry in fair condition. Is this patient to be included in the SEP-1 Core Measure due to severe sepsis or septic shock?    No   Exclusion criteria - the patient is NOT to be included for SEP-1 Core Measure due to:  2+ SIRS criteria are not met    Consult this encounter: general surgery    Clinical Impression:  1. Perioperative dehiscence of abdominal wound with evisceration, initial encounter    2. Chronic obstructive pulmonary disease, unspecified COPD type ([address]    3. Abdominal pain, unspecified abdominal location        Blood pressure 126/77, pulse (!) 118, temperature 99.6 °F (37.6 °C), resp. rate 14, height 5' 1\" (1.549 m), weight 111 lb (50.3 kg), SpO2 98 %. Audrey Esparza DO, ([facility])  am the primary attending of record and contributed the majority of evaluation and treatment of emergent care for this encounter. Total critical care time is 38 minutes, which excludes separately billable procedures and updating family. Time spent is specifically for management of the presenting complaint and symptoms initially, direct bedside care, reevaluation, review of records, and consultation. There was a high probability of clinically significant life-threatening deterioration in the patient's condition, which required my urgent intervention. This chart was generated in part by using Dragon Dictation system and may contain errors related to that system including errors in grammar, punctuation, and spelling, as well as words and phrases that may be inappropriate. If there are any questions or concerns please feel free to contact the dictating provider for clarification.      MARISOL ESPARZA, Via Ashvin Adler DO  02/28/23 0706

## 2023-02-28 NOTE — ANESTHESIA POSTPROCEDURE EVALUATION
Department of Anesthesiology  Postprocedure Note    Patient: Alex Bhakta  MRN: [id number]  YOB: 1965  Date of evaluation: 2/28/2023      Procedure Summary     Date: 02/28/23 Room / Location: [address] [place] [address]    Anesthesia Start: 2881 Anesthesia Stop: 4122    Procedure: ABDOMINAL  WOUND CLOSURE Diagnosis:       Abdominal wound dehiscence, initial encounter      (ABDOMINAL WOUND DEHISCENCE)    Surgeons: Tere Murcia MD Responsible Provider: Tona Baldwin MD    Anesthesia Type: general ASA Status: 3          Anesthesia Type: No value filed.     Ladonna Phase I: Ladonna Score: 9    Ladonna Phase II:        Anesthesia Post Evaluation    Patient location during evaluation: PACU  Patient participation: complete - patient participated  Level of consciousness: awake and alert  Airway patency: patent  Nausea & Vomiting: no nausea and no vomiting  Complications: no  Cardiovascular status: hemodynamically stable  Respiratory status: acceptable, room air and spontaneous ventilation  Hydration status: stable  Comments: --------------------            02/28/23               1050     --------------------   BP:     (!) 91/46    Pulse:   (!) 117     Resp:       14       Temp:                SpO2:      92%      --------------------

## 2023-02-28 NOTE — PROGRESS NOTES
Pt admitted to room 344. Pt A/O x4. VSS. Ng tube to CLWS. Non skid socks on. SCD's on. Soto catheter intact draining clear, yellow urine. Abd binder in place. Bed alarm active for safety. Bed locked and in lowest position. Call light and bedside table within reach. Will continue to monitor.

## 2023-02-28 NOTE — ANESTHESIA PRE PROCEDURE
Department of Anesthesiology  Preprocedure Note       Name:  Sean Cardona   Age:  62 y.o.  :  1965                                          MRN:  [id number]         Date:  2023      Surgeon: Anuj Flores):  John Cortez MD    Procedure: Procedure(s):  ABDOMINAL  WOUND CLOSURE    Medications prior to admission:   Prior to Admission medications    Medication Sig Start Date End Date Taking? Authorizing Provider   oxyCODONE (ROXICODONE) 5 MG immediate release tablet Take 1 tablet by mouth every 6 hours as needed for Pain for up to 7 days. Intended supply: 7 days.  Take lowest dose possible to manage pain Max Daily Amount: 20 mg 2/27/23 3/6/23  Chris Dickens MD   traZODone (DESYREL) 100 MG tablet Take 1 tablet by mouth nightly 23   ZARI Bernard CNP   omeprazole (PRILOSEC) 20 MG delayed release capsule TAKE 1 CAPSULE DAILY 23   ZARI Bernard CNP   spironolactone (ALDACTONE) 25 MG tablet Take 1 tablet by mouth daily 23   ZARI Bernard CNP   azelastine (ASTELIN) 0.1 % nasal spray  23   Historical Provider, MD   fluticasone Carmel Hugo) 50 MCG/ACT nasal spray  1/10/23   Historical Provider, MD Kvng Plunkett [phone] MCG/ACT inhaler  22   Historical Provider, MD   furosemide (LASIX) 40 MG tablet 40 mg daily 22   Historical Provider, MD   SPIRIVA RESPIMAT 2.5 MCG/ACT AERS inhaler  1/10/23   Historical Provider, MD   albuterol (PROVENTIL) (5 MG/ML) 0.5% nebulizer solution Take 1 mL by nebulization 4 times daily as needed for Wheezing 23   ZARI Bernard CNP   Handicap Placard MISC by Does not apply route 23   ZARI Bernard CNP   levalbuterol Linda Osborne) 0.31 MG/3ML nebulization Take 3 mLs by nebulization every 4 hours as needed for Wheezing 23  Terie [place], APRN - CNP       Current medications:    Current Facility-Administered Medications   Medication Dose Route Frequency Provider Last Rate Last Admin    HYDROmorphone (DILAUDID) injection 1 mg  1 mg IntraVENous Q3H PRN Maria M Reyes, DO   1 mg at 02/28/23 0524    ceFAZolin (ANCEF) 2000 mg in 0.9% sodium chloride 100 mL IVPB  2,000 mg IntraVENous Once Ingris England MD           Allergies:     Allergies   Allergen Reactions    Doxycycline Hyclate Nausea And Vomiting       Problem List:    Patient Active Problem List   Diagnosis Code    Vitamin D deficiency E55.9    Mixed hyperlipidemia E78.2    Chronic obstructive pulmonary disease with acute exacerbation ([facility] J44.1    Alopecia L65.9    Tobacco abuse Z72.0    Major depressive disorder F32.9    Cyst of ovary, left N83.202    Gastroesophageal reflux disease without esophagitis K21.9    Primary hypertension I10    Hepatic cirrhosis ([facility] K74.60    Hiatal hernia X10.1    Umbilical hernia B21.3    Small bowel obstruction (HCC) K56.609    Moderate malnutrition (HCC) E44.0    Perioperative dehiscence of abdominal wound with evisceration T81.31XA       Past Medical History:        Diagnosis Date    Cirrhosis ([facility]     COPD (chronic obstructive pulmonary disease) (HCC)     Hyperlipidemia        Past Surgical History:        Procedure Laterality Date    LAPAROTOMY N/A 3/13/1737    UMBILLICAL HERNIA REPAIR performed by Ingris England MD at [address] [facility]      TUBAL LIGATION         Social History:    Social History     Tobacco Use    Smoking status: Former     Types: Cigarettes    Smokeless tobacco: Never   Substance Use Topics    Alcohol use: Not Currently                                Counseling given: Not Answered      Vital Signs (Current):   Vitals:    02/28/23 0522 02/28/23 0555 02/28/23 0643 02/28/23 0705   BP: 126/77  96/63 105/67   Pulse: (!) 118 (!) 106 (!) 102 (!) 108   Resp: 14 19 14 14   Temp:    98.9 °F (37.2 °C)   TempSrc:    Oral   SpO2: 98% 98% 97% 98%   Weight:       Height:                                                  BP Readings from Last 3 Encounters: 02/28/23 105/67   02/27/23 134/78   01/26/23 123/74       NPO Status:                                                                                 BMI:   Wt Readings from Last 3 Encounters:   02/28/23 111 lb (50.3 kg)   02/27/23 111 lb (50.3 kg)   01/26/23 108 lb (49 kg)     Body mass index is 20.97 kg/m².     CBC:   Lab Results   Component Value Date/Time    WBC 9.7 02/28/2023 05:00 AM    RBC 3.53 02/28/2023 05:00 AM    HGB 11.4 02/28/2023 05:00 AM    HCT 34.4 02/28/2023 05:00 AM    MCV 97.3 02/28/2023 05:00 AM    RDW 12.9 02/28/2023 05:00 AM     02/28/2023 05:00 AM       CMP:   Lab Results   Component Value Date/Time     02/28/2023 05:00 AM    K 4.6 02/28/2023 05:00 AM     02/28/2023 05:00 AM    CO2 25 02/28/2023 05:00 AM    BUN 6 02/28/2023 05:00 AM    CREATININE <0.5 02/28/2023 05:00 AM    AGRATIO 0.9 02/28/2023 05:00 AM    LABGLOM >60 02/28/2023 05:00 AM    GLUCOSE 116 02/28/2023 05:00 AM    PROT 5.6 02/28/2023 05:00 AM    CALCIUM 8.5 02/28/2023 05:00 AM    BILITOT 0.4 02/28/2023 05:00 AM    ALKPHOS 155 02/28/2023 05:00 AM    AST 37 02/28/2023 05:00 AM    ALT 24 02/28/2023 05:00 AM       POC Tests:   Recent Labs     02/26/23  1137   POCGLU 109*       Coags: No results found for: PROTIME, INR, APTT    HCG (If Applicable): No results found for: PREGTESTUR, PREGSERUM, HCG, HCGQUANT     ABGs: No results found for: PHART, PO2ART, LAB5IAY, JAB6IED, BEART, I5WMKWKM     Type & Screen (If Applicable):  No results found for: LABABO, LABRH    Drug/Infectious Status (If Applicable):  No results found for: HIV, HEPCAB    COVID-19 Screening (If Applicable):   Lab Results   Component Value Date/Time    COVID19 NOT DETECTED 02/18/2023 10:42 AM           Anesthesia Evaluation  Patient summary reviewed no history of anesthetic complications:   Airway: Mallampati: III  TM distance: >3 FB   Neck ROM: full  Mouth opening: < 3 FB   Dental: normal exam         Pulmonary:   (+) COPD:  decreased breath sounds: bilateral                            Cardiovascular:    (+) hypertension:,         Rhythm: regular  Rate: normal                    Neuro/Psych:   (+) depression/anxiety             GI/Hepatic/Renal:   (+) hiatal hernia, GERD:, liver disease (cirrhosis):,          ROS comment: SBO s/p recent surgery, just discharged yesterday 2/27, back to ED this morning with wound dehiscence, stable, appears comfortable but tired. Endo/Other:                     Abdominal:             Vascular: Other Findings:           Anesthesia Plan      general     ASA 3       Induction: intravenous. MIPS: Postoperative opioids intended and Prophylactic antiemetics administered. Anesthetic plan and risks discussed with patient. Use of blood products discussed with patient whom consented to blood products. Plan discussed with CRNA.     Attending anesthesiologist reviewed and agrees with Preprocedure content                Eugena Gitelman, MD   2/28/2023

## 2023-02-28 NOTE — CONSULTS
Placed a call to general surgery @ 3747   RE: abdominal evisceration per Yuridia Retana called back @ 2533

## 2023-02-28 NOTE — ED NOTES
All of pt's belongings at bedside placed in a personal belongings bag and given to daughter. Pt removed one ring from ring finger on left hand and one bracelet, these were also given to daughter. Pt has been stripped of everything except pt gown. Transport at bedside to take to OR.      Heinz Denver, RN  02/28/23 6960

## 2023-02-28 NOTE — CARE COORDINATION
[facility] following    Melany Cheung RN, BSN CTN  [facility] [phone]

## 2023-02-28 NOTE — RT PROTOCOL NOTE
RT Inhaler-Nebulizer Bronchodilator Protocol Note    There is a bronchodilator order in the chart from a provider indicating to follow the RT Bronchodilator Protocol and there is an Initiate RT Inhaler-Nebulizer Bronchodilator Protocol order as well (see protocol at bottom of note). CXR Findings:  No results found. The findings from the last RT Protocol Assessment were as follows:   History Pulmonary Disease: Smoker 15 pack years or more  Respiratory Pattern: Regular pattern and RR 12-20 bpm  Breath Sounds: Slightly diminished and/or crackles  Cough: Strong, spontaneous, non-productive  Indication for Bronchodilator Therapy: On home bronchodilators  Bronchodilator Assessment Score: 3    Aerosolized bronchodilator medication orders have been revised according to the RT Inhaler-Nebulizer Bronchodilator Protocol below. Respiratory Therapist to perform RT Therapy Protocol Assessment initially then follow the protocol. Repeat RT Therapy Protocol Assessment PRN for score 0-3 or on second treatment, BID, and PRN for scores above 3. No Indications - adjust the frequency to every 6 hours PRN wheezing or bronchospasm, if no treatments needed after 48 hours then discontinue using Per Protocol order mode. If indication present, adjust the RT bronchodilator orders based on the Bronchodilator Assessment Score as indicated below. Use Inhaler orders unless patient has one or more of the following: on home nebulizer, not able to hold breath for 10 seconds, is not alert and oriented, cannot activate and use MDI correctly, or respiratory rate 25 breaths per minute or more, then use the equivalent nebulizer order(s) with same Frequency and PRN reasons based on the score. If a patient is on this medication at home then do not decrease Frequency below that used at home.     0-3 - enter or revise RT bronchodilator order(s) to equivalent RT Bronchodilator order with Frequency of every 4 hours PRN for wheezing or increased work of breathing using Per Protocol order mode. 4-6 - enter or revise RT Bronchodilator order(s) to two equivalent RT bronchodilator orders with one order with BID Frequency and one order with Frequency of every 4 hours PRN wheezing or increased work of breathing using Per Protocol order mode. 7-10 - enter or revise RT Bronchodilator order(s) to two equivalent RT bronchodilator orders with one order with TID Frequency and one order with Frequency of every 4 hours PRN wheezing or increased work of breathing using Per Protocol order mode. 11-13 - enter or revise RT Bronchodilator order(s) to one equivalent RT bronchodilator order with QID Frequency and an Albuterol order with Frequency of every 4 hours PRN wheezing or increased work of breathing using Per Protocol order mode. Greater than 13 - enter or revise RT Bronchodilator order(s) to one equivalent RT bronchodilator order with every 4 hours Frequency and an Albuterol order with Frequency of every 2 hours PRN wheezing or increased work of breathing using Per Protocol order mode. RT to enter RT Home Evaluation for COPD & MDI Assessment order using Per Protocol order mode.     Electronically signed by Francisca Rivero RCP on 2/28/2023 at 11:39 AM

## 2023-02-28 NOTE — ED NOTES
RN checked wound dressing on abdomen, no intervention necessary at this time.     Shamar Barrett RN  02/28/23 0626

## 2023-02-28 NOTE — H&P
Department of General Surgery - Adult   History and Physical      PATIENT NAME: Kelle Rob   YOB: 1965    ADMISSION DATE: 2/28/2023  4:46 AM      TODAY'S DATE: 2/28/2023    CHIEF COMPLAINT:  Bowel protrusion      HISTORY OF PRESENT ILLNESS:  The patient is a 62 y.o. female  who presents with recent laparotomy for a high-grade small bowel obstruction. Was discharged home just yesterday. Patient has COPD and states she was coughing last night when she felt a \"pop\" and then noted her bowel protruding through her midline incision site. Denies nausea, vomiting, fever, chills. Past Medical History:        Diagnosis Date    Cirrhosis ([facility]     COPD (chronic obstructive pulmonary disease) ([facility]     Hyperlipidemia        Past Surgical History:        Procedure Laterality Date    LAPAROTOMY N/A 6/67/5068    UMBILLICAL HERNIA REPAIR performed by Onur Swift MD at [address] [place] [address] ARTHROSCOPY      TUBAL LIGATION         Medications Prior to Admission:   Prior to Admission medications    Medication Sig Start Date End Date Taking? Authorizing Provider   oxyCODONE (ROXICODONE) 5 MG immediate release tablet Take 1 tablet by mouth every 6 hours as needed for Pain for up to 7 days. Intended supply: 7 days.  Take lowest dose possible to manage pain Max Daily Amount: 20 mg 2/27/23 3/6/23  Rodrigue Burns MD   traZODone (DESYREL) 100 MG tablet Take 1 tablet by mouth nightly 1/30/23   Susa , APRN - CNP   omeprazole (PRILOSEC) 20 MG delayed release capsule TAKE 1 CAPSULE DAILY 1/27/23   Susa , APRN - CNP   spironolactone (ALDACTONE) 25 MG tablet Take 1 tablet by mouth daily 1/27/23   Susa , APRN - CNP   azelastine (ASTELIN) 0.1 % nasal spray  1/2/23   Historical Provider, MD   fluticasone Elyssa Osceola) 50 MCG/ACT nasal spray  1/10/23   Historical Provider, MD   ADVAIR [facility] 230-21 MCG/ACT inhaler  11/30/22   Historical Provider, MD   furosemide (LASIX) 40 MG tablet 40 mg daily 11/9/22   Historical Provider, MD   SPIRIVA RESPIMAT 2.5 MCG/ACT AERS inhaler  1/10/23   Historical Provider, MD   albuterol (PROVENTIL) (5 MG/ML) 0.5% nebulizer solution Take 1 mL by nebulization 4 times daily as needed for Wheezing 1/18/23   [facility], APRN - CNP   Handicap Placard MISC by Does not apply route 1/18/23   [facility], APRN - CNP   levalbuterol Ladell Diovinny) 0.31 MG/3ML nebulization Take 3 mLs by nebulization every 4 hours as needed for Wheezing 1/18/23 2/17/23  [facility], APRN - CNP       Allergies:  Doxycycline hyclate    Social History:   TOBACCO:   reports that she has quit smoking. Her smoking use included cigarettes. She has never used smokeless tobacco.  ETOH:   reports that she does not currently use alcohol. DRUGS:   reports current drug use. Drug: Marijuana Winsomepavel Schultz). MARITAL STATUS:  single  OCCUPATION:  @[employer]@  Patient currently lives with family      Family History:   No family history on file. REVIEW OF SYSTEMS:    CONSTITUTIONAL:  negative  HEENT:  Negative  RESPIRATORY:  negative  CARDIOVASCULAR:  negative  GASTROINTESTINAL:  positive for abdominal pain  GENITOURINARY:  negative  HEMATOLOGIC/LYMPHATIC:  negative  ENDOCRINE:  Negative  NEUROLOGICAL:  Negative  * All other ROS reviewed and negative. PHYSICAL EXAM:    VITALS:  /67   Pulse (!) 108   Temp 99.6 °F (37.6 °C)   Resp 14   Ht 5' 1\" (1.549 m)   Wt 111 lb (50.3 kg)   SpO2 98%   BMI 20.97 kg/m²   INTAKE/OUTPUT:   No intake/output data recorded. No intake/output data recorded.       CONSTITUTIONAL:  alert, mild distress and thin  EYES:  PERRL, sclera clear  ENT:  Normocephalic,atraumatic, without obvious abnormality  NECK:  supple, symmetrical, trachea midline  LUNGS: Resp effort easy and unlabored, clear to auscultation  CARDIOVASCULAR:  NO JVD, regular rate and rhythm and    ABDOMEN:  midline incision w/ widely protruding loops of small intestine, viable, no bleeding  MUSCULOSKELETAL: No clubbing or cyanosis, 0+ pitting edema lower extremities  NEUROLOGIC:  Mental Status Exam:  Level of Alertness:   awake  PSYCHIATRIC:   person, place, time  SKIN:  normal skin color, texture, turgor       DATA:  CBC:   Recent Labs     02/28/23  0500   WBC 9.7   HGB 11.4*   HCT 34.4*        BMP:    Recent Labs     02/27/23  0432 02/28/23  0500   * 135*   K 3.9 4.6   CL 97* 101   CO2 26 25   BUN 7 6*   CREATININE <0.5* <0.5*   GLUCOSE 103* 116*     Hepatic:   Recent Labs     02/28/23  0500   AST 37   ALT 24   BILITOT 0.4   ALKPHOS 155*     Mag:    No results for input(s): MG in the last 72 hours. Phos:   No results for input(s): PHOS in the last 72 hours. INR: No results for input(s): INR in the last 72 hours. Radiology Review: Images personally reviewed by me. N/a      ASSESSMENT AND PLAN:  Abdominal wall dehiscence with evisceration   - will need to return to OR this AM for fascial re-closure, possible bowel resection if protruding segment is damaged or otherwise devitalized. I discussed this plan in detail w/ patient including risks, complications (bleeding, infection, injury to bowels or other surrounding structures, and need for further surgery). Patient appears to understand, asks appropriate questions, and agrees to proceed. PRACTITIONER CERTIFICATION   I certify that Guillermo Couch is expected to be hospitalized for >2 days based on the above assessment and plan.       Electronically signed by Jodi Quinteros MD     [address] [facility]  [id number]

## 2023-02-28 NOTE — CONSULTS
Hospital Medicine  Consult History & Physical        Chief Complaint:  cough and COPD    Date of Service: Pt seen/examined in consultation on 2/28/2023    History Of Present Illness: The patient is a 62 y.o. caucasianfemale who we are asked to see/evaluate by Dr. Ryan Hanley MD  for management of COPD and cough. Pt was just discharged from the hospital on  2/27 after small bowel obstruction which did not resolve resulting in laparotomy with lysis of adhesions and  umbilical hernia repair. On 2/22. Post op course complicated by ileus and mild incisional drainage. Pt has known COPD with chronic resp failure on 5L oxygen at home, cirrhosis and hyperlipidemia and had coughing spell. During coughing spell, she felt a pop and then had evisceration of her intestines. Pt went to OR this AM for fascial re-closure . Currently she states that she needs to cough, but is afraid to. I obtained a heart surgery post op pillow for her to hold against her abdomen when she needs to cough. This is working well. Past Medical History:        Diagnosis Date    Cirrhosis ([facility] [address]     COPD (chronic obstructive pulmonary disease) ([facility] [address]     Hyperlipidemia        Past Surgical History:        Procedure Laterality Date    LAPAROTOMY N/A 9/03/0228    UMBILLICAL HERNIA REPAIR performed by Ryan Hanley MD at [address] [place] [address] ARTHROSCOPY      TUBAL LIGATION         Medications Prior to Admission:    Prior to Admission medications    Medication Sig Start Date End Date Taking? Authorizing Provider   oxyCODONE (ROXICODONE) 5 MG immediate release tablet Take 1 tablet by mouth every 6 hours as needed for Pain for up to 7 days. Intended supply: 7 days.  Take lowest dose possible to manage pain Max Daily Amount: 20 mg 2/27/23 3/6/23  Sunshine Caldwell MD   traZODone (DESYREL) 100 MG tablet Take 1 tablet by mouth nightly 1/30/23   ZARI Pruitt - CNP   omeprazole (PRILOSEC) 20 MG delayed release capsule TAKE 1 CAPSULE DAILY 1/27/23   ZARI Rojas CNP   spironolactone (ALDACTONE) 25 MG tablet Take 1 tablet by mouth daily 1/27/23   ZARI Rojas CNP   azelastine (ASTELIN) 0.1 % nasal spray  1/2/23   Historical Provider, MD   fluticasone Rosemont Beer) 50 MCG/ACT nasal spray  1/10/23   Historical Provider, MD Kvng Plunkett 885-98 MCG/ACT inhaler  11/30/22   Historical Provider, MD   furosemide (LASIX) 40 MG tablet 40 mg daily 11/9/22   Historical Provider, MD   SPIRIVA RESPIMAT 2.5 MCG/ACT AERS inhaler  1/10/23   Historical Provider, MD   albuterol (PROVENTIL) (5 MG/ML) 0.5% nebulizer solution Take 1 mL by nebulization 4 times daily as needed for Wheezing 1/18/23   ZARI Rojas CNP   Handicap Placard MISC by Does not apply route 1/18/23   ZARI Rojas CNP   levalbuterol Orbie Pastel) 0.31 MG/3ML nebulization Take 3 mLs by nebulization every 4 hours as needed for Wheezing 1/18/23 2/17/23  ZARI Rojas CNP       Allergies:  Doxycycline hyclate    Social History:  The patient currently lives at home    TOBACCO:   reports that she has quit smoking. Her smoking use included cigarettes. She has never used smokeless tobacco.  ETOH:   reports that she does not currently use alcohol. ILLICIT DRUG USE: marijuana      Family History:  Reviewed in detail and negative for DM, Early CAD, Cancer, CVA. Positive as follows:    No family history on file. REVIEW OF SYSTEMS:   Positive for mild abd pain and thick, wet cough and as noted in the HPI. All other systems reviewed and negative. PHYSICAL EXAM:  /66   Pulse (!) 109   Temp 98.2 °F (36.8 °C) (Oral)   Resp 18   Ht 5' 1\" (1.549 m)   Wt 111 lb (50.3 kg)   SpO2 94%   BMI 20.97 kg/m²     General appearance:  General:  thin, chronically ill appearing  HEENT:  Normocephalic, atraumatic. Pupils equal, round, reactive to light. No scleral icterus. No conjunctival injection. Normal lips, teeth, and gums. No nasal discharge.   Neck: Supple  Heart:  Normal s1/s2, RRR, no murmurs, gallops, or rubs. no leg edema  Lungs:  some rhonchi bilaterally, no wheeze, no rales, some rhonchi, no use of accessory muscles  Abd: bowel sounds decreased, soft, post surgical tenderness, nondistended, no masses  Extrem:  No clubbing, cyanosis,  no edema  Skin:  Warm and dry, no open lesions or rash  Psych:  A&O x 3, appropriate mood and affect. Neuro: grossly intact, moves all four extremities. Breast: deferred  Rectal: deferred  Genitalia:  deferred          Lab Results   Component Value Date/Time    WBC 9.7 02/28/2023 05:00 AM    RBC 3.53 02/28/2023 05:00 AM    HGB 11.4 02/28/2023 05:00 AM    HCT 34.4 02/28/2023 05:00 AM    MCV 97.3 02/28/2023 05:00 AM    MCH 32.3 02/28/2023 05:00 AM    MCHC 33.2 02/28/2023 05:00 AM    RDW 12.9 02/28/2023 05:00 AM     02/28/2023 05:00 AM    MPV 8.3 02/28/2023 05:00 AM     Lab Results   Component Value Date/Time     02/28/2023 05:00 AM    K 4.6 02/28/2023 05:00 AM     02/28/2023 05:00 AM    CO2 25 02/28/2023 05:00 AM    BUN 6 02/28/2023 05:00 AM    CREATININE <0.5 02/28/2023 05:00 AM    AGRATIO 0.9 02/28/2023 05:00 AM    LABGLOM >60 02/28/2023 05:00 AM    GLUCOSE 116 02/28/2023 05:00 AM    PROT 5.6 02/28/2023 05:00 AM    LABALBU 2.7 02/28/2023 05:00 AM    CALCIUM 8.5 02/28/2023 05:00 AM    BILITOT 0.4 02/28/2023 05:00 AM    ALKPHOS 155 02/28/2023 05:00 AM    AST 37 02/28/2023 05:00 AM    ALT 24 02/28/2023 05:00 AM     No results found for: PROTIME, INR  No results for input(s): CKTOTAL, CKMB, CKMBINDEX, TROPONINI in the last 72 hours.   No results found for: NITRU, COLORU, PHUR, LABCAST, WBCUA, RBCUA, MUCUS, TRICHOMONAS, YEAST, BACTERIA, CLARITYU, SPECGRAV, LEUKOCYTESUR, UROBILINOGEN, BILIRUBINUR, BLOODU, GLUCOSEU, KETUA, AMORPHOUS  Lab Results   Component Value Date/Time    MG 1.70 02/23/2023 05:21 AM     Lab Results   Component Value Date/Time    LABA1C 5.3 01/18/2023 09:50 AM     No results found for: TSH, [id number], FOLATE, FERRITIN, IRON, TIBC, PTRFSAT, RETICCOUNT, TSH, FREET4  Lab Results   Component Value Date/Time    TRIG 83 01/18/2023 09:50 AM    HDL 71 01/18/2023 09:50 AM    LDLCALC 163 01/18/2023 09:50 AM    LABVLDL 17 01/18/2023 09:50 AM     No results found for: AMYLASE, LIPASE  No results found for: BNP  Lab Results   Component Value Date/Time    LACTA 1.7 02/18/2023 10:42 AM     No results found for: PHART, PH, OER7VYV, PCO2, PO2ART, PO2, QJZ7PFC, HCO3, BEART, BE, THGBART, THB, UKA6KWJ, F5ZPDBQC, O2SAT  No results found for: LABAMPH, BARBSCNU, LABBENZ, CANNAB, COCAINESCRN, LABMETH, OPIATESCREENURINE, PHENCYCLIDINESCREENURINE, PPXUR, ETOH  No results found for: DDIMER  No results found for: VITD25        U/A:  No results found for: NITRITE, COLORU, WBCUA, RBCUA, MUCUS, BACTERIA, CLARITYU, SPECGRAV, LEUKOCYTESUR, BLOODU, GLUCOSEU, AMORPHOUS    ABG  No results found for: GOW5XUM, BEART, S1JNEFJN, PHART, THGBART, FDF4DRL, PO2ART, YFY9BNK        Active Hospital Problems    Diagnosis Date Noted    Perioperative dehiscence of abdominal wound with evisceration Marie Dhillon 02/28/2023     Priority: Medium    Perioperative dehiscence of abdominal wound with evisceration, initial encounter Marie Dhillon 02/28/2023     Priority: Medium       ASSESSMENT/PLAN:     Severe COPD - continue with nebs and dulera and spiriva  Chronic resp failure with hypoxia - continue with 5L oxygen, nebs, dulera  Tachycardia - will use xopenex nebulized instead of albuterol  Abd pain -prn morphine as needed  Pt continues NPO with IVF  Cirrhosis - resume spironolactone and lasix when able - monitor for fluid overload    DVT Prophylaxis:  Yes lovenox  Antibiotic prophylaxis:  No  Diet: Diet NPO Exceptions are: Ice Chips, Sips of Water with Meds, Popsicles  Code Status: Full code  PT/OT Eval Status:    Dispo - home with home health      I appreciate the consult and will continue to follow along with you.       Electronically signed by Hernan Munoz Jacqueline Slade MD on 2/28/2023 at 12:58 PM

## 2023-03-01 LAB
ANION GAP SERPL CALCULATED.3IONS-SCNC: 13 MMOL/L (ref 3–16)
BASOPHILS ABSOLUTE: 0.1 K/UL (ref 0–0.2)
BASOPHILS RELATIVE PERCENT: 0.5 %
BUN BLDV-MCNC: 6 MG/DL (ref 7–20)
CALCIUM SERPL-MCNC: 8.2 MG/DL (ref 8.3–10.6)
CHLORIDE BLD-SCNC: 102 MMOL/L (ref 99–110)
CO2: 22 MMOL/L (ref 21–32)
CREAT SERPL-MCNC: <0.5 MG/DL (ref 0.6–1.1)
EOSINOPHILS ABSOLUTE: 0.1 K/UL (ref 0–0.6)
EOSINOPHILS RELATIVE PERCENT: 0.7 %
GFR SERPL CREATININE-BSD FRML MDRD: >60 ML/MIN/{1.73_M2}
GLUCOSE BLD-MCNC: 84 MG/DL (ref 70–99)
HCT VFR BLD CALC: 31.7 % (ref 36–48)
HEMOGLOBIN: 10.3 G/DL (ref 12–16)
LYMPHOCYTES ABSOLUTE: 0.8 K/UL (ref 1–5.1)
LYMPHOCYTES RELATIVE PERCENT: 5.3 %
MCH RBC QN AUTO: 32.3 PG (ref 26–34)
MCHC RBC AUTO-ENTMCNC: 32.6 G/DL (ref 31–36)
MCV RBC AUTO: 99.3 FL (ref 80–100)
MONOCYTES ABSOLUTE: 0.9 K/UL (ref 0–1.3)
MONOCYTES RELATIVE PERCENT: 5.9 %
NEUTROPHILS ABSOLUTE: 12.7 K/UL (ref 1.7–7.7)
NEUTROPHILS RELATIVE PERCENT: 87.6 %
PDW BLD-RTO: 13.2 % (ref 12.4–15.4)
PLATELET # BLD: 192 K/UL (ref 135–450)
PMV BLD AUTO: 8.1 FL (ref 5–10.5)
POTASSIUM REFLEX MAGNESIUM: 3.6 MMOL/L (ref 3.5–5.1)
RBC # BLD: 3.19 M/UL (ref 4–5.2)
SODIUM BLD-SCNC: 137 MMOL/L (ref 136–145)
WBC # BLD: 14.5 K/UL (ref 4–11)

## 2023-03-01 PROCEDURE — 6370000000 HC RX 637 (ALT 250 FOR IP)

## 2023-03-01 PROCEDURE — 36415 COLL VENOUS BLD VENIPUNCTURE: CPT

## 2023-03-01 PROCEDURE — 6360000002 HC RX W HCPCS: Performed by: INTERNAL MEDICINE

## 2023-03-01 PROCEDURE — 6370000000 HC RX 637 (ALT 250 FOR IP): Performed by: INTERNAL MEDICINE

## 2023-03-01 PROCEDURE — 94640 AIRWAY INHALATION TREATMENT: CPT

## 2023-03-01 PROCEDURE — 6360000002 HC RX W HCPCS: Performed by: SURGERY

## 2023-03-01 PROCEDURE — 80048 BASIC METABOLIC PNL TOTAL CA: CPT

## 2023-03-01 PROCEDURE — 99024 POSTOP FOLLOW-UP VISIT: CPT | Performed by: SURGERY

## 2023-03-01 PROCEDURE — 94761 N-INVAS EAR/PLS OXIMETRY MLT: CPT

## 2023-03-01 PROCEDURE — 85025 COMPLETE CBC W/AUTO DIFF WBC: CPT

## 2023-03-01 PROCEDURE — C9113 INJ PANTOPRAZOLE SODIUM, VIA: HCPCS | Performed by: INTERNAL MEDICINE

## 2023-03-01 PROCEDURE — 2580000003 HC RX 258: Performed by: SURGERY

## 2023-03-01 PROCEDURE — 6370000000 HC RX 637 (ALT 250 FOR IP): Performed by: SURGERY

## 2023-03-01 PROCEDURE — 1200000000 HC SEMI PRIVATE

## 2023-03-01 PROCEDURE — 2700000000 HC OXYGEN THERAPY PER DAY

## 2023-03-01 RX ADMIN — HYDROMORPHONE HYDROCHLORIDE 1 MG: 1 INJECTION, SOLUTION INTRAMUSCULAR; INTRAVENOUS; SUBCUTANEOUS at 06:30

## 2023-03-01 RX ADMIN — HYDROMORPHONE HYDROCHLORIDE 1 MG: 1 INJECTION, SOLUTION INTRAMUSCULAR; INTRAVENOUS; SUBCUTANEOUS at 12:59

## 2023-03-01 RX ADMIN — LEVALBUTEROL HYDROCHLORIDE 0.3 MG: 1.25 SOLUTION, CONCENTRATE RESPIRATORY (INHALATION) at 20:20

## 2023-03-01 RX ADMIN — PANTOPRAZOLE SODIUM 40 MG: 40 INJECTION, POWDER, FOR SOLUTION INTRAVENOUS at 09:37

## 2023-03-01 RX ADMIN — Medication 1 LOZENGE: at 17:00

## 2023-03-01 RX ADMIN — ENOXAPARIN SODIUM 40 MG: 100 INJECTION SUBCUTANEOUS at 09:36

## 2023-03-01 RX ADMIN — HYDROMORPHONE HYDROCHLORIDE 1 MG: 1 INJECTION, SOLUTION INTRAMUSCULAR; INTRAVENOUS; SUBCUTANEOUS at 03:16

## 2023-03-01 RX ADMIN — HYDROMORPHONE HYDROCHLORIDE 1 MG: 1 INJECTION, SOLUTION INTRAMUSCULAR; INTRAVENOUS; SUBCUTANEOUS at 19:08

## 2023-03-01 RX ADMIN — HYDROMORPHONE HYDROCHLORIDE 1 MG: 1 INJECTION, SOLUTION INTRAMUSCULAR; INTRAVENOUS; SUBCUTANEOUS at 00:16

## 2023-03-01 RX ADMIN — SODIUM CHLORIDE, PRESERVATIVE FREE 10 ML: 5 INJECTION INTRAVENOUS at 21:09

## 2023-03-01 RX ADMIN — Medication 2 PUFF: at 08:42

## 2023-03-01 RX ADMIN — HYDROMORPHONE HYDROCHLORIDE 1 MG: 1 INJECTION, SOLUTION INTRAMUSCULAR; INTRAVENOUS; SUBCUTANEOUS at 16:04

## 2023-03-01 RX ADMIN — SODIUM CHLORIDE: 9 INJECTION, SOLUTION INTRAVENOUS at 13:01

## 2023-03-01 RX ADMIN — LEVALBUTEROL HYDROCHLORIDE 0.3 MG: 1.25 SOLUTION, CONCENTRATE RESPIRATORY (INHALATION) at 02:25

## 2023-03-01 RX ADMIN — Medication 1 LOZENGE: at 09:48

## 2023-03-01 RX ADMIN — HYDROMORPHONE HYDROCHLORIDE 1 MG: 1 INJECTION, SOLUTION INTRAMUSCULAR; INTRAVENOUS; SUBCUTANEOUS at 09:36

## 2023-03-01 RX ADMIN — Medication: at 03:29

## 2023-03-01 RX ADMIN — TIOTROPIUM BROMIDE INHALATION SPRAY 2 PUFF: 3.12 SPRAY, METERED RESPIRATORY (INHALATION) at 08:42

## 2023-03-01 RX ADMIN — LEVALBUTEROL HYDROCHLORIDE 0.3 MG: 1.25 SOLUTION, CONCENTRATE RESPIRATORY (INHALATION) at 12:15

## 2023-03-01 RX ADMIN — SODIUM CHLORIDE, PRESERVATIVE FREE 10 ML: 5 INJECTION INTRAVENOUS at 09:37

## 2023-03-01 RX ADMIN — Medication 2 PUFF: at 20:25

## 2023-03-01 RX ADMIN — HYDROMORPHONE HYDROCHLORIDE 1 MG: 1 INJECTION, SOLUTION INTRAMUSCULAR; INTRAVENOUS; SUBCUTANEOUS at 22:08

## 2023-03-01 RX ADMIN — TRAZODONE HYDROCHLORIDE 100 MG: 50 TABLET ORAL at 21:09

## 2023-03-01 ASSESSMENT — PAIN SCALES - GENERAL
PAINLEVEL_OUTOF10: 9
PAINLEVEL_OUTOF10: 8
PAINLEVEL_OUTOF10: 6
PAINLEVEL_OUTOF10: 9
PAINLEVEL_OUTOF10: 10
PAINLEVEL_OUTOF10: 8
PAINLEVEL_OUTOF10: 9
PAINLEVEL_OUTOF10: 10
PAINLEVEL_OUTOF10: 5
PAINLEVEL_OUTOF10: 8
PAINLEVEL_OUTOF10: 9
PAINLEVEL_OUTOF10: 9

## 2023-03-01 ASSESSMENT — PAIN DESCRIPTION - LOCATION
LOCATION: ABDOMEN

## 2023-03-01 NOTE — PROGRESS NOTES
Shift assessment completed. Pt A&O x4, VSS. Ng tube to CLWS. Pt NPO with ice chips. Pt requesting a straw, informed pt she should only be eating ice chips and not drinking water. Soto catheter intact draining clear, yellow colored urine. Surgical incisions covered with abd pad and gauze tape, C/D/I. Abd binder in place for comfort. SCD's on. Non skid socks on. Bed alarm active for safety. Bed locked and in lowest position. Call light and bedside table within reach. Will continue to monitor.

## 2023-03-01 NOTE — CARE COORDINATION
Case Management Assessment  Initial Evaluation    Date/Time of Evaluation: 3/1/2023 10:37 AM  Assessment Completed by: Brad Ortiz RN    If patient is discharged prior to next notation, then this note serves as note for discharge by case management. Patient Name: Zachary Fernandez                   YOB: 1965  Diagnosis: Perioperative dehiscence of abdominal wound with evisceration, initial encounter [T81.31XA]  Abdominal pain, unspecified abdominal location [R10.9]  Chronic obstructive pulmonary disease, unspecified COPD type ([facility] [address] [J44.9]                   Date / Time: 2/28/2023  4:46 AM    Patient Admission Status: Inpatient   Readmission Risk (Low < 19, Mod (19-27), High > 27): Readmission Risk Score: 21.5    Current PCP: ZARI Stokes CNP  PCP verified by CM? Chart Reviewed: Yes      History Provided by: Patient  Patient Orientation: Alert and Oriented, Person, Place, Situation, Self    Patient Cognition: Alert    Hospitalization in the last 30 days (Readmission):  Yes    If yes, Readmission Assessment in  Navigator will be completed.     Advance Directives:      Code Status: Full Code   Patient's Primary Decision Maker is: Legal Next of Kin    Primary Decision MakerAlnathaniel Jordan Child - [phone]    Secondary Decision Maker: Jens Garcia - Son-in-Law - [phone]    Discharge Planning:    Patient lives with: Alone Type of Home: House  Primary Care Giver: Self  Patient Support Systems include: Children   Current Financial resources: Medicare  Current community resources: None  Current services prior to admission: Durable Medical Equipment            Current DME: Wheelchair, Cane, Oxygen Therapy (Comment)            Type of Home Care services:  OT, PT, Nursing Services    ADLS  Prior functional level: Assistance with the following:, Mobility, Bathing, Dressing, Toileting  Current functional level: Assistance with the following:, Bathing, Dressing, Toileting, Mobility    PT AM-PAC:   /24  OT AM-PAC:   /24    Family can provide assistance at DC: Yes  Would you like Case Management to discuss the discharge plan with any other family members/significant others, and if so, who? No  Plans to Return to Present Housing: Yes  Other Identified Issues/Barriers to RETURNING to current housing: none  Potential Assistance needed at discharge: [address] [email or web address] [place]            Potential DME: Walker  Patient expects to discharge to: [facility] for transportation at discharge: [address]: Venda Gut / Plan: MEDICARE PART A AND B / Product Type: *No Product type* /     Does insurance require precert for SNF: No    Potential assistance Purchasing Medications: No  Meds-to-Beds request: Yes      [address], [address] [phone] - F [phone]  [facility] [address]  Phone: [phone] Fax: [phone]    [facility] [address] [facility] [phone] - F [phone]  [address] [facility] [place] [facility] [place] [address]  Phone: [phone] Fax: [phone]      Notes:    Factors facilitating achievement of predicted outcomes: Family support, Motivated, Cooperative, Pleasant, Sense of humor, and Good insight into deficits    Barriers to discharge: none    Additional Case Management Notes: spoke with patient reported d/c'd to her home instead of her daughters to spend night with son who was in town. Stated will d/c to dtrs home at discharge. Has O2 with baseline at [facility] provided by moira, has jarethgens for portability as well. Feels would benefit from walker at d/c. Will resume [facility]. Currently with NGT.  Will need return of GI function prior to d/c. Lance Chairez RN      The Plan for Transition of Care is related to the following treatment goals of Perioperative dehiscence of abdominal wound with evisceration, initial encounter [T81.31XA]  Abdominal pain, unspecified abdominal location [R10.9]  Chronic obstructive pulmonary disease, unspecified COPD type ([facility] [U71.3]    IF APPLICABLE: The Patient and/or patient representative Marissa Rios and her family were provided with a choice of provider and agrees with the discharge plan. Freedom of choice list with basic dialogue that supports the patient's individualized plan of care/goals and shares the quality data associated with the providers was provided to:     Patient Representative Name:       The Patient and/or Patient Representative Agree with the Discharge Plan?       Clement Cheatham RN  Case Management Department

## 2023-03-01 NOTE — PLAN OF CARE
Problem: Discharge Planning  Goal: Discharge to home or other facility with appropriate resources  Outcome: Progressing  Flowsheets (Taken 2/28/2023 1149 by Susan Alexander RN)  Discharge to home or other facility with appropriate resources: Identify barriers to discharge with patient and caregiver     Problem: Pain  Goal: Verbalizes/displays adequate comfort level or baseline comfort level  Outcome: Progressing     Problem: Safety - Adult  Goal: Free from fall injury  Outcome: Progressing     Problem: ABCDS Injury Assessment  Goal: Absence of physical injury  Outcome: Progressing

## 2023-03-01 NOTE — PLAN OF CARE
Problem: Pain  Goal: Verbalizes/displays adequate comfort level or baseline comfort level  3/1/2023 0726 by Radha Sanchez RN  Outcome: Progressing  Flowsheets (Taken 3/1/2023 1569)  Verbalizes/displays adequate comfort level or baseline comfort level:   Encourage patient to monitor pain and request assistance   Administer analgesics based on type and severity of pain and evaluate response   Assess pain using appropriate pain scale   Implement non-pharmacological measures as appropriate and evaluate response  Problem: Safety - Adult  Goal: Free from fall injury  3/1/2023 0726 by Radha Sanchez RN  Outcome: Progressing  Flowsheets (Taken 3/1/2023 0726)  Free From Fall Injury: Instruct family/caregiver on patient safety

## 2023-03-01 NOTE — ACP (ADVANCE CARE PLANNING)
Advance Care Planning     General Advance Care Planning (ACP) Conversation    Date of Conversation: 2/28/2023  Conducted with: Patient with Decision Making Capacity    Healthcare Decision Maker:    Primary Decision Maker: Shanon Amado Child - [phone]    Secondary Decision Maker: Rell De Jesus - Son-in-Law - [phone]  Click here to complete Healthcare Decision Makers including selection of the Healthcare Decision Maker Relationship (ie \"Primary\"). Today we documented Decision Maker(s) consistent with Legal Next of Kin hierarchy.     Content/Action Overview:  Has NO ACP documents/care preferences - information provided, considering goals and options  Reviewed DNR/DNI and patient elects Full Code (Attempt Resuscitation)      Length of Voluntary ACP Conversation in minutes:  <16 minutes (Non-Billable)    Lance Chairez RN

## 2023-03-01 NOTE — PROGRESS NOTES
[facility] GENERAL SURGERY    Surgery Progress Note           POD # 1    PATIENT NAME: Prashanth Sumner     TODAY'S DATE: 3/1/2023    INTERVAL HISTORY:    Pt  resting comfortably but c/o incisional pain. Denies flatus but \"I hear gas rumbling\". OBJECTIVE:   VITALS:  /71   Pulse (!) 104   Temp 99 °F (37.2 °C)   Resp 18   Ht 5' 1\" (1.549 m)   Wt 111 lb (50.3 kg)   SpO2 91%   BMI 20.97 kg/m²     INTAKE/OUTPUT:    I/O last 3 completed shifts: In: 1775.9 [P.O.:360; I.V.:1415.9]  Out: 1632 [Urine:1350; Emesis/NG output:525]  No intake/output data recorded. CONSTITUTIONAL:  awake and alert  LUNGS:  clear to auscultation  ABDOMEN:   hypoactive bowel sounds, soft, non-distended, tenderness noted at incision   INCISION: clean, dry, no drainage    Data:  CBC:   Recent Labs     02/28/23  0500 03/01/23  0551   WBC 9.7 14.5*   HGB 11.4* 10.3*   HCT 34.4* 31.7*    192     BMP:    Recent Labs     02/27/23  0432 02/28/23  0500 03/01/23  0551   * 135* 137   K 3.9 4.6 3.6   CL 97* 101 102   CO2 26 25 22   BUN 7 6* 6*   CREATININE <0.5* <0.5* <0.5*   GLUCOSE 103* 116* 84     Hepatic:   Recent Labs     02/28/23  0500   AST 37   ALT 24   BILITOT 0.4   ALKPHOS 155*     Mag:    No results for input(s): MG in the last 72 hours. Phos:   No results for input(s): PHOS in the last 72 hours. INR: No results for input(s): INR in the last 72 hours.       Radiology Review:       ASSESSMENT AND PLAN:  62 y.o. female status post abdominal wound closure for fascial dehiscence/evisceration   - begin clamping NGT 2/4 hours; ok for sips of clears   - up to chair   - await return of bowel function    - Prophy - cont Lovenox, Protonix         Electronically signed by Mark Sylvester MD

## 2023-03-01 NOTE — PROGRESS NOTES
Hospitalist Progress Note      PCP: ZARI Diamond - CNP    Date of Admission: 2/28/2023    Chief Complaint: Abdominal pain, cough    Hospital Course: Patient came in with worsening abdominal pain following abdominal surgery. Had an episode of cough after which abdominal pain worsened. Initially patient's oxygen requirement was above baseline but currently patient is stable from respiratory standpoint. Lasix and spironolactone are on hold pending improvement in oral intake. Patient is currently NPO. Has known history of cirrhosis. Subjective: No chest pain, no shortness of breath, no nausea or vomiting. Has severe abdominal pain. Medications:  Reviewed    Infusion Medications    sodium chloride      sodium chloride 125 mL/hr at 03/01/23 1301     Scheduled Medications    mometasone-formoterol  2 puff Inhalation BID    traZODone  100 mg Oral Nightly    sodium chloride flush  5-40 mL IntraVENous 2 times per day    enoxaparin  40 mg SubCUTAneous Daily    pantoprazole  40 mg IntraVENous Daily    tiotropium  2 puff Inhalation Daily     PRN Meds: levalbuterol, sodium chloride flush, sodium chloride, ondansetron **OR** ondansetron, acetaminophen, benzocaine-menthol, HYDROmorphone **OR** HYDROmorphone      Intake/Output Summary (Last 24 hours) at 3/1/2023 1329  Last data filed at 3/1/2023 0554  Gross per 24 hour   Intake 1075.86 ml   Output 700 ml   Net 375.86 ml       Physical Exam Performed:    /71   Pulse (!) 104   Temp 99 °F (37.2 °C)   Resp 18   Ht 5' 1\" (1.549 m)   Wt 111 lb (50.3 kg)   SpO2 91%   BMI 20.97 kg/m²     General appearance: No apparent distress, appears stated age and cooperative. HEENT: Pupils equal, round, and reactive to light. Conjunctivae/corneas clear. Neck: Supple, with full range of motion. No jugular venous distention. Trachea midline. Respiratory:  Normal respiratory effort.  Clear to auscultation, bilaterally without Rales/Wheezes/Rhonchi. Cardiovascular: Regular rate and rhythm with normal S1/S2 without murmurs, rubs or gallops. Abdomen:. Postoperative abdomen with tenderness  Musculoskeletal: No clubbing, cyanosis or edema bilaterally. Full range of motion without deformity. Skin: Skin color, texture, turgor normal.  No rashes or lesions. Neurologic:  Neurovascularly intact without any focal sensory/motor deficits. Cranial nerves: II-XII intact, grossly non-focal.  Psychiatric: Alert and oriented, thought content appropriate, normal insight  Capillary Refill: Brisk, 3 seconds, normal   Peripheral Pulses: +2 palpable, equal bilaterally       Labs:   Recent Labs     02/28/23  0500 03/01/23  0551   WBC 9.7 14.5*   HGB 11.4* 10.3*   HCT 34.4* 31.7*    192     Recent Labs     02/27/23  0432 02/28/23  0500 03/01/23  0551   * 135* 137   K 3.9 4.6 3.6   CL 97* 101 102   CO2 26 25 22   BUN 7 6* 6*   CREATININE <0.5* <0.5* <0.5*   CALCIUM 8.7 8.5 8.2*     Recent Labs     02/28/23  0500   AST 37   ALT 24   BILITOT 0.4   ALKPHOS 155*     No results for input(s): INR in the last 72 hours. No results for input(s): Nigel Shackle in the last 72 hours. Urinalysis:    No results found for: Goran Barr [facility] [address] [facility] [address] [place] [address]    Radiology:  No orders to display       IP CONSULT TO HOSPITALIST    Assessment/Plan:    Active Hospital Problems    Diagnosis     Perioperative dehiscence of abdominal wound with evisceration [T81.31XA]      Priority: Medium    Perioperative dehiscence of abdominal wound with evisceration, initial encounter [T81.31XA]      Priority: Medium     PLAN    Chronic hypoxemic respiratory failure  Patient's home oxygen rate is 5 L. This is her current oxygen requirement. Stabilized. Nothing further. Continue maintenance management. Hyponatremia  Hypovolemic with a component of cirrhosis. .  Today's sodium level is normal.  Continue IV hydration and monitor labs regularly    Anemia of acute on chronic blood loss  Perioperative. Expected level. No need for transfusion. Continue to monitor hemoglobin. Cirrhosis  Baseline with normal bilirubin and normal transaminases. Platelet count is normal.  Nothing further from this standpoint. Resume spironolactone and Lasix while able to eat p.o. regularly. Abdominal pain  Analgesic management per primary    Discussed with the patient. Questions answered    DVT Prophylaxis: Lovenox  Diet: Diet NPO Exceptions are: Ice Chips, Sips of Water with Meds, Popsicles  Code Status: Full Code  PT/OT Eval Status: Currently not needed. Patient is also afraid to move    Dispo -inpatient stay, discharge when stable per primary. Patient is still NPO. Unlikely to be discharged today or tomorrow.     Appropriate for A1 Discharge Unit: Yue Garza MD

## 2023-03-02 ENCOUNTER — APPOINTMENT (OUTPATIENT)
Dept: GENERAL RADIOLOGY | Age: 58
DRG: 907 | End: 2023-03-02
Payer: MEDICARE

## 2023-03-02 LAB
BASOPHILS ABSOLUTE: 0 K/UL (ref 0–0.2)
BASOPHILS RELATIVE PERCENT: 0.4 %
EOSINOPHILS ABSOLUTE: 0.1 K/UL (ref 0–0.6)
EOSINOPHILS RELATIVE PERCENT: 1 %
HCT VFR BLD CALC: 31.6 % (ref 36–48)
HEMOGLOBIN: 10.1 G/DL (ref 12–16)
LYMPHOCYTES ABSOLUTE: 0.8 K/UL (ref 1–5.1)
LYMPHOCYTES RELATIVE PERCENT: 7.6 %
MCH RBC QN AUTO: 32 PG (ref 26–34)
MCHC RBC AUTO-ENTMCNC: 32.1 G/DL (ref 31–36)
MCV RBC AUTO: 99.6 FL (ref 80–100)
MONOCYTES ABSOLUTE: 0.8 K/UL (ref 0–1.3)
MONOCYTES RELATIVE PERCENT: 7.5 %
NEUTROPHILS ABSOLUTE: 9 K/UL (ref 1.7–7.7)
NEUTROPHILS RELATIVE PERCENT: 83.5 %
PDW BLD-RTO: 13.3 % (ref 12.4–15.4)
PLATELET # BLD: 197 K/UL (ref 135–450)
PMV BLD AUTO: 7.5 FL (ref 5–10.5)
PROCALCITONIN: 0.14 NG/ML (ref 0–0.15)
RBC # BLD: 3.17 M/UL (ref 4–5.2)
WBC # BLD: 10.8 K/UL (ref 4–11)

## 2023-03-02 PROCEDURE — 6370000000 HC RX 637 (ALT 250 FOR IP): Performed by: INTERNAL MEDICINE

## 2023-03-02 PROCEDURE — 71045 X-RAY EXAM CHEST 1 VIEW: CPT

## 2023-03-02 PROCEDURE — 6370000000 HC RX 637 (ALT 250 FOR IP): Performed by: SURGERY

## 2023-03-02 PROCEDURE — 6360000002 HC RX W HCPCS: Performed by: INTERNAL MEDICINE

## 2023-03-02 PROCEDURE — 94640 AIRWAY INHALATION TREATMENT: CPT

## 2023-03-02 PROCEDURE — 1200000000 HC SEMI PRIVATE

## 2023-03-02 PROCEDURE — 99024 POSTOP FOLLOW-UP VISIT: CPT | Performed by: SURGERY

## 2023-03-02 PROCEDURE — C9113 INJ PANTOPRAZOLE SODIUM, VIA: HCPCS | Performed by: INTERNAL MEDICINE

## 2023-03-02 PROCEDURE — APPSS30 APP SPLIT SHARED TIME 16-30 MINUTES: Performed by: CLINICAL NURSE SPECIALIST

## 2023-03-02 PROCEDURE — 2700000000 HC OXYGEN THERAPY PER DAY

## 2023-03-02 PROCEDURE — 97535 SELF CARE MNGMENT TRAINING: CPT

## 2023-03-02 PROCEDURE — 36415 COLL VENOUS BLD VENIPUNCTURE: CPT

## 2023-03-02 PROCEDURE — 6360000002 HC RX W HCPCS: Performed by: SURGERY

## 2023-03-02 PROCEDURE — 94761 N-INVAS EAR/PLS OXIMETRY MLT: CPT

## 2023-03-02 PROCEDURE — 85025 COMPLETE CBC W/AUTO DIFF WBC: CPT

## 2023-03-02 PROCEDURE — 6370000000 HC RX 637 (ALT 250 FOR IP): Performed by: CLINICAL NURSE SPECIALIST

## 2023-03-02 PROCEDURE — 97530 THERAPEUTIC ACTIVITIES: CPT

## 2023-03-02 PROCEDURE — 97166 OT EVAL MOD COMPLEX 45 MIN: CPT

## 2023-03-02 PROCEDURE — 2580000003 HC RX 258: Performed by: SURGERY

## 2023-03-02 PROCEDURE — 2580000003 HC RX 258: Performed by: INTERNAL MEDICINE

## 2023-03-02 PROCEDURE — 84145 PROCALCITONIN (PCT): CPT

## 2023-03-02 PROCEDURE — 97163 PT EVAL HIGH COMPLEX 45 MIN: CPT

## 2023-03-02 RX ORDER — 0.9 % SODIUM CHLORIDE 0.9 %
500 INTRAVENOUS SOLUTION INTRAVENOUS ONCE
Status: COMPLETED | OUTPATIENT
Start: 2023-03-02 | End: 2023-03-02

## 2023-03-02 RX ORDER — OXYCODONE HYDROCHLORIDE 5 MG/1
5 TABLET ORAL EVERY 4 HOURS PRN
Status: DISCONTINUED | OUTPATIENT
Start: 2023-03-02 | End: 2023-03-03

## 2023-03-02 RX ADMIN — ENOXAPARIN SODIUM 40 MG: 100 INJECTION SUBCUTANEOUS at 09:05

## 2023-03-02 RX ADMIN — LEVALBUTEROL HYDROCHLORIDE 0.3 MG: 1.25 SOLUTION, CONCENTRATE RESPIRATORY (INHALATION) at 08:52

## 2023-03-02 RX ADMIN — HYDROMORPHONE HYDROCHLORIDE 1 MG: 1 INJECTION, SOLUTION INTRAMUSCULAR; INTRAVENOUS; SUBCUTANEOUS at 04:21

## 2023-03-02 RX ADMIN — HYDROMORPHONE HYDROCHLORIDE 1 MG: 1 INJECTION, SOLUTION INTRAMUSCULAR; INTRAVENOUS; SUBCUTANEOUS at 16:48

## 2023-03-02 RX ADMIN — OXYCODONE HYDROCHLORIDE 5 MG: 5 TABLET ORAL at 18:26

## 2023-03-02 RX ADMIN — SODIUM CHLORIDE, PRESERVATIVE FREE 10 ML: 5 INJECTION INTRAVENOUS at 20:46

## 2023-03-02 RX ADMIN — HYDROMORPHONE HYDROCHLORIDE 1 MG: 1 INJECTION, SOLUTION INTRAMUSCULAR; INTRAVENOUS; SUBCUTANEOUS at 07:24

## 2023-03-02 RX ADMIN — PANTOPRAZOLE SODIUM 40 MG: 40 INJECTION, POWDER, FOR SOLUTION INTRAVENOUS at 09:05

## 2023-03-02 RX ADMIN — HYDROMORPHONE HYDROCHLORIDE 1 MG: 1 INJECTION, SOLUTION INTRAMUSCULAR; INTRAVENOUS; SUBCUTANEOUS at 23:43

## 2023-03-02 RX ADMIN — Medication 1 LOZENGE: at 16:39

## 2023-03-02 RX ADMIN — HYDROMORPHONE HYDROCHLORIDE 1 MG: 1 INJECTION, SOLUTION INTRAMUSCULAR; INTRAVENOUS; SUBCUTANEOUS at 01:19

## 2023-03-02 RX ADMIN — SODIUM CHLORIDE: 9 INJECTION, SOLUTION INTRAVENOUS at 15:06

## 2023-03-02 RX ADMIN — LEVALBUTEROL HYDROCHLORIDE 0.3 MG: 1.25 SOLUTION, CONCENTRATE RESPIRATORY (INHALATION) at 16:10

## 2023-03-02 RX ADMIN — Medication 2 PUFF: at 07:56

## 2023-03-02 RX ADMIN — SODIUM CHLORIDE, PRESERVATIVE FREE 10 ML: 5 INJECTION INTRAVENOUS at 09:06

## 2023-03-02 RX ADMIN — HYDROMORPHONE HYDROCHLORIDE 1 MG: 1 INJECTION, SOLUTION INTRAMUSCULAR; INTRAVENOUS; SUBCUTANEOUS at 10:16

## 2023-03-02 RX ADMIN — SODIUM CHLORIDE 500 ML: 9 INJECTION, SOLUTION INTRAVENOUS at 16:40

## 2023-03-02 RX ADMIN — TRAZODONE HYDROCHLORIDE 100 MG: 50 TABLET ORAL at 19:56

## 2023-03-02 RX ADMIN — LEVALBUTEROL HYDROCHLORIDE 0.3 MG: 1.25 SOLUTION, CONCENTRATE RESPIRATORY (INHALATION) at 05:17

## 2023-03-02 RX ADMIN — HYDROMORPHONE HYDROCHLORIDE 1 MG: 1 INJECTION, SOLUTION INTRAMUSCULAR; INTRAVENOUS; SUBCUTANEOUS at 13:42

## 2023-03-02 RX ADMIN — OXYCODONE HYDROCHLORIDE 5 MG: 5 TABLET ORAL at 22:27

## 2023-03-02 RX ADMIN — HYDROMORPHONE HYDROCHLORIDE 1 MG: 1 INJECTION, SOLUTION INTRAMUSCULAR; INTRAVENOUS; SUBCUTANEOUS at 19:56

## 2023-03-02 RX ADMIN — Medication 2 PUFF: at 19:38

## 2023-03-02 RX ADMIN — TIOTROPIUM BROMIDE INHALATION SPRAY 2 PUFF: 3.12 SPRAY, METERED RESPIRATORY (INHALATION) at 07:57

## 2023-03-02 ASSESSMENT — PAIN DESCRIPTION - LOCATION
LOCATION: ABDOMEN

## 2023-03-02 ASSESSMENT — PAIN SCALES - GENERAL
PAINLEVEL_OUTOF10: 10
PAINLEVEL_OUTOF10: 9
PAINLEVEL_OUTOF10: 9
PAINLEVEL_OUTOF10: 10
PAINLEVEL_OUTOF10: 9
PAINLEVEL_OUTOF10: 10
PAINLEVEL_OUTOF10: 9
PAINLEVEL_OUTOF10: 10
PAINLEVEL_OUTOF10: 10

## 2023-03-02 ASSESSMENT — PAIN DESCRIPTION - PAIN TYPE: TYPE: SURGICAL PAIN

## 2023-03-02 NOTE — PROGRESS NOTES
4 Eyes Skin Assessment     The patient is being assessed for  Low aida    I agree that 2 RN's have performed a thorough Head to Toe Skin Assessment on the patient. ALL assessment sites listed below have been assessed. Areas assessed by both nurses: LVBW  [x]   Head, Face, and Ears   [x]   Shoulders, Back, and Chest  [x]   Arms, Elbows, and Hands   [x]   Coccyx, Sacrum, and IschIum  [x]   Legs, Feet, and Heels        Does the Patient have Skin Breakdown? Yes LDA WOUND CARE was Initiated documentation include the Lizeth-wound, Wound Assessment, Measurements, Dressing Treatment, Drainage, and Color\",DTI turned open open @ coccyx.           Aida Prevention initiated:  Yes   Wound Care Orders initiated:  Yes      [id number] nurse consulted for Pressure Injury (Stage 3,4, Unstageable, DTI, NWPT, and Complex wounds), New and Established Ostomies:  Yes      Nurse 1 eSignature: Electronically signed by Vikram De La O RN on 3/2/23 at 5:57 PM EST    **SHARE this note so that the co-signing nurse is able to place an eSignature**    Nurse 2 eSignature: Electronically signed by Raffaele Richmond RN on 3/2/23 at 6:00 PM EST

## 2023-03-02 NOTE — PROGRESS NOTES
Physical Therapy  Facility/Department: [facility] C3 TELE/MED SURG/ONC  Physical Therapy Initial Assessment    Name: Breanne Muñoz  : 1965  MRN: [id number]  Date of Service: 3/2/2023    Discharge Recommendations:  Subacute/Skilled Nursing Facility   PT Equipment Recommendations  Equipment Needed: No  Other: TBD at SNF      Patient Diagnosis(es): The primary encounter diagnosis was Perioperative dehiscence of abdominal wound with evisceration, initial encounter. Diagnoses of Chronic obstructive pulmonary disease, unspecified COPD type ([address] and Abdominal pain, unspecified abdominal location were also pertinent to this visit. Past Medical History:  has a past medical history of Cirrhosis ([address] COPD (chronic obstructive pulmonary disease) ([facility] [address] and Hyperlipidemia. Past Surgical History:  has a past surgical history that includes Tubal ligation; Thumb arthroscopy; laparotomy (N/A, 2023); and Abdomen surgery (N/A, 2023). Assessment   Body Structures, Functions, Activity Limitations Requiring Skilled Therapeutic Intervention: Decreased functional mobility ; Decreased strength;Decreased endurance;Decreased balance; Increased pain;Decreased posture  Assessment: Pt referred for PT evaluation during current hospital stay with dx of abdominal wall wound dehiscence, s/p repair and closure on 23. Pt currently functioning well below her typical baseline, requiring modA x 2 for bed mobility and Lacy x 2 for sit<>stand and bed>chair transfers with RW. Pt too weak and in too much pain to attempt amb today. Recommend SNF at D/C in light of current deficits.   Treatment Diagnosis: Decreased strength and (I) with functional mobility following abdominal surgery 23  Specific Instructions for Next Treatment: Progress ther ex and mobility as tolerated  Therapy Prognosis: Good  Decision Making: High Complexity  Barriers to Learning: Pain  Requires PT Follow-Up: Yes  Activity Tolerance: Patient tolerated evaluation without incident;Patient tolerated treatment well;Patient limited by pain; Patient limited by endurance     Plan   General Plan: 3-5 times per week  Specific Instructions for Next Treatment: Progress ther ex and mobility as tolerated  Current Treatment Recommendations: Strengthening, Balance training, Functional mobility training, Transfer training, Endurance training, Gait training, Home exercise program, Safety education & training, Equipment evaluation, education, & procurement, Positioning, Therapeutic activities  Safety Devices: All fall risk precautions in place, Call light within reach, Gait belt, Heels elevated for pressure relief, Patient at risk for falls, All vianney prominences offloaded, Left in chair, Nurse notified (chair alarm not needed per RN)     Restrictions  Restrictions/Precautions  Restrictions/Precautions: Fall Risk, General Precautions  Position Activity Restriction  Other position/activity restrictions: NG tube to suction, Soto, IV lines, 5L O2, abdominal incision, cushion in chair, up in chair PRN, ambulate pt, high fall risk per nursing assessment     Subjective   Pain: Pt c/o 9/10 pain in R hip, abdomen, and throat at rest; pain increasing to 10/10 at EOB (RN aware). Chart Reviewed: Yes  Patient assessed for rehabilitation services?: Yes  Additional Pertinent Hx: s/p laparotomy with hernia repair on 2/22/23, pt developed post-op wound dehiscence  Family / Caregiver Present: No  Referring Practitioner: Dr. Teresa Patel  Referral Date : 03/02/23  Diagnosis: Abdominal wall wound dehiscence with evisceration, s/p abdominal wall closure on 2/28/23  Follows Commands: Within Functional Limits  General Comment: Pt resting in bed upon entry of therapy staff  Subjective: Pt agreeable to work with PT this morning, pleasant and cooperative despite high amounts of pain. Agreeable to transfer OOB and into chair with mod encouragement.     Social/Functional History  Social/Functional History  Lives With: Spouse  Type of Home: House  Home Layout: One level  Home Access: Stairs to enter with rails  Entrance Stairs - Number of Steps: enters through garage; full flight of stairs up to main level  Entrance Stairs - Rails: Right  Bathroom Shower/Tub: Tub/Shower unit  Bathroom Toilet: Standard  Home Equipment: Cane, Electric scooter, [employer]  Has the patient had two or more falls in the past year or any fall with injury in the past year?: No  Receives Help From: Family  ADL Assistance: Independent  Homemaking Responsibilities: Yes  Ambulation Assistance: Independent  Transfer Assistance: Independent  Active : Yes  Occupation: Retired    Vision/Hearing  Vision  Vision: Impaired  Vision Exceptions: Wears glasses at all times  Hearing  Hearing: Within functional limits      Cognition   Orientation  Overall Orientation Status: Within Functional Limits     Objective   Vitals  Heart Rate: (!) 113  Heart Rate Source: Monitor  BP: 116/75  BP Location: Left upper arm  BP Method: Automatic  Patient Position: Semi fowlers  MAP (Calculated): 89  Resp: 20  SpO2: 91 %  O2 Device: Nasal cannula (5L)    Observation/Palpation  Posture: Fair  Observation: Increased forward trunk flexion, rounded shoulders, mildly kyphotic upper trunk    Gross Assessment  AROM: Grossly decreased, non-functional  PROM: Generally decreased, functional  Strength: Grossly decreased, non-functional  Coordination: Grossly decreased, non-functional  Tone: Normal     Bed Mobility Training  Interventions: Tactile cues; Verbal cues;Manual cues  Rolling: Moderate assistance;Assist X2;Additional time  Supine to Sit: Moderate assistance;Assist X2;Additional time (moving toward L side, HOB elevated ~45 degrees)  Scooting: Maximum assistance; Additional time (to scoot forward to EOB)    Balance  Sitting: Impaired  Sitting - Static: Fair (occasional)  Sitting - Dynamic: Fair (occasional)  Standing: Impaired  Standing - Static: Fair;Constant support (using RW)  Standing - Dynamic: Fair;Constant support (using RW)    Transfer Training  Interventions: Safety awareness training;Verbal cues; Visual cues;Manual cues  Sit to Stand: Minimum assistance;Assist X2  Stand to Sit: Minimum assistance;Assist X2  Bed to Chair: Minimum assistance;Assist X2 (using RW, moving toward L side, mildly unsteady during transfer, pt unable to stand fully upright during transfer 2* abdominal pain)    Gait Training  Gait Training: No (pt in too much pain/too weak today to safely attempt)    AM-PAC Score  AM-PAC Inpatient Mobility Raw Score : 11 (03/02/23 1132)  AM-PAC Inpatient T-Scale Score : 33.86 (03/02/23 1132)  Mobility Inpatient CMS 0-100% Score: 72.57 (03/02/23 1132)  Mobility Inpatient CMS G-Code Modifier : CL (03/02/23 1132)    Goals  Short Term Goals  Time Frame for Short Term Goals: 1 week, 3/09/23 (unless otherwise specified)  Short Term Goal 1: Pt will transfer supine <-> sit with CGA x 1  Short Term Goal 2: Pt will transfer sit <-> stand and bed>chair using RW with SBA  Short Term Goal 3: Pt will ambulate x 50 feet using RW with CGA/SBA x 1  Short Term Goal 4: By 3/05/23: Pt will tolerate 12-15 reps BLE exercise for strengthening, balance, and endurance  Patient Goals   Patient Goals : \"To get stronger and be in less pain\"       Education  Patient Education  Education Given To: Patient  Education Provided: Role of Therapy;Plan of Care;Precautions;Transfer Training;Energy Conservation;Equipment  Education Method: Demonstration;Verbal  Barriers to Learning: Other (Comment) (pain)  Education Outcome: Verbalized understanding;Demonstrated understanding;Continued education needed      Therapy Time   Individual Concurrent Group Co-treatment   Time In 0930         Time Out 1005         Minutes 35         Timed Code Treatment Minutes: [address] [place], [place], [address]    If pt is unable to be seen after this session, please let this note serve as discharge summary.   Please see case management note for discharge disposition. Thank you.

## 2023-03-02 NOTE — PROGRESS NOTES
Message sent to hospitalist:  TEAGAN her HR is staying aroung 120-125. Other vitals okay. /69. Her O2 is right at 89-90% on the 5L NC (baseline). This is causing her MEWS to be a 4. (Procal was normal). Lisa Samples    Update @ 1135 [id number]; new order for 500 ml NS bolus.

## 2023-03-02 NOTE — PROGRESS NOTES
Hospitalist Progress Note      PCP: ZARI Morataya CNP    Date of Admission: 2/28/2023    Chief Complaint: Abdominal pain, cough    Hospital Course: Patient came in with worsening abdominal pain following abdominal surgery. Had an episode of cough after which abdominal pain worsened. Initially patient's oxygen requirement was above baseline but currently patient is stable from respiratory standpoint. Lasix and spironolactone are on hold pending improvement in oral intake. Patient switched to full liquid diet. Having some cough. Chest x-ray shows a suspicious left lower lobe infiltrate no fever. Oxygen requirement stable. Subjective: No chest pain, no shortness of breath, no nausea or vomiting. Abdominal pain is improving. Medications:  Reviewed    Infusion Medications    sodium chloride      sodium chloride 125 mL/hr at 03/02/23 0855     Scheduled Medications    mometasone-formoterol  2 puff Inhalation BID    traZODone  100 mg Oral Nightly    sodium chloride flush  5-40 mL IntraVENous 2 times per day    enoxaparin  40 mg SubCUTAneous Daily    pantoprazole  40 mg IntraVENous Daily    tiotropium  2 puff Inhalation Daily     PRN Meds: levalbuterol, sodium chloride flush, sodium chloride, ondansetron **OR** ondansetron, acetaminophen, benzocaine-menthol, HYDROmorphone **OR** HYDROmorphone      Intake/Output Summary (Last 24 hours) at 3/2/2023 1335  Last data filed at 3/2/2023 0900  Gross per 24 hour   Intake 5298.42 ml   Output 2300 ml   Net 2998.42 ml       Physical Exam Performed:    /75   Pulse (!) 113   Temp 98.7 °F (37.1 °C) (Oral)   Resp 20   Ht 5' 1\" (1.549 m)   Wt 111 lb (50.3 kg)   SpO2 91%   BMI 20.97 kg/m²     General appearance: No apparent distress, appears stated age and cooperative. HEENT: Pupils equal, round, and reactive to light. Conjunctivae/corneas clear. Neck: Supple, with full range of motion. No jugular venous distention.  Trachea midline. Respiratory:  Normal respiratory effort. Clear to auscultation, bilaterally without Rales/Wheezes/Rhonchi. Cardiovascular: Regular rate and rhythm with normal S1/S2 without murmurs, rubs or gallops. Abdomen:. Postoperative abdomen with tenderness  Musculoskeletal: No clubbing, cyanosis or edema bilaterally. Full range of motion without deformity. Skin: Skin color, texture, turgor normal.  No rashes or lesions. Neurologic:  Neurovascularly intact without any focal sensory/motor deficits. Cranial nerves: II-XII intact, grossly non-focal.  Psychiatric: Alert and oriented, thought content appropriate, normal insight  Capillary Refill: Brisk, 3 seconds, normal   Peripheral Pulses: +2 palpable, equal bilaterally     I examined the patient today (03/02/23). Physical exam is similar to yesterday (3/1). Labs:   Recent Labs     02/28/23  0500 03/01/23  0551 03/02/23  0506   WBC 9.7 14.5* 10.8   HGB 11.4* 10.3* 10.1*   HCT 34.4* 31.7* 31.6*    192 197     Recent Labs     02/28/23  0500 03/01/23  0551   * 137   K 4.6 3.6    102   CO2 25 22   BUN 6* 6*   CREATININE <0.5* <0.5*   CALCIUM 8.5 8.2*     Recent Labs     02/28/23  0500   AST 37   ALT 24   BILITOT 0.4   ALKPHOS 155*     No results for input(s): INR in the last 72 hours. No results for input(s): Lexis [place] in the last 72 hours. Urinalysis:    No results found for: [facility], BACTERIA, RBCUA, BLOODU, SPECGRAV, [address]    Radiology:  XR CHEST PORTABLE   Final Result   Left lower lobe airspace infiltrates. This could represent atelectasis   versus pneumonia in the appropriate clinical setting.              IP CONSULT TO HOSPITALIST    Assessment/Plan:    Active Hospital Problems    Diagnosis     Perioperative dehiscence of abdominal wound with evisceration [T81.31XA]      Priority: Medium    Perioperative dehiscence of abdominal wound with evisceration, initial encounter [T81.31XA]      Priority: Medium PLAN    Chronic hypoxemic respiratory failure  Patient's home oxygen rate is 5 L. This is her current oxygen requirement. Stabilized. Nothing further. Continue maintenance management. Chest x-ray shows some changes. WBC count is normal and patient is afebrile. Educated about incentive spirometry and added procalcitonin. Given stability of findings, I will not make any changes in treatment. Hyponatremia  Hypovolemic with a component of cirrhosis. .  Last sodium level is normal.  Monitor labs regularly. Repeat tomorrow. Anemia of acute on chronic blood loss  Perioperative. Expected level. No need for transfusion. Continue to monitor hemoglobin. No changes    Cirrhosis  Baseline with normal bilirubin and normal transaminases. Platelet count is normal.  Nothing further from this standpoint. Resume spironolactone and Lasix while able to eat p.o. regularly. Abdominal pain  Analgesic management per primary. Better today. Discussed with the patient. Questions answered  Discussed with nurse    DVT Prophylaxis: Lovenox  Diet: ADULT DIET; Full Liquid  Code Status: Full Code  PT/OT Eval Status: In process    Dispo -inpatient stay, discharge when stable per primary. Diet is being advanced. Hopefully discharge soon per primary if no new pulmonary issues.     Appropriate for A1 Discharge Unit: No      Los Butt MD

## 2023-03-02 NOTE — PROGRESS NOTES
[facility] GENERAL SURGERY    Surgery Progress Note           POD # 2    PATIENT NAME: Aleisha Real     TODAY'S DATE: 3/2/2023    INTERVAL HISTORY:    Pt doing okay, passing some flatus but also having some belching but tolerated ngt clamping     OBJECTIVE:   VITALS:  /75   Pulse (!) 113   Temp 98.7 °F (37.1 °C) (Oral)   Resp 20   Ht 5' 1\" (1.549 m)   Wt 111 lb (50.3 kg)   SpO2 91%   BMI 20.97 kg/m²     INTAKE/OUTPUT:    I/O last 3 completed shifts: In: 600 [P.O.:600]  Out: 3000 [Urine:1600; Emesis/NG output:1400]  I/O this shift: In: 4818.4 [P.O.:60; I.V.:4758.4]  Out: -               CONSTITUTIONAL:  awake and alert  LUNGS:  some crackles  ABDOMEN:   hypoactive bowel sounds, soft, non-distended, tenderness noted at incision   INCISION: clean, dry, no drainage    Data:  CBC:   Recent Labs     02/28/23  0500 03/01/23  0551 03/02/23  0506   WBC 9.7 14.5* 10.8   HGB 11.4* 10.3* 10.1*   HCT 34.4* 31.7* 31.6*    192 197       BMP:    Recent Labs     02/28/23  0500 03/01/23  0551   * 137   K 4.6 3.6    102   CO2 25 22   BUN 6* 6*   CREATININE <0.5* <0.5*   GLUCOSE 116* 84       Hepatic:   Recent Labs     02/28/23  0500   AST 37   ALT 24   BILITOT 0.4   ALKPHOS 155*       Mag:    No results for input(s): MG in the last 72 hours. Phos:   No results for input(s): PHOS in the last 72 hours. INR: No results for input(s): INR in the last 72 hours. Radiology Review:     CXR pending    ASSESSMENT AND PLAN:  62 y.o. female status post abdominal wound closure for fascial dehiscence/evisceration  GI: remove ngt if tolerates clamping trial - full liquids at dinner  : remove dacosta  Prophy - cont Lovenox, Protonix       Electronically signed by ZARI Starr - CNP       Surgery Staff    I have examined this patient, and read and agree with the note by Adam Mack CNP from today; more than half of the total time was spent by me on the encounter.      Should be able to d/c NGT later today and resoume PO intake.   Anticipate d/c home tomorrow vs Saturday    JOSE ROBERTO Bolaños MD

## 2023-03-02 NOTE — OP NOTE
[facility]                 Shady Jimenez                                OPERATIVE REPORT    PATIENT NAME: Dolly Fisher                       :        1965  MED REC NO:   [id number]                          ROOM:       6365  ACCOUNT NO:   [de-identified]                           ADMIT DATE: 2023  PROVIDER:     Nieves Pederson MD    DATE OF PROCEDURE:  2023    PREOPERATIVE DIAGNOSES:  Abdominal wall wound dehiscence with  evisceration. POSTOPERATIVE DIAGNOSES:  Abdominal wall wound dehiscence with  evisceration. PROCEDURE PERFORMED:  Abdominal wall wound closure. SURGEON:  Nieves Pederson MD    ANESTHESIA:  General.    ESTIMATED BLOOD LOSS:  Less than 50 mL. SPECIMENS:  None. COUNTS:  Sponge and needle counts were correct. INDICATIONS:  The patient is a 49-year-old female, who was approximately  two weeks status post laparotomy for high-grade small bowel obstruction. She was discharged to home having a small amount of drainage from her  midline incision, but otherwise seemed benign. However, on the first  evening of her return home, she awoke from sleep after coughing and  found that she had eviscerated her bowel loops into her abdominal wall. She was brought now for an urgent surgical repair of the abdominal wall. FINDINGS:  1. Wide fascial dehiscence with gross protruding of loops of the small  bowel, all viable. 2.  Reduction of the bowel loops with fascial closure and retention  suture placement. DESCRIPTION OF THE PROCEDURE:  After informed consent was obtained, the  patient was taken to the operating room and placed in the supine  position. Preoperative antibiotics were initiated in the holding area. Athrombic pumps were placed in the legs. General anesthesia was  administered without difficulty.   The abdominal wall to include the  bowel loops that were protruding out above the skin level were all  prepped and draped in the sterile fashion. I first removed the  remaining staples from the midline skin and the underlying residual  fascial suture that had not been torn through was removed. This allowed  me to fully open the fascial defect again and now I was able to gently  release the edges of the fascia from the bowel loops. Now, the bowel loops could be gently  and reduced back into the  abdominal cavity. There was a mild to moderate amount of edema within  the bowel loops, but they were all viable without any signs of ischemia. The wound cavity was copiously irrigated with saline and the fluid was  aspirated. A nasogastric tube was placed under manual guidance. I then  placed a total of three #2 nylon retention sutures across the fascial  opening. The midline fascia was then closed with two running looped 0  PDS sutures. The wound was copiously irrigated. The skin was closed  with staples loosely with room left for packing between the staples. The retention sutures were then tied down in a standard fashion and a  sterile dressing was applied. The patient was now extubated and taken  to the recovery room in stable condition.         Merle Hernandez MD    D: 03/01/2023 21:59:04       T: 03/02/2023 1:58:58     [id number]  Job#: [id number]     Doc#: [id number]    CC:

## 2023-03-02 NOTE — CARE COORDINATION
Chart reviewed POD 2. Abd wound closure. NGT out, full liquid diet. Therapy recs noted for SNF. Spoke with patient. Stated she wants nothing to do with that. Will go to her daughters home and resume [facility] would like a walker and shower chair. Has home O2 provided by [employer]  with portability. Sherley Baum RN  Spoke with Isabela Francisco with [employer], stated insurance will cover RW however will not cover showerchair.  Sherley Baum RN

## 2023-03-02 NOTE — PROGRESS NOTES
Occupational Therapy  Facility/Department: [facility] C3 TELE/MED SURG/ONC  Occupational Therapy Initial Assessment/Treatment    Name: Abel Schulz  : 1965  MRN: [id number]  Date of Service: 3/2/2023    Discharge Recommendations:  Subacute/Skilled Nursing Facility  OT Equipment Recommendations  Equipment Needed: Yes  Mobility Devices: ADL Assistive Devices  ADL Assistive Devices: Shower Chair with back  Other: Patient would benefit from shower chair for increased independence for safe and supported seating during showers . As pt unable to tolerate d/t endurance stand for extended time for showers. Patient Diagnosis(es): The primary encounter diagnosis was Perioperative dehiscence of abdominal wound with evisceration, initial encounter. Diagnoses of Chronic obstructive pulmonary disease, unspecified COPD type ([address] and Abdominal pain, unspecified abdominal location were also pertinent to this visit. Past Medical History:  has a past medical history of Cirrhosis ([facility] [address] COPD (chronic obstructive pulmonary disease) ([facility] [address] and Hyperlipidemia. Past Surgical History:  has a past surgical history that includes Tubal ligation; Thumb arthroscopy; laparotomy (N/A, 2023); and Abdomen surgery (N/A, 2023). Assessment   Performance deficits / Impairments: Decreased functional mobility ; Decreased safe awareness;Decreased balance;Decreased ADL status; Decreased endurance;Decreased strength;Decreased high-level IADLs;Decreased posture  Patient re-admitted from home with abdominal wall wound dehiscence, s/p repair and closure on 23. Pt limited by pain this date, though was agreeable to OOB to chair with encouragement, mod A of 2 for bed mobility and min A x2 for bed to chair. After evaluation, pt found to be presenting with the above mentioned occupational performance deficits which are affecting participation in daily living skills.  Pt would benefit from continued skilled occupational therapy to address ADLs, functional mobility, and safety while in acute care. Prognosis: Good  Decision Making: Medium Complexity  REQUIRES OT FOLLOW-UP: Yes  Activity Tolerance  Activity Tolerance: Patient limited by pain        Plan   Occupational Therapy Plan  Times Per Week: 3-5x's a week while in acute care     Restrictions  Restrictions/Precautions  Restrictions/Precautions: Fall Risk, General Precautions  Position Activity Restriction  Other position/activity restrictions: NG tube to suction, Soto, IV lines, 5L O2, abdominal incision, cushion in chair, up in chair PRN, ambulate pt, high fall risk per nursing assessment    Subjective   General  Chart Reviewed: Yes, Orders, Progress Notes, History and Physical, Imaging, Previous Admission, Operative Notes  Patient assessed for rehabilitation services?: Yes  Additional Pertinent Hx: recently d/c from [facility] on 2/27/23  Family / Caregiver Present: No  Referring Practitioner: Tenzin Maciel MD 3/0  Diagnosis: Abdominal wall wound dehiscence with evisceration, s/p abdominal wall closure on 2/28/23;  Severe COPD     Social/Functional History  Social/Functional History  Lives With: Spouse  Type of Home: House  Home Layout: One level  Home Access: Stairs to enter with rails  Entrance Stairs - Number of Steps: enters through garage; full flight of stairs up to main level  Entrance Stairs - Rails: Right  Bathroom Shower/Tub: Tub/Shower unit  Bathroom Toilet: Standard  Home Equipment: Cane, Electric scooter, [address]  Has the patient had two or more falls in the past year or any fall with injury in the past year?: No  Receives Help From: Family  ADL Assistance: Independent  Homemaking Responsibilities: Yes  Ambulation Assistance: Independent  Transfer Assistance: Independent  Active : Yes  Occupation: Retired       Objective   Heart Rate: (!) 113  Heart Rate Source: Monitor  BP: 116/75  BP Location: Left upper arm  MAP (Calculated): 89  Resp: 20  SpO2: 91 %  O2 Device: Nasal cannula (5L)  Pain: Pt c/o 9/10 pain in R hip, abdomen, and throat at rest; pain increasing to 10/10 at EOB (RN aware). Observation/Palpation  Posture: Fair  Observation: Increased forward trunk flexion, rounded shoulders, mildly kyphotic upper trunk    Safety Devices  Type of Devices: All fall risk precautions in place;Call light within reach;Gait belt; Heels elevated for pressure relief;Patient at risk for falls; All vianney prominences offloaded;Left in chair;Nurse notified (chair alarm not needed per RN)    Bed Mobility Training  Bed Mobility Training: Yes  Interventions: Tactile cues; Verbal cues;Manual cues  Rolling: Moderate assistance;Assist X2;Additional time  Supine to Sit: Moderate assistance;Assist X2;Additional time (moving toward L side, HOB elevated ~45 degrees)  Scooting: Maximum assistance; Additional time (to scoot forward to EOB)    Balance  Sitting: Impaired  Sitting - Static: Fair (occasional)  Sitting - Dynamic: Fair (occasional)  Standing: Impaired  Standing - Static: Fair;Constant support (using RW)  Standing - Dynamic: Fair;Constant support (using RW)    Transfer Training  Interventions: Safety awareness training;Verbal cues; Visual cues;Manual cues  Sit to Stand: Minimum assistance;Assist X2  Stand to Sit: Minimum assistance;Assist X2  Bed to Chair: Minimum assistance;Assist X2 (using RW, moving toward L side, mildly unsteady during transfer, pt unable to stand fully upright during transfer 2* abdominal pain)  Gait Training  Gait Training: No (pt in too much pain/too weak today to safely attempt)     AROM: Generally decreased, functional (limited d/t abdominal pain)  PROM: Within functional limits  Strength: Generally decreased, functional  Coordination: Generally decreased, functional  Tone: Normal  Sensation: Intact    ADL  Feeding: Beverage management; Independent  UE Dressing: Maximum assistance  LE Dressing: Dependent/Total  Toileting: Dependent/Total     Activity Tolerance  Activity Tolerance: Patient tolerated evaluation without incident;Patient tolerated treatment well;Patient limited by pain; Patient limited by endurance        Vision  Vision: Impaired  Vision Exceptions: Wears glasses at all times  Hearing  Hearing: Within functional limits  Orientation  Overall Orientation Status: Within Functional Limits             Education Given To: Patient  Education Provided: Role of Therapy; ADL Adaptive Strategies; Plan of Care;Transfer Training;Equipment; Fall Prevention Strategies  Education Method: Demonstration;Verbal  Barriers to Learning: Cognition  Education Outcome: Demonstrated understanding;Verbalized understanding;Continued education needed                     Disease specific: Pt educated on benefits of OOB activity to decrease risks associated with prolonged bedrest while in hospital. Pt verb understanding. AM-PAC Score        AM-[facility] Inpatient Daily Activity Raw Score: 11 (03/02/23 1304)  AM-PAC Inpatient ADL T-Scale Score : 29.04 (03/02/23 1304)  ADL Inpatient CMS 0-100% Score: 70.42 (03/02/23 1304)  ADL Inpatient CMS G-Code Modifier : CL (03/02/23 1304)      Goals  Short Term Goals  Time Frame for Short Term Goals: 1 week 3/09 unless otherwise specified  Short Term Goal 1: Pt will complete LE dressing with Lacy  Short Term Goal 2: Pt will complete toilet transfers with SBA by 3/07  Short Term Goal 3: Pt will complete standing level ADLs with SBA for balance  Patient Goals   Patient goals : \"to go home\"       Therapy Time   Individual Concurrent Group Co-treatment   Time In 0930         Time Out 1005         Minutes 35         Timed Code Treatment Minutes: 25 Minutes       Marianne Crocker OTR/L  If pt is unable to be seen after this session, please let this note serve as discharge summary. Please see case management note for discharge disposition. Thank you.

## 2023-03-02 NOTE — PROGRESS NOTES
Pt. Resting in bed. Alert/oriented. Vitals and assessment stable as charted. Noted crackles\rhonchi bilaterally in upper lungs and rhonchi lower. She states she does have an occasional productive cough. She is on 5L NC which is her baseline. Midline surgical incision dressing dry\intact. Abd binder in place. C\O abd pain 9/10; states IV pain medication does not help much and does not last long. NGT in place; noted is at 15 cm marking and I doubt the NGT is in correct positioning; relayed this to surgery NP. She has been with clamping cycles 2/4 throughout the night and tolerated. She states is passing flatus, BS active, denies nausea or bloating though does state she has been \"belching a lot\". Abd soft\rounded and tender. She has ice chips and is taking sips of clear liquids. She asked for coffee which I gave and instructed to only take small sips on occasion. Call light in reach. Bed alarm in place. Will continue to monitor. Reviewed incentive spirometry with patient. Education on reason for use. Instructed patient how to use incentive spirometer. Pt demonstrated understanding of use. Encouraged frequent use of I\S while patient is awake. Update @ 0123; Hospitalist made aware of pt LLE edema and crackles\rhonchi. New order for CXR.

## 2023-03-02 NOTE — PROGRESS NOTES
Soto Catheter removed with balloon intact as per order. Pt tolerated well. Instructed pt to call to get OOB. Hat placed for to maintain output measurement.

## 2023-03-02 NOTE — PLAN OF CARE
Problem: Discharge Planning  Goal: Discharge to home or other facility with appropriate resources  Outcome: Progressing     Problem: Pain  Goal: Verbalizes/displays adequate comfort level or baseline comfort level  Outcome: Progressing     Problem: Safety - Adult  Goal: Free from fall injury  Outcome: Progressing     Problem: ABCDS Injury Assessment  Goal: Absence of physical injury  Outcome: Progressing done

## 2023-03-03 ENCOUNTER — APPOINTMENT (OUTPATIENT)
Dept: GENERAL RADIOLOGY | Age: 58
DRG: 907 | End: 2023-03-03
Payer: MEDICARE

## 2023-03-03 PROBLEM — J96.21 ACUTE ON CHRONIC RESPIRATORY FAILURE WITH HYPOXIA (HCC): Status: ACTIVE | Noted: 2023-03-03

## 2023-03-03 LAB
ANION GAP SERPL CALCULATED.3IONS-SCNC: 10 MMOL/L (ref 3–16)
BASOPHILS ABSOLUTE: 0.1 K/UL (ref 0–0.2)
BASOPHILS RELATIVE PERCENT: 0.6 %
BUN BLDV-MCNC: 4 MG/DL (ref 7–20)
CALCIUM SERPL-MCNC: 8.4 MG/DL (ref 8.3–10.6)
CHLORIDE BLD-SCNC: 102 MMOL/L (ref 99–110)
CO2: 23 MMOL/L (ref 21–32)
CREAT SERPL-MCNC: <0.5 MG/DL (ref 0.6–1.1)
EKG ATRIAL RATE: 142 BPM
EKG DIAGNOSIS: NORMAL
EKG P AXIS: 61 DEGREES
EKG P-R INTERVAL: 122 MS
EKG Q-T INTERVAL: 288 MS
EKG QRS DURATION: 108 MS
EKG QTC CALCULATION (BAZETT): 443 MS
EKG R AXIS: -70 DEGREES
EKG T AXIS: 35 DEGREES
EKG VENTRICULAR RATE: 142 BPM
EOSINOPHILS ABSOLUTE: 0.1 K/UL (ref 0–0.6)
EOSINOPHILS RELATIVE PERCENT: 0.9 %
GFR SERPL CREATININE-BSD FRML MDRD: >60 ML/MIN/{1.73_M2}
GLUCOSE BLD-MCNC: 96 MG/DL (ref 70–99)
HCT VFR BLD CALC: 35.6 % (ref 36–48)
HEMOGLOBIN: 11.6 G/DL (ref 12–16)
LYMPHOCYTES ABSOLUTE: 1.1 K/UL (ref 1–5.1)
LYMPHOCYTES RELATIVE PERCENT: 8.7 %
MCH RBC QN AUTO: 32.7 PG (ref 26–34)
MCHC RBC AUTO-ENTMCNC: 32.5 G/DL (ref 31–36)
MCV RBC AUTO: 100.6 FL (ref 80–100)
MONOCYTES ABSOLUTE: 0.7 K/UL (ref 0–1.3)
MONOCYTES RELATIVE PERCENT: 5.5 %
NEUTROPHILS ABSOLUTE: 10.9 K/UL (ref 1.7–7.7)
NEUTROPHILS RELATIVE PERCENT: 84.3 %
PDW BLD-RTO: 13.4 % (ref 12.4–15.4)
PLATELET # BLD: 212 K/UL (ref 135–450)
PMV BLD AUTO: 7.8 FL (ref 5–10.5)
POTASSIUM SERPL-SCNC: 3.5 MMOL/L (ref 3.5–5.1)
PRO-BNP: 381 PG/ML (ref 0–124)
PROCALCITONIN: 0.16 NG/ML (ref 0–0.15)
RBC # BLD: 3.54 M/UL (ref 4–5.2)
SODIUM BLD-SCNC: 135 MMOL/L (ref 136–145)
TROPONIN: <0.01 NG/ML
TSH SERPL DL<=0.05 MIU/L-ACNC: 1.17 UIU/ML (ref 0.27–4.2)
WBC # BLD: 12.9 K/UL (ref 4–11)

## 2023-03-03 PROCEDURE — 97535 SELF CARE MNGMENT TRAINING: CPT

## 2023-03-03 PROCEDURE — 97530 THERAPEUTIC ACTIVITIES: CPT

## 2023-03-03 PROCEDURE — 94640 AIRWAY INHALATION TREATMENT: CPT

## 2023-03-03 PROCEDURE — 84145 PROCALCITONIN (PCT): CPT

## 2023-03-03 PROCEDURE — 6360000002 HC RX W HCPCS: Performed by: INTERNAL MEDICINE

## 2023-03-03 PROCEDURE — 6370000000 HC RX 637 (ALT 250 FOR IP): Performed by: INTERNAL MEDICINE

## 2023-03-03 PROCEDURE — 6360000002 HC RX W HCPCS: Performed by: SURGERY

## 2023-03-03 PROCEDURE — 2580000003 HC RX 258: Performed by: SURGERY

## 2023-03-03 PROCEDURE — 6360000002 HC RX W HCPCS: Performed by: STUDENT IN AN ORGANIZED HEALTH CARE EDUCATION/TRAINING PROGRAM

## 2023-03-03 PROCEDURE — 2700000000 HC OXYGEN THERAPY PER DAY

## 2023-03-03 PROCEDURE — C9113 INJ PANTOPRAZOLE SODIUM, VIA: HCPCS | Performed by: INTERNAL MEDICINE

## 2023-03-03 PROCEDURE — 94761 N-INVAS EAR/PLS OXIMETRY MLT: CPT

## 2023-03-03 PROCEDURE — 99222 1ST HOSP IP/OBS MODERATE 55: CPT | Performed by: STUDENT IN AN ORGANIZED HEALTH CARE EDUCATION/TRAINING PROGRAM

## 2023-03-03 PROCEDURE — 71045 X-RAY EXAM CHEST 1 VIEW: CPT

## 2023-03-03 PROCEDURE — 6370000000 HC RX 637 (ALT 250 FOR IP): Performed by: SURGERY

## 2023-03-03 PROCEDURE — 93010 ELECTROCARDIOGRAM REPORT: CPT | Performed by: INTERNAL MEDICINE

## 2023-03-03 PROCEDURE — 84484 ASSAY OF TROPONIN QUANT: CPT

## 2023-03-03 PROCEDURE — 1200000000 HC SEMI PRIVATE

## 2023-03-03 PROCEDURE — 36415 COLL VENOUS BLD VENIPUNCTURE: CPT

## 2023-03-03 PROCEDURE — 83880 ASSAY OF NATRIURETIC PEPTIDE: CPT

## 2023-03-03 PROCEDURE — 99024 POSTOP FOLLOW-UP VISIT: CPT | Performed by: SURGERY

## 2023-03-03 PROCEDURE — 6370000000 HC RX 637 (ALT 250 FOR IP): Performed by: CLINICAL NURSE SPECIALIST

## 2023-03-03 PROCEDURE — 84443 ASSAY THYROID STIM HORMONE: CPT

## 2023-03-03 PROCEDURE — 93005 ELECTROCARDIOGRAM TRACING: CPT | Performed by: INTERNAL MEDICINE

## 2023-03-03 PROCEDURE — 80048 BASIC METABOLIC PNL TOTAL CA: CPT

## 2023-03-03 PROCEDURE — 97116 GAIT TRAINING THERAPY: CPT

## 2023-03-03 PROCEDURE — APPSS30 APP SPLIT SHARED TIME 16-30 MINUTES: Performed by: CLINICAL NURSE SPECIALIST

## 2023-03-03 PROCEDURE — 85025 COMPLETE CBC W/AUTO DIFF WBC: CPT

## 2023-03-03 RX ORDER — LEVALBUTEROL 1.25 MG/.5ML
1.25 SOLUTION, CONCENTRATE RESPIRATORY (INHALATION)
Status: DISCONTINUED | OUTPATIENT
Start: 2023-03-03 | End: 2023-03-09 | Stop reason: HOSPADM

## 2023-03-03 RX ORDER — OXYCODONE HYDROCHLORIDE 5 MG/1
10 TABLET ORAL EVERY 4 HOURS PRN
Status: DISCONTINUED | OUTPATIENT
Start: 2023-03-03 | End: 2023-03-09 | Stop reason: HOSPADM

## 2023-03-03 RX ORDER — POLYETHYLENE GLYCOL 3350 17 G/17G
17 POWDER, FOR SOLUTION ORAL DAILY
Status: DISCONTINUED | OUTPATIENT
Start: 2023-03-04 | End: 2023-03-09 | Stop reason: HOSPADM

## 2023-03-03 RX ORDER — OXYCODONE HYDROCHLORIDE 5 MG/1
5 TABLET ORAL EVERY 4 HOURS PRN
Status: DISCONTINUED | OUTPATIENT
Start: 2023-03-03 | End: 2023-03-09 | Stop reason: HOSPADM

## 2023-03-03 RX ADMIN — OXYCODONE HYDROCHLORIDE 10 MG: 5 TABLET ORAL at 21:54

## 2023-03-03 RX ADMIN — LEVALBUTEROL HYDROCHLORIDE 0.3 MG: 1.25 SOLUTION, CONCENTRATE RESPIRATORY (INHALATION) at 06:12

## 2023-03-03 RX ADMIN — ENOXAPARIN SODIUM 40 MG: 100 INJECTION SUBCUTANEOUS at 08:26

## 2023-03-03 RX ADMIN — LEVALBUTEROL HYDROCHLORIDE 1.25 MG: 1.25 SOLUTION, CONCENTRATE RESPIRATORY (INHALATION) at 16:28

## 2023-03-03 RX ADMIN — OXYCODONE HYDROCHLORIDE 5 MG: 5 TABLET ORAL at 13:46

## 2023-03-03 RX ADMIN — HYDROMORPHONE HYDROCHLORIDE 0.5 MG: 1 INJECTION, SOLUTION INTRAMUSCULAR; INTRAVENOUS; SUBCUTANEOUS at 03:18

## 2023-03-03 RX ADMIN — Medication 2 PUFF: at 08:06

## 2023-03-03 RX ADMIN — Medication 1 LOZENGE: at 09:48

## 2023-03-03 RX ADMIN — OXYCODONE HYDROCHLORIDE 5 MG: 5 TABLET ORAL at 09:48

## 2023-03-03 RX ADMIN — HYDROMORPHONE HYDROCHLORIDE 1 MG: 1 INJECTION, SOLUTION INTRAMUSCULAR; INTRAVENOUS; SUBCUTANEOUS at 08:27

## 2023-03-03 RX ADMIN — TRAZODONE HYDROCHLORIDE 100 MG: 50 TABLET ORAL at 21:08

## 2023-03-03 RX ADMIN — TIOTROPIUM BROMIDE INHALATION SPRAY 2 PUFF: 3.12 SPRAY, METERED RESPIRATORY (INHALATION) at 08:06

## 2023-03-03 RX ADMIN — SODIUM CHLORIDE, PRESERVATIVE FREE 10 ML: 5 INJECTION INTRAVENOUS at 08:26

## 2023-03-03 RX ADMIN — PANTOPRAZOLE SODIUM 40 MG: 40 INJECTION, POWDER, FOR SOLUTION INTRAVENOUS at 08:26

## 2023-03-03 RX ADMIN — LEVALBUTEROL HYDROCHLORIDE 1.25 MG: 1.25 SOLUTION, CONCENTRATE RESPIRATORY (INHALATION) at 19:58

## 2023-03-03 RX ADMIN — Medication 2 PUFF: at 20:06

## 2023-03-03 RX ADMIN — OXYCODONE HYDROCHLORIDE 10 MG: 5 TABLET ORAL at 17:25

## 2023-03-03 RX ADMIN — OXYCODONE HYDROCHLORIDE 5 MG: 5 TABLET ORAL at 05:39

## 2023-03-03 ASSESSMENT — PAIN SCALES - GENERAL
PAINLEVEL_OUTOF10: 10
PAINLEVEL_OUTOF10: 10
PAINLEVEL_OUTOF10: 9
PAINLEVEL_OUTOF10: 9
PAINLEVEL_OUTOF10: 10
PAINLEVEL_OUTOF10: 6
PAINLEVEL_OUTOF10: 10
PAINLEVEL_OUTOF10: 10

## 2023-03-03 ASSESSMENT — ENCOUNTER SYMPTOMS
DIARRHEA: 0
EYE DISCHARGE: 0
SHORTNESS OF BREATH: 1
COLOR CHANGE: 0
EYE ITCHING: 0
WHEEZING: 0
EYE REDNESS: 0
ABDOMINAL DISTENTION: 0
CONSTIPATION: 0
NAUSEA: 0
EYE PAIN: 0
BACK PAIN: 0
STRIDOR: 0
ABDOMINAL PAIN: 1
TROUBLE SWALLOWING: 0
VOMITING: 0
SORE THROAT: 0
COUGH: 1

## 2023-03-03 ASSESSMENT — PAIN DESCRIPTION - LOCATION
LOCATION: ABDOMEN;HIP
LOCATION: ABDOMEN

## 2023-03-03 ASSESSMENT — PAIN DESCRIPTION - DESCRIPTORS
DESCRIPTORS: SORE;STABBING
DESCRIPTORS: ACHING;SHARP;STABBING;BURNING
DESCRIPTORS: ACHING

## 2023-03-03 ASSESSMENT — PAIN DESCRIPTION - ORIENTATION
ORIENTATION: MID
ORIENTATION: RIGHT;MID

## 2023-03-03 ASSESSMENT — PAIN - FUNCTIONAL ASSESSMENT: PAIN_FUNCTIONAL_ASSESSMENT: PREVENTS OR INTERFERES SOME ACTIVE ACTIVITIES AND ADLS

## 2023-03-03 NOTE — PROGRESS NOTES
PRN dilaudid and teresita given for pain. Pt was able to walk to BR as a x1 w/ walker. ABD dressing CDI. No c/o of nausea, was able to tolerate fluids. HR still slightly elevated at 114 after 500mL bolus, notified MD, no new orders. Bed in lowest position with call light within reach.

## 2023-03-03 NOTE — PROGRESS NOTES
Physical Therapy  Facility/Department: [facility] C3 TELE/MED SURG/ONC  Daily Treatment Note  NAME: Radha Vieira  : 1965  MRN: [id number]    Date of Service: 3/3/2023    Discharge Recommendations:  24 hour supervision or assist, Home with Home health PT   PT Equipment Recommendations  Equipment Needed: Yes  Mobility Devices: Vannie Robes: Rolling    Patient Diagnosis(es): The primary encounter diagnosis was Perioperative dehiscence of abdominal wound with evisceration, initial encounter. Diagnoses of Chronic obstructive pulmonary disease, unspecified COPD type ([address] and Abdominal pain, unspecified abdominal location were also pertinent to this visit. Assessment   Assessment: Pt tolerated therapy well this date. Pt seen as co-treat with OT to safely progress functional mobility and maximize outcomes. Pt with significant improvement in mobility today. Pt SBA for bed mobility and CGA for transfers and gait. Pt able to ambulate up to 25 ft with RW. Limited d/t pain and decreased activity tolerance. Pt would benefit from continued skilled therapy to address deficits. Recommend home with 24-hr Sup and home PT at d/c. Activity Tolerance: Patient tolerated treatment well;Patient limited by fatigue;Patient limited by pain  Equipment Needed: Yes  Mobility Devices: [address]    Physcial Therapy Plan  General Plan: 3-5 times per week  Specific Instructions for Next Treatment: Progress ther ex and mobility as tolerated  Current Treatment Recommendations: Strengthening;Balance training;Functional mobility training;Transfer training; Endurance training;Gait training;Home exercise program;Safety education & training;Equipment evaluation, education, & procurement;Positioning; Therapeutic activities     Restrictions  Restrictions/Precautions  Restrictions/Precautions: Fall Risk, General Precautions  Position Activity Restriction  Other position/activity restrictions: NG tube to suction, Soto, IV lines, 5L O2, abdominal incision, cushion in chair, up in chair PRN, ambulate pt, high fall risk per nursing assessment     Subjective    Subjective  Subjective: Pt agreeable to therapy  Pain: Pt reports 10/10 right hip and abdomen pain. Objective   Vitals  SpO2 90% on 6L post gait.  with mobility. Recovers to 120s with rest.       Bed Mobility Training  Bed Mobility Training: Yes  Interventions: Tactile cues; Verbal cues;Manual cues  Supine to Sit: Stand-by assistance (HOB elevated, use of rails)  Sit to Supine: Other (comment) (pt up in chair at end of session)    Balance  Sitting: Impaired  Sitting - Static: Fair (occasional)  Sitting - Dynamic: Fair (occasional)  Standing: Impaired  Standing - Static: Fair;Constant support  Standing - Dynamic: Fair;Constant support    Transfer Training  Transfer Training: Yes  Interventions: Safety awareness training;Verbal cues; Visual cues;Manual cues  Sit to Stand: Contact-guard assistance (Cues for safe hand placement)  Stand to Sit: Contact-guard assistance    Gait Training  Gait Training: Yes  Gait  Overall Level of Assistance: Contact-guard assistance  Base of Support: Narrowed  Speed/Ene: Pace decreased (< 100 feet/min)  Step Length: Left shortened;Right shortened  Gait Abnormalities:  (forward flexion with cues for posture, maintaining NEETU within RW, and technique for use of walker with gait)  Distance (ft): 15 Feet (+ 25)  Assistive Device: Gait belt;Walker, rolling           Safety Devices  Type of Devices: Call light within reach; Chair alarm in place;Gait belt;Nurse notified; Left in chair (no chair alarm on approach)       Goals  Short Term Goals  Time Frame for Short Term Goals: 1 week, 3/09/23 (unless otherwise specified) -- PROGRESSING 3/03  Short Term Goal 1: Pt will transfer supine <-> sit with CGA x 1  Short Term Goal 2: Pt will transfer sit <-> stand and bed>chair using RW with SBA  Short Term Goal 3: Pt will ambulate x 50 feet using RW with CGA/SBA x 1  Short Term Goal 4: By 3/05/23: Pt will tolerate 12-15 reps BLE exercise for strengthening, balance, and endurance  Patient Goals   Patient Goals : \"To get stronger and be in less pain\"    Education  Patient Education  Education Given To: Patient  Education Provided: Role of Therapy;Plan of Care;Precautions;Transfer Training;Energy Conservation;Equipment  Education Method: Demonstration;Verbal  Barriers to Learning: None  Education Outcome: Verbalized understanding;Demonstrated understanding;Continued education needed    [facility] 6 Clicks Inpatient Mobility:  AM-PAC Basic Mobility - Inpatient   How much help is needed turning from your back to your side while in a flat bed without using bedrails?: A Little  How much help is needed moving from lying on your back to sitting on the side of a flat bed without using bedrails?: A Little  How much help is needed moving to and from a bed to a chair?: A Little  How much help is needed standing up from a chair using your arms?: A Little  How much help is needed walking in hospital room?: A Little  How much help is needed climbing 3-5 steps with a railing?: A Lot  AM-PAC Inpatient Mobility Raw Score : 17  AM-PAC Inpatient T-Scale Score : 42.13  Mobility Inpatient CMS 0-100% Score: 50.57  Mobility Inpatient CMS G-Code Modifier : CK    Therapy Time   Individual Concurrent Group Co-treatment   Time In 1410         Time Out 1433         Minutes 23         Timed Code Treatment Minutes: Yashira PT, DPT  If pt is unable to be seen after this session, please let this note serve as discharge summary. Please see case management note for discharge disposition. Thank you.

## 2023-03-03 NOTE — PROGRESS NOTES
[facility] GENERAL SURGERY    Surgery Progress Note           POD # 3    PATIENT NAME: Dee Tamayo     TODAY'S DATE: 3/3/2023    INTERVAL HISTORY:    Pt doing okay, negra full liquids. No nausea, pain improving     OBJECTIVE:   VITALS:  /70   Pulse (!) 125   Temp 100.4 °F (38 °C) (Oral)   Resp 16   Ht 5' 1\" (1.549 m)   Wt 111 lb (50.3 kg)   SpO2 96%   BMI 20.97 kg/m²     INTAKE/OUTPUT:    I/O last 3 completed shifts: In: 5853.2 [P.O.:660; I.V.:5193.2]  Out: 1800 [Urine:1650; Emesis/NG output:150]  No intake/output data recorded. CONSTITUTIONAL:  awake and alert  LUNGS: no crackles or wheezes  ABDOMEN:  + bowel sounds, soft, non-distended, tenderness noted at incision   INCISION: clean, dry, no drainage    Data:  CBC:   Recent Labs     03/01/23  0551 03/02/23  0506 03/03/23  0555   WBC 14.5* 10.8 12.9*   HGB 10.3* 10.1* 11.6*   HCT 31.7* 31.6* 35.6*    197 212       BMP:    Recent Labs     03/01/23  0551 03/03/23  0555    135*   K 3.6 3.5    102   CO2 22 23   BUN 6* 4*   CREATININE <0.5* <0.5*   GLUCOSE 84 96           ASSESSMENT AND PLAN:  62 y.o. female status post abdominal wound closure for fascial dehiscence/evisceration  GI: increase soft diet    Prophy - cont Lovenox, Protonix     Dispo: likely 1-2 days    Electronically signed by ZARI Mancilla - CNP     Surgery Staff    I have examined this patient, and read and agree with the note by Mely Park CNP from today; more than half of the total time was spent by me on the encounter.      Hollie Carlson MD

## 2023-03-03 NOTE — CONSULTS
Pulmonary New Consultation       Patient Name:  Aleisha Real    REASONFOR ADMISSION/CC: Hypoxia    PCP: ZARI Casper CNP    HISTORY OF PRESENT ILLNESS:   62y.o. year old female with significant past medical history of COPD, recent laparotomy for small bowel obstruction that presents with shortness of breath and cough. Patient was admitted to [facility] for concern of abdominal wall dehiscence with evisceration. She had an abdominal wound closure for fascial dehiscence/evisceration. Today, patient's nasal cannula fell off and she desaturated to 70%. She was put on high flow nasal cannula and her saturations recovered. Patient was seen at bedside today and was not in respiratory distress. She was able to be weaned to 6 L nasal cannula. Her baseline oxygen requirement is 5 L nasal cannula. She has a diagnosis of moderate COPD, she is on triple therapy inhalers at home. She also uses vest therapy. Pulmonary was consulted for hypoxia. Past Medical History:   Diagnosis Date    Cirrhosis ([facility]     COPD (chronic obstructive pulmonary disease) ([facility]     Hyperlipidemia        Past Surgical History:   Procedure Laterality Date    ABDOMEN SURGERY N/A 2/28/2023    ABDOMINAL  WOUND CLOSURE performed by Chandu Kamara MD at [address] [place] [address] N/A 7/63/0509    UMBILLICAL HERNIA REPAIR performed by Chandu Kamara MD at [facility]         family history is not on file.     Social History     Tobacco Use    Smoking status: Former     Types: Cigarettes    Smokeless tobacco: Never   Substance Use Topics    Alcohol use: Not Currently        Meds:    Current Facility-Administered Medications:     oxyCODONE (ROXICODONE) immediate release tablet 5 mg, 5 mg, Oral, Q4H PRN, ZARI Starr CNP, 5 mg at 03/03/23 1284    mometasone-formoterol (DULERA) 200-5 MCG/ACT inhaler 2 puff, 2 puff, Inhalation, BID, Chandu Kamara MD, 2 puff at 03/03/23 0796 levalbuterol (XOPENEX) nebulizer solution 0.3 mg, 0.3 mg, Nebulization, Q4H PRN, Gely Verma MD, 0.3 mg at 03/03/23 9144    traZODone (DESYREL) tablet 100 mg, 100 mg, Oral, Nightly, Gely Verma MD, 100 mg at 03/02/23 1956    sodium chloride flush 0.9 % injection 5-40 mL, 5-40 mL, IntraVENous, 2 times per day, Gely Verma MD, 10 mL at 03/03/23 3109    sodium chloride flush 0.9 % injection 5-40 mL, 5-40 mL, IntraVENous, PRN, Gely Verma MD    0.9 % sodium chloride infusion, , IntraVENous, PRN, Gely Verma MD    enoxaparin (LOVENOX) injection 40 mg, 40 mg, SubCUTAneous, Daily, Gely Verma MD, 40 mg at 03/03/23 0826    ondansetron (ZOFRAN-ODT) disintegrating tablet 4 mg, 4 mg, Oral, Q8H PRN **OR** ondansetron (ZOFRAN) injection 4 mg, 4 mg, IntraVENous, Q6H PRN, Gely Verma MD    acetaminophen (TYLENOL) tablet 650 mg, 650 mg, Oral, Q4H PRN, Gely Verma MD    benzocaine-menthol (CEPACOL SORE THROAT) lozenge 1 lozenge, 1 lozenge, Oral, Q2H PRN, Gomez Saunders MD, 1 lozenge at 03/03/23 0948    HYDROmorphone (DILAUDID) injection 0.5 mg, 0.5 mg, IntraVENous, Q3H PRN, 0.5 mg at 03/03/23 0318 **OR** HYDROmorphone (DILAUDID) injection 1 mg, 1 mg, IntraVENous, Q3H PRN, Gely Verma MD, 1 mg at 03/03/23 0827    pantoprazole (PROTONIX) injection 40 mg, 40 mg, IntraVENous, Daily, Gomez Saunders MD, 40 mg at 03/03/23 0826    tiotropium (SPIRIVA RESPIMAT) 2.5 MCG/ACT inhaler 2 puff, 2 puff, Inhalation, Daily, Gomez Saunders MD, 2 puff at 03/03/23 0806     Continuous Infusions:   sodium chloride         PRN Meds:  oxyCODONE, levalbuterol, sodium chloride flush, sodium chloride, ondansetron **OR** ondansetron, acetaminophen, benzocaine-menthol, HYDROmorphone **OR** HYDROmorphone    Allergies:  Patient is allergic to doxycycline hyclate.     REVIEW OF SYSTEMS:  Review of Systems   Constitutional:  Negative for activity change, appetite change, chills, diaphoresis and fatigue. HENT:  Negative for congestion, sore throat and trouble swallowing. Eyes:  Negative for pain, discharge, redness and itching. Respiratory:  Positive for cough and shortness of breath. Negative for wheezing and stridor. Cardiovascular:  Negative for chest pain, palpitations and leg swelling. Gastrointestinal:  Positive for abdominal pain. Negative for abdominal distention, constipation, diarrhea, nausea and vomiting. Endocrine: Negative for polydipsia, polyphagia and polyuria. Genitourinary:  Negative for difficulty urinating. Musculoskeletal:  Negative for back pain, myalgias and neck pain. Skin:  Negative for color change. Neurological:  Negative for dizziness, weakness and light-headedness. Psychiatric/Behavioral:  Negative for agitation and behavioral problems. I personally reviewed the patient's medication list, medical and surgical history, and updated as needed. Objective:   EXAM:  /70   Pulse (!) 125   Temp 100.4 °F (38 °C) (Oral)   Resp 16   Ht 5' 1\" (1.549 m)   Wt 111 lb (50.3 kg)   SpO2 96%   BMI 20.97 kg/m²      Physical Exam  Constitutional:       General: She is not in acute distress. Appearance: She is normal weight. She is not toxic-appearing. HENT:      Head: Normocephalic and atraumatic. Nose: Nose normal.      Mouth/Throat:      Pharynx: No oropharyngeal exudate. Eyes:      General: No scleral icterus. Right eye: No discharge. Left eye: No discharge. Cardiovascular:      Rate and Rhythm: Tachycardia present. Heart sounds: No murmur heard. No gallop. Pulmonary:      Comments: Diminished breath sounds at the bases b/l  Abdominal:      General: Abdomen is flat. Bowel sounds are normal. There is no distension. Tenderness: There is no abdominal tenderness. Musculoskeletal:         General: No swelling. Cervical back: Normal range of motion. Skin:     General: Skin is warm and dry. Neurological:      Mental Status: She is alert and oriented to person, place, and time. Psychiatric:         Mood and Affect: Mood normal.          Data Reviewed:   LABS:  :   Recent Labs     03/01/23  0551 03/02/23  0506 03/03/23  0555   WBC 14.5* 10.8 12.9*   HGB 10.3* 10.1* 11.6*   HCT 31.7* 31.6* 35.6*   MCV 99.3 99.6 100.6*    197 212     BMP:   Recent Labs     03/01/23  0551 03/03/23  0555    135*   K 3.6 3.5    102   CO2 22 23   BUN 6* 4*   CREATININE <0.5* <0.5*     PROFILE: No results for input(s): AST, ALT, LIPASE, AMYLASE, BILIDIR, BILITOT, ALKPHOS in the last 72 hours. Invalid input(s):  ALB  PT/INR: No results for input(s): PROTIME, INR in the last 72 hours. APTT:No results for input(s): APTT in the last 72 hours.  :No results for input(s): NITRITE, COLORU, PHUR, LABCAST, WBCUA, RBCUA, MUCUS, TRICHOMONAS, YEAST, BACTERIA, CLARITYU, SPECGRAV, LEUKOCYTESUR, UROBILINOGEN, BILIRUBINUR, BLOODU, GLUCOSEU, AMORPHOUS in the last 72 hours. Invalid input(s): KETONESU  No results for input(s): PHART, NJL1UFC, PO2ART in the last 72 hours. Chest x-ray 3/3/2023  Low lung volumes bilaterally. Costophrenic angles are difficult to see bilaterally. No consolidation seen. CT chest abdomen pelvis 2/18/2023  Left lower lobe hernia with atelectasis. No lymphadenopathy. No pleural or pericardial effusions. No consolidations. Centrilobular emphysema. Assessment/Plan   62y.o. year old female with significant past medical history of COPD, recent laparotomy for small bowel obstruction that presents with shortness of breath and cough. Assessment:  Acute on Chronic hypoxic respiratory failure  Moderate COPD  Centrilobular emphysema  Hiatal hernia  Atelectasis    Plan:  - cont supplemental O2 therapy with goal saturation 88-92%. She is 90% saturation on 6L NC, she should be able to be weaned to her home baseline.   - cont her triple therapy from home - currently on Dulera/Spiriva  - Will start patient on scheduled Xopenex q4h while awake  - Recommend OOBTC, ambulation, IS, PT/OT - she does not appear overloaded on exam and lung sounds are clear, reviewed CXR with no obvious findings, she needs to ambulate.    Guillermo Cardona MD    12:11 PM

## 2023-03-03 NOTE — DISCHARGE INSTR - COC
Continuity of Care Form    Patient Name: Piper Dale   :  1965  MRN:  [id number]    Admit date:  2023  Discharge date:  3/9/23    Code Status Order: Full Code   Advance Directives:   [facility] Documentation       Date/Time Healthcare Directive Type of Healthcare Directive Copy in [address] Agent's Name Healthcare Agent's Phone Number    23 0745 No, patient does not have an advance directive for healthcare treatment -- -- -- -- --            Admitting Physician:  Heladio Britton MD  PCP: ZARI Alan CNP    Discharging Nurse: Christina Dawson Unit/Room#: 7916/4981-54  Discharging Unit Phone Number: [phone]    Emergency Contact:   Extended Emergency Contact Information  Primary Emergency Contact: [address] Phone: [phone]  Relation: Child   needed?  No  Secondary Emergency Contact: [address] Phone: [phone]  Relation: Son-in-Law    Past Surgical History:  Past Surgical History:   Procedure Laterality Date    ABDOMEN SURGERY N/A 2023    ABDOMINAL  WOUND CLOSURE performed by Heladio Britton MD at [facility] N/A     UMBILLICAL HERNIA REPAIR performed by Heladio Britton MD at [address] [place] [address] ARTHROSCOPY      TUBAL LIGATION         Immunization History:   Immunization History   Administered Date(s) Administered    COVID-19, MODERNA Bivalent BOOSTER, (age 12y+), IM, 48 mcg/0.5 mL 2022    Influenza, FLUARIX, FLULAVAL, FLUZONE (age 10 mo+) AND AFLURIA, (age 1 y+), PF, 0.5mL 09/10/2022       Active Problems:  Patient Active Problem List   Diagnosis Code    Vitamin D deficiency E55.9    Mixed hyperlipidemia E78.2    Chronic obstructive pulmonary disease with acute exacerbation (HCC) J44.1    Alopecia L65.9    Tobacco abuse Z72.0    Major depressive disorder F32.9    Cyst of ovary, left N83.202    Gastroesophageal reflux disease without esophagitis K21.9 Primary hypertension I10    Hepatic cirrhosis (HCC) K74.60    Hiatal hernia L06.7    Umbilical hernia D78.5    Small bowel obstruction (HCC) K56.609    Moderate malnutrition (HCC) E44.0    Perioperative dehiscence of abdominal wound with evisceration T81.31XA    Perioperative dehiscence of abdominal wound with evisceration, initial encounter T81.31XA       Isolation/Infection:   Isolation            No Isolation          Patient Infection Status       Infection Onset Added Last Indicated Last Indicated By Review Planned Expiration Resolved Resolved By    None active    Resolved    COVID-19 (Rule Out) 02/18/23 02/18/23 02/18/23 COVID-19 & Influenza Combo (Ordered)   02/18/23 Rule-Out Test Resulted            Nurse Assessment:  Last Vital Signs: /70   Pulse (!) 121   Temp 100.4 °F (38 °C) (Oral)   Resp 16   Ht 5' 1\" (1.549 m)   Wt 111 lb (50.3 kg)   SpO2 90%   BMI 20.97 kg/m²     Last documented pain score (0-10 scale): Pain Level: 9  Last Weight:   Wt Readings from Last 1 Encounters:   02/28/23 111 lb (50.3 kg)     Mental Status:  oriented and alert    IV Access:  - None    Nursing Mobility/ADLs:  Walking   Independent  Transfer  Independent  Bathing  Independent  Dressing  Independent  Toileting  Independent  Feeding  Independent  Med Admin  Independent  Med Delivery   whole    Wound Care Documentation and Therapy:  Wound 02/18/23 Coccyx Mid DTI (Active)   Wound Image   02/20/23 1304   Wound Etiology Deep tissue/Injury 03/01/23 0925   Dressing Status Clean;Dry; Intact 03/02/23 0856   Wound Cleansed Not Cleansed 03/02/23 0856   Dressing/Treatment Foam 03/02/23 0856   Offloading for Diabetic Foot Ulcers Offloading ordered 02/25/23 2000   Dressing Change Due 03/02/23 03/02/23 0856   Wound Length (cm) 13 cm 02/20/23 1304   Wound Width (cm) 18 cm 02/20/23 1304   Wound Depth (cm) 0.1 cm 02/20/23 1304   Wound Surface Area (cm^2) 234 cm^2 02/20/23 1304   Wound Volume (cm^3) 23.4 cm^3 02/20/23 1304   Distance Tunneling (cm) 0 cm 02/25/23 2000   Tunneling Position ___ O'Clock 0 02/25/23 2000   Undermining Starts ___ O'Clock 0 02/25/23 2000   Undermining Ends___ O'Clock 0 02/25/23 2000   Undermining Maxium Distance (cm) 0 02/25/23 2000   Wound Assessment Pink/red 02/25/23 2000   Drainage Amount None 02/25/23 2000   Odor None 03/02/23 0856   Lizeth-wound Assessment Blanchable erythema 03/02/23 0856   Margins Undefined edges 02/25/23 2000   Wound Thickness Description not for Pressure Injury Partial thickness 02/25/23 2000   Number of days: 12       Incision 02/22/23 Abdomen Lower;Medial (Active)   Dressing Status Dry; Intact; Clean 03/02/23 1945   Dressing Change Due 02/27/23 02/26/23 2356   Incision Cleansed Cleansed with saline 02/26/23 0409   Dressing/Treatment ABD pad;Gauze dressing/dressing sponge 03/01/23 0925   Closure Surgical glue 02/26/23 0409   Margins Approximated 02/24/23 2115   Incision Assessment Other (Comment) 02/26/23 0409   Drainage Amount Moderate 02/26/23 2356   Drainage Description Purulent;Serous; Thick; Thin 02/26/23 2356   Odor Malodorous/putrid 02/26/23 2356   Lizeth-incision Assessment Ecchymosis;Fragile 02/26/23 2356   Number of days: 8       Incision 02/28/23 Abdomen Medial;Anterior (Active)   Dressing Status Old drainage noted;Dry; Intact 03/03/23 0837   Dressing/Treatment ABD pad 03/03/23 0837   Number of days: 3        Elimination:  Continence: Bowel: Yes  Bladder: Yes  Urinary Catheter: None   Colostomy/Ileostomy/Ileal Conduit: No       Date of Last BM:     Intake/Output Summary (Last 24 hours) at 3/3/2023 1247  Last data filed at 3/3/2023 0655  Gross per 24 hour   Intake 914.77 ml   Output 250 ml   Net 664.77 ml     I/O last 3 completed shifts: In: 5853.2 [P.O.:660; I.V.:5193.2]  Out: 1800 [Urine:1650; Emesis/NG output:150]    Safety Concerns:      At Risk for Falls    Impairments/Disabilities:      None    Nutrition Therapy:  Current Nutrition Therapy:   Low Fiber Diet     Routes of Feeding: Oral  Liquids: No Restrictions  Daily Fluid Restriction: no  Last Modified Barium Swallow with Video (Video Swallowing Test): not done    Treatments at the Time of Hospital Discharge:   Respiratory Treatments: None   Oxygen Therapy:  is on oxygen at 6 L/min per nasal cannula. Ventilator:    - No ventilator support    Rehab Therapies: Physical Therapy and Occupational Therapy  Weight Bearing Status/Restrictions: No weight bearing restrictions  Other Medical Equipment (for information only, NOT a DME order):  None  Other Treatments: None    Patient's personal belongings (please select all that are sent with patient):  None    RN SIGNATURE:  Electronically signed by Carmen Menchaca RN on 3/9/23 at 3:47 PM EST    CASE MANAGEMENT/SOCIAL WORK SECTION    Inpatient Status Date: 3/3/23    Readmission Risk Assessment Score:  Readmission Risk              Risk of Unplanned Readmission:  13           Discharging to Facility/ [address]   [facility]   [address] [place], [place] [phone]     / signature: Electronically signed by Mica Nicole RN on 3/9/23 at 9:44 AM EST    PHYSICIAN SECTION    Prognosis: Good    Condition at Discharge: Stable    Rehab Potential (if transferring to Rehab): Good    Recommended Labs or Other Treatments After Discharge: Follow up with PCP within 1-2 weeks. Physician Certification: I certify the above information and transfer of Prashanth Sumner  is necessary for the continuing treatment of the diagnosis listed and that she requires [address] for less 30 days.      Update Admission H&P: No change in H&P    PHYSICIAN SIGNATURE:  Electronically signed by Jesús Brand MD on 3/9/23 at 9:44 AM EST

## 2023-03-03 NOTE — PROGRESS NOTES
03/03/23 0817   Oxygen Therapy/Pulse Ox   O2 Therapy Oxygen humidified   $Oxygen $Daily Charge   O2 Device (S)  High flow nasal cannula   O2 Flow Rate (L/min) 15 L/min   SpO2 (!) (S)  71 %  (Found patient at.  Placed on HFNC titrate as needed) HR=68 bpm, BUAS=072/58 mmhg, SpO2=97.0 %, Resp=17 B/min, EtCO2=23 mmHg, Apnea=3 Seconds, Pain=0, Hernández=3

## 2023-03-03 NOTE — PROGRESS NOTES
Occupational Therapy  Facility/Department: [facility] C3 TELE/MED SURG/ONC  Daily Treatment Note  NAME: Alma Mobley  : 1965  MRN: [id number]    Date of Service: 3/3/2023    Discharge Recommendations:  24 hour supervision or assist, Home with Home health OT, S Level 3  OT Equipment Recommendations  Equipment Needed: Yes  Mobility Devices: ADL Assistive Devices  ADL Assistive Devices: Shower Chair with back  Other: Patient would benefit from shower chair for increased independence for safe and supported seating during showers . As pt unable to tolerate d/t endurance stand for extended time for showers. Patient Diagnosis(es): The primary encounter diagnosis was Perioperative dehiscence of abdominal wound with evisceration, initial encounter. Diagnoses of Chronic obstructive pulmonary disease, unspecified COPD type ([address] and Abdominal pain, unspecified abdominal location were also pertinent to this visit. Assessment    Assessment: Pt progressing towards goals. Pt Min A-CGA for mobility, with poor safety with RW and need for cues. Pt SBA for grooming. Pt educated on the use of shower chair at home and where to purchase equipment. Cont per POC  Activity Tolerance: Patient tolerated treatment well;Patient limited by fatigue;Patient limited by pain  Discharge Recommendations: 24 hour supervision or assist;Home with Home health OT;S Level 3  Equipment Needed: Yes  Mobility Devices: ADL Assistive Devices  Other: Patient would benefit from shower chair for increased independence for safe and supported seating during showers . As pt unable to tolerate d/t endurance stand for extended time for showers.       Plan   Occupational Therapy Plan  Times Per Week: 3-5x's a week while in acute care     Restrictions  Restrictions/Precautions  Restrictions/Precautions: Fall Risk;General Precautions  Position Activity Restriction  Other position/activity restrictions: 5L O2, abdominal incision, cushion in chair, up in chair PRN, ambulate pt, high fall risk per nursing assessment    Subjective   Subjective  Subjective: Pt pleasant and agreeable to tx. Refusing SNF on arrival. Pt reports pain in abdomen  Orientation  Overall Orientation Status: Within Functional Limits  Pain: Pt reports 10/10 right hip and abdomen pain. Cognition  Overall Cognitive Status: WFL        Objective    Vitals  Vitals  Heart Rate: (!) 134  BP: 116/75  Patient Position: Sitting  MAP (Calculated): 89  SpO2: 90 %  O2 Device: High flow nasal cannula  Comment: 5L HFNC  Bed Mobility Training  Bed Mobility Training: Yes  Interventions: Tactile cues; Verbal cues;Manual cues  Supine to Sit: Stand-by assistance (HOB elevated, use of rails)  Sit to Supine: Other (comment) (pt up in chair at end of session)  Balance  Sitting: Impaired  Sitting - Static: Fair (occasional)  Sitting - Dynamic: Fair (occasional)  Standing: Impaired  Standing - Static: Fair;Constant support  Standing - Dynamic: Fair;Constant support  Transfer Training  Transfer Training: Yes  Interventions: Safety awareness training;Verbal cues; Visual cues;Manual cues  Sit to Stand: Contact-guard assistance (Cues for safe hand placement)  Stand to Sit: Contact-guard assistance  Gait Training       ADL  Grooming: Stand by assistance;Setup  UE Dressing: Maximum assistance  LE Dressing: Dependent/Total  Toileting: Minimal assistance  Toileting Skilled Clinical Factors: voiding over toilet        Safety Devices  Type of Devices: Call light within reach; Chair alarm in place;Gait belt;Nurse notified; Left in chair (no chair alarm on approach)     Patient Education  Education Given To: Patient  Education Provided: Role of Therapy; ADL Adaptive Strategies; Plan of Care;Transfer Training;Equipment; Fall Prevention Strategies  Education Method: Demonstration;Verbal  Barriers to Learning: Cognition  Education Outcome: Demonstrated understanding;Verbalized understanding;Continued education needed    AM-PAC Daily Activity - Inpatient   AM-PAC Inpatient Daily Activity Raw Score: 16  AM-PAC Inpatient ADL T-Scale Score : 35.96  ADL Inpatient CMS 0-100% Score: 53.32  ADL Inpatient CMS G-Code Modifier : CK    Goals  Short Term Goals  Time Frame for Short Term Goals: 1 week 3/09 unless otherwise specified  Short Term Goal 1: Pt will complete LE dressing with Lacy-- Ongoing 3/03  Short Term Goal 2: Pt will complete toilet transfers with SBA by 3/07-- Ongoing 3/03  Short Term Goal 3: Pt will complete standing level ADLs with SBA for balance-- Ongoing 3/03  Patient Goals   Patient goals : \"to go home\"       Therapy Time   Individual Concurrent Group Co-treatment   Time In 1410         Time Out 1433         Minutes 23         Timed Code Treatment Minutes: [address], OT  If pt is unable to be seen after this session, please let this note serve as discharge summary. Please see case management note for discharge disposition. Thank you.

## 2023-03-03 NOTE — PROGRESS NOTES
Hospitalist Progress Note      PCP: ZARI Youssef - CNP    Date of Admission: 2/28/2023    Chief Complaint: Abdominal pain, cough    Hospital Course: Patient came in with worsening abdominal pain following abdominal surgery. Had an episode of cough after which abdominal pain worsened. Initially patient's oxygen requirement was above baseline but currently patient is stable from respiratory standpoint. Lasix and spironolactone are on hold pending improvement in oral intake. Patient switched to full liquid diet. Having some cough. Chest x-ray shows a suspicious left lower lobe infiltrate no fever. Oxygen requirement stable. Subjective: No chest pain, no shortness of breath, no nausea or vomiting. Abdominal pain is improving. Medications:  Reviewed    Infusion Medications    sodium chloride       Scheduled Medications    mometasone-formoterol  2 puff Inhalation BID    traZODone  100 mg Oral Nightly    sodium chloride flush  5-40 mL IntraVENous 2 times per day    enoxaparin  40 mg SubCUTAneous Daily    pantoprazole  40 mg IntraVENous Daily    tiotropium  2 puff Inhalation Daily     PRN Meds: oxyCODONE, levalbuterol, sodium chloride flush, sodium chloride, ondansetron **OR** ondansetron, acetaminophen, benzocaine-menthol, HYDROmorphone **OR** HYDROmorphone      Intake/Output Summary (Last 24 hours) at 3/3/2023 1135  Last data filed at 3/3/2023 2089  Gross per 24 hour   Intake 914.77 ml   Output 600 ml   Net 314.77 ml       Physical Exam Performed:    /70   Pulse (!) 125   Temp 100.4 °F (38 °C) (Oral)   Resp 16   Ht 5' 1\" (1.549 m)   Wt 111 lb (50.3 kg)   SpO2 96%   BMI 20.97 kg/m²     General appearance: No apparent distress, appears stated age and cooperative. HEENT: Pupils equal, round, and reactive to light. Conjunctivae/corneas clear. Neck: Supple, with full range of motion. No jugular venous distention. Trachea midline.   Respiratory: Bilateral rhonchi with bibasilar inspiratory crackles  Cardiovascular: Tachycardia with normal S1/S2 without murmurs, rubs or gallops. Abdomen:. Postoperative abdomen with tenderness  Musculoskeletal: No clubbing, cyanosis or edema bilaterally. Full range of motion without deformity. Skin: Skin color, texture, turgor normal.  No rashes or lesions. Neurologic:  Neurovascularly intact without any focal sensory/motor deficits. Cranial nerves: II-XII intact, grossly non-focal.  Psychiatric: Alert and oriented, thought content appropriate, normal insight  Capillary Refill: Brisk, 3 seconds, normal   Peripheral Pulses: +2 palpable, equal bilaterally           Labs:   Recent Labs     03/01/23  0551 03/02/23  0506 03/03/23  0555   WBC 14.5* 10.8 12.9*   HGB 10.3* 10.1* 11.6*   HCT 31.7* 31.6* 35.6*    197 212     Recent Labs     03/01/23  0551 03/03/23  0555    135*   K 3.6 3.5    102   CO2 22 23   BUN 6* 4*   CREATININE <0.5* <0.5*   CALCIUM 8.2* 8.4     No results for input(s): AST, ALT, BILIDIR, BILITOT, ALKPHOS in the last 72 hours. No results for input(s): INR in the last 72 hours. Recent Labs     03/03/23  0555   TROPONINI <0.01       Urinalysis:    No results found for: Rescue Samara, BACTERIA, RBCUA, BLOODU, SPECGRAV, GLUCOSEU    Radiology:  XR CHEST PORTABLE   Final Result   Left lower lobe airspace infiltrates. This could represent atelectasis   versus pneumonia in the appropriate clinical setting. XR CHEST PORTABLE    (Results Pending)       IP CONSULT TO HOSPITALIST  IP CONSULT TO PULMONOLOGY    Assessment/Plan:    Active Hospital Problems    Diagnosis     Perioperative dehiscence of abdominal wound with evisceration [T81.31XA]      Priority: Medium    Perioperative dehiscence of abdominal wound with evisceration, initial encounter [T81.31XA]      Priority: Medium     PLAN    Acute on chronic hypoxemic respiratory failure  Patient's home oxygen rate is 5 L. This is her current oxygen requirement. Stabilized. Nothing further. Continue maintenance management. Chest x-ray done yesterday showed some minor changes but no clinical consequences. Procalcitonin was negative. Today patient's oxygen requirements have worsened. I will repeat chest x-ray and ask pulmonology to see. Patient has been off diuretics since admission due to acute illness. If chest x-ray shows worsening infiltrates we may consider adding a dose of Lasix. Hyponatremia  Hypovolemic with a component of cirrhosis. .  Currently close to normal.    Anemia of acute on chronic blood loss  Perioperative. Expected level. No need for transfusion. Continue to monitor hemoglobin. No changes. Today's hemoglobin is better. Cirrhosis  Baseline with normal bilirubin and normal transaminases. Platelet count is normal.  Nothing further from this standpoint. Resume spironolactone and Lasix while able to eat p.o. regularly. Abdominal pain  Analgesic management per primary. Stabilized    Sinus tachycardia  Probably reactive. EKG does not show any changes. I personally checked the EKG and showed that its consistent with sinus tachycardia and right bundle branch block with no evidence of ischemia. Troponin checked and found to be negative. Monitor on telemetry    Discussed with the patient. Questions answered  Discussed with nurse    DVT Prophylaxis: Lovenox  Diet: ADULT DIET; Regular; Low Fiber  Code Status: Full Code  PT/OT Eval Status: In process    Dispo -inpatient stay, discharge when stable per primary. Worsening.   No plans for discharge    Appropriate for A1 Discharge Unit: No      Leon Chacon MD

## 2023-03-03 NOTE — PLAN OF CARE
Problem: Safety - Adult  Goal: Free from fall injury  Outcome: Progressing  Flowsheets (Taken 3/1/2023 0759 by Justina Wu RN)  Free From Fall Injury: Instruct family/caregiver on patient safety     Problem: Skin/Tissue Integrity  Goal: Absence of new skin breakdown  Description: 1. Monitor for areas of redness and/or skin breakdown  2. Assess vascular access sites hourly  3. Every 4-6 hours minimum:  Change oxygen saturation probe site  4. Every 4-6 hours:  If on nasal continuous positive airway pressure, respiratory therapy assess nares and determine need for appliance change or resting period.   Outcome: Progressing     Problem: Respiratory - Adult  Goal: Achieves optimal ventilation and oxygenation  Outcome: Progressing  Flowsheets (Taken 3/3/2023 1229)  Achieves optimal ventilation and oxygenation:   Assess for changes in respiratory status   Assess for changes in mentation and behavior   Position to facilitate oxygenation and minimize respiratory effort   Oxygen supplementation based on oxygen saturation or arterial blood gases   Encourage broncho-pulmonary hygiene including cough, deep breathe, incentive spirometry   Assess and instruct to report shortness of breath or any respiratory difficulty   Respiratory therapy support as indicated     Problem: Gastrointestinal - Adult  Goal: Minimal or absence of nausea and vomiting  Outcome: Progressing  Flowsheets (Taken 3/3/2023 1229)  Minimal or absence of nausea and vomiting:   Administer IV fluids as ordered to ensure adequate hydration   Administer ordered antiemetic medications as needed   Advance diet as tolerated, if ordered   Provide nonpharmacologic comfort measures as appropriate     Problem: Gastrointestinal - Adult  Goal: Maintains or returns to baseline bowel function  Outcome: Progressing  Flowsheets (Taken 3/3/2023 1228)  Maintains or returns to baseline bowel function:   Assess bowel function   Encourage mobilization and activity Administer ordered medications as needed   Encourage oral fluids to ensure adequate hydration     Problem: Gastrointestinal - Adult  Goal: Maintains adequate nutritional intake  Outcome: Progressing  Flowsheets (Taken 3/3/2023 1229)  Maintains adequate nutritional intake:   Assist with meals as needed   Identify factors contributing to decreased intake, treat as appropriate   Monitor intake and output, weight and lab values

## 2023-03-03 NOTE — CARE COORDINATION
Chart reviewed, day 3. Care per pulmonology, IM and surgery. POD 3, wound closure for fascial dehiscence/evisceration. Patient has declined SNF in favor of d/c with HHC provided by [facility]. Requested PT see again for recs for RW per patient request. Per [employer] insurance will not cover shower chair.  Spike Scott RN

## 2023-03-04 PROBLEM — J90 PLEURAL EFFUSION: Status: ACTIVE | Noted: 2023-03-04

## 2023-03-04 PROBLEM — E87.1 HYPONATREMIA: Status: ACTIVE | Noted: 2023-03-04

## 2023-03-04 PROBLEM — J98.11 ATELECTASIS: Status: ACTIVE | Noted: 2023-03-04

## 2023-03-04 PROBLEM — D64.9 NORMOCYTIC NORMOCHROMIC ANEMIA: Status: ACTIVE | Noted: 2023-03-04

## 2023-03-04 PROBLEM — D72.829 LEUKOCYTOSIS: Status: ACTIVE | Noted: 2023-03-04

## 2023-03-04 PROBLEM — R91.8 PULMONARY INFILTRATES: Status: ACTIVE | Noted: 2023-03-04

## 2023-03-04 LAB
A/G RATIO: 0.8 (ref 1.1–2.2)
ALBUMIN SERPL-MCNC: 2.7 G/DL (ref 3.4–5)
ALP BLD-CCNC: 184 U/L (ref 40–129)
ALT SERPL-CCNC: 18 U/L (ref 10–40)
ANION GAP SERPL CALCULATED.3IONS-SCNC: 11 MMOL/L (ref 3–16)
AST SERPL-CCNC: 24 U/L (ref 15–37)
BASOPHILS ABSOLUTE: 0.1 K/UL (ref 0–0.2)
BASOPHILS RELATIVE PERCENT: 0.3 %
BILIRUB SERPL-MCNC: 0.7 MG/DL (ref 0–1)
BUN BLDV-MCNC: 5 MG/DL (ref 7–20)
CALCIUM SERPL-MCNC: 8.5 MG/DL (ref 8.3–10.6)
CHLORIDE BLD-SCNC: 95 MMOL/L (ref 99–110)
CO2: 24 MMOL/L (ref 21–32)
CREAT SERPL-MCNC: <0.5 MG/DL (ref 0.6–1.1)
EOSINOPHILS ABSOLUTE: 0.1 K/UL (ref 0–0.6)
EOSINOPHILS RELATIVE PERCENT: 0.3 %
GFR SERPL CREATININE-BSD FRML MDRD: >60 ML/MIN/{1.73_M2}
GLUCOSE BLD-MCNC: 118 MG/DL (ref 70–99)
HCT VFR BLD CALC: 33.3 % (ref 36–48)
HEMOGLOBIN: 10.6 G/DL (ref 12–16)
LYMPHOCYTES ABSOLUTE: 0.7 K/UL (ref 1–5.1)
LYMPHOCYTES RELATIVE PERCENT: 3.3 %
MCH RBC QN AUTO: 31.4 PG (ref 26–34)
MCHC RBC AUTO-ENTMCNC: 31.8 G/DL (ref 31–36)
MCV RBC AUTO: 98.9 FL (ref 80–100)
MONOCYTES ABSOLUTE: 0.8 K/UL (ref 0–1.3)
MONOCYTES RELATIVE PERCENT: 3.6 %
NEUTROPHILS ABSOLUTE: 19.8 K/UL (ref 1.7–7.7)
NEUTROPHILS RELATIVE PERCENT: 92.5 %
PDW BLD-RTO: 13 % (ref 12.4–15.4)
PLATELET # BLD: 226 K/UL (ref 135–450)
PMV BLD AUTO: 8.1 FL (ref 5–10.5)
POTASSIUM SERPL-SCNC: 3.8 MMOL/L (ref 3.5–5.1)
RBC # BLD: 3.36 M/UL (ref 4–5.2)
SODIUM BLD-SCNC: 130 MMOL/L (ref 136–145)
TOTAL PROTEIN: 5.9 G/DL (ref 6.4–8.2)
WBC # BLD: 21.5 K/UL (ref 4–11)

## 2023-03-04 PROCEDURE — 87186 SC STD MICRODIL/AGAR DIL: CPT

## 2023-03-04 PROCEDURE — 2580000003 HC RX 258: Performed by: SURGERY

## 2023-03-04 PROCEDURE — 85025 COMPLETE CBC W/AUTO DIFF WBC: CPT

## 2023-03-04 PROCEDURE — 94761 N-INVAS EAR/PLS OXIMETRY MLT: CPT

## 2023-03-04 PROCEDURE — 36415 COLL VENOUS BLD VENIPUNCTURE: CPT

## 2023-03-04 PROCEDURE — 2580000003 HC RX 258: Performed by: INTERNAL MEDICINE

## 2023-03-04 PROCEDURE — 6360000002 HC RX W HCPCS: Performed by: INTERNAL MEDICINE

## 2023-03-04 PROCEDURE — 6360000002 HC RX W HCPCS: Performed by: STUDENT IN AN ORGANIZED HEALTH CARE EDUCATION/TRAINING PROGRAM

## 2023-03-04 PROCEDURE — 87205 SMEAR GRAM STAIN: CPT

## 2023-03-04 PROCEDURE — 6360000002 HC RX W HCPCS: Performed by: SURGERY

## 2023-03-04 PROCEDURE — 1200000000 HC SEMI PRIVATE

## 2023-03-04 PROCEDURE — 87077 CULTURE AEROBIC IDENTIFY: CPT

## 2023-03-04 PROCEDURE — C9113 INJ PANTOPRAZOLE SODIUM, VIA: HCPCS | Performed by: INTERNAL MEDICINE

## 2023-03-04 PROCEDURE — 99024 POSTOP FOLLOW-UP VISIT: CPT | Performed by: SURGERY

## 2023-03-04 PROCEDURE — 6370000000 HC RX 637 (ALT 250 FOR IP): Performed by: NURSE PRACTITIONER

## 2023-03-04 PROCEDURE — 80053 COMPREHEN METABOLIC PANEL: CPT

## 2023-03-04 PROCEDURE — 6370000000 HC RX 637 (ALT 250 FOR IP): Performed by: INTERNAL MEDICINE

## 2023-03-04 PROCEDURE — 99233 SBSQ HOSP IP/OBS HIGH 50: CPT | Performed by: INTERNAL MEDICINE

## 2023-03-04 PROCEDURE — 94640 AIRWAY INHALATION TREATMENT: CPT

## 2023-03-04 PROCEDURE — 94669 MECHANICAL CHEST WALL OSCILL: CPT

## 2023-03-04 PROCEDURE — 2700000000 HC OXYGEN THERAPY PER DAY

## 2023-03-04 PROCEDURE — 87070 CULTURE OTHR SPECIMN AEROBIC: CPT

## 2023-03-04 PROCEDURE — 6370000000 HC RX 637 (ALT 250 FOR IP): Performed by: SURGERY

## 2023-03-04 RX ORDER — ACETYLCYSTEINE 200 MG/ML
600 SOLUTION ORAL; RESPIRATORY (INHALATION) 2 TIMES DAILY
Status: DISCONTINUED | OUTPATIENT
Start: 2023-03-04 | End: 2023-03-08

## 2023-03-04 RX ORDER — CALCIUM CARBONATE 200(500)MG
500 TABLET,CHEWABLE ORAL
Status: DISCONTINUED | OUTPATIENT
Start: 2023-03-04 | End: 2023-03-09 | Stop reason: HOSPADM

## 2023-03-04 RX ADMIN — OXYCODONE HYDROCHLORIDE 10 MG: 5 TABLET ORAL at 02:30

## 2023-03-04 RX ADMIN — Medication 2 PUFF: at 08:08

## 2023-03-04 RX ADMIN — SODIUM CHLORIDE, PRESERVATIVE FREE 10 ML: 5 INJECTION INTRAVENOUS at 09:16

## 2023-03-04 RX ADMIN — PIPERACILLIN AND TAZOBACTAM 3375 MG: 3; .375 INJECTION, POWDER, FOR SOLUTION INTRAVENOUS at 19:50

## 2023-03-04 RX ADMIN — ENOXAPARIN SODIUM 40 MG: 100 INJECTION SUBCUTANEOUS at 09:17

## 2023-03-04 RX ADMIN — HYDROMORPHONE HYDROCHLORIDE 1 MG: 1 INJECTION, SOLUTION INTRAMUSCULAR; INTRAVENOUS; SUBCUTANEOUS at 04:17

## 2023-03-04 RX ADMIN — LEVALBUTEROL HYDROCHLORIDE 1.25 MG: 1.25 SOLUTION, CONCENTRATE RESPIRATORY (INHALATION) at 08:06

## 2023-03-04 RX ADMIN — POLYETHYLENE GLYCOL 3350 17 G: 17 POWDER, FOR SOLUTION ORAL at 09:17

## 2023-03-04 RX ADMIN — OXYCODONE HYDROCHLORIDE 10 MG: 5 TABLET ORAL at 09:17

## 2023-03-04 RX ADMIN — HYDROMORPHONE HYDROCHLORIDE 1 MG: 1 INJECTION, SOLUTION INTRAMUSCULAR; INTRAVENOUS; SUBCUTANEOUS at 11:50

## 2023-03-04 RX ADMIN — ANTACID TABLETS 500 MG: 500 TABLET, CHEWABLE ORAL at 09:17

## 2023-03-04 RX ADMIN — OXYCODONE HYDROCHLORIDE 10 MG: 5 TABLET ORAL at 23:50

## 2023-03-04 RX ADMIN — ANTACID TABLETS 500 MG: 500 TABLET, CHEWABLE ORAL at 19:51

## 2023-03-04 RX ADMIN — TIOTROPIUM BROMIDE INHALATION SPRAY 2 PUFF: 3.12 SPRAY, METERED RESPIRATORY (INHALATION) at 08:08

## 2023-03-04 RX ADMIN — ACETYLCYSTEINE 600 MG: 200 INHALANT RESPIRATORY (INHALATION) at 20:07

## 2023-03-04 RX ADMIN — PANTOPRAZOLE SODIUM 40 MG: 40 INJECTION, POWDER, FOR SOLUTION INTRAVENOUS at 09:17

## 2023-03-04 RX ADMIN — HYDROMORPHONE HYDROCHLORIDE 1 MG: 1 INJECTION, SOLUTION INTRAMUSCULAR; INTRAVENOUS; SUBCUTANEOUS at 19:45

## 2023-03-04 RX ADMIN — HYDROMORPHONE HYDROCHLORIDE 1 MG: 1 INJECTION, SOLUTION INTRAMUSCULAR; INTRAVENOUS; SUBCUTANEOUS at 00:00

## 2023-03-04 RX ADMIN — OXYCODONE HYDROCHLORIDE 10 MG: 5 TABLET ORAL at 16:56

## 2023-03-04 RX ADMIN — LEVALBUTEROL HYDROCHLORIDE 1.25 MG: 1.25 SOLUTION, CONCENTRATE RESPIRATORY (INHALATION) at 20:07

## 2023-03-04 RX ADMIN — VANCOMYCIN HYDROCHLORIDE 1000 MG: 1 INJECTION, POWDER, LYOPHILIZED, FOR SOLUTION INTRAVENOUS at 19:51

## 2023-03-04 RX ADMIN — SODIUM CHLORIDE, PRESERVATIVE FREE 10 ML: 5 INJECTION INTRAVENOUS at 19:48

## 2023-03-04 RX ADMIN — LEVALBUTEROL HYDROCHLORIDE 1.25 MG: 1.25 SOLUTION, CONCENTRATE RESPIRATORY (INHALATION) at 12:23

## 2023-03-04 RX ADMIN — Medication 1 LOZENGE: at 19:18

## 2023-03-04 RX ADMIN — TRAZODONE HYDROCHLORIDE 100 MG: 50 TABLET ORAL at 19:52

## 2023-03-04 RX ADMIN — Medication 1 LOZENGE: at 15:42

## 2023-03-04 RX ADMIN — LEVALBUTEROL HYDROCHLORIDE 1.25 MG: 1.25 SOLUTION, CONCENTRATE RESPIRATORY (INHALATION) at 16:12

## 2023-03-04 ASSESSMENT — PAIN SCALES - GENERAL
PAINLEVEL_OUTOF10: 10
PAINLEVEL_OUTOF10: 3
PAINLEVEL_OUTOF10: 10
PAINLEVEL_OUTOF10: 9
PAINLEVEL_OUTOF10: 9
PAINLEVEL_OUTOF10: 10

## 2023-03-04 ASSESSMENT — PAIN DESCRIPTION - DESCRIPTORS
DESCRIPTORS: ACHING;SORE;STABBING;SHARP
DESCRIPTORS: ACHING;SORE;SHARP
DESCRIPTORS: ACHING;SHOOTING
DESCRIPTORS: ACHING;SHOOTING;SPASM;SHARP
DESCRIPTORS: ACHING;SORE;STABBING

## 2023-03-04 ASSESSMENT — PAIN DESCRIPTION - ORIENTATION
ORIENTATION: MID
ORIENTATION: RIGHT;LEFT;MID

## 2023-03-04 ASSESSMENT — PAIN DESCRIPTION - LOCATION
LOCATION: ABDOMEN

## 2023-03-04 NOTE — PROGRESS NOTES
PS MD Arana,    Patient is requesting miralax as a stool softner. Hasn't had a BM for a couple days. Could this be ordered at this time? Thank you.

## 2023-03-04 NOTE — PROGRESS NOTES
INPATIENT PULMONARY CRITICAL CARE PROGRESS NOTE      Reason for visit    Hypoxemia    SUBJECTIVE:    Patient when seen this morning continues to be hypoxemic, patient was on 13 L of high flow oxygen to maintain oxygen saturation which was ranging between 90 to 95%, patient was not having any increased chest congestion per se, patient does not have any expectoration, patient has a sinus tachycardia on the monitor, patient was complaining of increased reflux symptoms, patient did not have any wheezing, patient has not Tmax of 99.6 °F, patient's blood pressure was borderline normal, patient has had no urine output as documented in the epic with cumulative fluid balance of +3.3 L, patient was alert and communicative, no other pertinent review of system of concern      Physical Exam:  Blood pressure 120/73, pulse (!) 103, temperature 99 °F (37.2 °C), temperature source Oral, resp. rate 18, height 5' 1\" (1.549 m), weight 111 lb (50.3 kg), SpO2 94 %.'   Constitutional:  No acute distress. HENT:  Oropharynx is clear and moist. No thyromegaly. Eyes:  Conjunctivae are normal. Pupils equal, round, and reactive to light. No scleral icterus. Neck: . No tracheal deviation present. No obvious thyroid mass. Cardiovascular: Normal rate, regular rhythm, normal heart sounds. No right ventricular heave. No lower extremity edema. Pulmonary/Chest: No wheezes. Basilar rales. Chest wall is not dull to percussion. No accessory muscle usage or stridor. Decreased breath sound intensity  Abdominal: Soft. Bowel sounds present. No distension or hernia. No tenderness. Healing staples/retention sutures were intact  Musculoskeletal: No cyanosis. No clubbing. No obvious joint deformity. Lymphadenopathy: No cervical or supraclavicular adenopathy. Skin: Skin is warm and dry. No rash or nodules on the exposed extremities. Psychiatric: Normal mood and affect. Behavior is normal.  No anxiety. Neurologic: Alert, awake and oriented. PERRL. Speech fluent        Results:  CBC:   Recent Labs     03/02/23  0506 03/03/23  0555 03/04/23  0557   WBC 10.8 12.9* 21.5*   HGB 10.1* 11.6* 10.6*   HCT 31.6* 35.6* 33.3*   MCV 99.6 100.6* 98.9    212 226     BMP:   Recent Labs     03/03/23  0555 03/04/23  0557   * 130*   K 3.5 3.8    95*   CO2 23 24   BUN 4* 5*   CREATININE <0.5* <0.5*     LIVER PROFILE:   Recent Labs     03/04/23  0557   AST 24   ALT 18   BILITOT 0.7   ALKPHOS 184*         Imaging:  I have reviewed radiology images personally. XR CHEST PORTABLE   Final Result   Bibasilar airspace infiltrates with small bilateral pleural effusions. This   could represent atelectasis versus pneumonia in the appropriate clinical   setting         XR CHEST PORTABLE   Final Result   Left lower lobe airspace infiltrates. This could represent atelectasis   versus pneumonia in the appropriate clinical setting. XR ABDOMEN (KUB) (SINGLE AP VIEW)    Result Date: 2/21/2023  EXAMINATION: ONE SUPINE XRAY VIEW(S) OF THE ABDOMEN 2/21/2023 7:10 am COMPARISON: 2/20/2023 HISTORY: ORDERING SYSTEM PROVIDED HISTORY: Small bowel obstruction. TECHNOLOGIST PROVIDED HISTORY: Reason for exam:->Small bowel obstruction. Reason for Exam: sbft obstruction FINDINGS: Dilated small bowel loops are seen centrally filled with contrast. Small bowel dilatation is slightly less pronounced. There is likely a small amount of contrast entering the cecum in the right lower quadrant. Slight decrease in small bowel dilatation. There is likely a small amount of contrast entering the cecum     XR ABDOMEN (KUB) (SINGLE AP VIEW)    Result Date: 2/20/2023  EXAMINATION: ONE SUPINE X-RAY VIEW(S) OF THE ABDOMEN 2/20/2023 9:54 pm COMPARISON: February 28, 2023 and 9:18 a.m. HISTORY: ORDERING SYSTEM PROVIDED HISTORY:  Small bowel obstruction. TECHNOLOGIST PROVIDED HISTORY: Reason for Exam:  Small bowel obstruction.  FINDINGS: Contrast is noted in the small bowel however, nonvisualization of large bowel in the current evaluation. Small bowel loops are dilated. This was seen in previous evaluations. No evidence of free air. Dilated small bowel loops filled with contrast.  Nonvisualization of large bowel yet. XR ABDOMEN (KUB) (SINGLE AP VIEW)    Result Date: 2/19/2023  EXAMINATION: ONE SUPINE XRAY VIEW(S) OF THE ABDOMEN 2/19/2023 10:06 am COMPARISON: 02/18/2013 HISTORY: ORDERING SYSTEM PROVIDED HISTORY: NG Placement TECHNOLOGIST PROVIDED HISTORY: Reason for exam:->NG Placement FINDINGS: Tip of NG tube is unchanged. Tip projects in the left upper quadrant, similar to prior, presumably in hiatal hernia. There is decreased gaseous distention compared to prior. Tip of NG tube projects in left upper quadrant, presumably in hiatal hernia,. There is decreased gaseous distention compared to prior     XR ABDOMEN (KUB) (SINGLE AP VIEW)    Result Date: 2/18/2023  EXAMINATION: ONE SUPINE XRAY VIEW(S) OF THE ABDOMEN 2/18/2023 1:38 pm COMPARISON: CT obtained on the same day HISTORY: ORDERING SYSTEM PROVIDED HISTORY: NG tube placement TECHNOLOGIST PROVIDED HISTORY: Reason for exam:->NG tube placement Reason for Exam: ng tube placement FINDINGS: Enteric tube is seen with the tip and the side hole likely located within the large hiatal hernia seen on the same day CT. Small-bowel obstruction seen on same day CT is not well visualized due to relative lack of intraluminal gas. Enteric tube with the tip in the side hole likely located within the large hiatal hernia is seen on the same day CT. Small-bowel obstruction seen on the same day CT not well appreciated due to relative lack of intraluminal gas.      XR CHEST PORTABLE    Result Date: 3/3/2023  EXAMINATION: ONE XRAY VIEW OF THE CHEST 3/3/2023 11:39 am COMPARISON: Chest x-ray dated 2 March 2023 HISTORY: ORDERING SYSTEM PROVIDED HISTORY: shortness of breath TECHNOLOGIST PROVIDED HISTORY: Reason for exam:->shortness of breath FINDINGS: Bibasilar airspace infiltrates with small bilateral pleural effusions. No pneumothorax. Normal cardiomediastinal silhouette     Bibasilar airspace infiltrates with small bilateral pleural effusions. This could represent atelectasis versus pneumonia in the appropriate clinical setting     XR CHEST PORTABLE    Result Date: 3/2/2023  EXAMINATION: ONE XRAY VIEW OF THE CHEST 3/2/2023 9:51 am COMPARISON: None. HISTORY: ORDERING SYSTEM PROVIDED HISTORY: shortness of breath TECHNOLOGIST PROVIDED HISTORY: Reason for exam:->shortness of breath Reason for Exam: SOB FINDINGS: Left basilar airspace infiltrates. No pneumothorax or pleural effusion. Normal cardiomediastinal silhouette     Left lower lobe airspace infiltrates. This could represent atelectasis versus pneumonia in the appropriate clinical setting. FL SMALL BOWEL FOLLOW THROUGH ONLY    Result Date: 2/24/2023  EXAMINATION: SMALL BOWEL FOLLOW THROUGH SERIES 2/20/2023 TECHNIQUE: Small bowel follow through series was performed with overhead images and spot images. FLUOROSCOPY DOSE AND TYPE: None COMPARISON: CT abdomen/pelvis 2/18/2023 HISTORY: ORDERING SYSTEM PROVIDED HISTORY: SBO TECHNOLOGIST PROVIDED HISTORY: Reason for exam:->SBO Reason for exam:->give contrast through ng and clamp until study done FINDINGS: The study was performed portably.  film of the abdomen demonstrates persistent moderate gaseous distention of several loops of small bowel. Nasogastric tube is in place with distal tip located within the proximal stomach. Again noted is remote posttraumatic deformity of the leftward aspect of the medial aspects of the left-sided pubic rami. Two cups of thin barium were administered via the patient's nasogastric tube. Limited imaging of the stomach demonstrates moderate-sized hiatal hernia.  There is prominence of folds about the proximal stomach which may in part be related to incomplete distention although true fold thickening including changes of underlying gastritis not excluded based on this examination. There is moderate-marked dilatation of multiple loops of small bowel within the abdomen and pelvis. At 6.5 hours, there is no significant progression of contrast material within the dilated small bowel loops. Combination of findings is again consistent with changes of small bowel obstruction. 1. Persistent findings consistent with presence of small bowel obstruction. Plain film examination of the abdomen will be obtained at 10 PM this evening and 7 AM on 2/21/2023 to evaluate movement of contrast material. 2. Moderate-sized hiatal hernia. 3. Prominence of folds within the proximal aspect of the stomach. While finding in part may be on the basis of incomplete distention, true fold thickening including changes of underlying gastritis also in the differential diagnosis as described above. CT CHEST ABDOMEN PELVIS WO CONTRAST Additional Contrast? None    Result Date: 2/18/2023  EXAMINATION: CT OF THE CHEST, ABDOMEN, AND PELVIS WITHOUT CONTRAST 2/18/2023 11:32 am TECHNIQUE: CT of the chest, abdomen and pelvis was performed without the administration of intravenous contrast. Multiplanar reformatted images are provided for review. Automated exposure control, iterative reconstruction, and/or weight based adjustment of the mA/kV was utilized to reduce the radiation dose to as low as reasonably achievable. COMPARISON: None HISTORY: ORDERING SYSTEM PROVIDED HISTORY: shortness of breath TECHNOLOGIST PROVIDED HISTORY: Reason for exam:->shortness of breath Additional Contrast?->None Reason for Exam: N/V/C since Tuesday, known COPD, sob Relevant Medical/Surgical History: ovaries removed, tubal ligation, smoked x 30 yrs. Quit 10 yrs ago FINDINGS: Chest: Mediastinum: Heart is normal in size. There is moderate atherosclerotic calcification coronary arteries. Aorta and pulmonary arteries are normal. No lymph node enlargement. Normal thyroid.   Large hiatal hernia. Lungs/pleura: Peripheral opacification of bronchioles in the left lower lobe with no associated airspace change. There is atelectasis in the left lower lobe adjacent to the hiatal hernia. Additional bands of atelectasis in the right upper and middle lobe that are minimal.  No acute airspace finding is otherwise noted. No suspicious mass or nodule. Mild centrilobular emphysema. Soft Tissues/Bones: Age indeterminate compression fracture to the superior endplate of T8. No retropulsion or associated spinal canal stenosis. There is also a remote fracture of the lateral right 7th rib with callus formation at the adjacent 8th rib. Remote fractures are also seen at the left 6th through 8th ribs. Abdomen/Pelvis: Organs: Cholelithiasis without inflammation or biliary dilatation. The liver, pancreas and spleen are unremarkable. Kidneys and adrenal glands are normal. GI/Bowel: The distal stomach is normal.  There is mild dilation of the duodenum with diffuse dilation of the jejunum and proximal ileum with an apparent transition point in the right lower quadrant/pelvis. Definitive transition and etiology is difficult to confirm. There is decompression of the distal ileum. No appendix is identified. Stool and air are seen throughout the colon with diffuse diverticular changes but no inflammation. Pelvis: The bladder is unremarkable. The uterus and adnexa are unremarkable. Peritoneum/Retroperitoneum: The aorta tapers normally. No lymph node enlargement. Bones/Soft Tissues: Questionable mild compression fracture to the superior endplate of P75. No acute skeletal finding in the abdomen or pelvis. There is a remote healed fracture of the left pelvis involving the superior and inferior pubic rami. 1. Small-bowel obstruction with indeterminate etiology and an apparent transition in the right lower quadrant/pelvis. There is no pattern of pneumatosis or perforation.  2. Decompression of the distal ileum with stool and air in the colon. This may indicate incomplete obstruction or a recently developing pattern of obstruction. 3. Large hiatal hernia contributing to atelectasis in the left lower lobe. Peripheral bronchial opacification in the left lower lobe could potentially be in association, although mucoid impaction is suspected. 4. Age indeterminate compression fractures that are likely remote at both T8 and T12. Additional remote fractures of the ribs and left pelvis are noted. XR ABDOMEN (2 VIEWS)    Result Date: 2/22/2023  EXAMINATION: TWO XRAY VIEWS OF THE ABDOMEN 2/22/2023 6:00 am COMPARISON: 02/21/2023 radiograph HISTORY: ORDERING SYSTEM PROVIDED HISTORY: f/u SBO TECHNOLOGIST PROVIDED HISTORY: Reason for exam:->f/u SBO Reason for Exam: f/u SBO FINDINGS: Progressive transit of previously administered enteric contrast through the small bowel now present in the colon. There is dilution of the contrast column remaining within the stomach and throughout the small bowel. Persistent diffuse dilation of small bowel, perhaps slightly increased from prior study. No skeletal abnormality. Slow progressive transit of enteric contrast into the colon with dilution of the more proximal contrast column. Persistent dilation may represent ileus or a partial small bowel obstruction. XR ABDOMEN (2 VIEWS)    Result Date: 2/20/2023  EXAMINATION: TWO XRAY VIEWS OF THE ABDOMEN 2/20/2023 6:37 am COMPARISON: 02/19/2023 radiograph HISTORY: ORDERING SYSTEM PROVIDED HISTORY: sbo TECHNOLOGIST PROVIDED HISTORY: Reason for exam:->sbo Reason for Exam: f/u SBO FINDINGS: Enteric tube stable in the lumen of the stomach in a patient with a known hiatal hernia. This projects in the lower left chest.  Lung bases are clear. Dilated, air-filled loops of small bowel are identified in the mid abdomen similar to the prior radiograph. Paucity of air in the colon. No abnormal soft tissue mass or calcification.   There are no significant skeletal findings. History of small-bowel obstruction with stable dilated air-filled loops of small bowel in comparison to the prior radiograph. Assessment:  Principal Problem:    Perioperative dehiscence of abdominal wound with evisceration  Active Problems:    Perioperative dehiscence of abdominal wound with evisceration, initial encounter    Acute on chronic respiratory failure with hypoxia (HCC)    Leukocytosis    Normocytic normochromic anemia    Hyponatremia    Pulmonary infiltrates    Atelectasis    Pleural effusion  Resolved Problems:    * No resolved hospital problems.  *          Plan:   Oxygen supplementation to keep saturation between 90 and 94% only  Please titrate his oxygen as per the above parameters  Patient's hypoxemia has worsened and patient was on 13 L of high flow oxygen to maintain oxygen saturation  Patient's recent CT of the chest along with x-ray from yesterday was reviewed and patient has persistent bilateral pleural effusions along with pulm infiltrates and atelectasis which may be responsible for patient's hypoxemia along with that patient also had abdominal surgery with abdominal closure for perioperative dehiscence of the abdominal wall and evisceration causing patient to have some restrictive pattern especially in view that patient also had some compression fracture of the back on the imaging  Patient has a sinus tachycardia on the monitor  Patient is on Dulera along with Spiriva and Xopenex  Patient to continue with Xopenex nebulization  [facility] was discontinued  Spiriva to continue  Mucomyst nebulization ordered  Has low-grade fever along with that patient has worsening leukocytosis along with that patient has persistent or worsening pulm infiltrates status post surgery-we will give benefit of doubt for the patient as patient was started on empiric antibiotics for hospital-acquired pneumonia  IV Zosyn started along with 1 dose of vancomycin  Sputum culture ordered  Further management depending on patient clinical status and cultures  Incentive spirometry and Acapella started  Trend WBC count  If patient is not clinically better and patient has worsening pulm infiltrates status at bases patient may require repeat CT of the chest along with diagnostic/therapeutic bronchoscopy  Monitor input output and BMP  Correct electrolytes on whenever necessary basis  PUD and DVT prophylaxis as per primary team      Electronically signed by:  Aaron Wong MD    3/4/2023    6:41 PM.

## 2023-03-04 NOTE — PROGRESS NOTES
Assumed care from KRISTIN Ellis. Pt a/o. VSS. Pt Ax1 w/ walker. Pt c/o 10/10 pain. PRN oral medication administered per MAR. Pt lost IV access per previous RN. Pt refusing new IV since \"I am probably going home tomorrow.\" Pt denies any other needs @ this time. Bed in lowest position and wheels locked. Call light within reach. Bedside table within reach. Pt calls out appropriately.

## 2023-03-04 NOTE — PROGRESS NOTES
[facility] GENERAL SURGERY    Surgery Progress Note           POD # 4    PATIENT NAME: Jina Amor     TODAY'S DATE: 3/4/2023    INTERVAL HISTORY:    Pt  resting comfortably; taking PO well.  Still passing gas but no BM.  Pain improving.     OBJECTIVE:   VITALS:  /68   Pulse (!) 118   Temp 99 °F (37.2 °C) (Oral)   Resp 18   Ht 5' 1\" (1.549 m)   Wt 111 lb (50.3 kg)   SpO2 95%   BMI 20.97 kg/m²     INTAKE/OUTPUT:    I/O last 3 completed shifts:  In: 480 [P.O.:480]  Out: 450 [Urine:450]  I/O this shift:  In: -   Out: 200 [Urine:200]              CONSTITUTIONAL:  awake and alert  LUNGS:     ABDOMEN:    , soft, non-distended,     INCISION: clean, dry, no drainage, healing, staples/retention sutures intact    Data:  CBC:   Recent Labs     03/02/23  0506 03/03/23  0555 03/04/23  0557   WBC 10.8 12.9* 21.5*   HGB 10.1* 11.6* 10.6*   HCT 31.6* 35.6* 33.3*    212 226     BMP:    Recent Labs     03/03/23  0555 03/04/23  0557   * 130*   K 3.5 3.8    95*   CO2 23 24   BUN 4* 5*   CREATININE <0.5* <0.5*   GLUCOSE 96 118*     Hepatic:   Recent Labs     03/04/23  0557   AST 24   ALT 18   BILITOT 0.7   ALKPHOS 184*     Mag:    No results for input(s): MG in the last 72 hours.   Phos:   No results for input(s): PHOS in the last 72 hours.   INR: No results for input(s): INR in the last 72 hours.      Radiology Review:       ASSESSMENT AND PLAN:  58 y.o. female status post abdominal wound closure for fascial dehiscence/evisceration   - cont diet as tolerated   - started Miralax   - increase up OOB   - anticipate d/c home when (+) bowel function, likely 1-2 days         Electronically signed by JOSE ROBERTO CAMPBELL MD

## 2023-03-04 NOTE — PLAN OF CARE
Problem: Discharge Planning  Goal: Discharge to home or other facility with appropriate resources  Outcome: Progressing     Problem: Pain  Goal: Verbalizes/displays adequate comfort level or baseline comfort level  Outcome: Progressing     Problem: Safety - Adult  Goal: Free from fall injury  Outcome: Progressing     Problem: ABCDS Injury Assessment  Goal: Absence of physical injury  Outcome: Progressing     Problem: Skin/Tissue Integrity  Goal: Absence of new skin breakdown  Description: 1. Monitor for areas of redness and/or skin breakdown  2. Assess vascular access sites hourly  3. Every 4-6 hours minimum:  Change oxygen saturation probe site  4. Every 4-6 hours:  If on nasal continuous positive airway pressure, respiratory therapy assess nares and determine need for appliance change or resting period.   Outcome: Progressing     Problem: Respiratory - Adult  Goal: Achieves optimal ventilation and oxygenation  Outcome: Progressing     Problem: Gastrointestinal - Adult  Goal: Minimal or absence of nausea and vomiting  Outcome: Progressing  Goal: Maintains or returns to baseline bowel function  Outcome: Progressing  Goal: Maintains adequate nutritional intake  Outcome: Progressing

## 2023-03-04 NOTE — PLAN OF CARE
Problem: Discharge Planning  Goal: Discharge to home or other facility with appropriate resources  Outcome: Progressing     Problem: Pain  Goal: Verbalizes/displays adequate comfort level or baseline comfort level  Outcome: Progressing     Problem: Safety - Adult  Goal: Free from fall injury  3/3/2023 2246 by Pedro Keller RN  Outcome: Progressing  3/3/2023 1229 by Rhonda Woodall RN  Outcome: Progressing  Flowsheets (Taken 3/1/2023 0726 by Arlene Zhao RN)  Free From Fall Injury: Instruct family/caregiver on patient safety     Problem: ABCDS Injury Assessment  Goal: Absence of physical injury  Outcome: Progressing     Problem: Skin/Tissue Integrity  Goal: Absence of new skin breakdown  Description: 1.  Monitor for areas of redness and/or skin breakdown  2.  Assess vascular access sites hourly  3.  Every 4-6 hours minimum:  Change oxygen saturation probe site  4.  Every 4-6 hours:  If on nasal continuous positive airway pressure, respiratory therapy assess nares and determine need for appliance change or resting period.  3/3/2023 2246 by Pedro Keller RN  Outcome: Progressing  3/3/2023 1229 by Rhonda Woodall RN  Outcome: Progressing     Problem: Respiratory - Adult  Goal: Achieves optimal ventilation and oxygenation  3/3/2023 2246 by Pedro Keller RN  Outcome: Progressing  3/3/2023 1229 by Rhonda Woodall RN  Outcome: Progressing  Flowsheets (Taken 3/3/2023 1229)  Achieves optimal ventilation and oxygenation:   Assess for changes in respiratory status   Assess for changes in mentation and behavior   Position to facilitate oxygenation and minimize respiratory effort   Oxygen supplementation based on oxygen saturation or arterial blood gases   Encourage broncho-pulmonary hygiene including cough, deep breathe, incentive spirometry   Assess and instruct to report shortness of breath or any respiratory difficulty   Respiratory therapy support as indicated     Problem:  Gastrointestinal - Adult  Goal: Minimal or absence of nausea and vomiting  3/3/2023 2246 by Demetra Rogers RN  Outcome: Progressing  3/3/2023 1229 by Sue Vigil RN  Outcome: Progressing  Flowsheets (Taken 3/3/2023 1229)  Minimal or absence of nausea and vomiting:   Administer IV fluids as ordered to ensure adequate hydration   Administer ordered antiemetic medications as needed   Advance diet as tolerated, if ordered   Provide nonpharmacologic comfort measures as appropriate  Goal: Maintains or returns to baseline bowel function  3/3/2023 2246 by Demetra Rogers RN  Outcome: Progressing  3/3/2023 1229 by Sue Vigil RN  Outcome: Progressing  Flowsheets (Taken 3/3/2023 1229)  Maintains or returns to baseline bowel function:   Assess bowel function   Encourage mobilization and activity   Administer ordered medications as needed   Encourage oral fluids to ensure adequate hydration  Goal: Maintains adequate nutritional intake  3/3/2023 2246 by Demetra Rogers RN  Outcome: Progressing  3/3/2023 1229 by Sue Vigil RN  Outcome: Progressing  Flowsheets (Taken 3/3/2023 1229)  Maintains adequate nutritional intake:   Assist with meals as needed   Identify factors contributing to decreased intake, treat as appropriate   Monitor intake and output, weight and lab values

## 2023-03-04 NOTE — PROGRESS NOTES
03/04/23 0808   Treatment   Treatment Type HHN   $Treatment Type $Inhaled Therapy/Meds   Medications Levalbuterol HCL   Pre-Tx Pulse 133   Pre-Tx Resps 26   Breath Sounds Pre-Tx LOUIS Rhonchi   Breath Sounds Pre-Tx LLL Diminished   Breath Sounds Pre-Tx RUL Rhonchi   Breath Sounds Pre-Tx RML Diminished   Breath Sounds Pre-Tx RLL Diminished   Post-Tx Pulse 117   Post-Tx Resps 20   Delivery Source Mouthpiece; Oxygen   Position Parker's   Treatment Tolerance Well   Is patient on O2?  Y   Oxygen Therapy/Pulse Ox   O2 Therapy Oxygen humidified   $Oxygen $Daily Charge   O2 Device High flow nasal cannula   O2 Flow Rate (L/min) (S)  12 L/min  (75% on 6L)   SpO2 91 %   $Pulse Oximeter $Spot check (multiple/continuous)   Cough/Sputum   Sputum How Obtained Cough on request   Cough Productive   Frequency Infrequent   Sputum Amount Moderate   Sputum Color Dark Yellow;Brown

## 2023-03-04 NOTE — PROGRESS NOTES
Hospitalist Progress Note      PCP: Kyler Friend, APRN - CNP    Date of Admission: 2/28/2023    Chief Complaint:  Abdominal pain, cough       Hospital Course: This 60-year-old female is status post abdominal wound closure for fascial dehiscence/evisceration General surgery consulted and following    Subjective: Patient denies any chest pain or shortness of breath no nausea vomiting comfortable. Medications:  Reviewed    Infusion Medications    sodium chloride       Scheduled Medications    levalbuterol  1.25 mg Nebulization Q4H WA    polyethylene glycol  17 g Oral Daily    mometasone-formoterol  2 puff Inhalation BID    traZODone  100 mg Oral Nightly    sodium chloride flush  5-40 mL IntraVENous 2 times per day    enoxaparin  40 mg SubCUTAneous Daily    pantoprazole  40 mg IntraVENous Daily    tiotropium  2 puff Inhalation Daily     PRN Meds: calcium carbonate, HYDROmorphone **OR** HYDROmorphone, oxyCODONE **OR** oxyCODONE, sodium chloride flush, sodium chloride, ondansetron **OR** ondansetron, acetaminophen, benzocaine-menthol      Intake/Output Summary (Last 24 hours) at 3/4/2023 0908  Last data filed at 3/4/2023 0005  Gross per 24 hour   Intake 240 ml   Output 200 ml   Net 40 ml       Physical Exam Performed:    /77   Pulse (!) 120   Temp 99 °F (37.2 °C) (Oral)   Resp 18   Ht 5' 1\" (1.549 m)   Wt 111 lb (50.3 kg)   SpO2 (!) 86%   BMI 20.97 kg/m²     General appearance: No apparent distress, appears stated age and cooperative. HEENT: Pupils equal, round, and reactive to light. Conjunctivae/corneas clear. Neck: Supple, with full range of motion. No jugular venous distention. Trachea midline. Respiratory:  Normal respiratory effort. Clear to auscultation, bilaterally without Rales/Wheezes/Rhonchi. Cardiovascular: Regular rate and rhythm with normal S1/S2 without murmurs, rubs or gallops. Abdomen: Soft, non-tender, non-distended with normal bowel sounds.   Musculoskeletal: No clubbing, cyanosis or edema bilaterally. Full range of motion without deformity. Skin: Skin color, texture, turgor normal.  No rashes or lesions. Neurologic:  Neurovascularly intact without any focal sensory/motor deficits. Cranial nerves: II-XII intact, grossly non-focal.  Psychiatric: Alert and oriented, thought content appropriate, normal insight  Capillary Refill: Brisk, 3 seconds, normal   Peripheral Pulses: +2 palpable, equal bilaterally       Labs:   Recent Labs     03/02/23  0506 03/03/23  0555 03/04/23  0557   WBC 10.8 12.9* 21.5*   HGB 10.1* 11.6* 10.6*   HCT 31.6* 35.6* 33.3*    212 226     Recent Labs     03/03/23  0555 03/04/23  0557   * 130*   K 3.5 3.8    95*   CO2 23 24   BUN 4* 5*   CREATININE <0.5* <0.5*   CALCIUM 8.4 8.5     Recent Labs     03/04/23  0557   AST 24   ALT 18   BILITOT 0.7   ALKPHOS 184*     No results for input(s): INR in the last 72 hours. Recent Labs     03/03/23  0555   TROPONINI <0.01       Urinalysis:    No results found for: Danial Little, BACTERIA, RBCUA, BLOODU, SPECGRAV, GLUCOSEU    Radiology:  XR CHEST PORTABLE   Final Result   Bibasilar airspace infiltrates with small bilateral pleural effusions. This   could represent atelectasis versus pneumonia in the appropriate clinical   setting         XR CHEST PORTABLE   Final Result   Left lower lobe airspace infiltrates. This could represent atelectasis   versus pneumonia in the appropriate clinical setting.              IP CONSULT TO HOSPITALIST  IP CONSULT TO PULMONOLOGY    Assessment/Plan:    Active Hospital Problems    Diagnosis     Acute on chronic respiratory failure with hypoxia ([facility]) [J96.21]      Priority: Medium    Perioperative dehiscence of abdominal wound with evisceration [T81.31XA]      Priority: Medium    Perioperative dehiscence of abdominal wound with evisceration, initial encounter [T81.31XA]      Priority: Medium     Admitted with acute on chronic hypoxic respiratory failure continue with the current care , pulmonary consulted and following  Hyponatremia due to cirrhosis of the liver. Anemia of acute on chronic blood loss continue to monitor closely, check iron battery May consider starting on IV Venofer. Cirrhosis of the liver resume home medication Aldactone, Lasix. This 45-year-old female with a significant past medical history of COPD recent laparotomy for small bowel obstruction admitted with dehiscence status post abdominal wound closure for fascial dehiscence/evisceration postop care per general surgery. DVT Prophylaxis: Lovenox subcu  Diet: ADULT DIET;  Regular; Low Fiber  Code Status: Full Code  PT/OT Eval Status:     Dispo -     Appropriate for A1 Discharge Unit: Yue Warner MD

## 2023-03-05 PROCEDURE — 6370000000 HC RX 637 (ALT 250 FOR IP): Performed by: SURGERY

## 2023-03-05 PROCEDURE — 6360000002 HC RX W HCPCS: Performed by: STUDENT IN AN ORGANIZED HEALTH CARE EDUCATION/TRAINING PROGRAM

## 2023-03-05 PROCEDURE — 6370000000 HC RX 637 (ALT 250 FOR IP): Performed by: INTERNAL MEDICINE

## 2023-03-05 PROCEDURE — C9113 INJ PANTOPRAZOLE SODIUM, VIA: HCPCS | Performed by: INTERNAL MEDICINE

## 2023-03-05 PROCEDURE — 6360000002 HC RX W HCPCS: Performed by: INTERNAL MEDICINE

## 2023-03-05 PROCEDURE — 6360000002 HC RX W HCPCS: Performed by: SURGERY

## 2023-03-05 PROCEDURE — 94761 N-INVAS EAR/PLS OXIMETRY MLT: CPT

## 2023-03-05 PROCEDURE — 1200000000 HC SEMI PRIVATE

## 2023-03-05 PROCEDURE — 6370000000 HC RX 637 (ALT 250 FOR IP): Performed by: NURSE PRACTITIONER

## 2023-03-05 PROCEDURE — 94640 AIRWAY INHALATION TREATMENT: CPT

## 2023-03-05 PROCEDURE — 94669 MECHANICAL CHEST WALL OSCILL: CPT

## 2023-03-05 PROCEDURE — 6370000000 HC RX 637 (ALT 250 FOR IP): Performed by: HOSPITALIST

## 2023-03-05 PROCEDURE — 2580000003 HC RX 258: Performed by: SURGERY

## 2023-03-05 PROCEDURE — 2580000003 HC RX 258: Performed by: INTERNAL MEDICINE

## 2023-03-05 PROCEDURE — 99024 POSTOP FOLLOW-UP VISIT: CPT | Performed by: SURGERY

## 2023-03-05 PROCEDURE — 99233 SBSQ HOSP IP/OBS HIGH 50: CPT | Performed by: INTERNAL MEDICINE

## 2023-03-05 PROCEDURE — 2700000000 HC OXYGEN THERAPY PER DAY

## 2023-03-05 RX ORDER — FUROSEMIDE 40 MG/1
40 TABLET ORAL DAILY
Status: DISCONTINUED | OUTPATIENT
Start: 2023-03-05 | End: 2023-03-06

## 2023-03-05 RX ORDER — SPIRONOLACTONE 25 MG/1
25 TABLET ORAL DAILY
Status: DISCONTINUED | OUTPATIENT
Start: 2023-03-05 | End: 2023-03-09 | Stop reason: HOSPADM

## 2023-03-05 RX ADMIN — OXYCODONE HYDROCHLORIDE 10 MG: 5 TABLET ORAL at 07:47

## 2023-03-05 RX ADMIN — POLYETHYLENE GLYCOL 3350 17 G: 17 POWDER, FOR SOLUTION ORAL at 07:48

## 2023-03-05 RX ADMIN — PIPERACILLIN AND TAZOBACTAM 3375 MG: 3; .375 INJECTION, POWDER, FOR SOLUTION INTRAVENOUS at 03:21

## 2023-03-05 RX ADMIN — FUROSEMIDE 40 MG: 40 TABLET ORAL at 15:27

## 2023-03-05 RX ADMIN — PANTOPRAZOLE SODIUM 40 MG: 40 INJECTION, POWDER, FOR SOLUTION INTRAVENOUS at 07:48

## 2023-03-05 RX ADMIN — HYDROMORPHONE HYDROCHLORIDE 1 MG: 1 INJECTION, SOLUTION INTRAMUSCULAR; INTRAVENOUS; SUBCUTANEOUS at 04:33

## 2023-03-05 RX ADMIN — ACETYLCYSTEINE 600 MG: 200 INHALANT RESPIRATORY (INHALATION) at 08:42

## 2023-03-05 RX ADMIN — Medication 1 LOZENGE: at 12:31

## 2023-03-05 RX ADMIN — TIOTROPIUM BROMIDE INHALATION SPRAY 2 PUFF: 3.12 SPRAY, METERED RESPIRATORY (INHALATION) at 08:43

## 2023-03-05 RX ADMIN — LEVALBUTEROL HYDROCHLORIDE 1.25 MG: 1.25 SOLUTION, CONCENTRATE RESPIRATORY (INHALATION) at 12:07

## 2023-03-05 RX ADMIN — SPIRONOLACTONE 25 MG: 25 TABLET ORAL at 15:27

## 2023-03-05 RX ADMIN — LEVALBUTEROL HYDROCHLORIDE 1.25 MG: 1.25 SOLUTION, CONCENTRATE RESPIRATORY (INHALATION) at 19:16

## 2023-03-05 RX ADMIN — Medication 1 LOZENGE: at 00:39

## 2023-03-05 RX ADMIN — PIPERACILLIN AND TAZOBACTAM 3375 MG: 3; .375 INJECTION, POWDER, FOR SOLUTION INTRAVENOUS at 19:45

## 2023-03-05 RX ADMIN — LEVALBUTEROL HYDROCHLORIDE 1.25 MG: 1.25 SOLUTION, CONCENTRATE RESPIRATORY (INHALATION) at 15:49

## 2023-03-05 RX ADMIN — TRAZODONE HYDROCHLORIDE 100 MG: 50 TABLET ORAL at 19:39

## 2023-03-05 RX ADMIN — ENOXAPARIN SODIUM 40 MG: 100 INJECTION SUBCUTANEOUS at 07:48

## 2023-03-05 RX ADMIN — ACETYLCYSTEINE 600 MG: 200 INHALANT RESPIRATORY (INHALATION) at 19:16

## 2023-03-05 RX ADMIN — PIPERACILLIN AND TAZOBACTAM 3375 MG: 3; .375 INJECTION, POWDER, FOR SOLUTION INTRAVENOUS at 12:01

## 2023-03-05 RX ADMIN — Medication 1 LOZENGE: at 17:10

## 2023-03-05 RX ADMIN — ANTACID TABLETS 500 MG: 500 TABLET, CHEWABLE ORAL at 17:10

## 2023-03-05 RX ADMIN — Medication 1 LOZENGE: at 19:40

## 2023-03-05 RX ADMIN — SODIUM CHLORIDE, PRESERVATIVE FREE 10 ML: 5 INJECTION INTRAVENOUS at 19:40

## 2023-03-05 RX ADMIN — OXYCODONE HYDROCHLORIDE 10 MG: 5 TABLET ORAL at 11:41

## 2023-03-05 RX ADMIN — HYDROMORPHONE HYDROCHLORIDE 1 MG: 1 INJECTION, SOLUTION INTRAMUSCULAR; INTRAVENOUS; SUBCUTANEOUS at 08:53

## 2023-03-05 RX ADMIN — OXYCODONE HYDROCHLORIDE 10 MG: 5 TABLET ORAL at 17:07

## 2023-03-05 RX ADMIN — OXYCODONE HYDROCHLORIDE 10 MG: 5 TABLET ORAL at 23:21

## 2023-03-05 RX ADMIN — LEVALBUTEROL HYDROCHLORIDE 1.25 MG: 1.25 SOLUTION, CONCENTRATE RESPIRATORY (INHALATION) at 08:42

## 2023-03-05 RX ADMIN — HYDROMORPHONE HYDROCHLORIDE 1 MG: 1 INJECTION, SOLUTION INTRAMUSCULAR; INTRAVENOUS; SUBCUTANEOUS at 20:03

## 2023-03-05 ASSESSMENT — PAIN DESCRIPTION - LOCATION
LOCATION: ABDOMEN

## 2023-03-05 ASSESSMENT — PAIN DESCRIPTION - DESCRIPTORS
DESCRIPTORS: ACHING;SPASM;SHARP
DESCRIPTORS: SORE;SPASM;STABBING
DESCRIPTORS: TEARING;STABBING

## 2023-03-05 ASSESSMENT — PAIN SCALES - GENERAL
PAINLEVEL_OUTOF10: 3
PAINLEVEL_OUTOF10: 10
PAINLEVEL_OUTOF10: 5
PAINLEVEL_OUTOF10: 5
PAINLEVEL_OUTOF10: 0
PAINLEVEL_OUTOF10: 1
PAINLEVEL_OUTOF10: 7
PAINLEVEL_OUTOF10: 8
PAINLEVEL_OUTOF10: 10
PAINLEVEL_OUTOF10: 9

## 2023-03-05 ASSESSMENT — PAIN DESCRIPTION - ORIENTATION
ORIENTATION: MID
ORIENTATION: MID

## 2023-03-05 ASSESSMENT — PAIN - FUNCTIONAL ASSESSMENT: PAIN_FUNCTIONAL_ASSESSMENT: PREVENTS OR INTERFERES SOME ACTIVE ACTIVITIES AND ADLS

## 2023-03-05 NOTE — PROGRESS NOTES
INPATIENT PULMONARY CRITICAL CARE PROGRESS NOTE      Reason for visit    Hypoxemia    SUBJECTIVE:    Patient when seen this morning continues to be hypoxemic, patient states that she is doing incentive spirometry, patient states that she feels that her I-S is better than It, no hemoptysis, patient also was having some nasal congestion which is improving with saline nasal spray, patient does not have any chest pain, patient is afebrile and hemodynamically maintained, patient has sinus tachycardia on the monitor, patient does report symptoms have improved with Tums, patient documented urine output may not be objective, patient's glycemic control acceptable, no other pertinent review of system of concern    Physical Exam:  Blood pressure 127/77, pulse (!) 101, temperature 99 °F (37.2 °C), temperature source Oral, resp. rate 18, height 5' 1\" (1.549 m), weight 111 lb (50.3 kg), SpO2 94 %.'   Constitutional:  No acute distress. HENT:  Oropharynx is clear and moist. No thyromegaly. Eyes:  Conjunctivae are normal. Pupils equal, round, and reactive to light. No scleral icterus. Neck: . No tracheal deviation present. No obvious thyroid mass. Cardiovascular: Sinus tachycardia, normal heart sounds. No right ventricular heave. No lower extremity edema. Pulmonary/Chest: No wheezes. Basilar rales. Chest wall is not dull to percussion. No accessory muscle usage or stridor. Decreased breath sound intensity  Abdominal: Soft. Bowel sounds present. No distension or hernia. No tenderness. Healing staples/retention sutures were intact  Musculoskeletal: No cyanosis. No clubbing. No obvious joint deformity. Lymphadenopathy: No cervical or supraclavicular adenopathy. Skin: Skin is warm and dry. No rash or nodules on the exposed extremities. Psychiatric: Normal mood and affect. Behavior is normal.  No anxiety. Neurologic: Alert, awake and oriented. PERRL.   Speech fluent        Results:  CBC:   Recent Labs 03/03/23  0555 03/04/23  0557   WBC 12.9* 21.5*   HGB 11.6* 10.6*   HCT 35.6* 33.3*   .6* 98.9    226       BMP:   Recent Labs     03/03/23  0555 03/04/23  0557   * 130*   K 3.5 3.8    95*   CO2 23 24   BUN 4* 5*   CREATININE <0.5* <0.5*       LIVER PROFILE:   Recent Labs     03/04/23  0557   AST 24   ALT 18   BILITOT 0.7   ALKPHOS 184*           Imaging:  I have reviewed radiology images personally. XR CHEST PORTABLE   Final Result   Bibasilar airspace infiltrates with small bilateral pleural effusions. This   could represent atelectasis versus pneumonia in the appropriate clinical   setting         XR CHEST PORTABLE   Final Result   Left lower lobe airspace infiltrates. This could represent atelectasis   versus pneumonia in the appropriate clinical setting. XR ABDOMEN (KUB) (SINGLE AP VIEW)    Result Date: 2/21/2023  EXAMINATION: ONE SUPINE XRAY VIEW(S) OF THE ABDOMEN 2/21/2023 7:10 am COMPARISON: 2/20/2023 HISTORY: ORDERING SYSTEM PROVIDED HISTORY: Small bowel obstruction. TECHNOLOGIST PROVIDED HISTORY: Reason for exam:->Small bowel obstruction. Reason for Exam: sbft obstruction FINDINGS: Dilated small bowel loops are seen centrally filled with contrast. Small bowel dilatation is slightly less pronounced. There is likely a small amount of contrast entering the cecum in the right lower quadrant. Slight decrease in small bowel dilatation. There is likely a small amount of contrast entering the cecum     XR ABDOMEN (KUB) (SINGLE AP VIEW)    Result Date: 2/20/2023  EXAMINATION: ONE SUPINE X-RAY VIEW(S) OF THE ABDOMEN 2/20/2023 9:54 pm COMPARISON: February 28, 2023 and 9:18 a.m. HISTORY: ORDERING SYSTEM PROVIDED HISTORY:  Small bowel obstruction. TECHNOLOGIST PROVIDED HISTORY: Reason for Exam:  Small bowel obstruction. FINDINGS: Contrast is noted in the small bowel however, nonvisualization of large bowel in the current evaluation. Small bowel loops are dilated.   This was seen in previous evaluations. No evidence of free air. Dilated small bowel loops filled with contrast.  Nonvisualization of large bowel yet. XR ABDOMEN (KUB) (SINGLE AP VIEW)    Result Date: 2/19/2023  EXAMINATION: ONE SUPINE XRAY VIEW(S) OF THE ABDOMEN 2/19/2023 10:06 am COMPARISON: 02/18/2013 HISTORY: ORDERING SYSTEM PROVIDED HISTORY: NG Placement TECHNOLOGIST PROVIDED HISTORY: Reason for exam:->NG Placement FINDINGS: Tip of NG tube is unchanged. Tip projects in the left upper quadrant, similar to prior, presumably in hiatal hernia. There is decreased gaseous distention compared to prior. Tip of NG tube projects in left upper quadrant, presumably in hiatal hernia,. There is decreased gaseous distention compared to prior     XR ABDOMEN (KUB) (SINGLE AP VIEW)    Result Date: 2/18/2023  EXAMINATION: ONE SUPINE XRAY VIEW(S) OF THE ABDOMEN 2/18/2023 1:38 pm COMPARISON: CT obtained on the same day HISTORY: ORDERING SYSTEM PROVIDED HISTORY: NG tube placement TECHNOLOGIST PROVIDED HISTORY: Reason for exam:->NG tube placement Reason for Exam: ng tube placement FINDINGS: Enteric tube is seen with the tip and the side hole likely located within the large hiatal hernia seen on the same day CT. Small-bowel obstruction seen on same day CT is not well visualized due to relative lack of intraluminal gas. Enteric tube with the tip in the side hole likely located within the large hiatal hernia is seen on the same day CT. Small-bowel obstruction seen on the same day CT not well appreciated due to relative lack of intraluminal gas. XR CHEST PORTABLE    Result Date: 3/3/2023  EXAMINATION: ONE XRAY VIEW OF THE CHEST 3/3/2023 11:39 am COMPARISON: Chest x-ray dated 2 March 2023 HISTORY: ORDERING SYSTEM PROVIDED HISTORY: shortness of breath TECHNOLOGIST PROVIDED HISTORY: Reason for exam:->shortness of breath FINDINGS: Bibasilar airspace infiltrates with small bilateral pleural effusions. No pneumothorax.   Normal cardiomediastinal silhouette     Bibasilar airspace infiltrates with small bilateral pleural effusions. This could represent atelectasis versus pneumonia in the appropriate clinical setting     XR CHEST PORTABLE    Result Date: 3/2/2023  EXAMINATION: ONE XRAY VIEW OF THE CHEST 3/2/2023 9:51 am COMPARISON: None. HISTORY: ORDERING SYSTEM PROVIDED HISTORY: shortness of breath TECHNOLOGIST PROVIDED HISTORY: Reason for exam:->shortness of breath Reason for Exam: SOB FINDINGS: Left basilar airspace infiltrates. No pneumothorax or pleural effusion. Normal cardiomediastinal silhouette     Left lower lobe airspace infiltrates. This could represent atelectasis versus pneumonia in the appropriate clinical setting. FL SMALL BOWEL FOLLOW THROUGH ONLY    Result Date: 2/24/2023  EXAMINATION: SMALL BOWEL FOLLOW THROUGH SERIES 2/20/2023 TECHNIQUE: Small bowel follow through series was performed with overhead images and spot images. FLUOROSCOPY DOSE AND TYPE: None COMPARISON: CT abdomen/pelvis 2/18/2023 HISTORY: ORDERING SYSTEM PROVIDED HISTORY: SBO TECHNOLOGIST PROVIDED HISTORY: Reason for exam:->SBO Reason for exam:->give contrast through ng and clamp until study done FINDINGS: The study was performed portably.  film of the abdomen demonstrates persistent moderate gaseous distention of several loops of small bowel. Nasogastric tube is in place with distal tip located within the proximal stomach. Again noted is remote posttraumatic deformity of the leftward aspect of the medial aspects of the left-sided pubic rami. Two cups of thin barium were administered via the patient's nasogastric tube. Limited imaging of the stomach demonstrates moderate-sized hiatal hernia. There is prominence of folds about the proximal stomach which may in part be related to incomplete distention although true fold thickening including changes of underlying gastritis not excluded based on this examination.  There is moderate-marked dilatation of multiple loops of small bowel within the abdomen and pelvis. At 6.5 hours, there is no significant progression of contrast material within the dilated small bowel loops.  Combination of findings is again consistent with changes of small bowel obstruction.     1. Persistent findings consistent with presence of small bowel obstruction. Plain film examination of the abdomen will be obtained at 10 PM this evening and 7 AM on 2/21/2023 to evaluate movement of contrast material. 2. Moderate-sized hiatal hernia. 3. Prominence of folds within the proximal aspect of the stomach.  While finding in part may be on the basis of incomplete distention, true fold thickening including changes of underlying gastritis also in the differential diagnosis as described above.     CT CHEST ABDOMEN PELVIS WO CONTRAST Additional Contrast? None    Result Date: 2/18/2023  EXAMINATION: CT OF THE CHEST, ABDOMEN, AND PELVIS WITHOUT CONTRAST 2/18/2023 11:32 am TECHNIQUE: CT of the chest, abdomen and pelvis was performed without the administration of intravenous contrast. Multiplanar reformatted images are provided for review. Automated exposure control, iterative reconstruction, and/or weight based adjustment of the mA/kV was utilized to reduce the radiation dose to as low as reasonably achievable. COMPARISON: None HISTORY: ORDERING SYSTEM PROVIDED HISTORY: shortness of breath TECHNOLOGIST PROVIDED HISTORY: Reason for exam:->shortness of breath Additional Contrast?->None Reason for Exam: N/V/C since Tuesday, known COPD, sob Relevant Medical/Surgical History: ovaries removed, tubal ligation, smoked x 30 yrs. Quit 10 yrs ago FINDINGS: Chest: Mediastinum: Heart is normal in size.  There is moderate atherosclerotic calcification coronary arteries.  Aorta and pulmonary arteries are normal. No lymph node enlargement.  Normal thyroid.  Large hiatal hernia. Lungs/pleura: Peripheral opacification of bronchioles in the left lower lobe with no associated  airspace change. There is atelectasis in the left lower lobe adjacent to the hiatal hernia. Additional bands of atelectasis in the right upper and middle lobe that are minimal.  No acute airspace finding is otherwise noted. No suspicious mass or nodule. Mild centrilobular emphysema. Soft Tissues/Bones: Age indeterminate compression fracture to the superior endplate of T8. No retropulsion or associated spinal canal stenosis. There is also a remote fracture of the lateral right 7th rib with callus formation at the adjacent 8th rib. Remote fractures are also seen at the left 6th through 8th ribs. Abdomen/Pelvis: Organs: Cholelithiasis without inflammation or biliary dilatation. The liver, pancreas and spleen are unremarkable. Kidneys and adrenal glands are normal. GI/Bowel: The distal stomach is normal.  There is mild dilation of the duodenum with diffuse dilation of the jejunum and proximal ileum with an apparent transition point in the right lower quadrant/pelvis. Definitive transition and etiology is difficult to confirm. There is decompression of the distal ileum. No appendix is identified. Stool and air are seen throughout the colon with diffuse diverticular changes but no inflammation. Pelvis: The bladder is unremarkable. The uterus and adnexa are unremarkable. Peritoneum/Retroperitoneum: The aorta tapers normally. No lymph node enlargement. Bones/Soft Tissues: Questionable mild compression fracture to the superior endplate of R17. No acute skeletal finding in the abdomen or pelvis. There is a remote healed fracture of the left pelvis involving the superior and inferior pubic rami. 1. Small-bowel obstruction with indeterminate etiology and an apparent transition in the right lower quadrant/pelvis. There is no pattern of pneumatosis or perforation. 2. Decompression of the distal ileum with stool and air in the colon.   This may indicate incomplete obstruction or a recently developing pattern of obstruction. 3. Large hiatal hernia contributing to atelectasis in the left lower lobe. Peripheral bronchial opacification in the left lower lobe could potentially be in association, although mucoid impaction is suspected. 4. Age indeterminate compression fractures that are likely remote at both T8 and T12. Additional remote fractures of the ribs and left pelvis are noted. XR ABDOMEN (2 VIEWS)    Result Date: 2/22/2023  EXAMINATION: TWO XRAY VIEWS OF THE ABDOMEN 2/22/2023 6:00 am COMPARISON: 02/21/2023 radiograph HISTORY: ORDERING SYSTEM PROVIDED HISTORY: f/u SBO TECHNOLOGIST PROVIDED HISTORY: Reason for exam:->f/u SBO Reason for Exam: f/u SBO FINDINGS: Progressive transit of previously administered enteric contrast through the small bowel now present in the colon. There is dilution of the contrast column remaining within the stomach and throughout the small bowel. Persistent diffuse dilation of small bowel, perhaps slightly increased from prior study. No skeletal abnormality. Slow progressive transit of enteric contrast into the colon with dilution of the more proximal contrast column. Persistent dilation may represent ileus or a partial small bowel obstruction. XR ABDOMEN (2 VIEWS)    Result Date: 2/20/2023  EXAMINATION: TWO XRAY VIEWS OF THE ABDOMEN 2/20/2023 6:37 am COMPARISON: 02/19/2023 radiograph HISTORY: ORDERING SYSTEM PROVIDED HISTORY: sbo TECHNOLOGIST PROVIDED HISTORY: Reason for exam:->sbo Reason for Exam: f/u SBO FINDINGS: Enteric tube stable in the lumen of the stomach in a patient with a known hiatal hernia. This projects in the lower left chest.  Lung bases are clear. Dilated, air-filled loops of small bowel are identified in the mid abdomen similar to the prior radiograph. Paucity of air in the colon. No abnormal soft tissue mass or calcification. There are no significant skeletal findings.      History of small-bowel obstruction with stable dilated air-filled loops of small bowel in comparison to the prior radiograph. Assessment:  Principal Problem:    Perioperative dehiscence of abdominal wound with evisceration  Active Problems:    Perioperative dehiscence of abdominal wound with evisceration, initial encounter    Acute on chronic respiratory failure with hypoxia (HCC)    Leukocytosis    Normocytic normochromic anemia    Hyponatremia    Pulmonary infiltrates    Atelectasis    Pleural effusion  Resolved Problems:    * No resolved hospital problems.  *          Plan:   Oxygen supplementation to keep saturation between 90 and 94% only  Please titrate his oxygen as per the above parameters  Patient's hypoxemia has worsened and patient was on 13 L of high flow oxygen to maintain oxygen saturation  Patient's recent CT of the chest along with x-ray from yesterday was reviewed and patient has persistent bilateral pleural effusions along with pulm infiltrates and atelectasis which may be responsible for patient's hypoxemia along with that patient also had abdominal surgery with abdominal closure for perioperative dehiscence of the abdominal wall and evisceration causing patient to have some restrictive pattern especially in view that patient also had some compression fracture of the back on the imaging  Patient has a sinus tachycardia on the monitor  Patient to continue with Xopenex nebulization  Claryce Hail has been discontinued  Spiriva to continue  Mucomyst nebulization ordered  Has low-grade fever along with that patient has worsening leukocytosis along with that patient has persistent or worsening pulm infiltrates status post surgery-we will give benefit of doubt for the patient as patient was started on empiric antibiotics for hospital-acquired pneumonia  IV Zosyn started along with 1 dose of vancomycin  Sputum culture ordered and the results are pending  Trend the fever curve  Further management depending on patient clinical status and cultures  Incentive spirometry and Acapella to continue  Trend WBC count  If patient is not clinically better and patient has worsening pulm infiltrates status at bases patient may require repeat CT of the chest along with diagnostic/therapeutic bronchoscopy  Monitor input output and BMP  Pierre  Saline nasal spray to continue  Medication for GERD as per primary team  Correct electrolytes on whenever necessary basis  PUD and DVT prophylaxis as per primary team  Ambulate as tolerated      Electronically signed by:  Mark Harrell MD    3/5/2023    11:24 AM.

## 2023-03-05 NOTE — PROGRESS NOTES
Pts wound drsg was saturated and leaking on the floor, etc. Cleaned wound with NS and applied fresh drsg.

## 2023-03-05 NOTE — PLAN OF CARE
Problem: Pain  Goal: Verbalizes/displays adequate comfort level or baseline comfort level  Outcome: Progressing  Note: Pt verbalizes how to use 1-10 pain scale. PRN dilaudid and teresita available. Reassessment of pain after administration of pain medication. Use of repositioning for comfort.       Problem: Safety - Adult  Goal: Free from fall injury  Outcome: Progressing     Problem: ABCDS Injury Assessment  Goal: Absence of physical injury  Outcome: Progressing

## 2023-03-05 NOTE — PLAN OF CARE
Problem: Discharge Planning  Goal: Discharge to home or other facility with appropriate resources  3/4/2023 2220 by Demetra Rogers RN  Outcome: Progressing  3/4/2023 1722 by Nena Gaviria RN  Outcome: Progressing     Problem: Pain  Goal: Verbalizes/displays adequate comfort level or baseline comfort level  3/4/2023 2220 by Demetra Rogers RN  Outcome: Progressing  3/4/2023 1722 by Nena Gaviria RN  Outcome: Progressing     Problem: Safety - Adult  Goal: Free from fall injury  3/4/2023 2220 by Demetra Rogers RN  Outcome: Progressing  3/4/2023 1722 by Nena Gaviria RN  Outcome: Progressing     Problem: ABCDS Injury Assessment  Goal: Absence of physical injury  3/4/2023 2220 by Demetra Rogers RN  Outcome: Progressing  3/4/2023 1722 by Nena Gaviria RN  Outcome: Progressing     Problem: Skin/Tissue Integrity  Goal: Absence of new skin breakdown  Description: 1. Monitor for areas of redness and/or skin breakdown  2. Assess vascular access sites hourly  3. Every 4-6 hours minimum:  Change oxygen saturation probe site  4. Every 4-6 hours:  If on nasal continuous positive airway pressure, respiratory therapy assess nares and determine need for appliance change or resting period.   3/4/2023 2220 by Demetra Rogers RN  Outcome: Progressing  3/4/2023 1722 by Nena Gaviria RN  Outcome: Progressing     Problem: Respiratory - Adult  Goal: Achieves optimal ventilation and oxygenation  3/4/2023 2220 by Demetra Rogers RN  Outcome: Progressing  3/4/2023 1722 by Nena Gaviria RN  Outcome: Progressing     Problem: Gastrointestinal - Adult  Goal: Minimal or absence of nausea and vomiting  3/4/2023 2220 by Demetra Rogers RN  Outcome: Progressing  3/4/2023 1722 by Nena Gaviria RN  Outcome: Progressing  Goal: Maintains or returns to baseline bowel function  3/4/2023 2220 by Demetra Rogers RN  Outcome: Progressing  3/4/2023 1722 by Nena Gaviria RN  Outcome: Progressing  Goal: Maintains adequate nutritional intake  3/4/2023 2220 by Aden Lopez RN  Outcome: Progressing  3/4/2023 1722 by Pietro Nicholson RN  Outcome: Progressing

## 2023-03-05 NOTE — PROGRESS NOTES
PT wound drsg saturated. Leaking from second and third suture on patient's left. Cleaned wound with NS and applied fresh dressing and wash cloth provided for extra absorption. New abd binder applied.    Shift assessment completed.  Pt A&O, VSS.  L lower extrem +2. More edematous than previous. Patient requested leg pumps on. This RN applied. Denies any needs at this time. Bed locked and in lowest position.  Call light within reach. Will continue to monitor.

## 2023-03-05 NOTE — PROGRESS NOTES
Hospitalist Progress Note      PCP: ZARI Youssef - CNP    Date of Admission: 2/28/2023    Chief Complaint: Abdominal pain, cough    Hospital Course: This 51-year-old female is status post abdominal wound closure for fascial dehiscence/evisceration General surgery consulted and following    Subjective: Patient denies any chest pain no shortness of breath no nausea vomiting would like to restart her Lasix and Aldactone. Medications:  Reviewed    Infusion Medications    sodium chloride       Scheduled Medications    acetylcysteine  600 mg Inhalation BID    piperacillin-tazobactam  3,375 mg IntraVENous Q8H    levalbuterol  1.25 mg Nebulization Q4H WA    polyethylene glycol  17 g Oral Daily    traZODone  100 mg Oral Nightly    sodium chloride flush  5-40 mL IntraVENous 2 times per day    enoxaparin  40 mg SubCUTAneous Daily    pantoprazole  40 mg IntraVENous Daily    tiotropium  2 puff Inhalation Daily     PRN Meds: calcium carbonate, sodium chloride, HYDROmorphone **OR** HYDROmorphone, oxyCODONE **OR** oxyCODONE, sodium chloride flush, sodium chloride, ondansetron **OR** ondansetron, acetaminophen, benzocaine-menthol      Intake/Output Summary (Last 24 hours) at 3/5/2023 0926  Last data filed at 3/4/2023 2350  Gross per 24 hour   Intake 330 ml   Output 300 ml   Net 30 ml       Physical Exam Performed:    /77   Pulse (!) 101   Temp 99 °F (37.2 °C) (Oral)   Resp 18   Ht 5' 1\" (1.549 m)   Wt 111 lb (50.3 kg)   SpO2 94%   BMI 20.97 kg/m²     General appearance: No apparent distress, appears stated age and cooperative. HEENT: Pupils equal, round, and reactive to light. Conjunctivae/corneas clear. Neck: Supple, with full range of motion. No jugular venous distention. Trachea midline. Respiratory:  Normal respiratory effort. Clear to auscultation, bilaterally without Rales/Wheezes/Rhonchi. Cardiovascular: Regular rate and rhythm with normal S1/S2 without murmurs, rubs or gallops.   Abdomen: Soft, non-tender, non-distended with normal bowel sounds. Musculoskeletal: No clubbing, cyanosis or edema bilaterally. Full range of motion without deformity. Skin: Skin color, texture, turgor normal.  No rashes or lesions. Neurologic:  Neurovascularly intact without any focal sensory/motor deficits. Cranial nerves: II-XII intact, grossly non-focal.  Psychiatric: Alert and oriented, thought content appropriate, normal insight  Capillary Refill: Brisk, 3 seconds, normal   Peripheral Pulses: +2 palpable, equal bilaterally       Labs:   Recent Labs     03/03/23  0555 03/04/23  0557   WBC 12.9* 21.5*   HGB 11.6* 10.6*   HCT 35.6* 33.3*    226     Recent Labs     03/03/23  0555 03/04/23  0557   * 130*   K 3.5 3.8    95*   CO2 23 24   BUN 4* 5*   CREATININE <0.5* <0.5*   CALCIUM 8.4 8.5     Recent Labs     03/04/23  0557   AST 24   ALT 18   BILITOT 0.7   ALKPHOS 184*     No results for input(s): INR in the last 72 hours. Recent Labs     03/03/23  0555   TROPONINI <0.01       Urinalysis:    No results found for: Sarah Learn, BACTERIA, RBCUA, BLOODU, SPECGRAV, GLUCOSEU    Radiology:  XR CHEST PORTABLE   Final Result   Bibasilar airspace infiltrates with small bilateral pleural effusions. This   could represent atelectasis versus pneumonia in the appropriate clinical   setting         XR CHEST PORTABLE   Final Result   Left lower lobe airspace infiltrates. This could represent atelectasis   versus pneumonia in the appropriate clinical setting.              IP CONSULT TO HOSPITALIST  IP CONSULT TO PULMONOLOGY    Assessment/Plan:    Active Hospital Problems    Diagnosis     Leukocytosis [D72.829]      Priority: Medium    Normocytic normochromic anemia [D64.9]      Priority: Medium    Hyponatremia [E87.1]      Priority: Medium    Pulmonary infiltrates [R91.8]      Priority: Medium    Atelectasis [J98.11]      Priority: Medium    Pleural effusion [J90]      Priority: Medium    Acute on chronic respiratory failure with hypoxia (HCC) [J96.21]      Priority: Medium    Perioperative dehiscence of abdominal wound with evisceration [T81.31XA]      Priority: Medium    Perioperative dehiscence of abdominal wound with evisceration, initial encounter [T81.31XA]      Priority: Medium     Admitted with acute on chronic hypoxic respiratory failure continue with the current care , pulmonary consulted and following, today with worsening respiratory failure and increased O2 requirement 13 to 14 L, worsening leukocytosis concern for healthcare associated pneumonia started on Zosyn given 1 dose of vancomycin per pulmonary. Hyponatremia due to cirrhosis of the liver. Anemia of acute on chronic blood loss continue to monitor closely, check iron battery May consider starting on IV Venofer. Cirrhosis of the liver restarted home medication Aldactone, Lasix. This 60-year-old female with a significant past medical history of COPD recent laparotomy for small bowel obstruction admitted with dehiscence status post abdominal wound closure for fascial dehiscence/evisceration postop care per general surgery. DVT Prophylaxis: Lovenox subcu  Diet: ADULT DIET;  Regular; Low Fiber  ADULT ORAL NUTRITION SUPPLEMENT; Breakfast, Lunch, Dinner; Standard High Calorie/High Protein Oral Supplement  Code Status: Full Code  PT/OT Eval Status:     Dispo -     Appropriate for A1 Discharge Unit: No      Nicole MD Julian

## 2023-03-05 NOTE — PROGRESS NOTES
Pt alert and oriented, VSS. Assessment completed and documented. Copious amounts of serous fluid from midline incision after ambulation, dressing changed. Pt c/o pain, see eMAR for PRNs given. Denies any further needs at this time. Bed locked and in lowest position, call light within reach.

## 2023-03-05 NOTE — PROGRESS NOTES
[facility] GENERAL SURGERY    Surgery Progress Note           POD # 5    PATIENT NAME: Guillermo Couch     TODAY'S DATE: 3/5/2023    INTERVAL HISTORY:    Pt  overall feeling better, eating better. Has noted increased clear drainage from the central portion of her incision. OBJECTIVE:   VITALS:  /68   Pulse (!) 110   Temp 99.6 °F (37.6 °C) (Oral)   Resp 18   Ht 5' 1\" (1.549 m)   Wt 111 lb (50.3 kg)   SpO2 93%   BMI 20.97 kg/m²     INTAKE/OUTPUT:    I/O last 3 completed shifts: In: 330 [P.O.:320; I.V.:10]  Out: 500 [Urine:500]  I/O this shift: In: 400 [I.V.:400]  Out: -               CONSTITUTIONAL:  awake and alert  LUNGS:    ABDOMEN:   , soft, non-distended, non-tender   INCISION: clean, healing, serous drainage from infraumbilical region of incision    Data:  CBC:   Recent Labs     03/03/23  0555 03/04/23  0557   WBC 12.9* 21.5*   HGB 11.6* 10.6*   HCT 35.6* 33.3*    226     BMP:    Recent Labs     03/03/23  0555 03/04/23  0557   * 130*   K 3.5 3.8    95*   CO2 23 24   BUN 4* 5*   CREATININE <0.5* <0.5*   GLUCOSE 96 118*     Hepatic:   Recent Labs     03/04/23  0557   AST 24   ALT 18   BILITOT 0.7   ALKPHOS 184*     Mag:    No results for input(s): MG in the last 72 hours. Phos:   No results for input(s): PHOS in the last 72 hours. INR: No results for input(s): INR in the last 72 hours. Radiology Review:       ASSESSMENT AND PLAN:  62 y.o. female status post repair of abdominal wound evisceration.   Increased wound drainage may represent repeat fascial separation, but with the retention sutures in place, should be protected from repeat evisceration   - monitor wound status for now   - Int Med working on pulmonary weaning, and continued diuresis   - hopefully home in 1-2 more days        Electronically signed by Jodi Quinteros MD

## 2023-03-05 NOTE — PROGRESS NOTES
Pt still with large amounts of drainage from midline incision. MD made aware during rounds. Dressing changes needed after every ambulation d/t the amount of fluid leaking.

## 2023-03-06 PROCEDURE — 2700000000 HC OXYGEN THERAPY PER DAY

## 2023-03-06 PROCEDURE — 6360000002 HC RX W HCPCS: Performed by: INTERNAL MEDICINE

## 2023-03-06 PROCEDURE — 6360000002 HC RX W HCPCS: Performed by: SURGERY

## 2023-03-06 PROCEDURE — 94669 MECHANICAL CHEST WALL OSCILL: CPT

## 2023-03-06 PROCEDURE — 6370000000 HC RX 637 (ALT 250 FOR IP): Performed by: INTERNAL MEDICINE

## 2023-03-06 PROCEDURE — 94640 AIRWAY INHALATION TREATMENT: CPT

## 2023-03-06 PROCEDURE — 2580000003 HC RX 258: Performed by: INTERNAL MEDICINE

## 2023-03-06 PROCEDURE — 6370000000 HC RX 637 (ALT 250 FOR IP): Performed by: SURGERY

## 2023-03-06 PROCEDURE — 99232 SBSQ HOSP IP/OBS MODERATE 35: CPT | Performed by: INTERNAL MEDICINE

## 2023-03-06 PROCEDURE — 1200000000 HC SEMI PRIVATE

## 2023-03-06 PROCEDURE — 6370000000 HC RX 637 (ALT 250 FOR IP): Performed by: HOSPITALIST

## 2023-03-06 PROCEDURE — 6370000000 HC RX 637 (ALT 250 FOR IP): Performed by: NURSE PRACTITIONER

## 2023-03-06 PROCEDURE — C9113 INJ PANTOPRAZOLE SODIUM, VIA: HCPCS | Performed by: INTERNAL MEDICINE

## 2023-03-06 PROCEDURE — 99024 POSTOP FOLLOW-UP VISIT: CPT | Performed by: SURGERY

## 2023-03-06 PROCEDURE — 36415 COLL VENOUS BLD VENIPUNCTURE: CPT

## 2023-03-06 PROCEDURE — 2580000003 HC RX 258: Performed by: SURGERY

## 2023-03-06 PROCEDURE — 6360000002 HC RX W HCPCS: Performed by: STUDENT IN AN ORGANIZED HEALTH CARE EDUCATION/TRAINING PROGRAM

## 2023-03-06 PROCEDURE — 94761 N-INVAS EAR/PLS OXIMETRY MLT: CPT

## 2023-03-06 RX ORDER — FUROSEMIDE 10 MG/ML
40 INJECTION INTRAMUSCULAR; INTRAVENOUS 2 TIMES DAILY
Status: DISCONTINUED | OUTPATIENT
Start: 2023-03-06 | End: 2023-03-07

## 2023-03-06 RX ORDER — PANTOPRAZOLE SODIUM 40 MG/1
40 TABLET, DELAYED RELEASE ORAL
Status: DISCONTINUED | OUTPATIENT
Start: 2023-03-07 | End: 2023-03-09 | Stop reason: HOSPADM

## 2023-03-06 RX ORDER — FLUTICASONE PROPIONATE 50 MCG
1 SPRAY, SUSPENSION (ML) NASAL DAILY
Status: DISCONTINUED | OUTPATIENT
Start: 2023-03-06 | End: 2023-03-09 | Stop reason: HOSPADM

## 2023-03-06 RX ORDER — FUROSEMIDE 10 MG/ML
40 INJECTION INTRAMUSCULAR; INTRAVENOUS ONCE
Status: DISCONTINUED | OUTPATIENT
Start: 2023-03-06 | End: 2023-03-06

## 2023-03-06 RX ADMIN — PIPERACILLIN AND TAZOBACTAM 3375 MG: 3; .375 INJECTION, POWDER, FOR SOLUTION INTRAVENOUS at 03:22

## 2023-03-06 RX ADMIN — ACETYLCYSTEINE 600 MG: 200 INHALANT RESPIRATORY (INHALATION) at 08:01

## 2023-03-06 RX ADMIN — HYDROMORPHONE HYDROCHLORIDE 1 MG: 1 INJECTION, SOLUTION INTRAMUSCULAR; INTRAVENOUS; SUBCUTANEOUS at 09:50

## 2023-03-06 RX ADMIN — LEVALBUTEROL HYDROCHLORIDE 1.25 MG: 1.25 SOLUTION, CONCENTRATE RESPIRATORY (INHALATION) at 12:13

## 2023-03-06 RX ADMIN — LEVALBUTEROL HYDROCHLORIDE 1.25 MG: 1.25 SOLUTION, CONCENTRATE RESPIRATORY (INHALATION) at 16:12

## 2023-03-06 RX ADMIN — ENOXAPARIN SODIUM 40 MG: 100 INJECTION SUBCUTANEOUS at 09:50

## 2023-03-06 RX ADMIN — HYDROMORPHONE HYDROCHLORIDE 1 MG: 1 INJECTION, SOLUTION INTRAMUSCULAR; INTRAVENOUS; SUBCUTANEOUS at 00:23

## 2023-03-06 RX ADMIN — FLUTICASONE PROPIONATE 1 SPRAY: 50 SPRAY, METERED NASAL at 13:25

## 2023-03-06 RX ADMIN — OXYCODONE HYDROCHLORIDE 10 MG: 5 TABLET ORAL at 17:36

## 2023-03-06 RX ADMIN — OXYCODONE HYDROCHLORIDE 10 MG: 5 TABLET ORAL at 08:56

## 2023-03-06 RX ADMIN — LEVALBUTEROL HYDROCHLORIDE 1.25 MG: 1.25 SOLUTION, CONCENTRATE RESPIRATORY (INHALATION) at 19:56

## 2023-03-06 RX ADMIN — HYDROMORPHONE HYDROCHLORIDE 1 MG: 1 INJECTION, SOLUTION INTRAMUSCULAR; INTRAVENOUS; SUBCUTANEOUS at 22:51

## 2023-03-06 RX ADMIN — TIOTROPIUM BROMIDE INHALATION SPRAY 2 PUFF: 3.12 SPRAY, METERED RESPIRATORY (INHALATION) at 08:05

## 2023-03-06 RX ADMIN — HYDROMORPHONE HYDROCHLORIDE 1 MG: 1 INJECTION, SOLUTION INTRAMUSCULAR; INTRAVENOUS; SUBCUTANEOUS at 14:37

## 2023-03-06 RX ADMIN — PANTOPRAZOLE SODIUM 40 MG: 40 INJECTION, POWDER, FOR SOLUTION INTRAVENOUS at 09:50

## 2023-03-06 RX ADMIN — SALINE NASAL SPRAY 1 SPRAY: 1.5 SOLUTION NASAL at 13:25

## 2023-03-06 RX ADMIN — SPIRONOLACTONE 25 MG: 25 TABLET ORAL at 09:50

## 2023-03-06 RX ADMIN — FUROSEMIDE 40 MG: 40 TABLET ORAL at 09:50

## 2023-03-06 RX ADMIN — HYDROMORPHONE HYDROCHLORIDE 1 MG: 1 INJECTION, SOLUTION INTRAMUSCULAR; INTRAVENOUS; SUBCUTANEOUS at 18:49

## 2023-03-06 RX ADMIN — OXYCODONE HYDROCHLORIDE 10 MG: 5 TABLET ORAL at 21:35

## 2023-03-06 RX ADMIN — SODIUM CHLORIDE, PRESERVATIVE FREE 10 ML: 5 INJECTION INTRAVENOUS at 21:33

## 2023-03-06 RX ADMIN — FUROSEMIDE 40 MG: 10 INJECTION, SOLUTION INTRAMUSCULAR; INTRAVENOUS at 13:26

## 2023-03-06 RX ADMIN — PIPERACILLIN AND TAZOBACTAM 3375 MG: 3; .375 INJECTION, POWDER, FOR SOLUTION INTRAVENOUS at 18:53

## 2023-03-06 RX ADMIN — PIPERACILLIN AND TAZOBACTAM 3375 MG: 3; .375 INJECTION, POWDER, FOR SOLUTION INTRAVENOUS at 12:27

## 2023-03-06 RX ADMIN — SODIUM CHLORIDE, PRESERVATIVE FREE 10 ML: 5 INJECTION INTRAVENOUS at 09:52

## 2023-03-06 RX ADMIN — HYDROMORPHONE HYDROCHLORIDE 1 MG: 1 INJECTION, SOLUTION INTRAMUSCULAR; INTRAVENOUS; SUBCUTANEOUS at 04:15

## 2023-03-06 RX ADMIN — OXYCODONE HYDROCHLORIDE 10 MG: 5 TABLET ORAL at 13:38

## 2023-03-06 RX ADMIN — ACETYLCYSTEINE 600 MG: 200 INHALANT RESPIRATORY (INHALATION) at 19:56

## 2023-03-06 RX ADMIN — LEVALBUTEROL HYDROCHLORIDE 1.25 MG: 1.25 SOLUTION, CONCENTRATE RESPIRATORY (INHALATION) at 08:01

## 2023-03-06 RX ADMIN — TRAZODONE HYDROCHLORIDE 100 MG: 50 TABLET ORAL at 21:33

## 2023-03-06 RX ADMIN — POLYETHYLENE GLYCOL 3350 17 G: 17 POWDER, FOR SOLUTION ORAL at 09:50

## 2023-03-06 ASSESSMENT — PAIN DESCRIPTION - LOCATION
LOCATION: ABDOMEN

## 2023-03-06 ASSESSMENT — PAIN SCALES - GENERAL
PAINLEVEL_OUTOF10: 7
PAINLEVEL_OUTOF10: 10
PAINLEVEL_OUTOF10: 4
PAINLEVEL_OUTOF10: 10
PAINLEVEL_OUTOF10: 9
PAINLEVEL_OUTOF10: 0
PAINLEVEL_OUTOF10: 10
PAINLEVEL_OUTOF10: 10
PAINLEVEL_OUTOF10: 8
PAINLEVEL_OUTOF10: 5
PAINLEVEL_OUTOF10: 0
PAINLEVEL_OUTOF10: 10
PAINLEVEL_OUTOF10: 10
PAINLEVEL_OUTOF10: 8
PAINLEVEL_OUTOF10: 8
PAINLEVEL_OUTOF10: 10
PAINLEVEL_OUTOF10: 9
PAINLEVEL_OUTOF10: 8
PAINLEVEL_OUTOF10: 5
PAINLEVEL_OUTOF10: 9
PAINLEVEL_OUTOF10: 7

## 2023-03-06 ASSESSMENT — PAIN DESCRIPTION - ORIENTATION
ORIENTATION: MID

## 2023-03-06 ASSESSMENT — PAIN DESCRIPTION - DESCRIPTORS
DESCRIPTORS: SPASM
DESCRIPTORS: ACHING;SHARP
DESCRIPTORS: ACHING

## 2023-03-06 NOTE — PROGRESS NOTES
Comprehensive Nutrition Assessment    Type and Reason for Visit:  Initial, RD Nutrition Re-Screen/LOS    Nutrition Recommendations/Plan:   Continue low fiber diet   Continue Ensure plus chocolate with meals  Will monitor nutritional adequacy, nutrition-related labs, weights, BMs, and clinical progress      Malnutrition Assessment:  Malnutrition Status:  At risk for malnutrition (Comment) (03/06/23 1246)      Nutrition Assessment:    Patient admitted with perioperative dishiscence of abdominal wound with evisceration; status post abdominal wound closure on 3/2/23 .  Also with pleural effusion and pulmonary infiltrates, currently on 7 liters high flow nasal cannula.  Currently on a low fiber diet with Ensure nutrition supplements tid (patient prefers chocolate) as tolerated.  Po intake 25-75% meals.  Patient does not like the hospital food much but does like to drink the chocolate Ensure.  Swallowing without difficulty.  Will continue to monitor.    Nutrition Related Findings:    labs reviewed, no new since 3/4; anxious and demanding per nurse this am Wound Type: Surgical Incision (redenss)       Current Nutrition Intake & Therapies:    Average Meal Intake: 26-50%, 51-75%  Average Supplements Intake: Unable to assess  ADULT DIET; Regular; Low Fiber  ADULT ORAL NUTRITION SUPPLEMENT; Breakfast, Lunch, Dinner; Standard High Calorie/High Protein Oral Supplement    Anthropometric Measures:  Height: 5' 1\" (154.9 cm)  Ideal Body Weight (IBW): 105 lbs (48 kg)       Current Body Weight: 111 lb (50.3 kg),   IBW. Weight Source: Stated  Current BMI (kg/m2): 21                          BMI Categories: Normal Weight (BMI 18.5-24.9)    Estimated Daily Nutrient Needs:  Energy Requirements Based On: Kcal/kg  Weight Used for Energy Requirements: Current  Energy (kcal/day): 0899-0789 (30-35 kcal/50.3 kg)  Weight Used for Protein Requirements: Current  Protein (g/day): 75-91 (1.5-1.8 g/50.3 kg)  Method Used for Fluid Requirements: 1  ml/kcal  Fluid (ml/day):      Nutrition Diagnosis:   Increased nutrient needs related to increase demand for energy/nutrients as evidenced by wounds (extended hospital stay)    Nutrition Interventions:   Food and/or Nutrient Delivery: Continue Current Diet, Continue Oral Nutrition Supplement  Nutrition Education/Counseling:  (monitor need)  Coordination of Nutrition Care: Continue to monitor while inpatient       Goals:     Goals: PO intake 75% or greater       Nutrition Monitoring and Evaluation:      Food/Nutrient Intake Outcomes: Food and Nutrient Intake, Supplement Intake  Physical Signs/Symptoms Outcomes: Nutrition Focused Physical Findings, Biochemical Data    Discharge Planning:     Too soon to determine     MADDIE, [address] [facility], [address] [place] [address],   Contact: [phone]

## 2023-03-06 NOTE — CARE COORDINATION
Chart reviewed day 6. Spoke with patient. Reported plan remains home with resumption of [facility] and has home O2 with baseline at [facility]. [facility] on [facility]. Will also need RW at d/c.

## 2023-03-06 NOTE — PROGRESS NOTES
Shift assessment completed. Pt A&O, VSS. Multiple changes through night for abd drg leaking. Patient and RN agreeable to trial purwick overnight to preserve skin integrity. Denies any needs at this time. Bed locked and in lowest position. Call light within reach. Will continue to monitor.

## 2023-03-06 NOTE — PROGRESS NOTES
Physical Therapy  Pt declined therapy services this morning due to pain; PT will re attempt at a later time.    Skinner Fruits PTA

## 2023-03-06 NOTE — PROGRESS NOTES
[facility] GENERAL SURGERY    Surgery Progress Note           POD # 6    PATIENT NAME: Jina Amor     TODAY'S DATE: 3/6/2023    INTERVAL HISTORY:    Pt  overall feeling better, eating better.  Not much drainage from incision  OBJECTIVE:   VITALS:  /69   Pulse (!) 117   Temp 98.4 °F (36.9 °C) (Oral)   Resp 20   Ht 5' 1\" (1.549 m)   Wt 111 lb (50.3 kg)   SpO2 91%   BMI 20.97 kg/m²     INTAKE/OUTPUT:    I/O last 3 completed shifts:  In: 1093.7 [P.O.:480; I.V.:410; IV Piggyback:203.7]  Out: 2750 [Urine:2750]  I/O this shift:  In: -   Out: 1400 [Urine:1400]              CONSTITUTIONAL:  awake and alert  LUNGS:    ABDOMEN:   , soft, non-distended, non-tender   INCISION: clean, healing, serous drainage from infraumbilical region of incision    Data:  CBC:   Recent Labs     03/04/23  0557   WBC 21.5*   HGB 10.6*   HCT 33.3*          BMP:    Recent Labs     03/04/23  0557   *   K 3.8   CL 95*   CO2 24   BUN 5*   CREATININE <0.5*   GLUCOSE 118*       Hepatic:   Recent Labs     03/04/23  0557   AST 24   ALT 18   BILITOT 0.7   ALKPHOS 184*       Mag:    No results for input(s): MG in the last 72 hours.   Phos:   No results for input(s): PHOS in the last 72 hours.   INR: No results for input(s): INR in the last 72 hours.      Radiology Review:       ASSESSMENT AND PLAN:  58 y.o. female status post repair of abdominal wound evisceration.  Increased wound drainage may represent repeat fascial separation, but with the retention sutures in place, should be protected from repeat evisceration   - monitor wound status for now   - Int Med working on pulmonary weaning, and continued diuresis   - hopefully home in 1-2 more days      Electronically signed by Ash Landaverde MD

## 2023-03-06 NOTE — PLAN OF CARE
Problem: Discharge Planning  Goal: Discharge to home or other facility with appropriate resources  Outcome: Progressing     Problem: Pain  Goal: Verbalizes/displays adequate comfort level or baseline comfort level  3/5/2023 2131 by Myra Carty RN  Outcome: Progressing  3/5/2023 1609 by Funmi Boss RN  Outcome: Progressing  Note: Pt verbalizes how to use 1-10 pain scale. PRN dilaudid and teresita available. Reassessment of pain after administration of pain medication. Use of repositioning for comfort. Problem: Safety - Adult  Goal: Free from fall injury  3/5/2023 2131 by Myra Carty RN  Outcome: Progressing  3/5/2023 1609 by Funmi Boss RN  Outcome: Progressing     Problem: ABCDS Injury Assessment  Goal: Absence of physical injury  3/5/2023 2131 by Myra Carty RN  Outcome: Progressing  3/5/2023 1609 by Funmi Boss RN  Outcome: Progressing     Problem: Skin/Tissue Integrity  Goal: Absence of new skin breakdown  Description: 1. Monitor for areas of redness and/or skin breakdown  2. Assess vascular access sites hourly  3. Every 4-6 hours minimum:  Change oxygen saturation probe site  4. Every 4-6 hours:  If on nasal continuous positive airway pressure, respiratory therapy assess nares and determine need for appliance change or resting period.   Outcome: Progressing     Problem: Respiratory - Adult  Goal: Achieves optimal ventilation and oxygenation  Outcome: Progressing     Problem: Gastrointestinal - Adult  Goal: Minimal or absence of nausea and vomiting  Outcome: Progressing  Goal: Maintains or returns to baseline bowel function  Outcome: Progressing  Goal: Maintains adequate nutritional intake  Outcome: Progressing

## 2023-03-06 NOTE — PROGRESS NOTES
INPATIENT PULMONARY CRITICAL CARE PROGRESS NOTE      Reason for visit    Hypoxemia    SUBJECTIVE: Patient when seen this morning was clinically better, patient oxygen requirements are coming down and patient was on 7 L of high flow oxygen with saturation of 92% when evaluated this morning, patient was bringing up some mucoid expectoration, patient does not have any sinus congestion, patient has had a Tmax of 99.7 °F but was afebrile this morning, patient has a sinus rhythm on the monitor,, patient was hemodynamically maintained, patient has had good urine output overnight with cumulative fluid balance of +1.7 L, patient glycemic control has been acceptable, no other pertinent review of system of concern      Physical Exam:  Blood pressure (!) 142/82, pulse 95, temperature 98.3 °F (36.8 °C), temperature source Oral, resp. rate 18, height 5' 1\" (1.549 m), weight 111 lb (50.3 kg), SpO2 (!) 88 %.'   Constitutional:  No acute distress. HENT:  Oropharynx is clear and moist. No thyromegaly. Eyes:  Conjunctivae are normal. Pupils equal, round, and reactive to light. No scleral icterus. Neck: . No tracheal deviation present. No obvious thyroid mass. Cardiovascular: Sinus rhythm, normal heart sounds. No right ventricular heave. No lower extremity edema. Pulmonary/Chest: No wheezes. Minimal basilar rales. Chest wall is not dull to percussion. No accessory muscle usage or stridor. Decreased breath sound intensity  Abdominal: Soft. Bowel sounds present. No distension or hernia. No tenderness. Healing staples/retention sutures were intact  Musculoskeletal: No cyanosis. No clubbing. No obvious joint deformity. Lymphadenopathy: No cervical or supraclavicular adenopathy. Skin: Skin is warm and dry. No rash or nodules on the exposed extremities. Psychiatric: Normal mood and affect. Behavior is normal.  No anxiety. Neurologic: Alert, awake and oriented. PERRL.   Speech fluent        Results:  CBC:   Recent Labs 03/04/23  0557   WBC 21.5*   HGB 10.6*   HCT 33.3*   MCV 98.9          BMP:   Recent Labs     03/04/23  0557   *   K 3.8   CL 95*   CO2 24   BUN 5*   CREATININE <0.5*       LIVER PROFILE:   Recent Labs     03/04/23  0557   AST 24   ALT 18   BILITOT 0.7   ALKPHOS 184*           Imaging:  I have reviewed radiology images personally. XR CHEST PORTABLE   Final Result   Bibasilar airspace infiltrates with small bilateral pleural effusions. This   could represent atelectasis versus pneumonia in the appropriate clinical   setting         XR CHEST PORTABLE   Final Result   Left lower lobe airspace infiltrates. This could represent atelectasis   versus pneumonia in the appropriate clinical setting. XR ABDOMEN (KUB) (SINGLE AP VIEW)    Result Date: 2/21/2023  EXAMINATION: ONE SUPINE XRAY VIEW(S) OF THE ABDOMEN 2/21/2023 7:10 am COMPARISON: 2/20/2023 HISTORY: ORDERING SYSTEM PROVIDED HISTORY: Small bowel obstruction. TECHNOLOGIST PROVIDED HISTORY: Reason for exam:->Small bowel obstruction. Reason for Exam: sbft obstruction FINDINGS: Dilated small bowel loops are seen centrally filled with contrast. Small bowel dilatation is slightly less pronounced. There is likely a small amount of contrast entering the cecum in the right lower quadrant. Slight decrease in small bowel dilatation. There is likely a small amount of contrast entering the cecum     XR ABDOMEN (KUB) (SINGLE AP VIEW)    Result Date: 2/20/2023  EXAMINATION: ONE SUPINE X-RAY VIEW(S) OF THE ABDOMEN 2/20/2023 9:54 pm COMPARISON: February 28, 2023 and 9:18 a.m. HISTORY: ORDERING SYSTEM PROVIDED HISTORY:  Small bowel obstruction. TECHNOLOGIST PROVIDED HISTORY: Reason for Exam:  Small bowel obstruction. FINDINGS: Contrast is noted in the small bowel however, nonvisualization of large bowel in the current evaluation. Small bowel loops are dilated. This was seen in previous evaluations. No evidence of free air.      Dilated small bowel loops filled with contrast.  Nonvisualization of large bowel yet. XR ABDOMEN (KUB) (SINGLE AP VIEW)    Result Date: 2/19/2023  EXAMINATION: ONE SUPINE XRAY VIEW(S) OF THE ABDOMEN 2/19/2023 10:06 am COMPARISON: 02/18/2013 HISTORY: ORDERING SYSTEM PROVIDED HISTORY: NG Placement TECHNOLOGIST PROVIDED HISTORY: Reason for exam:->NG Placement FINDINGS: Tip of NG tube is unchanged. Tip projects in the left upper quadrant, similar to prior, presumably in hiatal hernia. There is decreased gaseous distention compared to prior. Tip of NG tube projects in left upper quadrant, presumably in hiatal hernia,. There is decreased gaseous distention compared to prior     XR ABDOMEN (KUB) (SINGLE AP VIEW)    Result Date: 2/18/2023  EXAMINATION: ONE SUPINE XRAY VIEW(S) OF THE ABDOMEN 2/18/2023 1:38 pm COMPARISON: CT obtained on the same day HISTORY: ORDERING SYSTEM PROVIDED HISTORY: NG tube placement TECHNOLOGIST PROVIDED HISTORY: Reason for exam:->NG tube placement Reason for Exam: ng tube placement FINDINGS: Enteric tube is seen with the tip and the side hole likely located within the large hiatal hernia seen on the same day CT. Small-bowel obstruction seen on same day CT is not well visualized due to relative lack of intraluminal gas. Enteric tube with the tip in the side hole likely located within the large hiatal hernia is seen on the same day CT. Small-bowel obstruction seen on the same day CT not well appreciated due to relative lack of intraluminal gas. XR CHEST PORTABLE    Result Date: 3/3/2023  EXAMINATION: ONE XRAY VIEW OF THE CHEST 3/3/2023 11:39 am COMPARISON: Chest x-ray dated 2 March 2023 HISTORY: ORDERING SYSTEM PROVIDED HISTORY: shortness of breath TECHNOLOGIST PROVIDED HISTORY: Reason for exam:->shortness of breath FINDINGS: Bibasilar airspace infiltrates with small bilateral pleural effusions. No pneumothorax.   Normal cardiomediastinal silhouette     Bibasilar airspace infiltrates with small bilateral pleural effusions.  This could represent atelectasis versus pneumonia in the appropriate clinical setting     XR CHEST PORTABLE    Result Date: 3/2/2023  EXAMINATION: ONE XRAY VIEW OF THE CHEST 3/2/2023 9:51 am COMPARISON: None. HISTORY: ORDERING SYSTEM PROVIDED HISTORY: shortness of breath TECHNOLOGIST PROVIDED HISTORY: Reason for exam:->shortness of breath Reason for Exam: SOB FINDINGS: Left basilar airspace infiltrates.  No pneumothorax or pleural effusion. Normal cardiomediastinal silhouette     Left lower lobe airspace infiltrates.  This could represent atelectasis versus pneumonia in the appropriate clinical setting.     FL SMALL BOWEL FOLLOW THROUGH ONLY    Result Date: 2/24/2023  EXAMINATION: SMALL BOWEL FOLLOW THROUGH SERIES 2/20/2023 TECHNIQUE: Small bowel follow through series was performed with overhead images and spot images. FLUOROSCOPY DOSE AND TYPE: None COMPARISON: CT abdomen/pelvis 2/18/2023 HISTORY: ORDERING SYSTEM PROVIDED HISTORY: SBO TECHNOLOGIST PROVIDED HISTORY: Reason for exam:->SBO Reason for exam:->give contrast through ng and clamp until study done FINDINGS: The study was performed portably.   film of the abdomen demonstrates persistent moderate gaseous distention of several loops of small bowel. Nasogastric tube is in place with distal tip located within the proximal stomach.  Again noted is remote posttraumatic deformity of the leftward aspect of the medial aspects of the left-sided pubic rami. Two cups of thin barium were administered via the patient's nasogastric tube. Limited imaging of the stomach demonstrates moderate-sized hiatal hernia. There is prominence of folds about the proximal stomach which may in part be related to incomplete distention although true fold thickening including changes of underlying gastritis not excluded based on this examination. There is moderate-marked dilatation of multiple loops of small bowel within the abdomen and pelvis. At 6.5  hours, there is no significant progression of contrast material within the dilated small bowel loops. Combination of findings is again consistent with changes of small bowel obstruction. 1. Persistent findings consistent with presence of small bowel obstruction. Plain film examination of the abdomen will be obtained at 10 PM this evening and 7 AM on 2/21/2023 to evaluate movement of contrast material. 2. Moderate-sized hiatal hernia. 3. Prominence of folds within the proximal aspect of the stomach. While finding in part may be on the basis of incomplete distention, true fold thickening including changes of underlying gastritis also in the differential diagnosis as described above. CT CHEST ABDOMEN PELVIS WO CONTRAST Additional Contrast? None    Result Date: 2/18/2023  EXAMINATION: CT OF THE CHEST, ABDOMEN, AND PELVIS WITHOUT CONTRAST 2/18/2023 11:32 am TECHNIQUE: CT of the chest, abdomen and pelvis was performed without the administration of intravenous contrast. Multiplanar reformatted images are provided for review. Automated exposure control, iterative reconstruction, and/or weight based adjustment of the mA/kV was utilized to reduce the radiation dose to as low as reasonably achievable. COMPARISON: None HISTORY: ORDERING SYSTEM PROVIDED HISTORY: shortness of breath TECHNOLOGIST PROVIDED HISTORY: Reason for exam:->shortness of breath Additional Contrast?->None Reason for Exam: N/V/C since Tuesday, known COPD, sob Relevant Medical/Surgical History: ovaries removed, tubal ligation, smoked x 30 yrs. Quit 10 yrs ago FINDINGS: Chest: Mediastinum: Heart is normal in size. There is moderate atherosclerotic calcification coronary arteries. Aorta and pulmonary arteries are normal. No lymph node enlargement. Normal thyroid. Large hiatal hernia. Lungs/pleura: Peripheral opacification of bronchioles in the left lower lobe with no associated airspace change.   There is atelectasis in the left lower lobe adjacent to the hiatal hernia. Additional bands of atelectasis in the right upper and middle lobe that are minimal.  No acute airspace finding is otherwise noted. No suspicious mass or nodule. Mild centrilobular emphysema. Soft Tissues/Bones: Age indeterminate compression fracture to the superior endplate of T8. No retropulsion or associated spinal canal stenosis. There is also a remote fracture of the lateral right 7th rib with callus formation at the adjacent 8th rib. Remote fractures are also seen at the left 6th through 8th ribs. Abdomen/Pelvis: Organs: Cholelithiasis without inflammation or biliary dilatation. The liver, pancreas and spleen are unremarkable. Kidneys and adrenal glands are normal. GI/Bowel: The distal stomach is normal.  There is mild dilation of the duodenum with diffuse dilation of the jejunum and proximal ileum with an apparent transition point in the right lower quadrant/pelvis. Definitive transition and etiology is difficult to confirm. There is decompression of the distal ileum. No appendix is identified. Stool and air are seen throughout the colon with diffuse diverticular changes but no inflammation. Pelvis: The bladder is unremarkable. The uterus and adnexa are unremarkable. Peritoneum/Retroperitoneum: The aorta tapers normally. No lymph node enlargement. Bones/Soft Tissues: Questionable mild compression fracture to the superior endplate of D09. No acute skeletal finding in the abdomen or pelvis. There is a remote healed fracture of the left pelvis involving the superior and inferior pubic rami. 1. Small-bowel obstruction with indeterminate etiology and an apparent transition in the right lower quadrant/pelvis. There is no pattern of pneumatosis or perforation. 2. Decompression of the distal ileum with stool and air in the colon. This may indicate incomplete obstruction or a recently developing pattern of obstruction.  3. Large hiatal hernia contributing to atelectasis in the left lower lobe. Peripheral bronchial opacification in the left lower lobe could potentially be in association, although mucoid impaction is suspected. 4. Age indeterminate compression fractures that are likely remote at both T8 and T12. Additional remote fractures of the ribs and left pelvis are noted. XR ABDOMEN (2 VIEWS)    Result Date: 2/22/2023  EXAMINATION: TWO XRAY VIEWS OF THE ABDOMEN 2/22/2023 6:00 am COMPARISON: 02/21/2023 radiograph HISTORY: ORDERING SYSTEM PROVIDED HISTORY: f/u SBO TECHNOLOGIST PROVIDED HISTORY: Reason for exam:->f/u SBO Reason for Exam: f/u SBO FINDINGS: Progressive transit of previously administered enteric contrast through the small bowel now present in the colon. There is dilution of the contrast column remaining within the stomach and throughout the small bowel. Persistent diffuse dilation of small bowel, perhaps slightly increased from prior study. No skeletal abnormality. Slow progressive transit of enteric contrast into the colon with dilution of the more proximal contrast column. Persistent dilation may represent ileus or a partial small bowel obstruction. XR ABDOMEN (2 VIEWS)    Result Date: 2/20/2023  EXAMINATION: TWO XRAY VIEWS OF THE ABDOMEN 2/20/2023 6:37 am COMPARISON: 02/19/2023 radiograph HISTORY: ORDERING SYSTEM PROVIDED HISTORY: sbo TECHNOLOGIST PROVIDED HISTORY: Reason for exam:->sbo Reason for Exam: f/u SBO FINDINGS: Enteric tube stable in the lumen of the stomach in a patient with a known hiatal hernia. This projects in the lower left chest.  Lung bases are clear. Dilated, air-filled loops of small bowel are identified in the mid abdomen similar to the prior radiograph. Paucity of air in the colon. No abnormal soft tissue mass or calcification. There are no significant skeletal findings. History of small-bowel obstruction with stable dilated air-filled loops of small bowel in comparison to the prior radiograph.        CULTURE, RESPIRATORY Further report to follow P  [facility]   Gram Stain Result 3+ WBC's (Polymorphonuclear)   3+ Gram negative rods   1+ Gram positive cocci   1+ Gram positive rods           Assessment:  Principal Problem:    Perioperative dehiscence of abdominal wound with evisceration  Active Problems:    Perioperative dehiscence of abdominal wound with evisceration, initial encounter    Acute on chronic respiratory failure with hypoxia (HCC)    Leukocytosis    Normocytic normochromic anemia    Hyponatremia    Pulmonary infiltrates    Atelectasis    Pleural effusion  Resolved Problems:    * No resolved hospital problems.  *          Plan:   Oxygen supplementation to keep saturation between 90 and 94% only  Please titrate his oxygen as per the above parameters  Patient's oxygen requirements are coming down and patient when seen this morning was on 7 L of high flow oxygen to maintain oxygen saturation  Patient's recent CT of the chest along with x-ray from yesterday was reviewed and patient has persistent bilateral pleural effusions along with pulm infiltrates and atelectasis which may be responsible for patient's hypoxemia along with that patient also had abdominal surgery with abdominal closure for perioperative dehiscence of the abdominal wall and evisceration causing patient to have some restrictive pattern especially in view that patient also had some compression fracture of the back on the imaging  Patient to continue with Xopenex nebulization  Spiriva to continue  Mucomyst nebulization to continue  Has low-grade fever along with that patient has worsening leukocytosis along with that patient has persistent or worsening pulm infiltrates status post surgery-we will give benefit of doubt for the patient as patient was started on empiric antibiotics for hospital-acquired pneumonia  IV Zosyn started along with 1 dose of vancomycin  Sputum culture ordered and the results are still pending  Trend the fever curve  Further management depending on patient clinical status and cultures  Incentive spirometry and Acapella to continue  Trend WBC count  We will hold off on any bronchoscopy at this time  Monitor input output and BMP  Saline nasal spray to continue  Medication for GERD as per primary team  Correct electrolytes on whenever necessary basis  PUD and DVT prophylaxis as per primary team  Ambulate as tolerated    Case discussed with patient and RT      Electronically signed by:  Melissa Knowles MD    3/6/2023    8:45 AM.

## 2023-03-07 PROBLEM — J15.69: Status: ACTIVE | Noted: 2023-03-07

## 2023-03-07 PROBLEM — E87.6 HYPOKALEMIA: Status: ACTIVE | Noted: 2023-03-07

## 2023-03-07 PROBLEM — J15.6: Status: ACTIVE | Noted: 2023-03-07

## 2023-03-07 LAB
ANION GAP SERPL CALCULATED.3IONS-SCNC: 10 MMOL/L (ref 3–16)
ANION GAP SERPL CALCULATED.3IONS-SCNC: 7 MMOL/L (ref 3–16)
BASOPHILS ABSOLUTE: 0 K/UL (ref 0–0.2)
BASOPHILS RELATIVE PERCENT: 0.3 %
BUN BLDV-MCNC: 6 MG/DL (ref 7–20)
BUN BLDV-MCNC: 6 MG/DL (ref 7–20)
CALCIUM SERPL-MCNC: 8.4 MG/DL (ref 8.3–10.6)
CALCIUM SERPL-MCNC: 8.6 MG/DL (ref 8.3–10.6)
CHLORIDE BLD-SCNC: 83 MMOL/L (ref 99–110)
CHLORIDE BLD-SCNC: 88 MMOL/L (ref 99–110)
CO2: 37 MMOL/L (ref 21–32)
CO2: 38 MMOL/L (ref 21–32)
CREAT SERPL-MCNC: <0.5 MG/DL (ref 0.6–1.1)
CREAT SERPL-MCNC: <0.5 MG/DL (ref 0.6–1.1)
CULTURE, RESPIRATORY: ABNORMAL
CULTURE, RESPIRATORY: ABNORMAL
EOSINOPHILS ABSOLUTE: 0.1 K/UL (ref 0–0.6)
EOSINOPHILS RELATIVE PERCENT: 0.8 %
GFR SERPL CREATININE-BSD FRML MDRD: >60 ML/MIN/{1.73_M2}
GFR SERPL CREATININE-BSD FRML MDRD: >60 ML/MIN/{1.73_M2}
GLUCOSE BLD-MCNC: 120 MG/DL (ref 70–99)
GLUCOSE BLD-MCNC: 133 MG/DL (ref 70–99)
GRAM STAIN RESULT: ABNORMAL
HCT VFR BLD CALC: 30.6 % (ref 36–48)
HEMOGLOBIN: 10.3 G/DL (ref 12–16)
LYMPHOCYTES ABSOLUTE: 0.8 K/UL (ref 1–5.1)
LYMPHOCYTES RELATIVE PERCENT: 8.1 %
MAGNESIUM: 1.5 MG/DL (ref 1.8–2.4)
MCH RBC QN AUTO: 32.5 PG (ref 26–34)
MCHC RBC AUTO-ENTMCNC: 33.6 G/DL (ref 31–36)
MCV RBC AUTO: 96.7 FL (ref 80–100)
MONOCYTES ABSOLUTE: 0.9 K/UL (ref 0–1.3)
MONOCYTES RELATIVE PERCENT: 8.8 %
NEUTROPHILS ABSOLUTE: 8.5 K/UL (ref 1.7–7.7)
NEUTROPHILS RELATIVE PERCENT: 82 %
ORGANISM: ABNORMAL
PDW BLD-RTO: 12.9 % (ref 12.4–15.4)
PHOSPHORUS: 3.8 MG/DL (ref 2.5–4.9)
PLATELET # BLD: 245 K/UL (ref 135–450)
PMV BLD AUTO: 7.9 FL (ref 5–10.5)
POTASSIUM SERPL-SCNC: 2.8 MMOL/L (ref 3.5–5.1)
POTASSIUM SERPL-SCNC: 3.2 MMOL/L (ref 3.5–5.1)
RBC # BLD: 3.16 M/UL (ref 4–5.2)
SARS-COV-2, NAAT: NOT DETECTED
SODIUM BLD-SCNC: 130 MMOL/L (ref 136–145)
SODIUM BLD-SCNC: 133 MMOL/L (ref 136–145)
WBC # BLD: 10.4 K/UL (ref 4–11)

## 2023-03-07 PROCEDURE — 97530 THERAPEUTIC ACTIVITIES: CPT

## 2023-03-07 PROCEDURE — 87635 SARS-COV-2 COVID-19 AMP PRB: CPT

## 2023-03-07 PROCEDURE — 2580000003 HC RX 258: Performed by: INTERNAL MEDICINE

## 2023-03-07 PROCEDURE — 6360000002 HC RX W HCPCS: Performed by: SURGERY

## 2023-03-07 PROCEDURE — 99232 SBSQ HOSP IP/OBS MODERATE 35: CPT | Performed by: INTERNAL MEDICINE

## 2023-03-07 PROCEDURE — 97116 GAIT TRAINING THERAPY: CPT

## 2023-03-07 PROCEDURE — 97110 THERAPEUTIC EXERCISES: CPT

## 2023-03-07 PROCEDURE — 94640 AIRWAY INHALATION TREATMENT: CPT

## 2023-03-07 PROCEDURE — 6370000000 HC RX 637 (ALT 250 FOR IP): Performed by: NURSE PRACTITIONER

## 2023-03-07 PROCEDURE — 6360000002 HC RX W HCPCS: Performed by: STUDENT IN AN ORGANIZED HEALTH CARE EDUCATION/TRAINING PROGRAM

## 2023-03-07 PROCEDURE — 6360000002 HC RX W HCPCS: Performed by: INTERNAL MEDICINE

## 2023-03-07 PROCEDURE — 6370000000 HC RX 637 (ALT 250 FOR IP): Performed by: INTERNAL MEDICINE

## 2023-03-07 PROCEDURE — 6370000000 HC RX 637 (ALT 250 FOR IP): Performed by: HOSPITALIST

## 2023-03-07 PROCEDURE — 85025 COMPLETE CBC W/AUTO DIFF WBC: CPT

## 2023-03-07 PROCEDURE — 94669 MECHANICAL CHEST WALL OSCILL: CPT

## 2023-03-07 PROCEDURE — 99024 POSTOP FOLLOW-UP VISIT: CPT | Performed by: SURGERY

## 2023-03-07 PROCEDURE — 2700000000 HC OXYGEN THERAPY PER DAY

## 2023-03-07 PROCEDURE — 94761 N-INVAS EAR/PLS OXIMETRY MLT: CPT

## 2023-03-07 PROCEDURE — 84100 ASSAY OF PHOSPHORUS: CPT

## 2023-03-07 PROCEDURE — 2580000003 HC RX 258: Performed by: SURGERY

## 2023-03-07 PROCEDURE — 1200000000 HC SEMI PRIVATE

## 2023-03-07 PROCEDURE — 36415 COLL VENOUS BLD VENIPUNCTURE: CPT

## 2023-03-07 PROCEDURE — 80048 BASIC METABOLIC PNL TOTAL CA: CPT

## 2023-03-07 PROCEDURE — 6370000000 HC RX 637 (ALT 250 FOR IP): Performed by: SURGERY

## 2023-03-07 PROCEDURE — 83735 ASSAY OF MAGNESIUM: CPT

## 2023-03-07 RX ORDER — DIPHENHYDRAMINE HCL 25 MG
25 TABLET ORAL EVERY 6 HOURS PRN
Status: DISCONTINUED | OUTPATIENT
Start: 2023-03-07 | End: 2023-03-09 | Stop reason: HOSPADM

## 2023-03-07 RX ORDER — FUROSEMIDE 40 MG/1
40 TABLET ORAL DAILY
Status: DISCONTINUED | OUTPATIENT
Start: 2023-03-08 | End: 2023-03-09 | Stop reason: HOSPADM

## 2023-03-07 RX ORDER — POTASSIUM CHLORIDE 20 MEQ/1
40 TABLET, EXTENDED RELEASE ORAL PRN
Status: DISCONTINUED | OUTPATIENT
Start: 2023-03-07 | End: 2023-03-09 | Stop reason: HOSPADM

## 2023-03-07 RX ORDER — MAGNESIUM SULFATE 1 G/100ML
1000 INJECTION INTRAVENOUS PRN
Status: DISCONTINUED | OUTPATIENT
Start: 2023-03-07 | End: 2023-03-09 | Stop reason: HOSPADM

## 2023-03-07 RX ORDER — POTASSIUM CHLORIDE 7.45 MG/ML
10 INJECTION INTRAVENOUS PRN
Status: DISCONTINUED | OUTPATIENT
Start: 2023-03-07 | End: 2023-03-09 | Stop reason: HOSPADM

## 2023-03-07 RX ORDER — POTASSIUM CHLORIDE 7.45 MG/ML
10 INJECTION INTRAVENOUS
Status: COMPLETED | OUTPATIENT
Start: 2023-03-07 | End: 2023-03-07

## 2023-03-07 RX ADMIN — OXYCODONE HYDROCHLORIDE 10 MG: 5 TABLET ORAL at 14:40

## 2023-03-07 RX ADMIN — Medication 1 LOZENGE: at 14:40

## 2023-03-07 RX ADMIN — HYDROMORPHONE HYDROCHLORIDE 1 MG: 1 INJECTION, SOLUTION INTRAMUSCULAR; INTRAVENOUS; SUBCUTANEOUS at 17:45

## 2023-03-07 RX ADMIN — PANTOPRAZOLE SODIUM 40 MG: 40 TABLET, DELAYED RELEASE ORAL at 06:06

## 2023-03-07 RX ADMIN — LEVALBUTEROL HYDROCHLORIDE 1.25 MG: 1.25 SOLUTION, CONCENTRATE RESPIRATORY (INHALATION) at 16:35

## 2023-03-07 RX ADMIN — MAGNESIUM SULFATE HEPTAHYDRATE 1000 MG: 1 INJECTION, SOLUTION INTRAVENOUS at 13:22

## 2023-03-07 RX ADMIN — POTASSIUM CHLORIDE 10 MEQ: 7.46 INJECTION, SOLUTION INTRAVENOUS at 13:23

## 2023-03-07 RX ADMIN — OXYCODONE HYDROCHLORIDE 10 MG: 5 TABLET ORAL at 10:08

## 2023-03-07 RX ADMIN — POTASSIUM CHLORIDE 10 MEQ: 7.46 INJECTION, SOLUTION INTRAVENOUS at 10:02

## 2023-03-07 RX ADMIN — HYDROMORPHONE HYDROCHLORIDE 1 MG: 1 INJECTION, SOLUTION INTRAMUSCULAR; INTRAVENOUS; SUBCUTANEOUS at 08:29

## 2023-03-07 RX ADMIN — LEVALBUTEROL HYDROCHLORIDE 1.25 MG: 1.25 SOLUTION, CONCENTRATE RESPIRATORY (INHALATION) at 09:06

## 2023-03-07 RX ADMIN — FUROSEMIDE 40 MG: 10 INJECTION, SOLUTION INTRAMUSCULAR; INTRAVENOUS at 08:31

## 2023-03-07 RX ADMIN — TIOTROPIUM BROMIDE INHALATION SPRAY 2 PUFF: 3.12 SPRAY, METERED RESPIRATORY (INHALATION) at 09:06

## 2023-03-07 RX ADMIN — OXYCODONE HYDROCHLORIDE 10 MG: 5 TABLET ORAL at 01:38

## 2023-03-07 RX ADMIN — HYDROMORPHONE HYDROCHLORIDE 1 MG: 1 INJECTION, SOLUTION INTRAMUSCULAR; INTRAVENOUS; SUBCUTANEOUS at 04:03

## 2023-03-07 RX ADMIN — TRAZODONE HYDROCHLORIDE 100 MG: 50 TABLET ORAL at 21:50

## 2023-03-07 RX ADMIN — SODIUM CHLORIDE, PRESERVATIVE FREE 10 ML: 5 INJECTION INTRAVENOUS at 22:25

## 2023-03-07 RX ADMIN — POLYETHYLENE GLYCOL 3350 17 G: 17 POWDER, FOR SOLUTION ORAL at 08:31

## 2023-03-07 RX ADMIN — FLUTICASONE PROPIONATE 1 SPRAY: 50 SPRAY, METERED NASAL at 08:30

## 2023-03-07 RX ADMIN — POTASSIUM CHLORIDE 10 MEQ: 7.46 INJECTION, SOLUTION INTRAVENOUS at 11:50

## 2023-03-07 RX ADMIN — SPIRONOLACTONE 25 MG: 25 TABLET ORAL at 08:31

## 2023-03-07 RX ADMIN — OXYCODONE HYDROCHLORIDE 10 MG: 5 TABLET ORAL at 06:06

## 2023-03-07 RX ADMIN — POTASSIUM BICARBONATE 40 MEQ: 782 TABLET, EFFERVESCENT ORAL at 08:31

## 2023-03-07 RX ADMIN — LEVALBUTEROL HYDROCHLORIDE 1.25 MG: 1.25 SOLUTION, CONCENTRATE RESPIRATORY (INHALATION) at 11:54

## 2023-03-07 RX ADMIN — OXYCODONE HYDROCHLORIDE 10 MG: 5 TABLET ORAL at 18:52

## 2023-03-07 RX ADMIN — HYDROMORPHONE HYDROCHLORIDE 1 MG: 1 INJECTION, SOLUTION INTRAMUSCULAR; INTRAVENOUS; SUBCUTANEOUS at 13:20

## 2023-03-07 RX ADMIN — ENOXAPARIN SODIUM 40 MG: 100 INJECTION SUBCUTANEOUS at 08:31

## 2023-03-07 RX ADMIN — PIPERACILLIN AND TAZOBACTAM 3375 MG: 3; .375 INJECTION, POWDER, FOR SOLUTION INTRAVENOUS at 17:50

## 2023-03-07 RX ADMIN — PIPERACILLIN AND TAZOBACTAM 3375 MG: 3; .375 INJECTION, POWDER, FOR SOLUTION INTRAVENOUS at 03:30

## 2023-03-07 RX ADMIN — LEVALBUTEROL HYDROCHLORIDE 1.25 MG: 1.25 SOLUTION, CONCENTRATE RESPIRATORY (INHALATION) at 20:08

## 2023-03-07 RX ADMIN — MAGNESIUM SULFATE HEPTAHYDRATE 1000 MG: 1 INJECTION, SOLUTION INTRAVENOUS at 12:11

## 2023-03-07 RX ADMIN — PIPERACILLIN AND TAZOBACTAM 3375 MG: 3; .375 INJECTION, POWDER, FOR SOLUTION INTRAVENOUS at 12:12

## 2023-03-07 RX ADMIN — HYDROMORPHONE HYDROCHLORIDE 1 MG: 1 INJECTION, SOLUTION INTRAMUSCULAR; INTRAVENOUS; SUBCUTANEOUS at 21:50

## 2023-03-07 RX ADMIN — POTASSIUM CHLORIDE 10 MEQ: 7.46 INJECTION, SOLUTION INTRAVENOUS at 08:22

## 2023-03-07 RX ADMIN — ACETYLCYSTEINE 600 MG: 200 INHALANT RESPIRATORY (INHALATION) at 09:06

## 2023-03-07 RX ADMIN — ACETYLCYSTEINE 600 MG: 200 INHALANT RESPIRATORY (INHALATION) at 20:08

## 2023-03-07 ASSESSMENT — PAIN DESCRIPTION - LOCATION
LOCATION: ABDOMEN
LOCATION: ABDOMEN
LOCATION: ABDOMEN;HIP
LOCATION: ABDOMEN

## 2023-03-07 ASSESSMENT — PAIN SCALES - GENERAL
PAINLEVEL_OUTOF10: 9
PAINLEVEL_OUTOF10: 9
PAINLEVEL_OUTOF10: 10
PAINLEVEL_OUTOF10: 10
PAINLEVEL_OUTOF10: 9
PAINLEVEL_OUTOF10: 8
PAINLEVEL_OUTOF10: 9
PAINLEVEL_OUTOF10: 8
PAINLEVEL_OUTOF10: 9
PAINLEVEL_OUTOF10: 10

## 2023-03-07 ASSESSMENT — PAIN DESCRIPTION - ORIENTATION: ORIENTATION: RIGHT

## 2023-03-07 ASSESSMENT — PAIN DESCRIPTION - DESCRIPTORS: DESCRIPTORS: ACHING

## 2023-03-07 NOTE — PROGRESS NOTES
4 Eyes Skin Assessment     The patient is being assess for  Shift Handoff    I agree that 2 RN's have performed a thorough Head to Toe Skin Assessment on the patient. ALL assessment sites listed below have been assessed. Areas assessed by both nurses: Anahy Cha RN and Dontae Hudson RN  [x]   Head, Face, and Ears   [x]   Shoulders, Back, and Chest  [x]   Arms, Elbows, and Hands   [x]   Coccyx, Sacrum, and IschIum  [x]   Legs, Feet, and Heels        Does the Patient have Skin Breakdown?   Yes LDA WOUND CARE was Initiated documentation include the Lizeth-wound, Wound Assessment, Measurements, Dressing Treatment, Drainage, and Color\",         Dedrick Prevention initiated:  Yes   Wound Care Orders initiated:  Yes      [id number]  nurse consulted for Pressure Injury (Stage 3,4, Unstageable, DTI, NWPT, and Complex wounds), New and Established Ostomies:  NA      Nurse 1 eSignature: Electronically signed by Sundar Vance RN on 3/6/23 at 8:04 PM EST    **SHARE this note so that the co-signing nurse is able to place an eSignature**    Nurse 2 eSignature: {Esignature:[id number]}

## 2023-03-07 NOTE — PROGRESS NOTES
Physical Therapy  Facility/Department: [facility] C3 TELE/MED SURG/ONC  Daily Treatment Note  NAME: Gene Soria  : 1965  MRN: [id number]    Date of Service: 3/7/2023    Discharge Recommendations:  24 hour supervision or assist, Home with Home health PT   PT Equipment Recommendations  Equipment Needed: Yes  Mikel Woodruff: Rolling    Patient Diagnosis(es): The primary encounter diagnosis was Perioperative dehiscence of abdominal wound with evisceration, initial encounter. Diagnoses of Chronic obstructive pulmonary disease, unspecified COPD type ([address] and Abdominal pain, unspecified abdominal location were also pertinent to this visit. Assessment   Assessment: Pt seen for PT treatment follow up this date. Pt tolerated therapy well and is making great progress towards therapy goals. Pt completed supine>sit with SBA, multiple STS transfers with RW and SBA and ambulated a total of 40ft with RW and SBA for line managment. SpO2 was 88-89% on 5L with mobility. No LOB/instability observed with transfers or ambulation. Pt educated on and performed BLE exercises with VC for proper technique. Pt would continue to benefit from skilled therapy in the acute setting in order to address functional deficits and enhance pt independence. Recommend home with 24hr assist, HHPT and RW upon d/c. Activity Tolerance: Patient tolerated treatment well  Equipment Needed: Yes     Plan    Physcial Therapy Plan  General Plan: 3-5 times per week  Current Treatment Recommendations: Strengthening;Balance training;Functional mobility training;Transfer training; Endurance training;Gait training;Home exercise program;Safety education & training;Equipment evaluation, education, & procurement;Positioning; Therapeutic activities     Restrictions  Restrictions/Precautions  Restrictions/Precautions: Fall Risk, General Precautions  Position Activity Restriction  Other position/activity restrictions: 5L O2, abdominal incision, cushion in chair, up in chair PRN, ambulate pt, high fall risk per nursing assessment     Subjective    Subjective  Subjective: Arrived and pt agreed to participate in therapy  Pain: Pt reports hip and abdomen pain, did not rate     Objective   Vitals   BP: 123/71, SpO2: 90% 5L    Bed Mobility Training  Bed Mobility Training: Yes  Overall Level of Assistance: Stand-by assistance  Supine to Sit: Stand-by assistance; Additional time (HOB elevated, use of bedrails)  Sit to Supine:  (Pt left in chair EOS)  Scooting: Stand-by assistance; Additional time (towards EOB)  Balance  Sitting: Intact  Sitting - Static: Good (unsupported)  Sitting - Dynamic: Good (unsupported)  Standing: Impaired  Standing - Static: Good;Constant support (with SBA and RW)  Standing - Dynamic: Fair;Constant support (with SBA and RW for ambulation)  Transfer Training  Transfer Training: Yes  Overall Level of Assistance: Stand-by assistance  Interventions: Safety awareness training;Verbal cues  Sit to Stand: Stand-by assistance; Adaptive equipment (with RW)  Stand to Sit: Stand-by assistance; Adaptive equipment (with RW)  Bed to Chair: Stand-by assistance; Adaptive equipment; Additional time (with RW)  Toilet Transfer: Stand-by assistance; Adaptive equipment (with RW)  Gait Training  Gait Training: Yes  Gait  Overall Level of Assistance: Stand-by assistance; Adaptive equipment; Additional time (RW and SBA for line management)  Interventions: Verbal cues; Safety awareness training  Base of Support: Narrowed  Speed/Ene: Slow  Step Length: Right shortened;Left shortened  Gait Abnormalities:  (forward flexed posture)  Distance (ft): 40 Feet (10+10+20)  Assistive Device: Walker, rolling;Gait belt     PT Exercises  Exercise Treatment: Pt performed sitting ankle pumps, LAQ, marches and hip abduction x15 reps BLE     Safety Devices  Type of Devices: Call light within reach; Chair alarm in place;Gait belt;Nurse notified; Left in chair  Restraints  Restraints Initially in Place: No       Goals  Short Term Goals  Time Frame for Short Term Goals: 1 week, 3/09/23 (unless otherwise specified)  Short Term Goal 1: Pt will transfer supine <-> sit with CGA x 1-- 3/07 GOAL MET: new goal: perform all bed mobility with supervision  Short Term Goal 2: Pt will transfer sit <-> stand and bed>chair using RW with SBA-- 3/07 GOAL MET: new goal: pt will perform all functional transfers with RW and supervision  Short Term Goal 3: Pt will ambulate x 50 feet using RW with CGA/SBA x 1-- 3/07 progressing  Short Term Goal 4: By 3/05/23: Pt will tolerate 12-15 reps BLE exercise for strengthening, balance, and endurance-- 3/07 continue goal  Patient Goals   Patient Goals : \"To get stronger and be in less pain\"    Education  Patient Education  Education Given To: Patient  Education Provided: Role of Therapy;Plan of Care;Home Exercise Program;Transfer Training;Equipment  Education Method: Verbal  Barriers to Learning: None  Education Outcome: Verbalized understanding;Demonstrated understanding    Therapy Time   Individual Concurrent Group Co-treatment   Time In 1058         Time Out 1126         Minutes 28         Timed Code Treatment Minutes: 28 Minutes     If pt is unable to be seen after this session, please let this note serve as discharge summary. Please see case management note for discharge disposition. Thank you.    Joaquin Pickard,SPT

## 2023-03-07 NOTE — PROGRESS NOTES
Hospitalist Progress Note      PCP: Mechelle Preston, ZARI - CNP    Date of Admission: 2/28/2023    Chief Complaint: Abdominal pain, cough      Subjective:  She is feeling better. Pain controlled. Minimal dyspnea. Medications:  Reviewed    Infusion Medications    sodium chloride       Scheduled Medications    potassium chloride  10 mEq IntraVENous Q1H    pantoprazole  40 mg Oral QAM AC    furosemide  40 mg IntraVENous BID    fluticasone  1 spray Each Nostril Daily    spironolactone  25 mg Oral Daily    acetylcysteine  600 mg Inhalation BID    piperacillin-tazobactam  3,375 mg IntraVENous Q8H    levalbuterol  1.25 mg Nebulization Q4H WA    polyethylene glycol  17 g Oral Daily    traZODone  100 mg Oral Nightly    sodium chloride flush  5-40 mL IntraVENous 2 times per day    enoxaparin  40 mg SubCUTAneous Daily    tiotropium  2 puff Inhalation Daily     PRN Meds: magnesium sulfate, potassium chloride **OR** potassium alternative oral replacement **OR** potassium chloride, sodium chloride, calcium carbonate, HYDROmorphone **OR** HYDROmorphone, oxyCODONE **OR** oxyCODONE, sodium chloride flush, sodium chloride, ondansetron **OR** ondansetron, acetaminophen, benzocaine-menthol      Intake/Output Summary (Last 24 hours) at 3/7/2023 0958  Last data filed at 3/7/2023 0646  Gross per 24 hour   Intake 480 ml   Output 2500 ml   Net -2020 ml       Physical Exam Performed:    /71   Pulse (!) 108   Temp 98.4 °F (36.9 °C) (Oral)   Resp 16   Ht 5' 1\" (1.549 m)   Wt 111 lb (50.3 kg)   SpO2 92%   BMI 20.97 kg/m²     General appearance: No apparent distress, appears stated age. HEENT: Pupils equal, round, and reactive to light. Conjunctivae/corneas clear. Neck: Supple, with full range of motion. No jugular venous distention. Trachea midline. Respiratory:  Normal respiratory effort. Clear to auscultation, bilaterally without Rales/Wheezes/Rhonchi.   Cardiovascular: Regular rate and rhythm with normal S1/S2 without murmurs, rubs or gallops. Abdomen: Soft, mild diffuse tenderness, non-distended with normal bowel sounds. Musculoskeletal: No clubbing, cyanosis. 2+ BLE pitting edema has now resolved. Full range of motion without deformity. Skin: Skin color, texture, turgor normal.  No rashes or lesions. Neurologic:  Neurovascularly intact without any focal sensory/motor deficits. Cranial nerves: II-XII intact, grossly non-focal.  Psychiatric: Alert and oriented, thought content appropriate, has insight. Poor concentration, tangential thoughts, loses her train of thought. Capillary Refill: Brisk, 3 seconds, normal   Peripheral Pulses: +2 palpable, equal bilaterally       Labs:   Recent Labs     03/07/23 0527   WBC 10.4   HGB 10.3*   HCT 30.6*        Recent Labs     03/07/23 0527   *   K 2.8*   CL 88*   CO2 38*   BUN 6*   CREATININE <0.5*   CALCIUM 8.4   PHOS 3.8     No results for input(s): AST, ALT, BILIDIR, BILITOT, ALKPHOS in the last 72 hours. No results for input(s): INR in the last 72 hours. No results for input(s): Rhae Childes in the last 72 hours. Urinalysis:    No results found for: [place], BACTERIA, RBCUA, BLOODU, [address], [place] [place] [address]    Radiology:  XR CHEST PORTABLE   Final Result   Bibasilar airspace infiltrates with small bilateral pleural effusions. This   could represent atelectasis versus pneumonia in the appropriate clinical   setting         XR CHEST PORTABLE   Final Result   Left lower lobe airspace infiltrates. This could represent atelectasis   versus pneumonia in the appropriate clinical setting.              IP CONSULT TO HOSPITALIST  IP CONSULT TO PULMONOLOGY    Assessment/Plan:    Active Hospital Problems    Diagnosis     Hypokalemia [E87.6]      Priority: Medium    Pneumonia due to Enterobacter species ([facility] [address] [J15.6]      Priority: Medium    Leukocytosis [D72.829]      Priority: Medium    Normocytic normochromic anemia [D64.9]      Priority: Medium Hyponatremia [E87.1]      Priority: Medium    Pulmonary infiltrates [R91.8]      Priority: Medium    Atelectasis [J98.11]      Priority: Medium    Pleural effusion [J90]      Priority: Medium    Acute on chronic respiratory failure with hypoxia (HCC) [J96.21]      Priority: Medium    Perioperative dehiscence of abdominal wound with evisceration [T81.31XA]      Priority: Medium    Perioperative dehiscence of abdominal wound with evisceration, initial encounter [T81.31XA]      Priority: Medium       [id number] Y F with a h/o COPD, HLD, and cirrhosis. Admitted here 2/18 with SBO, did not respond to conservative management, required lysis of adhesions and umbilical hernia repair on 2/22, discharged home 2/27. Returned to the ED on 2/28 because she coughed at home, heard a \"pop,\" and saw that intestines had protruded from her abdominal wound. Dehiscence with evisceration. Bowel loops reduced on 2/28, wound closed in OR. Enterobacter HCAP. Zosyn per pulmonary, can discharge with PO levofloxacin to complete a 7-day course. Pulmonary mucus plugging. Acetylcysteine nebs BID. Volume overload, complicated by bilateral pleural effusions. Resumed her usual furosemide 40 qd (completed course of 40 IV BID here) and spironolactone 25 qd in light of her cirrhosis. COPD. Inhaled bronchodilators. She quit cigarettes years ago. Ongoing marijuana use, encouraged cessation. Acute on chronic hypoxic respiratory failure. Due to above issues. Wean to baseline 5LNC. DVT Prophylaxis: enoxaparin  Diet: ADULT DIET; Regular; Low Fiber  ADULT ORAL NUTRITION SUPPLEMENT; Breakfast, Lunch, Dinner; Standard High Calorie/High Protein Oral Supplement  Code Status: Full Code  PT/OT Eval Status: rec'd home PT/OT    Dispo - when he electrolytes improve and ideally when she is doing well on 5LNC. Perhaps 3/8, pending pulm and surgery input.       Appropriate for A1 Discharge Unit: No      Hao Bell MD

## 2023-03-07 NOTE — PLAN OF CARE
Problem: Pain  Goal: Verbalizes/displays adequate comfort level or baseline comfort level  3/7/2023 0918 by Maximo Villalta RN  Outcome: Progressing  Flowsheets (Taken 3/7/2023 3304)  Verbalizes/displays adequate comfort level or baseline comfort level:   Encourage patient to monitor pain and request assistance   Assess pain using appropriate pain scale   Administer analgesics based on type and severity of pain and evaluate response   Implement non-pharmacological measures as appropriate and evaluate response  Problem: Safety - Adult  Goal: Free from fall injury  3/7/2023 0918 by Maximo Villalta RN  Outcome: Progressing  Flowsheets (Taken 3/7/2023 0918)  Free From Fall Injury: Instruct family/caregiver on patient safety  Problem: Respiratory - Adult  Goal: Achieves optimal ventilation and oxygenation  3/7/2023 0919 by Maximo Villalta RN  Outcome: Progressing  Flowsheets (Taken 3/7/2023 0919)  Achieves optimal ventilation and oxygenation: Assess for changes in respiratory status

## 2023-03-07 NOTE — PROGRESS NOTES
Pt alert and oriented, VSS. Assessment completed and documented. On 8L of oxygen, will attempt to decrease as tolerated. Dressing to abd currently C/D/I. C/o pain, see eMAR for PRNs given. Pitting edema to BLE, MARIANGEL hose applied. Denies any further needs at this time. Bed locked and in lowest position, call light within reach.

## 2023-03-07 NOTE — PROGRESS NOTES
INPATIENT PULMONARY CRITICAL CARE PROGRESS NOTE      Reason for visit    Hypoxemia    SUBJECTIVE: Patient when seen this morning was sleeping in the bed without any increased work of breathing, patient was not having any increased chest congestion, patient was still on 8 L of high flow oxygen with saturation of 90 to 92%, patient was afebrile and had sinus tachycardia on the monitor, patient has had good urine output with cumulative fluid balance of -1.7 L, patient's glycemic control was acceptable, no other pertinent review of system of concern      Physical Exam:  Blood pressure 123/71, pulse (!) 108, temperature 98.4 °F (36.9 °C), temperature source Oral, resp. rate 16, height 5' 1\" (1.549 m), weight 111 lb (50.3 kg), SpO2 90 %.'   Constitutional:  No acute distress. HENT:  Oropharynx is clear and moist. No thyromegaly. Eyes:  Conjunctivae are normal. Pupils equal, round, and reactive to light. No scleral icterus. Neck: . No tracheal deviation present. No obvious thyroid mass. Cardiovascular: Sinus tachycardia, normal heart sounds. No right ventricular heave. No lower extremity edema. Pulmonary/Chest: No wheezes. Minimal basilar rales. Chest wall is not dull to percussion. No accessory muscle usage or stridor. Decreased breath sound intensity  Abdominal: Soft. Bowel sounds present. No distension or hernia. No tenderness. Healing staples/retention sutures were intact  Musculoskeletal: No cyanosis. No clubbing. No obvious joint deformity. Lymphadenopathy: No cervical or supraclavicular adenopathy. Skin: Skin is warm and dry. No rash or nodules on the exposed extremities.   Neurologic: Sleeping when evaluated        Results:  CBC:   Recent Labs     03/07/23 0527   WBC 10.4   HGB 10.3*   HCT 30.6*   MCV 96.7        BMP:   Recent Labs     03/07/23 0527   *   K 2.8*   CL 88*   CO2 38*   PHOS 3.8   BUN 6*   CREATININE <0.5*           Imaging:  I have reviewed radiology images personally. XR CHEST PORTABLE   Final Result   Bibasilar airspace infiltrates with small bilateral pleural effusions. This   could represent atelectasis versus pneumonia in the appropriate clinical   setting         XR CHEST PORTABLE   Final Result   Left lower lobe airspace infiltrates. This could represent atelectasis   versus pneumonia in the appropriate clinical setting. XR ABDOMEN (KUB) (SINGLE AP VIEW)    Result Date: 2/21/2023  EXAMINATION: ONE SUPINE XRAY VIEW(S) OF THE ABDOMEN 2/21/2023 7:10 am COMPARISON: 2/20/2023 HISTORY: ORDERING SYSTEM PROVIDED HISTORY: Small bowel obstruction. TECHNOLOGIST PROVIDED HISTORY: Reason for exam:->Small bowel obstruction. Reason for Exam: sbft obstruction FINDINGS: Dilated small bowel loops are seen centrally filled with contrast. Small bowel dilatation is slightly less pronounced. There is likely a small amount of contrast entering the cecum in the right lower quadrant. Slight decrease in small bowel dilatation. There is likely a small amount of contrast entering the cecum     XR ABDOMEN (KUB) (SINGLE AP VIEW)    Result Date: 2/20/2023  EXAMINATION: ONE SUPINE X-RAY VIEW(S) OF THE ABDOMEN 2/20/2023 9:54 pm COMPARISON: February 28, 2023 and 9:18 a.m. HISTORY: ORDERING SYSTEM PROVIDED HISTORY:  Small bowel obstruction. TECHNOLOGIST PROVIDED HISTORY: Reason for Exam:  Small bowel obstruction. FINDINGS: Contrast is noted in the small bowel however, nonvisualization of large bowel in the current evaluation. Small bowel loops are dilated. This was seen in previous evaluations. No evidence of free air. Dilated small bowel loops filled with contrast.  Nonvisualization of large bowel yet.      XR ABDOMEN (KUB) (SINGLE AP VIEW)    Result Date: 2/19/2023  EXAMINATION: ONE SUPINE XRAY VIEW(S) OF THE ABDOMEN 2/19/2023 10:06 am COMPARISON: 02/18/2013 HISTORY: ORDERING SYSTEM PROVIDED HISTORY: NG Placement TECHNOLOGIST PROVIDED HISTORY: Reason for exam:->NG Placement FINDINGS: Tip of NG tube is unchanged. Tip projects in the left upper quadrant, similar to prior, presumably in hiatal hernia. There is decreased gaseous distention compared to prior. Tip of NG tube projects in left upper quadrant, presumably in hiatal hernia,. There is decreased gaseous distention compared to prior     XR ABDOMEN (KUB) (SINGLE AP VIEW)    Result Date: 2/18/2023  EXAMINATION: ONE SUPINE XRAY VIEW(S) OF THE ABDOMEN 2/18/2023 1:38 pm COMPARISON: CT obtained on the same day HISTORY: ORDERING SYSTEM PROVIDED HISTORY: NG tube placement TECHNOLOGIST PROVIDED HISTORY: Reason for exam:->NG tube placement Reason for Exam: ng tube placement FINDINGS: Enteric tube is seen with the tip and the side hole likely located within the large hiatal hernia seen on the same day CT. Small-bowel obstruction seen on same day CT is not well visualized due to relative lack of intraluminal gas. Enteric tube with the tip in the side hole likely located within the large hiatal hernia is seen on the same day CT. Small-bowel obstruction seen on the same day CT not well appreciated due to relative lack of intraluminal gas. XR CHEST PORTABLE    Result Date: 3/3/2023  EXAMINATION: ONE XRAY VIEW OF THE CHEST 3/3/2023 11:39 am COMPARISON: Chest x-ray dated 2 March 2023 HISTORY: ORDERING SYSTEM PROVIDED HISTORY: shortness of breath TECHNOLOGIST PROVIDED HISTORY: Reason for exam:->shortness of breath FINDINGS: Bibasilar airspace infiltrates with small bilateral pleural effusions. No pneumothorax. Normal cardiomediastinal silhouette     Bibasilar airspace infiltrates with small bilateral pleural effusions. This could represent atelectasis versus pneumonia in the appropriate clinical setting     XR CHEST PORTABLE    Result Date: 3/2/2023  EXAMINATION: ONE XRAY VIEW OF THE CHEST 3/2/2023 9:51 am COMPARISON: None.  HISTORY: ORDERING SYSTEM PROVIDED HISTORY: shortness of breath TECHNOLOGIST PROVIDED HISTORY: Reason for exam:->shortness of breath Reason for Exam: SOB FINDINGS: Left basilar airspace infiltrates. No pneumothorax or pleural effusion. Normal cardiomediastinal silhouette     Left lower lobe airspace infiltrates. This could represent atelectasis versus pneumonia in the appropriate clinical setting. FL SMALL BOWEL FOLLOW THROUGH ONLY    Result Date: 2/24/2023  EXAMINATION: SMALL BOWEL FOLLOW THROUGH SERIES 2/20/2023 TECHNIQUE: Small bowel follow through series was performed with overhead images and spot images. FLUOROSCOPY DOSE AND TYPE: None COMPARISON: CT abdomen/pelvis 2/18/2023 HISTORY: ORDERING SYSTEM PROVIDED HISTORY: SBO TECHNOLOGIST PROVIDED HISTORY: Reason for exam:->SBO Reason for exam:->give contrast through ng and clamp until study done FINDINGS: The study was performed portably.  film of the abdomen demonstrates persistent moderate gaseous distention of several loops of small bowel. Nasogastric tube is in place with distal tip located within the proximal stomach. Again noted is remote posttraumatic deformity of the leftward aspect of the medial aspects of the left-sided pubic rami. Two cups of thin barium were administered via the patient's nasogastric tube. Limited imaging of the stomach demonstrates moderate-sized hiatal hernia. There is prominence of folds about the proximal stomach which may in part be related to incomplete distention although true fold thickening including changes of underlying gastritis not excluded based on this examination. There is moderate-marked dilatation of multiple loops of small bowel within the abdomen and pelvis. At 6.5 hours, there is no significant progression of contrast material within the dilated small bowel loops. Combination of findings is again consistent with changes of small bowel obstruction. 1. Persistent findings consistent with presence of small bowel obstruction.  Plain film examination of the abdomen will be obtained at 10 PM this evening and 7 AM on 2/21/2023 to evaluate movement of contrast material. 2. Moderate-sized hiatal hernia. 3. Prominence of folds within the proximal aspect of the stomach. While finding in part may be on the basis of incomplete distention, true fold thickening including changes of underlying gastritis also in the differential diagnosis as described above. CT CHEST ABDOMEN PELVIS WO CONTRAST Additional Contrast? None    Result Date: 2/18/2023  EXAMINATION: CT OF THE CHEST, ABDOMEN, AND PELVIS WITHOUT CONTRAST 2/18/2023 11:32 am TECHNIQUE: CT of the chest, abdomen and pelvis was performed without the administration of intravenous contrast. Multiplanar reformatted images are provided for review. Automated exposure control, iterative reconstruction, and/or weight based adjustment of the mA/kV was utilized to reduce the radiation dose to as low as reasonably achievable. COMPARISON: None HISTORY: ORDERING SYSTEM PROVIDED HISTORY: shortness of breath TECHNOLOGIST PROVIDED HISTORY: Reason for exam:->shortness of breath Additional Contrast?->None Reason for Exam: N/V/C since Tuesday, known COPD, sob Relevant Medical/Surgical History: ovaries removed, tubal ligation, smoked x 30 yrs. Quit 10 yrs ago FINDINGS: Chest: Mediastinum: Heart is normal in size. There is moderate atherosclerotic calcification coronary arteries. Aorta and pulmonary arteries are normal. No lymph node enlargement. Normal thyroid. Large hiatal hernia. Lungs/pleura: Peripheral opacification of bronchioles in the left lower lobe with no associated airspace change. There is atelectasis in the left lower lobe adjacent to the hiatal hernia. Additional bands of atelectasis in the right upper and middle lobe that are minimal.  No acute airspace finding is otherwise noted. No suspicious mass or nodule. Mild centrilobular emphysema. Soft Tissues/Bones: Age indeterminate compression fracture to the superior endplate of T8.   No retropulsion or associated spinal canal stenosis. There is also a remote fracture of the lateral right 7th rib with callus formation at the adjacent 8th rib. Remote fractures are also seen at the left 6th through 8th ribs. Abdomen/Pelvis: Organs: Cholelithiasis without inflammation or biliary dilatation. The liver, pancreas and spleen are unremarkable. Kidneys and adrenal glands are normal. GI/Bowel: The distal stomach is normal.  There is mild dilation of the duodenum with diffuse dilation of the jejunum and proximal ileum with an apparent transition point in the right lower quadrant/pelvis. Definitive transition and etiology is difficult to confirm. There is decompression of the distal ileum. No appendix is identified. Stool and air are seen throughout the colon with diffuse diverticular changes but no inflammation. Pelvis: The bladder is unremarkable. The uterus and adnexa are unremarkable. Peritoneum/Retroperitoneum: The aorta tapers normally. No lymph node enlargement. Bones/Soft Tissues: Questionable mild compression fracture to the superior endplate of S41. No acute skeletal finding in the abdomen or pelvis. There is a remote healed fracture of the left pelvis involving the superior and inferior pubic rami. 1. Small-bowel obstruction with indeterminate etiology and an apparent transition in the right lower quadrant/pelvis. There is no pattern of pneumatosis or perforation. 2. Decompression of the distal ileum with stool and air in the colon. This may indicate incomplete obstruction or a recently developing pattern of obstruction. 3. Large hiatal hernia contributing to atelectasis in the left lower lobe. Peripheral bronchial opacification in the left lower lobe could potentially be in association, although mucoid impaction is suspected. 4. Age indeterminate compression fractures that are likely remote at both T8 and T12. Additional remote fractures of the ribs and left pelvis are noted.      XR ABDOMEN (2 VIEWS)    Result Date: 2/22/2023  EXAMINATION: TWO XRAY VIEWS OF THE ABDOMEN 2/22/2023 6:00 am COMPARISON: 02/21/2023 radiograph HISTORY: ORDERING SYSTEM PROVIDED HISTORY: f/u SBO TECHNOLOGIST PROVIDED HISTORY: Reason for exam:->f/u SBO Reason for Exam: f/u SBO FINDINGS: Progressive transit of previously administered enteric contrast through the small bowel now present in the colon. There is dilution of the contrast column remaining within the stomach and throughout the small bowel. Persistent diffuse dilation of small bowel, perhaps slightly increased from prior study. No skeletal abnormality. Slow progressive transit of enteric contrast into the colon with dilution of the more proximal contrast column. Persistent dilation may represent ileus or a partial small bowel obstruction. XR ABDOMEN (2 VIEWS)    Result Date: 2/20/2023  EXAMINATION: TWO XRAY VIEWS OF THE ABDOMEN 2/20/2023 6:37 am COMPARISON: 02/19/2023 radiograph HISTORY: ORDERING SYSTEM PROVIDED HISTORY: sbo TECHNOLOGIST PROVIDED HISTORY: Reason for exam:->sbo Reason for Exam: f/u SBO FINDINGS: Enteric tube stable in the lumen of the stomach in a patient with a known hiatal hernia. This projects in the lower left chest.  Lung bases are clear. Dilated, air-filled loops of small bowel are identified in the mid abdomen similar to the prior radiograph. Paucity of air in the colon. No abnormal soft tissue mass or calcification. There are no significant skeletal findings. History of small-bowel obstruction with stable dilated air-filled loops of small bowel in comparison to the prior radiograph.        CULTURE, RESPIRATORY Further report to follow P  [facility]   Gram Stain Result 3+ WBC's (Polymorphonuclear)   3+ Gram negative rods   1+ Gram positive cocci   1+ Gram positive rods       CULTURE, RESPIRATORY Light growth normal respiratory lenin with Abnormal   [facility]   Gram Stain Result 3+ WBC's (Polymorphonuclear) 3+ Gram negative rods   1+ Gram positive cocci   1+ Gram positive rods  [address] [facility] [address] [facility]   Organism Enterobacter hormaechei Abnormal   [facility]   CULTURE, RESPIRATORY Heavy growth  [facility]        Susceptibility    Enterobacter hormaechei (1)    Antibiotic Interpretation Microscan  Method Status    amoxicillin-clavulanate Resistant >16/8 mcg/mL BACTERIAL SUSCEPTIBILITY PANEL BY NEW     ampicillin Resistant >16 mcg/mL BACTERIAL SUSCEPTIBILITY PANEL BY NEW     ampicillin-sulbactam Resistant >16/8 mcg/mL BACTERIAL SUSCEPTIBILITY PANEL BY NEW     ceFAZolin Resistant >16 mcg/mL BACTERIAL SUSCEPTIBILITY PANEL BY NEW     cefepime Sensitive <=2 mcg/mL BACTERIAL SUSCEPTIBILITY PANEL BY NEW     cefTRIAXone Resistant >32 mcg/mL BACTERIAL SUSCEPTIBILITY PANEL BY NEW     cefuroxime Resistant >16 mcg/mL BACTERIAL SUSCEPTIBILITY PANEL BY NEW     ciprofloxacin Sensitive <=1 mcg/mL BACTERIAL SUSCEPTIBILITY PANEL BY NEW     ertapenem Sensitive <=0.5 mcg/mL BACTERIAL SUSCEPTIBILITY PANEL BY NEW     gentamicin Sensitive <=4 mcg/mL BACTERIAL SUSCEPTIBILITY PANEL BY NEW     meropenem Sensitive <=1 mcg/mL BACTERIAL SUSCEPTIBILITY PANEL BY NEW     piperacillin-tazobactam Sensitive <=16 mcg/mL BACTERIAL SUSCEPTIBILITY PANEL BY NEW     trimethoprim-sulfamethoxazole Sensitive <=2/38 mcg/mL BACTERIAL SUSCEPTIBILITY PANEL BY NEW                  Assessment:  Principal Problem:    Perioperative dehiscence of abdominal wound with evisceration  Active Problems:    Perioperative dehiscence of abdominal wound with evisceration, initial encounter    Acute on chronic respiratory failure with hypoxia (HCC)    Leukocytosis    Normocytic normochromic anemia    Hyponatremia    Pulmonary infiltrates    Atelectasis    Pleural effusion    Hypokalemia  Resolved Problems:    * No resolved hospital problems.  *          Plan:   Oxygen supplementation to keep saturation between 90 and 94% only  Please titrate his oxygen as per the above parameters  Patient's oxygen requirements are coming down and patient when seen this morning was on 7 L of high flow oxygen to maintain oxygen saturation  Patient's recent CT of the chest along with x-ray from yesterday was reviewed and patient has persistent bilateral pleural effusions along with pulm infiltrates and atelectasis which may be responsible for patient's hypoxemia along with that patient also had abdominal surgery with abdominal closure for perioperative dehiscence of the abdominal wall and evisceration causing patient to have some restrictive pattern especially in view that patient also had some compression fracture of the back on the imaging  Patient to continue with Xopenex nebulization  Spiriva to continue  Mucomyst nebulization to continue  Patient respite culture shows patient to have Enterobacter pneumonia which was sensitive to Zosyn  IV Zosyn started along with 1 dose of vancomycin  Zosyn to continue  Further management depending on patient clinical status and cultures  Incentive spirometry and Acapella to continue  Trend WBC count  We will hold off on any bronchoscopy at this time  Monitor input output and BMP  Saline nasal spray to continue  Medication for GERD as per primary team  Correct electrolytes on whenever necessary basis  PUD and DVT prophylaxis as per primary team  Ambulate as tolerated          Electronically signed by:  Alla De La Cruz MD    3/7/2023    9:08 AM.

## 2023-03-07 NOTE — PROGRESS NOTES
Shift assessment completed. Pt A&O x4, VSS. Pt on 8 liters of high flow oxygen at 90%, will attempt to wean as tolerated. +2 pitting edema to BLE, jl hose in place. Abd incision dressing C/D/I. Pure wick changed and sandy care provided. Repositioned patient for comfort. Pt reporting pain 10/10, very uncomfortable in bed, PRN pain medication given per MAR. Bed locked and in lowest position. Call light and bedside table within reach. Will continue to monitor.

## 2023-03-07 NOTE — PROGRESS NOTES
Gene Soria was evaluated today and a DME order was entered for a standard walker because she requires this to successfully complete daily living tasks of ambulating. A standard walker is necessary due to the patient's impaired ambulation and mobility restrictions and she can ambulate only by using a walker instead of a lesser assistive device, such as a cane or crutch. The need for this equipment was discussed with the patient and she understands and is in agreement. Muscular deconditioning.

## 2023-03-07 NOTE — PROGRESS NOTES
[facility] GENERAL SURGERY    Surgery Progress Note           POD # 7    PATIENT NAME: Jeremy Fernandes     TODAY'S DATE: 3/7/2023    INTERVAL HISTORY:    Pt doing okay, minimal incisional drainage. Eating better     OBJECTIVE:   VITALS:  /71   Pulse (!) 108   Temp 98.4 °F (36.9 °C) (Oral)   Resp 16   Ht 5' 1\" (1.549 m)   Wt 111 lb (50.3 kg)   SpO2 92%   BMI 20.97 kg/m²     INTAKE/OUTPUT:    I/O last 3 completed shifts: In: 1163.7 [P.O.:960; IV Piggyback:203.7]  Out: 5850 [Urine:5850]  No intake/output data recorded. CONSTITUTIONAL:  awake and alert     ABDOMEN:   , soft, non-distended, incisional tenderness   INCISION: clean, no drainage    Data:  CBC:   Recent Labs     03/07/23 0527   WBC 10.4   HGB 10.3*   HCT 30.6*          BMP:    Recent Labs     03/07/23 0527   *   K 2.8*   CL 88*   CO2 38*   BUN 6*   CREATININE <0.5*   GLUCOSE 120*       Hepatic:   No results for input(s): AST, ALT, ALB, BILITOT, ALKPHOS in the last 72 hours. Mag:      Recent Labs     03/07/23 0527   MG 1.50*        Phos:     Recent Labs     03/07/23 0527   PHOS 3.8        INR: No results for input(s): INR in the last 72 hours. Radiology Review:       ASSESSMENT AND PLAN:  62 y.o. female status post repair of abdominal wound evisceration. Increased wound drainage may represent repeat fascial separation, but with the retention sutures in place, should be protected from repeat evisceration  Wound stable. Continue with medical management of pulmonary issues  Hypokalemia: replacing    Dispo: once medically stable      Electronically signed by ZARI Kimball CNP     Patient seen and agree with above and more than half of the total time was spent by me on the encounter.      Michelle Guerra MD

## 2023-03-07 NOTE — CARE COORDINATION
Chart reviewed day 7. Care managed per pulmonology, IM and surgery. Patients O2 requirements are greater than baseline of 5, currently at 8 LHF with sat of 90%. Replacing K and MG. Per surgery, wound is stable. Will need respiratory improvement prior to d/c.  Plan for resumption of [facility] and RW at d/c. Isis Nava RN

## 2023-03-07 NOTE — PLAN OF CARE
Problem: Discharge Planning  Goal: Discharge to home or other facility with appropriate resources  Outcome: Progressing     Problem: Pain  Goal: Verbalizes/displays adequate comfort level or baseline comfort level  Outcome: Progressing     Problem: Safety - Adult  Goal: Free from fall injury  Outcome: Progressing     Problem: ABCDS Injury Assessment  Goal: Absence of physical injury  Outcome: Progressing     Problem: Skin/Tissue Integrity  Goal: Absence of new skin breakdown  Description: 1. Monitor for areas of redness and/or skin breakdown  2. Assess vascular access sites hourly  3. Every 4-6 hours minimum:  Change oxygen saturation probe site  4. Every 4-6 hours:  If on nasal continuous positive airway pressure, respiratory therapy assess nares and determine need for appliance change or resting period.   Outcome: Progressing     Problem: Respiratory - Adult  Goal: Achieves optimal ventilation and oxygenation  Outcome: Progressing     Problem: Gastrointestinal - Adult  Goal: Minimal or absence of nausea and vomiting  Outcome: Progressing  Goal: Maintains or returns to baseline bowel function  Outcome: Progressing  Goal: Maintains adequate nutritional intake  Outcome: Progressing     Problem: Nutrition Deficit:  Goal: Optimize nutritional status  Outcome: Progressing

## 2023-03-07 NOTE — PROGRESS NOTES
Patient given discharge instructions and verbalized understanding.  Her medication was sent to the pharmacy here at [facility] and she and  stated they would stop and pick it up on the way out of hospital. Volunteer called to transport and patient discharged in stable condition at 1340

## 2023-03-07 NOTE — PROGRESS NOTES
Occupational Therapy  Facility/Department: [facility] C3 TELE/MED SURG/ONC  Treatment/discharge Note    Name: Hussain Sanchez  : 1965  MRN: [id number]  Date of Service: 3/7/2023    Discharge Recommendations:  24 hour supervision or assist          Patient Diagnosis(es): The primary encounter diagnosis was Perioperative dehiscence of abdominal wound with evisceration, initial encounter. Diagnoses of Chronic obstructive pulmonary disease, unspecified COPD type ([address] and Abdominal pain, unspecified abdominal location were also pertinent to this visit. Past Medical History:  has a past medical history of Cirrhosis ([address] COPD (chronic obstructive pulmonary disease) ([facility] and Hyperlipidemia. Past Surgical History:  has a past surgical history that includes Tubal ligation; Thumb arthroscopy; laparotomy (N/A, 2023); and Abdomen surgery (N/A, 2023). Assessment   Assessment: pt from home normally independent with IADL's & functional mobility without AD; pt admitted forABDOMINAL WOUND DEHISCENCE s/pABDOMINAL  WOUND CLOSURE   23; pt independent with functional/toilet transfers, supervision with bathroom mobility with RW due to multiple lines, independent with LE self care; pt discharged from OT with goals met;  REQUIRES OT FOLLOW-UP: No  Activity Tolerance  Activity Tolerance: Patient Tolerated treatment well        Plan   Occupational Therapy Plan  Times Per Week: pt discharged from OT with goals met;      Restrictions  Restrictions/Precautions  Restrictions/Precautions: Fall Risk, General Precautions  Position Activity Restriction  Other position/activity restrictions: 5L O2,IV, abdominal incision with abdominal binder, cushion in chair, up in chair PRN, ambulate pt, high fall risk per nursing assessment    Subjective   General  Chart Reviewed: Yes  Patient assessed for rehabilitation services?: Yes  Additional Pertinent Hx: recently d/c from [facility] on 23  Family / Caregiver Present: No  Referring Practitioner: Tenzin Maciel MD 3/0  Diagnosis: Abdominal wall wound dehiscence with evisceration, s/p abdominal wall closure on 2/28/23; Severe COPD  Subjective  Subjective: denies pain  General Comment  Comments: RN cleared pt for OT; pt awake in bed, crocheting         Objective   Heart Rate: (!) 108  Heart Rate Source: Monitor  BP: 123/71  BP Location: Left upper arm  BP Method: Automatic  Patient Position: Semi fowlers  MAP (Calculated): 88  Resp: 16  SpO2: 90 %  O2 Device: High flow nasal cannula             Safety Devices  Type of Devices: All vianney prominences offloaded;Bed alarm in place; Left in bed;Call light within reach  Restraints  Restraints Initially in Place: No    Toilet Transfers  Toilet - Technique: Ambulating (supervision d/t IV pole & O 2 tubing)     ADL  Feeding: Independent  Grooming: Supervision (standing to wash hands at sink after toileting)  LE Dressing: Independent  Toileting: Independent  Toileting Skilled Clinical Factors: voiding over toilet & pericare     Activity Tolerance  Activity Tolerance: Patient tolerated treatment well  Bed mobility  Supine to Sit: Modified independent (use of bedrail & HOB elevated)  Sit to Supine: Modified independent (use of bedrail)  Scooting: Modified independent (use of bedrail, bridging)  Transfers  Sit to stand: Independent  Stand to sit:  Independent     Cognition  Overall Cognitive Status: WFL  Orientation  Overall Orientation Status: Within Functional Limits  Orientation Level: Oriented X4               Exercise Treatment: BUE AROM & strengthening EOB  Education Given To: Patient  Education Provided: Role of Therapy;Precautions  Education Provided Comments: disease specific: importance of use of RED/nurse call light for ADL needs/transfers, OOB to chair for meals & short periods of time  Education Method: Demonstration;Verbal  Barriers to Learning: None  Education Outcome: Demonstrated understanding                 AM-PAC Score        AM-PAC Inpatient Daily Activity Raw Score: 21 (03/07/23 1529)  AM-PAC Inpatient ADL T-Scale Score : 44.27 (03/07/23 1529)  ADL Inpatient CMS 0-100% Score: 32.79 (03/07/23 1529)  ADL Inpatient CMS G-Code Modifier : CJ (03/07/23 1529)    Tinneti Score       Goals  Short Term Goals  Time Frame for Short Term Goals: 1 week 3/09 unless otherwise specified  Short Term Goal 1: Pt will complete LE dressing with Lacy-- ; 3/07 STG met  Short Term Goal 2: Pt will complete toilet transfers with SBA by 3/07-- 3/07 STG met, modified independent  Short Term Goal 3: Pt will complete standing level ADLs with SBA for balance--3/07 STG met, supervision  Patient Goals   Patient goals : \"to go home\"       Therapy Time   Individual Concurrent Group Co-treatment   Time In 1450         Time Out 1521         Minutes [address] [place], [place]

## 2023-03-08 LAB
ANION GAP SERPL CALCULATED.3IONS-SCNC: 9 MMOL/L (ref 3–16)
BUN BLDV-MCNC: 6 MG/DL (ref 7–20)
CALCIUM SERPL-MCNC: 9 MG/DL (ref 8.3–10.6)
CHLORIDE BLD-SCNC: 86 MMOL/L (ref 99–110)
CO2: 36 MMOL/L (ref 21–32)
CREAT SERPL-MCNC: <0.5 MG/DL (ref 0.6–1.1)
GFR SERPL CREATININE-BSD FRML MDRD: >60 ML/MIN/{1.73_M2}
GLUCOSE BLD-MCNC: 105 MG/DL (ref 70–99)
MAGNESIUM: 2.2 MG/DL (ref 1.8–2.4)
POTASSIUM SERPL-SCNC: 3.2 MMOL/L (ref 3.5–5.1)
SODIUM BLD-SCNC: 131 MMOL/L (ref 136–145)

## 2023-03-08 PROCEDURE — 2580000003 HC RX 258: Performed by: INTERNAL MEDICINE

## 2023-03-08 PROCEDURE — 6370000000 HC RX 637 (ALT 250 FOR IP): Performed by: SURGERY

## 2023-03-08 PROCEDURE — 1200000000 HC SEMI PRIVATE

## 2023-03-08 PROCEDURE — 2580000003 HC RX 258: Performed by: SURGERY

## 2023-03-08 PROCEDURE — 94669 MECHANICAL CHEST WALL OSCILL: CPT

## 2023-03-08 PROCEDURE — 99232 SBSQ HOSP IP/OBS MODERATE 35: CPT | Performed by: INTERNAL MEDICINE

## 2023-03-08 PROCEDURE — 6360000002 HC RX W HCPCS: Performed by: SURGERY

## 2023-03-08 PROCEDURE — 6360000002 HC RX W HCPCS: Performed by: STUDENT IN AN ORGANIZED HEALTH CARE EDUCATION/TRAINING PROGRAM

## 2023-03-08 PROCEDURE — 36415 COLL VENOUS BLD VENIPUNCTURE: CPT

## 2023-03-08 PROCEDURE — 6370000000 HC RX 637 (ALT 250 FOR IP): Performed by: NURSE PRACTITIONER

## 2023-03-08 PROCEDURE — 6360000002 HC RX W HCPCS: Performed by: INTERNAL MEDICINE

## 2023-03-08 PROCEDURE — 94761 N-INVAS EAR/PLS OXIMETRY MLT: CPT

## 2023-03-08 PROCEDURE — 6370000000 HC RX 637 (ALT 250 FOR IP): Performed by: HOSPITALIST

## 2023-03-08 PROCEDURE — 83735 ASSAY OF MAGNESIUM: CPT

## 2023-03-08 PROCEDURE — 80048 BASIC METABOLIC PNL TOTAL CA: CPT

## 2023-03-08 PROCEDURE — 99024 POSTOP FOLLOW-UP VISIT: CPT | Performed by: SURGERY

## 2023-03-08 PROCEDURE — 2700000000 HC OXYGEN THERAPY PER DAY

## 2023-03-08 PROCEDURE — 94640 AIRWAY INHALATION TREATMENT: CPT

## 2023-03-08 PROCEDURE — 6370000000 HC RX 637 (ALT 250 FOR IP): Performed by: INTERNAL MEDICINE

## 2023-03-08 RX ORDER — CIPROFLOXACIN 500 MG/1
500 TABLET, FILM COATED ORAL 2 TIMES DAILY
Qty: 4 TABLET | Refills: 0 | Status: SHIPPED | OUTPATIENT
Start: 2023-03-08 | End: 2023-03-10

## 2023-03-08 RX ORDER — POTASSIUM CHLORIDE 20 MEQ/1
20 TABLET, EXTENDED RELEASE ORAL DAILY
Qty: 7 TABLET | Refills: 0 | Status: SHIPPED | OUTPATIENT
Start: 2023-03-08 | End: 2023-03-15

## 2023-03-08 RX ADMIN — PIPERACILLIN AND TAZOBACTAM 3375 MG: 3; .375 INJECTION, POWDER, FOR SOLUTION INTRAVENOUS at 02:48

## 2023-03-08 RX ADMIN — LEVALBUTEROL HYDROCHLORIDE 1.25 MG: 1.25 SOLUTION, CONCENTRATE RESPIRATORY (INHALATION) at 15:44

## 2023-03-08 RX ADMIN — FUROSEMIDE 40 MG: 40 TABLET ORAL at 09:22

## 2023-03-08 RX ADMIN — OXYCODONE HYDROCHLORIDE 10 MG: 5 TABLET ORAL at 09:27

## 2023-03-08 RX ADMIN — LEVALBUTEROL HYDROCHLORIDE 1.25 MG: 1.25 SOLUTION, CONCENTRATE RESPIRATORY (INHALATION) at 07:46

## 2023-03-08 RX ADMIN — SODIUM CHLORIDE, PRESERVATIVE FREE 10 ML: 5 INJECTION INTRAVENOUS at 20:23

## 2023-03-08 RX ADMIN — PANTOPRAZOLE SODIUM 40 MG: 40 TABLET, DELAYED RELEASE ORAL at 06:31

## 2023-03-08 RX ADMIN — OXYCODONE HYDROCHLORIDE 10 MG: 5 TABLET ORAL at 04:58

## 2023-03-08 RX ADMIN — FLUTICASONE PROPIONATE 1 SPRAY: 50 SPRAY, METERED NASAL at 09:31

## 2023-03-08 RX ADMIN — DIPHENHYDRAMINE HCL 25 MG: 25 TABLET ORAL at 00:26

## 2023-03-08 RX ADMIN — LEVALBUTEROL HYDROCHLORIDE 1.25 MG: 1.25 SOLUTION, CONCENTRATE RESPIRATORY (INHALATION) at 20:44

## 2023-03-08 RX ADMIN — TIOTROPIUM BROMIDE INHALATION SPRAY 2 PUFF: 3.12 SPRAY, METERED RESPIRATORY (INHALATION) at 07:46

## 2023-03-08 RX ADMIN — HYDROMORPHONE HYDROCHLORIDE 1 MG: 1 INJECTION, SOLUTION INTRAMUSCULAR; INTRAVENOUS; SUBCUTANEOUS at 06:31

## 2023-03-08 RX ADMIN — LEVALBUTEROL HYDROCHLORIDE 1.25 MG: 1.25 SOLUTION, CONCENTRATE RESPIRATORY (INHALATION) at 11:44

## 2023-03-08 RX ADMIN — TRAZODONE HYDROCHLORIDE 100 MG: 50 TABLET ORAL at 20:21

## 2023-03-08 RX ADMIN — OXYCODONE HYDROCHLORIDE 10 MG: 5 TABLET ORAL at 20:21

## 2023-03-08 RX ADMIN — PIPERACILLIN AND TAZOBACTAM 3375 MG: 3; .375 INJECTION, POWDER, FOR SOLUTION INTRAVENOUS at 18:48

## 2023-03-08 RX ADMIN — SPIRONOLACTONE 25 MG: 25 TABLET ORAL at 09:29

## 2023-03-08 RX ADMIN — HYDROMORPHONE HYDROCHLORIDE 1 MG: 1 INJECTION, SOLUTION INTRAMUSCULAR; INTRAVENOUS; SUBCUTANEOUS at 18:45

## 2023-03-08 RX ADMIN — HYDROMORPHONE HYDROCHLORIDE 1 MG: 1 INJECTION, SOLUTION INTRAMUSCULAR; INTRAVENOUS; SUBCUTANEOUS at 02:20

## 2023-03-08 RX ADMIN — ENOXAPARIN SODIUM 40 MG: 100 INJECTION SUBCUTANEOUS at 09:22

## 2023-03-08 RX ADMIN — ACETYLCYSTEINE 600 MG: 200 INHALANT RESPIRATORY (INHALATION) at 07:46

## 2023-03-08 RX ADMIN — OXYCODONE HYDROCHLORIDE 10 MG: 5 TABLET ORAL at 00:26

## 2023-03-08 RX ADMIN — POLYETHYLENE GLYCOL 3350 17 G: 17 POWDER, FOR SOLUTION ORAL at 09:22

## 2023-03-08 RX ADMIN — HYDROMORPHONE HYDROCHLORIDE 1 MG: 1 INJECTION, SOLUTION INTRAMUSCULAR; INTRAVENOUS; SUBCUTANEOUS at 13:20

## 2023-03-08 RX ADMIN — HYDROMORPHONE HYDROCHLORIDE 1 MG: 1 INJECTION, SOLUTION INTRAMUSCULAR; INTRAVENOUS; SUBCUTANEOUS at 23:17

## 2023-03-08 RX ADMIN — SODIUM CHLORIDE, PRESERVATIVE FREE 10 ML: 5 INJECTION INTRAVENOUS at 09:30

## 2023-03-08 RX ADMIN — PIPERACILLIN AND TAZOBACTAM 3375 MG: 3; .375 INJECTION, POWDER, FOR SOLUTION INTRAVENOUS at 12:01

## 2023-03-08 ASSESSMENT — PAIN DESCRIPTION - LOCATION
LOCATION: ABDOMEN
LOCATION: BUTTOCKS
LOCATION: ABDOMEN
LOCATION: ABDOMEN
LOCATION: ABDOMEN;HIP
LOCATION: ABDOMEN

## 2023-03-08 ASSESSMENT — PAIN - FUNCTIONAL ASSESSMENT
PAIN_FUNCTIONAL_ASSESSMENT: PREVENTS OR INTERFERES SOME ACTIVE ACTIVITIES AND ADLS

## 2023-03-08 ASSESSMENT — PAIN DESCRIPTION - DESCRIPTORS
DESCRIPTORS: ACHING;SHARP;STABBING
DESCRIPTORS: ACHING;SHARP
DESCRIPTORS: ACHING;SHARP;STABBING

## 2023-03-08 ASSESSMENT — PAIN SCALES - GENERAL
PAINLEVEL_OUTOF10: 9
PAINLEVEL_OUTOF10: 8
PAINLEVEL_OUTOF10: 8
PAINLEVEL_OUTOF10: 9
PAINLEVEL_OUTOF10: 10
PAINLEVEL_OUTOF10: 10
PAINLEVEL_OUTOF10: 9
PAINLEVEL_OUTOF10: 10

## 2023-03-08 ASSESSMENT — PAIN DESCRIPTION - ORIENTATION
ORIENTATION: MID
ORIENTATION: RIGHT;MID
ORIENTATION: RIGHT

## 2023-03-08 NOTE — PROGRESS NOTES
Physical Therapy  Attempted to see pt for therapy. Pt declining therapy at this time. States just received pain medicine and would like to rest. Will re-attempt as schedule permits. Thank you. Attempted to see pt for second attempt 10:50. Pt currently sleeping. Will re-attempt as schedule permits. Thank you.   Mars Peterson, PT, DPT

## 2023-03-08 NOTE — CARE COORDINATION
Chart reviewed day 8. Spoke with Dr Lauren Mendez. Surgery ok with d/c. Would like walk test completed. Spoke with Kaye FOSTER, will do walk test. Spoke with Hola Moy with [employer] will see about provided higher dose concentrator for home use as well as walker. Spoke with [facility], aware patient will likely d/c today. Anda Kawasaki, RN    Spoke with Hola Moy with [employer] stated patient active with [employer] home O2. Spoke with Edis Johnson with [facility], faxed O2 testing, order and documentation to her, approved. Will need 10 L concentrator in home setting. Not available tonight will be available tomorrow. Spoke with MD, will not d/c till has required flow rate. Updated Kaye FOSTER. Per Hola Moy with [employer]. Will not be able to provide RW under insurance as patient just got the Wheelchair provided to her.  Patient will pay privately for Molly Ogden RN

## 2023-03-08 NOTE — PROGRESS NOTES
Hospitalist Progress Note      PCP: Joaquín Chadwick, ZARI - CNP    Date of Admission: 2/28/2023    Chief Complaint: Abdominal pain, cough      Subjective:  She is feeling better. Pain controlled. Minimal dyspnea. Jeison Carolyne to discharge. Now on 7.5 LNC. 90% during my visit. She says that at baseline she sometimes desaturates into the 80's at home on her [facility]. Her home machine maxes out at 5L. I asked CM to see if we can get her more equipment at home so that she can use more oxygen. Medications:  Reviewed    Infusion Medications    sodium chloride       Scheduled Medications    furosemide  40 mg Oral Daily    pantoprazole  40 mg Oral QAM AC    fluticasone  1 spray Each Nostril Daily    spironolactone  25 mg Oral Daily    piperacillin-tazobactam  3,375 mg IntraVENous Q8H    levalbuterol  1.25 mg Nebulization Q4H WA    polyethylene glycol  17 g Oral Daily    traZODone  100 mg Oral Nightly    sodium chloride flush  5-40 mL IntraVENous 2 times per day    enoxaparin  40 mg SubCUTAneous Daily    tiotropium  2 puff Inhalation Daily     PRN Meds: magnesium sulfate, potassium chloride **OR** potassium alternative oral replacement **OR** potassium chloride, diphenhydrAMINE, sodium chloride, calcium carbonate, HYDROmorphone **OR** HYDROmorphone, oxyCODONE **OR** oxyCODONE, sodium chloride flush, sodium chloride, ondansetron **OR** ondansetron, acetaminophen, benzocaine-menthol      Intake/Output Summary (Last 24 hours) at 3/8/2023 1229  Last data filed at 3/8/2023 0949  Gross per 24 hour   Intake 2717.36 ml   Output 3500 ml   Net -782.64 ml       Physical Exam Performed:    /73   Pulse (!) 108   Temp 98.4 °F (36.9 °C) (Oral)   Resp 20   Ht 5' 1\" (1.549 m)   Wt 111 lb (50.3 kg)   SpO2 (!) 87%   BMI 20.97 kg/m²     General appearance: No apparent distress, appears stated age. HEENT: Pupils equal, round, and reactive to light. Conjunctivae/corneas clear. Neck: Supple, with full range of motion.  No jugular venous distention. Trachea midline. Respiratory:  Normal respiratory effort. Clear to auscultation, bilaterally without Rales/Wheezes/Rhonchi. Cardiovascular: Regular rate and rhythm with normal S1/S2 without murmurs, rubs or gallops. Abdomen: Soft, mild diffuse tenderness, non-distended with normal bowel sounds. Musculoskeletal: No clubbing, cyanosis. 2+ BLE pitting edema has now resolved. Full range of motion without deformity. Skin: Skin color, texture, turgor normal.  No rashes or lesions. Neurologic:  Neurovascularly intact without any focal sensory/motor deficits. Cranial nerves: II-XII intact, grossly non-focal.  Psychiatric: Alert and oriented, thought content appropriate, has insight. Poor concentration, tangential thoughts, impulsive. Capillary Refill: Brisk, 3 seconds, normal   Peripheral Pulses: +2 palpable, equal bilaterally       Labs:   Recent Labs     03/07/23 0527   WBC 10.4   HGB 10.3*   HCT 30.6*        Recent Labs     03/07/23  0527 03/07/23  1450 03/08/23  0558   * 130* 131*   K 2.8* 3.2* 3.2*   CL 88* 83* 86*   CO2 38* 37* 36*   BUN 6* 6* 6*   CREATININE <0.5* <0.5* <0.5*   CALCIUM 8.4 8.6 9.0   PHOS 3.8  --   --      No results for input(s): AST, ALT, BILIDIR, BILITOT, ALKPHOS in the last 72 hours. No results for input(s): INR in the last 72 hours. No results for input(s): Curlie Lat in the last 72 hours. Urinalysis:    No results found for: Isadore [place], BACTERIA, RBCUA, BLOODU, [address], [address]    Radiology:  XR CHEST PORTABLE   Final Result   Bibasilar airspace infiltrates with small bilateral pleural effusions. This   could represent atelectasis versus pneumonia in the appropriate clinical   setting         XR CHEST PORTABLE   Final Result   Left lower lobe airspace infiltrates. This could represent atelectasis   versus pneumonia in the appropriate clinical setting.              IP CONSULT TO HOSPITALIST  IP CONSULT TO PULMONOLOGY    Assessment/Plan:    Active Hospital Problems    Diagnosis     Hypokalemia [E87.6]      Priority: Medium    Pneumonia due to Enterobacter species ([address] [J15.6]      Priority: Medium    Leukocytosis [D72.829]      Priority: Medium    Normocytic normochromic anemia [D64.9]      Priority: Medium    Hyponatremia [E87.1]      Priority: Medium    Pulmonary infiltrates [R91.8]      Priority: Medium    Atelectasis [J98.11]      Priority: Medium    Pleural effusion [J90]      Priority: Medium    Acute on chronic respiratory failure with hypoxia (HCC) [J96.21]      Priority: Medium    Perioperative dehiscence of abdominal wound with evisceration [T81.31XA]      Priority: Medium    Perioperative dehiscence of abdominal wound with evisceration, initial encounter [T81.31XA]      Priority: Medium       62 Y F with a h/o COPD, HLD, and cirrhosis. Admitted here 2/18 with SBO, did not respond to conservative management, required lysis of adhesions and umbilical hernia repair on 2/22, discharged home 2/27. Returned to the ED on 2/28 because she coughed at home, heard a \"pop,\" and saw that intestines had protruded from her abdominal wound. Dehiscence with evisceration. Bowel loops reduced on 2/28, wound closed in OR. Enterobacter HCAP. Zosyn per pulmonary, can discharge with PO cipro to complete a 7-day course through 3/10. Pulmonary mucus plugging. Acetylcysteine nebs BID. Volume overload, complicated by bilateral pleural effusions. Resumed her usual furosemide 40 qd (completed course of 40 IV BID here) and spironolactone 25 qd in light of her cirrhosis. COPD. Inhaled bronchodilators. She quit cigarettes years ago. Ongoing marijuana use, encouraged cessation. Acute on chronic hypoxic respiratory failure. Due to above issues. Wean to baseline 5LNC. - she says that at baseline she sometimes desaturates into the 80's at home on her [facility]. Her home machine maxes out at 5L.   I asked CM to see if we can get her more equipment at home so that she can use more oxygen, perhaps 8LNC. DVT Prophylaxis: enoxaparin  Diet: ADULT DIET; Regular; Low Fiber  ADULT ORAL NUTRITION SUPPLEMENT; Breakfast, Lunch, Dinner; Standard High Calorie/High Protein Oral Supplement  Code Status: Full Code  PT/OT Eval Status: rec'd home PT/OT    Dispo - I'm OK with discharge if we can arrange for her to have 8L NC at home. I think she is either at her pulmonary baseline or close enough. I sent two days of cipro and a week of potassium to the outpatient pharmacy.       Appropriate for A1 Discharge Unit: No      Dennys Ortez MD

## 2023-03-08 NOTE — PROGRESS NOTES
[facility] GENERAL SURGERY    Surgery Progress Note           POD # 8    PATIENT NAME: Kendy Del Rio     TODAY'S DATE: 3/8/2023    INTERVAL HISTORY:    Pt doing well, eating. Bowels are working. Anxious to go home    OBJECTIVE:   VITALS:  /73   Pulse (!) 103   Temp 98.4 °F (36.9 °C) (Oral)   Resp 20   Ht 5' 1\" (1.549 m)   Wt 111 lb (50.3 kg)   SpO2 92%   BMI 20.97 kg/m²     INTAKE/OUTPUT:    I/O last 3 completed shifts: In: 3077.4 [P.O.:2140; I.V.:198.7; IV Piggyback:738.6]  Out: 6300 [Urine:6300]  I/O this shift:  In: 240 [P.O.:240]  Out: 500 [Urine:500]              CONSTITUTIONAL:  awake and alert     ABDOMEN:   , soft, non-distended, incisional tenderness   INCISION: clean, no drainage    Data:  CBC:   Recent Labs     03/07/23 0527   WBC 10.4   HGB 10.3*   HCT 30.6*          BMP:    Recent Labs     03/07/23  0527 03/07/23  1450 03/08/23  0558   * 130* 131*   K 2.8* 3.2* 3.2*   CL 88* 83* 86*   CO2 38* 37* 36*   BUN 6* 6* 6*   CREATININE <0.5* <0.5* <0.5*   GLUCOSE 120* 133* 105*       Hepatic:   No results for input(s): AST, ALT, ALB, BILITOT, ALKPHOS in the last 72 hours. Mag:      Recent Labs     03/07/23  0527 03/08/23  0558   MG 1.50* 2.20        Phos:     Recent Labs     03/07/23 0527   PHOS 3.8         ASSESSMENT AND PLAN:  62 y.o. female status post repair of abdominal wound evisceration. Increased wound drainage may represent repeat fascial separation, but with the retention sutures in place, should be protected from repeat evisceration  Wound stable. Continue with medical management of pulmonary issues  Hypokalemia: replacing    Dispo: once medically stable, and ok with pulmonary and IM      Electronically signed by ZARI Humphrey CNP     Patient seen and agree with above and more than half of the total time was spent by me on the encounter.      Salvador Baires MD

## 2023-03-08 NOTE — PROGRESS NOTES
4 Eyes Skin Assessment     The patient is being assess for  Shift Handoff    I agree that 2 RN's have performed a thorough Head to Toe Skin Assessment on the patient. ALL assessment sites listed below have been assessed. Areas assessed by both nurses: db escobar rn  [x]   Head, Face, and Ears   [x]   Shoulders, Back, and Chest  [x]   Arms, Elbows, and Hands   [x]   Coccyx, Sacrum, and Ischum  [x]   Legs, Feet, and Heels    Stage 2 coccyx with cream         Does the Patient have Skin Breakdown?   Yes LDA WOUND CARE was Initiated documentation include the Lizeth-wound, Wound Assessment, Measurements, Dressing Treatment, Drainage, and Color\",         Dedrick Prevention initiated:  Yes   Wound Care Orders initiated:  Yes      [id number]  nurse consulted for Pressure Injury (Stage 3,4, Unstageable, DTI, NWPT, and Complex wounds):  n/a    Nurse 1 eSignature: Electronically signed by Collin Malik RN on 3/8/23 at 6:27 PM EST    **SHARE this note so that the co-signing nurse is able to place an eSignature**    Nurse 2 eSignature: Electronically signed by Dawna Bullard RN on 3/8/23 at 9:09 PM EST

## 2023-03-08 NOTE — PROGRESS NOTES
O2 saturation at REST on 4 liters nasal canula = _86_____%  Pt is 93 % on 6 Liters nasal canula  If saturation is 89% or above please proceed with steps 2 and 3. ......... O2 saturation with AMBULATION of __20___ feet on 6 l/nc ___%  O2 saturation with AMBULATION on current liter flow = ___90___%    DCP notified: ______    Signature: _____. Kevin Melchor RN ____________________ Date: __3/8/2023________   Time: ____1400__

## 2023-03-08 NOTE — PROGRESS NOTES
INPATIENT PULMONARY CRITICAL CARE PROGRESS NOTE      Reason for visit    Hypoxemia    SUBJECTIVE: Patient when seen this morning was feeling better, patient did not have any significant shortness of breath or wheezing, no increasing chest congestion, patient was on 5 L of nasal cannula oxygen with saturation of 92%, patient takes oxygen at home and patient states that normally she uses 5 L of oxygen at home, patient does not have any chest pain, no abdominal discomfort, patient tolerating oral diet, patient was afebrile and he medically maintained, patient had sinus rhythm on the monitor which was sinus tachycardia, patient was having adequate urine output with cumulative fluid balance of -4.9 L, patient's glycemic control was acceptable, no other pertinent review of system of concern      Physical Exam:  Blood pressure 107/67, pulse (!) 102, temperature 98.4 °F (36.9 °C), temperature source Oral, resp. rate 18, height 5' 1\" (1.549 m), weight 111 lb (50.3 kg), SpO2 94 %.'   Constitutional:  No acute distress. HENT:  Oropharynx is clear and moist. No thyromegaly. Eyes:  Conjunctivae are normal. Pupils equal, round, and reactive to light. No scleral icterus. Neck: . No tracheal deviation present. No obvious thyroid mass. Cardiovascular: Sinus tachycardia, normal heart sounds. No right ventricular heave. No lower extremity edema. Pulmonary/Chest: No wheezes. Minimal basilar rales. Chest wall is not dull to percussion. No accessory muscle usage or stridor. Decreased breath sound intensity  Abdominal: Soft. Bowel sounds present. No distension or hernia. No tenderness. Healing staples/retention sutures were intact  Musculoskeletal: No cyanosis. No clubbing. No obvious joint deformity. Lymphadenopathy: No cervical or supraclavicular adenopathy. Skin: Skin is warm and dry. No rash or nodules on the exposed extremities.   Neurologic: Alert and oriented with no focal cranial nerve deficits when evaluated        Results:  CBC:   Recent Labs     03/07/23  0527   WBC 10.4   HGB 10.3*   HCT 30.6*   MCV 96.7          BMP:   Recent Labs     03/07/23  0527 03/07/23  1450 03/08/23  0558   * 130* 131*   K 2.8* 3.2* 3.2*   CL 88* 83* 86*   CO2 38* 37* 36*   PHOS 3.8  --   --    BUN 6* 6* 6*   CREATININE <0.5* <0.5* <0.5*             Imaging:  I have reviewed radiology images personally. XR CHEST PORTABLE   Final Result   Bibasilar airspace infiltrates with small bilateral pleural effusions. This   could represent atelectasis versus pneumonia in the appropriate clinical   setting         XR CHEST PORTABLE   Final Result   Left lower lobe airspace infiltrates. This could represent atelectasis   versus pneumonia in the appropriate clinical setting. XR ABDOMEN (KUB) (SINGLE AP VIEW)    Result Date: 2/21/2023  EXAMINATION: ONE SUPINE XRAY VIEW(S) OF THE ABDOMEN 2/21/2023 7:10 am COMPARISON: 2/20/2023 HISTORY: ORDERING SYSTEM PROVIDED HISTORY: Small bowel obstruction. TECHNOLOGIST PROVIDED HISTORY: Reason for exam:->Small bowel obstruction. Reason for Exam: sbft obstruction FINDINGS: Dilated small bowel loops are seen centrally filled with contrast. Small bowel dilatation is slightly less pronounced. There is likely a small amount of contrast entering the cecum in the right lower quadrant. Slight decrease in small bowel dilatation. There is likely a small amount of contrast entering the cecum     XR ABDOMEN (KUB) (SINGLE AP VIEW)    Result Date: 2/20/2023  EXAMINATION: ONE SUPINE X-RAY VIEW(S) OF THE ABDOMEN 2/20/2023 9:54 pm COMPARISON: February 28, 2023 and 9:18 a.m. HISTORY: ORDERING SYSTEM PROVIDED HISTORY:  Small bowel obstruction. TECHNOLOGIST PROVIDED HISTORY: Reason for Exam:  Small bowel obstruction. FINDINGS: Contrast is noted in the small bowel however, nonvisualization of large bowel in the current evaluation. Small bowel loops are dilated.   This was seen in previous evaluations. No evidence of free air. Dilated small bowel loops filled with contrast.  Nonvisualization of large bowel yet. XR ABDOMEN (KUB) (SINGLE AP VIEW)    Result Date: 2/19/2023  EXAMINATION: ONE SUPINE XRAY VIEW(S) OF THE ABDOMEN 2/19/2023 10:06 am COMPARISON: 02/18/2013 HISTORY: ORDERING SYSTEM PROVIDED HISTORY: NG Placement TECHNOLOGIST PROVIDED HISTORY: Reason for exam:->NG Placement FINDINGS: Tip of NG tube is unchanged. Tip projects in the left upper quadrant, similar to prior, presumably in hiatal hernia. There is decreased gaseous distention compared to prior. Tip of NG tube projects in left upper quadrant, presumably in hiatal hernia,. There is decreased gaseous distention compared to prior     XR ABDOMEN (KUB) (SINGLE AP VIEW)    Result Date: 2/18/2023  EXAMINATION: ONE SUPINE XRAY VIEW(S) OF THE ABDOMEN 2/18/2023 1:38 pm COMPARISON: CT obtained on the same day HISTORY: ORDERING SYSTEM PROVIDED HISTORY: NG tube placement TECHNOLOGIST PROVIDED HISTORY: Reason for exam:->NG tube placement Reason for Exam: ng tube placement FINDINGS: Enteric tube is seen with the tip and the side hole likely located within the large hiatal hernia seen on the same day CT. Small-bowel obstruction seen on same day CT is not well visualized due to relative lack of intraluminal gas. Enteric tube with the tip in the side hole likely located within the large hiatal hernia is seen on the same day CT. Small-bowel obstruction seen on the same day CT not well appreciated due to relative lack of intraluminal gas. XR CHEST PORTABLE    Result Date: 3/3/2023  EXAMINATION: ONE XRAY VIEW OF THE CHEST 3/3/2023 11:39 am COMPARISON: Chest x-ray dated 2 March 2023 HISTORY: ORDERING SYSTEM PROVIDED HISTORY: shortness of breath TECHNOLOGIST PROVIDED HISTORY: Reason for exam:->shortness of breath FINDINGS: Bibasilar airspace infiltrates with small bilateral pleural effusions. No pneumothorax.   Normal cardiomediastinal silhouette     Bibasilar airspace infiltrates with small bilateral pleural effusions. This could represent atelectasis versus pneumonia in the appropriate clinical setting     XR CHEST PORTABLE    Result Date: 3/2/2023  EXAMINATION: ONE XRAY VIEW OF THE CHEST 3/2/2023 9:51 am COMPARISON: None. HISTORY: ORDERING SYSTEM PROVIDED HISTORY: shortness of breath TECHNOLOGIST PROVIDED HISTORY: Reason for exam:->shortness of breath Reason for Exam: SOB FINDINGS: Left basilar airspace infiltrates. No pneumothorax or pleural effusion. Normal cardiomediastinal silhouette     Left lower lobe airspace infiltrates. This could represent atelectasis versus pneumonia in the appropriate clinical setting. FL SMALL BOWEL FOLLOW THROUGH ONLY    Result Date: 2/24/2023  EXAMINATION: SMALL BOWEL FOLLOW THROUGH SERIES 2/20/2023 TECHNIQUE: Small bowel follow through series was performed with overhead images and spot images. FLUOROSCOPY DOSE AND TYPE: None COMPARISON: CT abdomen/pelvis 2/18/2023 HISTORY: ORDERING SYSTEM PROVIDED HISTORY: SBO TECHNOLOGIST PROVIDED HISTORY: Reason for exam:->SBO Reason for exam:->give contrast through ng and clamp until study done FINDINGS: The study was performed portably.  film of the abdomen demonstrates persistent moderate gaseous distention of several loops of small bowel. Nasogastric tube is in place with distal tip located within the proximal stomach. Again noted is remote posttraumatic deformity of the leftward aspect of the medial aspects of the left-sided pubic rami. Two cups of thin barium were administered via the patient's nasogastric tube. Limited imaging of the stomach demonstrates moderate-sized hiatal hernia. There is prominence of folds about the proximal stomach which may in part be related to incomplete distention although true fold thickening including changes of underlying gastritis not excluded based on this examination.  There is moderate-marked dilatation of multiple loops of small bowel within the abdomen and pelvis. At 6.5 hours, there is no significant progression of contrast material within the dilated small bowel loops. Combination of findings is again consistent with changes of small bowel obstruction. 1. Persistent findings consistent with presence of small bowel obstruction. Plain film examination of the abdomen will be obtained at 10 PM this evening and 7 AM on 2/21/2023 to evaluate movement of contrast material. 2. Moderate-sized hiatal hernia. 3. Prominence of folds within the proximal aspect of the stomach. While finding in part may be on the basis of incomplete distention, true fold thickening including changes of underlying gastritis also in the differential diagnosis as described above. CT CHEST ABDOMEN PELVIS WO CONTRAST Additional Contrast? None    Result Date: 2/18/2023  EXAMINATION: CT OF THE CHEST, ABDOMEN, AND PELVIS WITHOUT CONTRAST 2/18/2023 11:32 am TECHNIQUE: CT of the chest, abdomen and pelvis was performed without the administration of intravenous contrast. Multiplanar reformatted images are provided for review. Automated exposure control, iterative reconstruction, and/or weight based adjustment of the mA/kV was utilized to reduce the radiation dose to as low as reasonably achievable. COMPARISON: None HISTORY: ORDERING SYSTEM PROVIDED HISTORY: shortness of breath TECHNOLOGIST PROVIDED HISTORY: Reason for exam:->shortness of breath Additional Contrast?->None Reason for Exam: N/V/C since Tuesday, known COPD, sob Relevant Medical/Surgical History: ovaries removed, tubal ligation, smoked x 30 yrs. Quit 10 yrs ago FINDINGS: Chest: Mediastinum: Heart is normal in size. There is moderate atherosclerotic calcification coronary arteries. Aorta and pulmonary arteries are normal. No lymph node enlargement. Normal thyroid. Large hiatal hernia. Lungs/pleura: Peripheral opacification of bronchioles in the left lower lobe with no associated airspace change. There is atelectasis in the left lower lobe adjacent to the hiatal hernia. Additional bands of atelectasis in the right upper and middle lobe that are minimal.  No acute airspace finding is otherwise noted. No suspicious mass or nodule. Mild centrilobular emphysema. Soft Tissues/Bones: Age indeterminate compression fracture to the superior endplate of T8. No retropulsion or associated spinal canal stenosis. There is also a remote fracture of the lateral right 7th rib with callus formation at the adjacent 8th rib. Remote fractures are also seen at the left 6th through 8th ribs. Abdomen/Pelvis: Organs: Cholelithiasis without inflammation or biliary dilatation. The liver, pancreas and spleen are unremarkable. Kidneys and adrenal glands are normal. GI/Bowel: The distal stomach is normal.  There is mild dilation of the duodenum with diffuse dilation of the jejunum and proximal ileum with an apparent transition point in the right lower quadrant/pelvis. Definitive transition and etiology is difficult to confirm. There is decompression of the distal ileum. No appendix is identified. Stool and air are seen throughout the colon with diffuse diverticular changes but no inflammation. Pelvis: The bladder is unremarkable. The uterus and adnexa are unremarkable. Peritoneum/Retroperitoneum: The aorta tapers normally. No lymph node enlargement. Bones/Soft Tissues: Questionable mild compression fracture to the superior endplate of X41. No acute skeletal finding in the abdomen or pelvis. There is a remote healed fracture of the left pelvis involving the superior and inferior pubic rami. 1. Small-bowel obstruction with indeterminate etiology and an apparent transition in the right lower quadrant/pelvis. There is no pattern of pneumatosis or perforation. 2. Decompression of the distal ileum with stool and air in the colon. This may indicate incomplete obstruction or a recently developing pattern of obstruction.  3. Large hiatal hernia contributing to atelectasis in the left lower lobe. Peripheral bronchial opacification in the left lower lobe could potentially be in association, although mucoid impaction is suspected. 4. Age indeterminate compression fractures that are likely remote at both T8 and T12. Additional remote fractures of the ribs and left pelvis are noted. XR ABDOMEN (2 VIEWS)    Result Date: 2/22/2023  EXAMINATION: TWO XRAY VIEWS OF THE ABDOMEN 2/22/2023 6:00 am COMPARISON: 02/21/2023 radiograph HISTORY: ORDERING SYSTEM PROVIDED HISTORY: f/u SBO TECHNOLOGIST PROVIDED HISTORY: Reason for exam:->f/u SBO Reason for Exam: f/u SBO FINDINGS: Progressive transit of previously administered enteric contrast through the small bowel now present in the colon. There is dilution of the contrast column remaining within the stomach and throughout the small bowel. Persistent diffuse dilation of small bowel, perhaps slightly increased from prior study. No skeletal abnormality. Slow progressive transit of enteric contrast into the colon with dilution of the more proximal contrast column. Persistent dilation may represent ileus or a partial small bowel obstruction. XR ABDOMEN (2 VIEWS)    Result Date: 2/20/2023  EXAMINATION: TWO XRAY VIEWS OF THE ABDOMEN 2/20/2023 6:37 am COMPARISON: 02/19/2023 radiograph HISTORY: ORDERING SYSTEM PROVIDED HISTORY: sbo TECHNOLOGIST PROVIDED HISTORY: Reason for exam:->sbo Reason for Exam: f/u SBO FINDINGS: Enteric tube stable in the lumen of the stomach in a patient with a known hiatal hernia. This projects in the lower left chest.  Lung bases are clear. Dilated, air-filled loops of small bowel are identified in the mid abdomen similar to the prior radiograph. Paucity of air in the colon. No abnormal soft tissue mass or calcification. There are no significant skeletal findings.      History of small-bowel obstruction with stable dilated air-filled loops of small bowel in comparison to the prior radiograph.        CULTURE, RESPIRATORY Further report to follow P  [facility]   Gram Stain Result 3+ WBC's (Polymorphonuclear)   3+ Gram negative rods   1+ Gram positive cocci   1+ Gram positive rods       CULTURE, RESPIRATORY Light growth normal respiratory lenin with Abnormal   [facility]   Gram Stain Result 3+ WBC's (Polymorphonuclear)   3+ Gram negative rods   1+ Gram positive cocci   1+ Gram positive rods  [facility]   Organism Enterobacter hormaechei Abnormal   [facility]   CULTURE, RESPIRATORY Heavy growth  [facility]        Susceptibility    Enterobacter hormaechei (1)    Antibiotic Interpretation Microscan  Method Status    amoxicillin-clavulanate Resistant >16/8 mcg/mL BACTERIAL SUSCEPTIBILITY PANEL BY NEW     ampicillin Resistant >16 mcg/mL BACTERIAL SUSCEPTIBILITY PANEL BY NEW     ampicillin-sulbactam Resistant >16/8 mcg/mL BACTERIAL SUSCEPTIBILITY PANEL BY NEW     ceFAZolin Resistant >16 mcg/mL BACTERIAL SUSCEPTIBILITY PANEL BY NEW     cefepime Sensitive <=2 mcg/mL BACTERIAL SUSCEPTIBILITY PANEL BY NEW     cefTRIAXone Resistant >32 mcg/mL BACTERIAL SUSCEPTIBILITY PANEL BY NEW     cefuroxime Resistant >16 mcg/mL BACTERIAL SUSCEPTIBILITY PANEL BY NEW     ciprofloxacin Sensitive <=1 mcg/mL BACTERIAL SUSCEPTIBILITY PANEL BY NEW     ertapenem Sensitive <=0.5 mcg/mL BACTERIAL SUSCEPTIBILITY PANEL BY NEW     gentamicin Sensitive <=4 mcg/mL BACTERIAL SUSCEPTIBILITY PANEL BY NEW     meropenem Sensitive <=1 mcg/mL BACTERIAL SUSCEPTIBILITY PANEL BY NEW     piperacillin-tazobactam Sensitive <=16 mcg/mL BACTERIAL SUSCEPTIBILITY PANEL BY NEW     trimethoprim-sulfamethoxazole Sensitive <=2/38 mcg/mL BACTERIAL SUSCEPTIBILITY PANEL BY NEW                  Assessment:  Principal Problem:    Perioperative dehiscence of abdominal wound with evisceration  Active Problems:    Perioperative dehiscence of abdominal wound with evisceration, initial encounter    Acute on chronic respiratory failure with hypoxia (HCC)    Leukocytosis    Normocytic normochromic anemia    Hyponatremia    Pulmonary infiltrates    Atelectasis    Pleural effusion    Hypokalemia    Pneumonia due to Enterobacter species [facility])  Resolved Problems:    * No resolved hospital problems. *          Plan:   Oxygen supplementation to keep saturation between 90 and 94% only  Please titrate his oxygen as per the above parameters  Patient's oxygen requirements are coming down and patient when seen this morning was on 5 L oxygen to maintain oxygen saturation which was patient's baseline  Patient's recent CT of the chest along with x-ray from yesterday was reviewed and patient has persistent bilateral pleural effusions along with pulm infiltrates and atelectasis which may be responsible for patient's hypoxemia along with that patient also had abdominal surgery with abdominal closure for perioperative dehiscence of the abdominal wall and evisceration causing patient to have some restrictive pattern especially in view that patient also had some compression fracture of the back on the imaging  Patient to continue with Xopenex nebulization  Spiriva to continue  Mucomyst nebulization being discontinued  Patient respite culture shows patient to have Enterobacter pneumonia which was sensitive to Zosyn  IV Zosyn can be changed to p.o. Cipro if deemed appropriate  Further management depending on patient clinical status and cultures  Incentive spirometry and Acapella to continue  Trend WBC count  No need for any bronchoscopy  Monitor input output and BMP  Saline nasal spray to continue  Medication for GERD as per primary team  Correct electrolytes on whenever necessary basis  PUD and DVT prophylaxis as per primary team  Ambulate as tolerated  PT/OT-patient states that she will be getting home therapy    ? Discharge planning      Case discussed with patient and nursing along with RT          Electronically signed by:  Wesley Mohamud MD    3/8/2023    8:48 AM.

## 2023-03-09 VITALS
SYSTOLIC BLOOD PRESSURE: 110 MMHG | BODY MASS INDEX: 20.96 KG/M2 | OXYGEN SATURATION: 95 % | HEART RATE: 102 BPM | WEIGHT: 111 LBS | DIASTOLIC BLOOD PRESSURE: 68 MMHG | HEIGHT: 61 IN | TEMPERATURE: 98.4 F | RESPIRATION RATE: 16 BRPM

## 2023-03-09 PROCEDURE — 97530 THERAPEUTIC ACTIVITIES: CPT

## 2023-03-09 PROCEDURE — 2700000000 HC OXYGEN THERAPY PER DAY

## 2023-03-09 PROCEDURE — 97110 THERAPEUTIC EXERCISES: CPT

## 2023-03-09 PROCEDURE — 2580000003 HC RX 258: Performed by: INTERNAL MEDICINE

## 2023-03-09 PROCEDURE — 6370000000 HC RX 637 (ALT 250 FOR IP): Performed by: HOSPITALIST

## 2023-03-09 PROCEDURE — 6360000002 HC RX W HCPCS: Performed by: STUDENT IN AN ORGANIZED HEALTH CARE EDUCATION/TRAINING PROGRAM

## 2023-03-09 PROCEDURE — 6360000002 HC RX W HCPCS: Performed by: INTERNAL MEDICINE

## 2023-03-09 PROCEDURE — 99231 SBSQ HOSP IP/OBS SF/LOW 25: CPT | Performed by: INTERNAL MEDICINE

## 2023-03-09 PROCEDURE — 6360000002 HC RX W HCPCS: Performed by: SURGERY

## 2023-03-09 PROCEDURE — 6370000000 HC RX 637 (ALT 250 FOR IP): Performed by: INTERNAL MEDICINE

## 2023-03-09 PROCEDURE — 94761 N-INVAS EAR/PLS OXIMETRY MLT: CPT

## 2023-03-09 PROCEDURE — 6370000000 HC RX 637 (ALT 250 FOR IP): Performed by: NURSE PRACTITIONER

## 2023-03-09 PROCEDURE — 97116 GAIT TRAINING THERAPY: CPT

## 2023-03-09 PROCEDURE — 2580000003 HC RX 258: Performed by: SURGERY

## 2023-03-09 PROCEDURE — 6370000000 HC RX 637 (ALT 250 FOR IP): Performed by: SURGERY

## 2023-03-09 PROCEDURE — 94640 AIRWAY INHALATION TREATMENT: CPT

## 2023-03-09 PROCEDURE — 94669 MECHANICAL CHEST WALL OSCILL: CPT

## 2023-03-09 RX ORDER — POTASSIUM CHLORIDE 20 MEQ/1
40 TABLET, EXTENDED RELEASE ORAL ONCE
Status: COMPLETED | OUTPATIENT
Start: 2023-03-09 | End: 2023-03-09

## 2023-03-09 RX ORDER — POLYETHYLENE GLYCOL 3350 17 G/17G
17 POWDER, FOR SOLUTION ORAL DAILY PRN
Qty: 527 G | Refills: 0 | Status: SHIPPED | OUTPATIENT
Start: 2023-03-09 | End: 2023-04-09

## 2023-03-09 RX ADMIN — POLYETHYLENE GLYCOL 3350 17 G: 17 POWDER, FOR SOLUTION ORAL at 09:15

## 2023-03-09 RX ADMIN — Medication 1 LOZENGE: at 14:46

## 2023-03-09 RX ADMIN — OXYCODONE HYDROCHLORIDE 10 MG: 5 TABLET ORAL at 01:55

## 2023-03-09 RX ADMIN — FLUTICASONE PROPIONATE 1 SPRAY: 50 SPRAY, METERED NASAL at 09:18

## 2023-03-09 RX ADMIN — LEVALBUTEROL HYDROCHLORIDE 1.25 MG: 1.25 SOLUTION, CONCENTRATE RESPIRATORY (INHALATION) at 08:09

## 2023-03-09 RX ADMIN — PANTOPRAZOLE SODIUM 40 MG: 40 TABLET, DELAYED RELEASE ORAL at 05:18

## 2023-03-09 RX ADMIN — SODIUM CHLORIDE, PRESERVATIVE FREE 10 ML: 5 INJECTION INTRAVENOUS at 09:16

## 2023-03-09 RX ADMIN — HYDROMORPHONE HYDROCHLORIDE 1 MG: 1 INJECTION, SOLUTION INTRAMUSCULAR; INTRAVENOUS; SUBCUTANEOUS at 10:33

## 2023-03-09 RX ADMIN — HYDROMORPHONE HYDROCHLORIDE 1 MG: 1 INJECTION, SOLUTION INTRAMUSCULAR; INTRAVENOUS; SUBCUTANEOUS at 05:18

## 2023-03-09 RX ADMIN — ENOXAPARIN SODIUM 40 MG: 100 INJECTION SUBCUTANEOUS at 09:19

## 2023-03-09 RX ADMIN — PIPERACILLIN AND TAZOBACTAM 3375 MG: 3; .375 INJECTION, POWDER, FOR SOLUTION INTRAVENOUS at 02:56

## 2023-03-09 RX ADMIN — TIOTROPIUM BROMIDE INHALATION SPRAY 2 PUFF: 3.12 SPRAY, METERED RESPIRATORY (INHALATION) at 08:09

## 2023-03-09 RX ADMIN — OXYCODONE HYDROCHLORIDE 10 MG: 5 TABLET ORAL at 09:16

## 2023-03-09 RX ADMIN — FUROSEMIDE 40 MG: 40 TABLET ORAL at 09:17

## 2023-03-09 RX ADMIN — Medication 1 LOZENGE: at 12:05

## 2023-03-09 RX ADMIN — POTASSIUM CHLORIDE 40 MEQ: 1500 TABLET, EXTENDED RELEASE ORAL at 09:17

## 2023-03-09 RX ADMIN — LEVALBUTEROL HYDROCHLORIDE 1.25 MG: 1.25 SOLUTION, CONCENTRATE RESPIRATORY (INHALATION) at 11:48

## 2023-03-09 RX ADMIN — SPIRONOLACTONE 25 MG: 25 TABLET ORAL at 09:17

## 2023-03-09 ASSESSMENT — PAIN SCALES - GENERAL
PAINLEVEL_OUTOF10: 10
PAINLEVEL_OUTOF10: 10
PAINLEVEL_OUTOF10: 8

## 2023-03-09 NOTE — PROGRESS NOTES
INPATIENT PULMONARY CRITICAL CARE PROGRESS NOTE      Reason for visit    Hypoxemia    SUBJECTIVE: Patient when seen this morning had just woken up and was slightly groggy, no increased work of breathing, patient was on 6 L of high flow oxygen with saturation of 96%, patient was afebrile and hemodynamically maintained, patient has a sinus rhythm on the monitor, patient has had good urine output overnight with cumulative fluid balance of -5.7 L, patient's glycemic control has been acceptable, no other pertinent review of system of concern    Physical Exam:  Blood pressure 108/67, pulse 98, temperature 98.2 °F (36.8 °C), temperature source Oral, resp. rate 18, height 5' 1\" (1.549 m), weight 111 lb (50.3 kg), SpO2 96 %.'   Constitutional:  No acute distress.   HENT:  Oropharynx is clear and moist. No thyromegaly.  Eyes:  Conjunctivae are normal. Pupils equal, round, and reactive to light. No scleral icterus.   Neck: . No tracheal deviation present. No obvious thyroid mass.   Cardiovascular: Sinus rhythm, normal heart sounds.  No right ventricular heave. No lower extremity edema.  Pulmonary/Chest: No wheezes.  Minimal basilar rales.  Chest wall is not dull to percussion.  No accessory muscle usage or stridor.  Decreased breath sound intensity  Abdominal: Soft. Bowel sounds present. No distension or hernia. No tenderness.  Healing staples/retention sutures were intact  Musculoskeletal: No cyanosis. No clubbing. No obvious joint deformity.   Lymphadenopathy: No cervical or supraclavicular adenopathy.   Skin: Skin is warm and dry. No rash or nodules on the exposed extremities.  Neurologic: Slightly groggy when seen        Results:  CBC:   Recent Labs     03/07/23  0527   WBC 10.4   HGB 10.3*   HCT 30.6*   MCV 96.7          BMP:   Recent Labs     03/07/23  0527 03/07/23  1450 03/08/23  0558   * 130* 131*   K 2.8* 3.2* 3.2*   CL 88* 83* 86*   CO2 38* 37* 36*   PHOS 3.8  --   --    BUN 6* 6* 6*   CREATININE <0.5*  <0.5* <0.5*             Imaging:  I have reviewed radiology images personally. XR CHEST PORTABLE   Final Result   Bibasilar airspace infiltrates with small bilateral pleural effusions. This   could represent atelectasis versus pneumonia in the appropriate clinical   setting         XR CHEST PORTABLE   Final Result   Left lower lobe airspace infiltrates. This could represent atelectasis   versus pneumonia in the appropriate clinical setting. XR ABDOMEN (KUB) (SINGLE AP VIEW)    Result Date: 2/21/2023  EXAMINATION: ONE SUPINE XRAY VIEW(S) OF THE ABDOMEN 2/21/2023 7:10 am COMPARISON: 2/20/2023 HISTORY: ORDERING SYSTEM PROVIDED HISTORY: Small bowel obstruction. TECHNOLOGIST PROVIDED HISTORY: Reason for exam:->Small bowel obstruction. Reason for Exam: sbft obstruction FINDINGS: Dilated small bowel loops are seen centrally filled with contrast. Small bowel dilatation is slightly less pronounced. There is likely a small amount of contrast entering the cecum in the right lower quadrant. Slight decrease in small bowel dilatation. There is likely a small amount of contrast entering the cecum     XR ABDOMEN (KUB) (SINGLE AP VIEW)    Result Date: 2/20/2023  EXAMINATION: ONE SUPINE X-RAY VIEW(S) OF THE ABDOMEN 2/20/2023 9:54 pm COMPARISON: February 28, 2023 and 9:18 a.m. HISTORY: ORDERING SYSTEM PROVIDED HISTORY:  Small bowel obstruction. TECHNOLOGIST PROVIDED HISTORY: Reason for Exam:  Small bowel obstruction. FINDINGS: Contrast is noted in the small bowel however, nonvisualization of large bowel in the current evaluation. Small bowel loops are dilated. This was seen in previous evaluations. No evidence of free air. Dilated small bowel loops filled with contrast.  Nonvisualization of large bowel yet.      XR ABDOMEN (KUB) (SINGLE AP VIEW)    Result Date: 2/19/2023  EXAMINATION: ONE SUPINE XRAY VIEW(S) OF THE ABDOMEN 2/19/2023 10:06 am COMPARISON: 02/18/2013 HISTORY: ORDERING SYSTEM PROVIDED HISTORY: NG Placement TECHNOLOGIST PROVIDED HISTORY: Reason for exam:->NG Placement FINDINGS: Tip of NG tube is unchanged. Tip projects in the left upper quadrant, similar to prior, presumably in hiatal hernia. There is decreased gaseous distention compared to prior. Tip of NG tube projects in left upper quadrant, presumably in hiatal hernia,. There is decreased gaseous distention compared to prior     XR ABDOMEN (KUB) (SINGLE AP VIEW)    Result Date: 2/18/2023  EXAMINATION: ONE SUPINE XRAY VIEW(S) OF THE ABDOMEN 2/18/2023 1:38 pm COMPARISON: CT obtained on the same day HISTORY: ORDERING SYSTEM PROVIDED HISTORY: NG tube placement TECHNOLOGIST PROVIDED HISTORY: Reason for exam:->NG tube placement Reason for Exam: ng tube placement FINDINGS: Enteric tube is seen with the tip and the side hole likely located within the large hiatal hernia seen on the same day CT. Small-bowel obstruction seen on same day CT is not well visualized due to relative lack of intraluminal gas. Enteric tube with the tip in the side hole likely located within the large hiatal hernia is seen on the same day CT. Small-bowel obstruction seen on the same day CT not well appreciated due to relative lack of intraluminal gas. XR CHEST PORTABLE    Result Date: 3/3/2023  EXAMINATION: ONE XRAY VIEW OF THE CHEST 3/3/2023 11:39 am COMPARISON: Chest x-ray dated 2 March 2023 HISTORY: ORDERING SYSTEM PROVIDED HISTORY: shortness of breath TECHNOLOGIST PROVIDED HISTORY: Reason for exam:->shortness of breath FINDINGS: Bibasilar airspace infiltrates with small bilateral pleural effusions. No pneumothorax. Normal cardiomediastinal silhouette     Bibasilar airspace infiltrates with small bilateral pleural effusions. This could represent atelectasis versus pneumonia in the appropriate clinical setting     XR CHEST PORTABLE    Result Date: 3/2/2023  EXAMINATION: ONE XRAY VIEW OF THE CHEST 3/2/2023 9:51 am COMPARISON: None.  HISTORY: ORDERING SYSTEM PROVIDED HISTORY: shortness of breath TECHNOLOGIST PROVIDED HISTORY: Reason for exam:->shortness of breath Reason for Exam: SOB FINDINGS: Left basilar airspace infiltrates. No pneumothorax or pleural effusion. Normal cardiomediastinal silhouette     Left lower lobe airspace infiltrates. This could represent atelectasis versus pneumonia in the appropriate clinical setting. FL SMALL BOWEL FOLLOW THROUGH ONLY    Result Date: 2/24/2023  EXAMINATION: SMALL BOWEL FOLLOW THROUGH SERIES 2/20/2023 TECHNIQUE: Small bowel follow through series was performed with overhead images and spot images. FLUOROSCOPY DOSE AND TYPE: None COMPARISON: CT abdomen/pelvis 2/18/2023 HISTORY: ORDERING SYSTEM PROVIDED HISTORY: SBO TECHNOLOGIST PROVIDED HISTORY: Reason for exam:->SBO Reason for exam:->give contrast through ng and clamp until study done FINDINGS: The study was performed portably.  film of the abdomen demonstrates persistent moderate gaseous distention of several loops of small bowel. Nasogastric tube is in place with distal tip located within the proximal stomach. Again noted is remote posttraumatic deformity of the leftward aspect of the medial aspects of the left-sided pubic rami. Two cups of thin barium were administered via the patient's nasogastric tube. Limited imaging of the stomach demonstrates moderate-sized hiatal hernia. There is prominence of folds about the proximal stomach which may in part be related to incomplete distention although true fold thickening including changes of underlying gastritis not excluded based on this examination. There is moderate-marked dilatation of multiple loops of small bowel within the abdomen and pelvis. At 6.5 hours, there is no significant progression of contrast material within the dilated small bowel loops. Combination of findings is again consistent with changes of small bowel obstruction. 1. Persistent findings consistent with presence of small bowel obstruction.  Plain film examination of the abdomen will be obtained at 10 PM this evening and 7 AM on 2/21/2023 to evaluate movement of contrast material. 2. Moderate-sized hiatal hernia. 3. Prominence of folds within the proximal aspect of the stomach. While finding in part may be on the basis of incomplete distention, true fold thickening including changes of underlying gastritis also in the differential diagnosis as described above. CT CHEST ABDOMEN PELVIS WO CONTRAST Additional Contrast? None    Result Date: 2/18/2023  EXAMINATION: CT OF THE CHEST, ABDOMEN, AND PELVIS WITHOUT CONTRAST 2/18/2023 11:32 am TECHNIQUE: CT of the chest, abdomen and pelvis was performed without the administration of intravenous contrast. Multiplanar reformatted images are provided for review. Automated exposure control, iterative reconstruction, and/or weight based adjustment of the mA/kV was utilized to reduce the radiation dose to as low as reasonably achievable. COMPARISON: None HISTORY: ORDERING SYSTEM PROVIDED HISTORY: shortness of breath TECHNOLOGIST PROVIDED HISTORY: Reason for exam:->shortness of breath Additional Contrast?->None Reason for Exam: N/V/C since Tuesday, known COPD, sob Relevant Medical/Surgical History: ovaries removed, tubal ligation, smoked x 30 yrs. Quit 10 yrs ago FINDINGS: Chest: Mediastinum: Heart is normal in size. There is moderate atherosclerotic calcification coronary arteries. Aorta and pulmonary arteries are normal. No lymph node enlargement. Normal thyroid. Large hiatal hernia. Lungs/pleura: Peripheral opacification of bronchioles in the left lower lobe with no associated airspace change. There is atelectasis in the left lower lobe adjacent to the hiatal hernia. Additional bands of atelectasis in the right upper and middle lobe that are minimal.  No acute airspace finding is otherwise noted. No suspicious mass or nodule. Mild centrilobular emphysema.  Soft Tissues/Bones: Age indeterminate compression fracture to the superior endplate of T8. No retropulsion or associated spinal canal stenosis. There is also a remote fracture of the lateral right 7th rib with callus formation at the adjacent 8th rib. Remote fractures are also seen at the left 6th through 8th ribs. Abdomen/Pelvis: Organs: Cholelithiasis without inflammation or biliary dilatation. The liver, pancreas and spleen are unremarkable. Kidneys and adrenal glands are normal. GI/Bowel: The distal stomach is normal.  There is mild dilation of the duodenum with diffuse dilation of the jejunum and proximal ileum with an apparent transition point in the right lower quadrant/pelvis. Definitive transition and etiology is difficult to confirm. There is decompression of the distal ileum. No appendix is identified. Stool and air are seen throughout the colon with diffuse diverticular changes but no inflammation. Pelvis: The bladder is unremarkable. The uterus and adnexa are unremarkable. Peritoneum/Retroperitoneum: The aorta tapers normally. No lymph node enlargement. Bones/Soft Tissues: Questionable mild compression fracture to the superior endplate of S23. No acute skeletal finding in the abdomen or pelvis. There is a remote healed fracture of the left pelvis involving the superior and inferior pubic rami. 1. Small-bowel obstruction with indeterminate etiology and an apparent transition in the right lower quadrant/pelvis. There is no pattern of pneumatosis or perforation. 2. Decompression of the distal ileum with stool and air in the colon. This may indicate incomplete obstruction or a recently developing pattern of obstruction. 3. Large hiatal hernia contributing to atelectasis in the left lower lobe. Peripheral bronchial opacification in the left lower lobe could potentially be in association, although mucoid impaction is suspected. 4. Age indeterminate compression fractures that are likely remote at both T8 and T12.   Additional remote fractures of the ribs and left pelvis are noted. XR ABDOMEN (2 VIEWS)    Result Date: 2/22/2023  EXAMINATION: TWO XRAY VIEWS OF THE ABDOMEN 2/22/2023 6:00 am COMPARISON: 02/21/2023 radiograph HISTORY: ORDERING SYSTEM PROVIDED HISTORY: f/u SBO TECHNOLOGIST PROVIDED HISTORY: Reason for exam:->f/u SBO Reason for Exam: f/u SBO FINDINGS: Progressive transit of previously administered enteric contrast through the small bowel now present in the colon. There is dilution of the contrast column remaining within the stomach and throughout the small bowel. Persistent diffuse dilation of small bowel, perhaps slightly increased from prior study. No skeletal abnormality. Slow progressive transit of enteric contrast into the colon with dilution of the more proximal contrast column. Persistent dilation may represent ileus or a partial small bowel obstruction. XR ABDOMEN (2 VIEWS)    Result Date: 2/20/2023  EXAMINATION: TWO XRAY VIEWS OF THE ABDOMEN 2/20/2023 6:37 am COMPARISON: 02/19/2023 radiograph HISTORY: ORDERING SYSTEM PROVIDED HISTORY: sbo TECHNOLOGIST PROVIDED HISTORY: Reason for exam:->sbo Reason for Exam: f/u SBO FINDINGS: Enteric tube stable in the lumen of the stomach in a patient with a known hiatal hernia. This projects in the lower left chest.  Lung bases are clear. Dilated, air-filled loops of small bowel are identified in the mid abdomen similar to the prior radiograph. Paucity of air in the colon. No abnormal soft tissue mass or calcification. There are no significant skeletal findings. History of small-bowel obstruction with stable dilated air-filled loops of small bowel in comparison to the prior radiograph.        CULTURE, RESPIRATORY Further report to follow P  [facility]   Gram Stain Result 3+ WBC's (Polymorphonuclear)   3+ Gram negative rods   1+ Gram positive cocci   1+ Gram positive rods       CULTURE, RESPIRATORY Light growth normal respiratory lenin with Abnormal   [facility]   Gram Stain Result 3+ WBC's (Polymorphonuclear)   3+ Gram negative rods   1+ Gram positive cocci   1+ Gram positive rods  [address] [facility]   Organism Enterobacter hormaechei Abnormal   [facility]   CULTURE, RESPIRATORY Heavy growth  [facility]        Susceptibility    Enterobacter hormaechei (1)    Antibiotic Interpretation Microscan  Method Status    amoxicillin-clavulanate Resistant >16/8 mcg/mL BACTERIAL SUSCEPTIBILITY PANEL BY NEW     ampicillin Resistant >16 mcg/mL BACTERIAL SUSCEPTIBILITY PANEL BY NEW     ampicillin-sulbactam Resistant >16/8 mcg/mL BACTERIAL SUSCEPTIBILITY PANEL BY NEW     ceFAZolin Resistant >16 mcg/mL BACTERIAL SUSCEPTIBILITY PANEL BY NEW     cefepime Sensitive <=2 mcg/mL BACTERIAL SUSCEPTIBILITY PANEL BY NEW     cefTRIAXone Resistant >32 mcg/mL BACTERIAL SUSCEPTIBILITY PANEL BY NEW     cefuroxime Resistant >16 mcg/mL BACTERIAL SUSCEPTIBILITY PANEL BY NEW     ciprofloxacin Sensitive <=1 mcg/mL BACTERIAL SUSCEPTIBILITY PANEL BY NEW     ertapenem Sensitive <=0.5 mcg/mL BACTERIAL SUSCEPTIBILITY PANEL BY NEW     gentamicin Sensitive <=4 mcg/mL BACTERIAL SUSCEPTIBILITY PANEL BY NEW     meropenem Sensitive <=1 mcg/mL BACTERIAL SUSCEPTIBILITY PANEL BY NEW     piperacillin-tazobactam Sensitive <=16 mcg/mL BACTERIAL SUSCEPTIBILITY PANEL BY NEW     trimethoprim-sulfamethoxazole Sensitive <=2/38 mcg/mL BACTERIAL SUSCEPTIBILITY PANEL BY NEW                  Assessment:  Principal Problem:    Perioperative dehiscence of abdominal wound with evisceration  Active Problems:    Perioperative dehiscence of abdominal wound with evisceration, initial encounter    Acute on chronic respiratory failure with hypoxia (HCC)    Leukocytosis    Normocytic normochromic anemia    Hyponatremia    Pulmonary infiltrates    Atelectasis    Pleural effusion    Hypokalemia    Pneumonia due to Enterobacter species (HCC)  Resolved Problems:    * No resolved hospital problems.  *          Plan:   Oxygen supplementation to keep saturation between 90 and 94% only  Please titrate his oxygen as per the above parameters  Patient's recent CT of the chest along with x-ray from yesterday was reviewed and patient has persistent bilateral pleural effusions along with pulm infiltrates and atelectasis which may be responsible for patient's hypoxemia along with that patient also had abdominal surgery with abdominal closure for perioperative dehiscence of the abdominal wall and evisceration causing patient to have some restrictive pattern especially in view that patient also had some compression fracture of the back on the imaging  Patient to continue with Xopenex nebulization  Spiriva to continue  Mucomyst nebulization being discontinued  Patient respite culture shows patient to have Enterobacter pneumonia which was sensitive to Zosyn  IV Zosyn can be changed to p.o. Cipro if deemed appropriate  Further management depending on patient clinical status and cultures  Incentive spirometry and Acapella to continue  Trend WBC count which has normalized  Monitor input output and BMP  Saline nasal spray to continue  Medication for GERD as per primary team  Correct electrolytes on whenever necessary basis  PUD and DVT prophylaxis as per primary team  Ambulate as tolerated  PT/OT-patient states that she will be getting home therapy    ? Discharge planning      Case discussed with RT    No other recommendations from pulmonary/critical care standpoint review- will sign off-please call on whenever necessary basis if the patient is not discharged          Electronically signed by:  Gary Ochoa MD    3/9/2023    8:35 AM.

## 2023-03-09 NOTE — CARE COORDINATION
[facility]    DC order noted, all docs needed have been faxed to [facility] for home care services.     Home care to see patient within 24-48 hrs    Marissa Vazquez RN, BSN CTN  [facility] [phone]

## 2023-03-09 NOTE — PROGRESS NOTES
Cardiology Consultation   Referring Physician: Charlesetta Galeazzi, DO  Reason for Consultation: CAD s/p CABG   Chief Complaint:   Chief Complaint   Patient presents with    Check-Up     No cc     Subjective:   History of Present Illness:  Jose Miguel is a 37 y.o. male who presents with the above complaint. He was admitted to [facility] with complaints of sudden onset severe substernal chest pain. He underwent heart catheterization on 20 resulting in MI x 3 to RCA. He called the office with worsening chest pain and underwent repeat angiography 20 that showed patent stents. He presented to the ED 3/5/21 with an NSTEMI and underwent angiography. He was referred to [facility] and underwent CABG x4. He presents today for follow up. He reports feeling well currently. He did experience chest pain a few days ago but this has resolved for him. He has not been taking his previously prescribed medications. He states he is trying to reduce his sodium and sugar intake. He continues to be a daily smoker and has reduced this to 1 PPD. His breathing has been comfortable without shortness of breath. Prior cardiac testing:    EK20 reviewed. NSR   EKG 21 NSR       Echo: 2020  There is akinesis of the mid to apical/ lateral myocardium. Ejection fraction is visually estimated to be 40-45%. Grade I diastolic dysfunction with normal LV filling pressures. No evidence of left ventricular mass or thrombus noted. Echo: 7/15/20  Left ventricular cavity size is normal.  There is moderate concentric left ventricular hypertrophy. Ejection fraction is visually estimated to be 45-50%. There is moderate hypokinesis of the mid to apical inferoseptal and inferior walls. Diastolic filling parameters suggest grade I diastolic dysfunction. Normal right ventricular size. Right ventricular systolic function is moderately reduced.  TAPSE 1.2 cm     Cath: [facility] GENERAL SURGERY    Surgery Progress Note           POD # 9    PATIENT NAME: Ann Paul     TODAY'S DATE: 3/9/2023    INTERVAL HISTORY:    Pt doing well, eating, no wound drainage. OBJECTIVE:   VITALS:  /68   Pulse (!) 102   Temp 98.4 °F (36.9 °C) (Oral)   Resp 16   Ht 5' 1\" (1.549 m)   Wt 111 lb (50.3 kg)   SpO2 95%   BMI 20.97 kg/m²     INTAKE/OUTPUT:    I/O last 3 completed shifts: In: 2020 [P.O.:2020]  Out: 4700 [Urine:4700]  I/O this shift:  In: -   Out: 800 [Urine:800]              CONSTITUTIONAL:  awake and alert     ABDOMEN: soft, non-distended, incisional tenderness   INCISION: clean, no drainage    Data:  CBC:   Recent Labs     03/07/23  0527   WBC 10.4   HGB 10.3*   HCT 30.6*          BMP:    Recent Labs     03/07/23  0527 03/07/23  1450 03/08/23  0558   * 130* 131*   K 2.8* 3.2* 3.2*   CL 88* 83* 86*   CO2 38* 37* 36*   BUN 6* 6* 6*   CREATININE <0.5* <0.5* <0.5*   GLUCOSE 120* 133* 105*       Hepatic:   No results for input(s): AST, ALT, ALB, BILITOT, ALKPHOS in the last 72 hours. Mag:      Recent Labs     03/07/23  0527 03/08/23  0558   MG 1.50* 2.20        Phos:     Recent Labs     03/07/23 0527   PHOS 3.8         ASSESSMENT AND PLAN:  62 y.o. female status post repair of abdominal wound evisceration. Increased wound drainage may represent repeat fascial separation, but with the retention sutures in place, should be protected from repeat evisceration  Wound stable. Dispo: d/c to home - arrangements have been made for increased O2 requirements at home.      Electronically signed by ZARI Washington - JANETT 5/5/2020  ANGIOGRAM/CORONARY ARTERIOGRAM:        The left main coronary artery is patent   The left anterior descending artery is patent, D1 stent is widely patent . The left circumflex artery is patent, mid stent is widely patent. The right coronary artery is diffusely diseased, prox 80%, distal 80%      INTERVENTION  1. JR 4 guide   2. runthrough wire used to cross the lesion   3. Distal RCA predilated w/ 2.5/3.5 regular balloons  4. IVUS catheter passed to distal RCA, distal reference diameter 4.5 mm   5. 4.0 X 18 mm [place] MI distal RCA  6. 4.0 X 26 / 4.5 X 12 prox RCA  7. Post dilation distal/prox  RCA w/ 4.0 NC balloon to 18 keyur      SUMMARY:      Single vessel CAD   S/p successful IVUS guided PCI distal/prox RCA X 3 MI     Cath: 6/12/20   The left main coronary artery is patent   The left anterior descending artery is widely patent D1 stent is widely patent   The left circumflex artery is widely patent, small vessel OM disease   The right coronary artery is widely patent, with patents stents, small vessel disease of the distal RPDA       Echo: 3/5/21  Mild conc. LVH with normal LV size & wall motion. EF is 65%. Normal diastolic function. The right ventricle is normal in size and function. The left atrium is normal in size. Stress test: 3/5/21  Moderate size moderate severity inferior lateral wall defect consistent with ischemia      Cath: 3/5/21   The left main coronary artery is patent . The left anterior descending artery has a mid 90% lesion,   D1 stent is widely patent . The left circumflex artery has an ostial 90% lesion, mid ISR 90% . The right coronary artery has a mid and distal 99% lesion with left to right collaterals of the PDA   Ostial and distal stents are patent    INTERVENTION  1. JR 4 guide  2. Runthrought wire used to corss RCA lesion   3.  POBA of the mid and distal RCA w/ 3.0 X 12 balloon      SUMMARY:   Severe native 3V CAD   S/p POBA mid and distal RCA     3/15/21 cabgx4 (LIMA-LAD, SVG-D1, L radial off LIMA-OM, SVG-PDA)     Past Medical History:   has a past medical history of Chronic systolic congestive heart failure ([facility] [address] Coronary artery disease, Essential hypertension, GERD (gastroesophageal reflux disease), Headache, NSTEMI (non-ST elevated myocardial infarction) ([facility] [address] Obesity, Other hyperlipidemia, Stenosis of left subclavian artery ([facility] [address] and Tobacco abuse counseling. Surgical History:   has a past surgical history that includes Mouth surgery; Coronary angioplasty with stent (05/05/2020); Coronary angioplasty with stent (04/25/2020); Coronary angioplasty (03/08/2021); vascular surgery (03/12/2021); Coronary artery bypass graft (03/15/2021); and Coronary artery bypass graft (N/A, 3/15/2021). Social History:   reports that he has been smoking cigarettes. He started smoking about 32 years ago. He has been smoking about 1.00 pack per day. He has never used smokeless tobacco. He reports previous drug use. Drug: Marijuana Joann Kales). He reports that he does not drink alcohol. Family History:  family history includes Diabetes in his brother and mother; Heart Disease in his maternal aunt, maternal cousin, and maternal uncle; High Blood Pressure in his maternal aunt; Other in his father and mother. Home Medications:  Were reviewed and are listed in nursing record and/or below  Prior to Admission medications    Medication Sig Start Date End Date Taking?  Authorizing Provider   atorvastatin (LIPITOR) 80 MG tablet Take 1 tablet by mouth nightly 2/10/22  Yes Luda Good,    lisinopril (PRINIVIL;ZESTRIL) 20 MG tablet Take 1 tablet by mouth daily 2/10/22  Yes Luda Good,    famotidine (PEPCID) 20 MG tablet Take 1 tablet by mouth 2 times daily 8/4/21  Yes Tulio Aviles,    carvedilol (COREG) 6.25 MG tablet Take 1 tablet by mouth 2 times daily (with meals)  Patient not taking: Reported on 3/21/2022 7/30/21   Narayanmane Landon MD   aspirin 325 MG EC tablet Take 1 tablet by mouth daily  Patient not taking: Reported on 3/21/2022 7/30/21   Donovan Chaidez MD   dilTIAZem (CARDIZEM CD) 120 MG extended release capsule Take 1 capsule by mouth daily  Patient not taking: Reported on 3/21/2022 7/30/21   Donovan Chaidez MD   senna (SENOKOT) 8.6 MG tablet Take 1 tablet by mouth 2 times daily  Patient not taking: Reported on 3/21/2022 4/23/21 4/23/22  Kimberly Holly MD   buPROPion [facility] SR) 150 MG extended release tablet Take 1 tablet by mouth 2 times daily  Patient not taking: Reported on 3/21/2022 3/19/21   Kimberly Holly MD          Allergies:  Patient has no known allergies. Review of Systems:   · Constitutional: no unanticipated weight loss. There's been no change in energy level, sleep pattern, or activity level. No fevers, chills. · Eyes: No visual changes or diplopia. No scleral icterus. · ENT: No Headaches, hearing loss or vertigo. No mouth sores or sore throat. · Cardiovascular: as reviewed in HPI  · Respiratory: No cough or wheezing, no sputum production. No hemoptysis. · Gastrointestinal: No abdominal pain, appetite loss, blood in stools. No change in bowel or bladder habits. · Genitourinary: No dysuria, trouble voiding, or hematuria. · Musculoskeletal:  No gait disturbance, no joint complaints. · Integumentary: No rash or pruritis. · Neurological: No headache, diplopia, change in muscle strength, numbness or tingling. · Psychiatric: No anxiety or depression. · Endocrine: No temperature intolerance. No excessive thirst, fluid intake, or urination. No tremor. · Hematologic/Lymphatic: No abnormal bruising or bleeding, blood clots or swollen lymph nodes. · Allergic/Immunologic: No nasal congestion or hives. Objective:   PHYSICAL EXAM:    Vitals:    03/23/22 1434   BP: (!) 120/92   Pulse:    SpO2:     Weight: 237 lb (107.5 kg)       General Appearance:  Alert, cooperative, no distress, appears stated age.    Head:  Normocephalic, without obvious abnormality, atraumatic. Eyes:  Pupils equal and round. No scleral icterus. Mouth: Moist mucosa, no pharyngeal erythema. Nose: Nares normal. No drainage or sinus tenderness. Neck: Supple, symmetrical, trachea midline. No adenopathy. No tenderness/mass/nodules. No carotid bruit or elevated JVD. Lungs:   Clear to auscultation bilaterally, respirations unlabored. No wheeze, rales, or rhonchi. Chest Wall:  No tenderness or deformity. Heart:  Regular rate. S1/S2 normal. No murmur, rub, or gallop. Abdomen:   Soft, non-tender, bowel sounds active. Musculoskeletal: No muscle wasting or digital clubbing. Extremities: Extremities normal, atraumatic. No cyanosis or edema. Pulses: 2+ radial and carotid pulses, symmetric. Skin: No rashes or lesions. Pysch: Normal mood and affect. Alert and oriented x 4.    Neurologic: Normal gross motor and sensory exam.       Labs     CBC:   Lab Results   Component Value Date    WBC 10.6 03/21/2022    RBC 5.85 03/21/2022    HGB 17.6 03/21/2022    HCT 51.2 03/21/2022    MCV 87.5 03/21/2022    RDW 14.1 03/21/2022     03/21/2022     CMP:  Lab Results   Component Value Date     03/21/2022    K 4.4 03/21/2022     03/21/2022    CO2 23 03/21/2022    BUN 8 03/21/2022    CREATININE 0.6 03/21/2022    GFRAA >60 03/21/2022    AGRATIO 1.2 03/21/2022    LABGLOM >60 03/21/2022    GLUCOSE 111 03/21/2022    PROT 7.4 03/21/2022    CALCIUM 9.6 03/21/2022    BILITOT 0.6 03/21/2022    ALKPHOS 137 03/21/2022    AST 19 03/21/2022    ALT 11 03/21/2022     PT/INR:  No results found for: PTINR  HgBA1c:  Lab Results   Component Value Date    LABA1C 5.7 03/08/2021     Lab Results   Component Value Date    TROPONINI <0.01 03/21/2022       CURRENT Medications:  Current Outpatient Medications on File Prior to Visit   Medication Sig Dispense Refill    atorvastatin (LIPITOR) 80 MG tablet Take 1 tablet by mouth nightly 30 tablet 3    lisinopril (PRINIVIL;ZESTRIL) 20 MG tablet Take 1 tablet by mouth daily 90 tablet 1    famotidine (PEPCID) 20 MG tablet Take 1 tablet by mouth 2 times daily 180 tablet 2    carvedilol (COREG) 6.25 MG tablet Take 1 tablet by mouth 2 times daily (with meals) (Patient not taking: Reported on 3/21/2022) 60 tablet 3    aspirin 325 MG EC tablet Take 1 tablet by mouth daily (Patient not taking: Reported on 3/21/2022) 30 tablet 3    dilTIAZem (CARDIZEM CD) 120 MG extended release capsule Take 1 capsule by mouth daily (Patient not taking: Reported on 3/21/2022) 30 capsule 1    senna (SENOKOT) 8.6 MG tablet Take 1 tablet by mouth 2 times daily (Patient not taking: Reported on 3/21/2022) 60 tablet 11    buPROPion (WELLBUTRIN SR) 150 MG extended release tablet Take 1 tablet by mouth 2 times daily (Patient not taking: Reported on 3/21/2022) 60 tablet 0     No current facility-administered medications on file prior to visit. Assessment and Plan   1) CAD s/p CABG 3/15/21  -s/p successful IVUS guided PCI distal/prox RCA X 3 MI    -CCB for radial artery graft   -Continue ASA, statin and B-blocker   -no symptoms of angina today     2) Essential hypertensin  -Controlled   -On Lisinopril 20 mg daily  -Encouraged low sodium diet     3) Tobacco abuse   -Encouraged smoking cessation     4) Hyperlipidemia  -On Lipitor 80 mg daily  -LDL 93 (3/7/21)     5) Carotid/subclavain stenosis   -Management per Vascular   -S/p subclavian stent     Follow up in 6 months    Thank you for allowing us to participate in the care of Randall Hutchinson. Kori Ocampo MD [address] [facility]   [facility]   (J): [phone]   (F): [phone]      This note was scribed in the presence of Kori Ocampo MD by [employer], RN. Physician Attestation:  The scribes documentation has been prepared under my direction and personally reviewed by me in its entirety.      I confirm the note above accurately reflects all work, treatment, procedures, and medical decision making performed by me.     Electronically signed by Annabel Friend MD on 4/4/2022 at 2:35 PM

## 2023-03-09 NOTE — PROGRESS NOTES
Hospitalist Progress Note      PCP: ZARI Alan - CNP    Date of Admission: 2/28/2023    Chief Complaint: Abdominal pain, cough      Subjective:  She is feeling better. Pain controlled. Minimal dyspnea. Juline Carolyne to discharge. Increase oxygen supply has been arranged for home. Medications:  Reviewed    Infusion Medications    sodium chloride       Scheduled Medications    furosemide  40 mg Oral Daily    pantoprazole  40 mg Oral QAM AC    fluticasone  1 spray Each Nostril Daily    spironolactone  25 mg Oral Daily    piperacillin-tazobactam  3,375 mg IntraVENous Q8H    levalbuterol  1.25 mg Nebulization Q4H WA    polyethylene glycol  17 g Oral Daily    traZODone  100 mg Oral Nightly    sodium chloride flush  5-40 mL IntraVENous 2 times per day    enoxaparin  40 mg SubCUTAneous Daily    tiotropium  2 puff Inhalation Daily     PRN Meds: magnesium sulfate, potassium chloride **OR** potassium alternative oral replacement **OR** potassium chloride, diphenhydrAMINE, sodium chloride, calcium carbonate, HYDROmorphone **OR** HYDROmorphone, oxyCODONE **OR** oxyCODONE, sodium chloride flush, sodium chloride, ondansetron **OR** ondansetron, acetaminophen, benzocaine-menthol      Intake/Output Summary (Last 24 hours) at 3/9/2023 1106  Last data filed at 3/9/2023 0550  Gross per 24 hour   Intake 960 ml   Output 1700 ml   Net -740 ml       Physical Exam Performed:    /68   Pulse (!) 102   Temp 98.4 °F (36.9 °C) (Oral)   Resp 16   Ht 5' 1\" (1.549 m)   Wt 111 lb (50.3 kg)   SpO2 94%   BMI 20.97 kg/m²     General appearance: No apparent distress, appears stated age. HEENT: Pupils equal, round, and reactive to light. Conjunctivae/corneas clear. Neck: Supple, with full range of motion. No jugular venous distention. Trachea midline. Respiratory:  Normal respiratory effort. Clear to auscultation, bilaterally without Rales/Wheezes/Rhonchi.   Cardiovascular: Regular rate and rhythm with normal S1/S2 without murmurs, rubs or gallops. Abdomen: Soft, mild diffuse tenderness, non-distended with normal bowel sounds. Musculoskeletal: No clubbing, cyanosis. 2+ BLE pitting edema has now resolved. Full range of motion without deformity. Skin: Skin color, texture, turgor normal.  No rashes or lesions. Neurologic:  Neurovascularly intact without any focal sensory/motor deficits. Cranial nerves: II-XII intact, grossly non-focal.  Psychiatric: Alert and oriented, thought content appropriate, has insight. Impulsive. Capillary Refill: Brisk, 3 seconds, normal   Peripheral Pulses: +2 palpable, equal bilaterally       Labs:   Recent Labs     03/07/23 0527   WBC 10.4   HGB 10.3*   HCT 30.6*        Recent Labs     03/07/23  0527 03/07/23  1450 03/08/23  0558   * 130* 131*   K 2.8* 3.2* 3.2*   CL 88* 83* 86*   CO2 38* 37* 36*   BUN 6* 6* 6*   CREATININE <0.5* <0.5* <0.5*   CALCIUM 8.4 8.6 9.0   PHOS 3.8  --   --      No results for input(s): AST, ALT, BILIDIR, BILITOT, ALKPHOS in the last 72 hours. No results for input(s): INR in the last 72 hours. No results for input(s): Malika [place] in the last 72 hours. Urinalysis:    No results found for: Toya Kugel, BACTERIA, RBCUA, BLOODU, [address]    Radiology:  XR CHEST PORTABLE   Final Result   Bibasilar airspace infiltrates with small bilateral pleural effusions. This   could represent atelectasis versus pneumonia in the appropriate clinical   setting         XR CHEST PORTABLE   Final Result   Left lower lobe airspace infiltrates. This could represent atelectasis   versus pneumonia in the appropriate clinical setting.              IP CONSULT TO HOSPITALIST  IP CONSULT TO PULMONOLOGY  IP CONSULT TO HOME CARE NEEDS    Assessment/Plan:    Active Hospital Problems    Diagnosis     Hypokalemia [E87.6]      Priority: Medium    Pneumonia due to Enterobacter species ([facility] [address] [J15.6]      Priority: Medium    Leukocytosis [D72.829]      Priority: Medium    Normocytic normochromic anemia [D64.9]      Priority: Medium    Hyponatremia [E87.1]      Priority: Medium    Pulmonary infiltrates [R91.8]      Priority: Medium    Atelectasis [J98.11]      Priority: Medium    Pleural effusion [J90]      Priority: Medium    Acute on chronic respiratory failure with hypoxia (HCC) [J96.21]      Priority: Medium    Perioperative dehiscence of abdominal wound with evisceration [T81.31XA]      Priority: Medium    Perioperative dehiscence of abdominal wound with evisceration, initial encounter [T81.31XA]      Priority: Medium       62 Y F with a h/o COPD, HLD, and cirrhosis. Admitted here 2/18 with SBO, did not respond to conservative management, required lysis of adhesions and umbilical hernia repair on 2/22, discharged home 2/27. Returned to the ED on 2/28 because she coughed at home, heard a \"pop,\" and saw that intestines had protruded from her abdominal wound. Dehiscence with evisceration. Bowel loops reduced on 2/28, wound closed in OR. Enterobacter HCAP. Zosyn per pulmonary, can discharge with PO cipro to complete a 7-day course through 3/10. Pulmonary mucus plugging. Acetylcysteine nebs BID. Volume overload, complicated by bilateral pleural effusions. Resumed her usual furosemide 40 qd (completed course of 40 IV BID here) and spironolactone 25 qd in light of her cirrhosis. COPD. Inhaled bronchodilators. She quit cigarettes years ago. Ongoing marijuana use, encouraged cessation. Acute on chronic hypoxic respiratory failure. Due to above issues. Wean to baseline 5LNC. - she says that at baseline she sometimes desaturates into the 80's at home on her [facility]. Her home machine maxes out at 5L. I asked CM to see if we can get her more equipment at home so that she can use more oxygen, perhaps 8LNC. DVT Prophylaxis: enoxaparin  Diet: ADULT DIET;  Regular; Low Fiber  ADULT ORAL NUTRITION SUPPLEMENT; Breakfast, Lunch, Dinner; Standard High Calorie/High Protein Oral Supplement  Code Status: Full Code  PT/OT Eval Status: rec'd home PT/OT    Dispo - I'm OK with discharge. I ordered home care, increased home oxygen, filled out the [address], and sent cipro and potassium to the outpatient pharmacy.       Appropriate for A1 Discharge Unit: No Ramon Apgar, MD

## 2023-03-09 NOTE — PROGRESS NOTES
Discharge: Pt taken out by wheelchair by this writer and PCA to be discharged to home with family. Pt left with 2 oxygen tanks for home oxygen. Prescriptions picked up at the Outpatient Pharmacy before discharge.

## 2023-03-09 NOTE — PLAN OF CARE
Problem: Discharge Planning  Goal: Discharge to home or other facility with appropriate resources  3/9/2023 0101 by Jessica Eaton RN  Outcome: Progressing  3/9/2023 0101 by Jessica Eaton RN  Outcome: Progressing  3/8/2023 2114 by Pavan Garcia RN  Outcome: Progressing     Problem: Pain  Goal: Verbalizes/displays adequate comfort level or baseline comfort level  3/9/2023 0101 by Jessica Eaton RN  Outcome: Progressing  3/9/2023 0101 by Jessica Eaton RN  Outcome: Progressing  3/8/2023 2114 by Pavan Garcia RN  Outcome: Progressing     Problem: Safety - Adult  Goal: Free from fall injury  3/9/2023 0101 by Jessica Eaton RN  Outcome: Progressing  3/9/2023 0101 by Jessica Eaton RN  Outcome: Progressing  3/8/2023 2114 by Pavan Garcia RN  Outcome: Progressing     Problem: ABCDS Injury Assessment  Goal: Absence of physical injury  3/9/2023 0101 by Jessica Eaton RN  Outcome: Progressing  3/9/2023 0101 by Jessica Eaton RN  Outcome: Progressing  3/8/2023 2114 by Pavan Garcia RN  Outcome: Progressing     Problem: Skin/Tissue Integrity  Goal: Absence of new skin breakdown  Description: 1. Monitor for areas of redness and/or skin breakdown  2. Assess vascular access sites hourly  3. Every 4-6 hours minimum:  Change oxygen saturation probe site  4. Every 4-6 hours:  If on nasal continuous positive airway pressure, respiratory therapy assess nares and determine need for appliance change or resting period.   3/9/2023 0101 by Jessica Eaton RN  Outcome: Progressing  3/9/2023 0101 by Jessica Eaton RN  Outcome: Progressing  3/8/2023 2114 by Pavan Garcia RN  Outcome: Progressing     Problem: Respiratory - Adult  Goal: Achieves optimal ventilation and oxygenation  3/9/2023 0101 by Jessica Eaton RN  Outcome: Progressing  3/9/2023 0101 by Jessica Eaton RN  Outcome: Progressing  3/8/2023 2114 by Pavan Garcia RN  Outcome: Progressing     Problem: Gastrointestinal - Adult  Goal: Minimal or absence of nausea and vomiting  3/9/2023 0101 by Addi Sanchez RN  Outcome: Progressing  3/9/2023 0101 by Addi Sanchez RN  Outcome: Progressing  3/8/2023 2114 by Farshad De Souza RN  Outcome: Progressing  Goal: Maintains or returns to baseline bowel function  3/9/2023 0101 by Addi Sanchez RN  Outcome: Progressing  3/9/2023 0101 by Addi Sanchez RN  Outcome: Progressing  3/8/2023 2114 by Farshad De Souza RN  Outcome: Progressing  Goal: Maintains adequate nutritional intake  3/9/2023 0101 by Addi Sanchez RN  Outcome: Progressing  3/9/2023 0101 by Addi Sanchez RN  Outcome: Progressing  3/8/2023 2114 by Farshad De Souza RN  Outcome: Progressing     Problem: Nutrition Deficit:  Goal: Optimize nutritional status  3/9/2023 0101 by Addi Sanchez RN  Outcome: Progressing  3/9/2023 0101 by Addi Sanchez RN  Outcome: Progressing  3/8/2023 2114 by Farshad De Souza RN  Outcome: Progressing

## 2023-03-09 NOTE — PLAN OF CARE
Problem: Pain  Goal: Verbalizes/displays adequate comfort level or baseline comfort level  3/9/2023 1348 by Laila Oquendo RN  Outcome: Progressing  Flowsheets (Taken 3/9/2023 1348)  Verbalizes/displays adequate comfort level or baseline comfort level:   Encourage patient to monitor pain and request assistance   Assess pain using appropriate pain scale   Administer analgesics based on type and severity of pain and evaluate response   Implement non-pharmacological measures as appropriate and evaluate response

## 2023-03-09 NOTE — PROGRESS NOTES
Physical Therapy  Facility/Department: [facility] C3 TELE/MED SURG/ONC  Daily Treatment Note  NAME: Christen Hercules  : 1965  MRN: [id number]    Date of Service: 3/9/2023    Discharge Recommendations:  24 hour supervision or assist, Home with Home health PT   PT Equipment Recommendations  Equipment Needed: Yes    Patient Diagnosis(es): The primary encounter diagnosis was Perioperative dehiscence of abdominal wound with evisceration, initial encounter. Diagnoses of Chronic obstructive pulmonary disease, unspecified COPD type ([address] Abdominal pain, unspecified abdominal location, and Small bowel obstruction ([address] were also pertinent to this visit. Assessment   Assessment: Pt seen for PT treatment follow up this date. Pt tolerated therapy well and is making great progress towards therapy goals. Pt completed supine>sit with mod indep, multiple STS transfers with RW and mod indep and ambulated a total of 75ft with RW and SPV/Mod indep . No LOB/instability observed with transfers or ambulation. Pt educated on and performed BLE exercises with VC for proper technique. Pt would continue to benefit from skilled therapy in the acute setting in order to address functional deficits and enhance pt independence. Recommend home with 24hr assist, HHPT and RW upon d/c. Activity Tolerance: Patient tolerated treatment well  Equipment Needed: Yes     Plan    Physcial Therapy Plan  General Plan: 3-5 times per week  Specific Instructions for Next Treatment: Progress ther ex and mobility as tolerated  Current Treatment Recommendations: Strengthening;Balance training;Functional mobility training;Transfer training; Endurance training;Gait training;Home exercise program;Safety education & training;Equipment evaluation, education, & procurement;Positioning; Therapeutic activities     Restrictions  Restrictions/Precautions  Restrictions/Precautions: Fall Risk, General Precautions  Position Activity Restriction  Other position/activity restrictions: 5L O2,IV, abdominal incision with abdominal binder, cushion in chair, up in chair PRN, ambulate pt, high fall risk per nursing assessment     Subjective    Subjective  Subjective: pt agreed to participate in therapy  Pain: Pt reports hip and abdomen pain, did not rate  Orientation  Overall Orientation Status: Within Normal Limits     Objective   Vitals  Heart Rate: (!) 102  BP: 110/68  BP Location: Left upper arm  MAP (Calculated): 82  SpO2: 95 %  O2 Device: High flow nasal cannula  Bed Mobility Training  Bed Mobility Training: Yes  Overall Level of Assistance: Stand-by assistance  Supine to Sit: Modified independent  Sit to Supine: Modified independent  Balance  Sitting: Intact  Standing: Impaired (rw)  Standing - Static: Good  Standing - Dynamic: Good  Transfer Training  Transfer Training: Yes (RW)  Interventions: Verbal cues  Sit to Stand: Modified independent  Stand to Sit: Modified independent  Toilet Transfer: Modified independent  Gait Training  Gait Training: Yes  Gait  Overall Level of Assistance: Supervision;Modified independent (pt managed O2 tubing indep)  Interventions: Verbal cues  Step Length: Right shortened;Left shortened  Distance (ft): 75 Feet (2x)  Assistive Device: Walker, rolling;Gait belt     PT Exercises  Exercise Treatment: Pt performed sitting ankle pumps, LAQ, GS, marches and hip abduction x15 reps BLE     Safety Devices  Type of Devices: All vianney prominences offloaded;Bed alarm in place; Left in bed;Call light within reach       Goals  Short Term Goals  Time Frame for Short Term Goals: 1 week, 3/09/23 (unless otherwise specified)  Short Term Goal 1: Pt will transfer supine <-> sit with CGA x 1-- 3/07 GOAL MET: new goal: perform all bed mobility with supervision  -3/09 met new goal: indep  Short Term Goal 2: Pt will transfer sit <-> stand and bed>chair using RW with SBA-- 3/07 GOAL MET: new goal: pt will perform all functional transfers with RW and supervision  -3/09 met  new goal: indep  Short Term Goal 3: Pt will ambulate x 50 feet using RW with CGA/SBA x 1-- 3/07 progressing  -3/09 met 75' rw spv/mod indep  Short Term Goal 4: By 3/05/23: Pt will tolerate 12-15 reps BLE exercise for strengthening, balance, and endurance-- 3/09 continue goal  Patient Goals   Patient Goals :  \"To get stronger and be in less pain\"    Education  Patient Education  Education Given To: Patient  Education Provided: Role of Therapy;Plan of Care;Home Exercise Program;Transfer Training;Equipment  Education Method: Verbal  Barriers to Learning: None  Education Outcome: Verbalized understanding;Demonstrated understanding  [facility] 6 Clicks Inpatient Mobility:  AM-PAC Basic Mobility - Inpatient   How much help is needed turning from your back to your side while in a flat bed without using bedrails?: None  How much help is needed moving from lying on your back to sitting on the side of a flat bed without using bedrails?: None  How much help is needed moving to and from a bed to a chair?: None  How much help is needed standing up from a chair using your arms?: None  How much help is needed walking in hospital room?: None  How much help is needed climbing 3-5 steps with a railing?: A Little  AM-PAC Inpatient Mobility Raw Score : 23  AM-PAC Inpatient T-Scale Score : 56.93  Mobility Inpatient CMS 0-100% Score: 11.2  Mobility Inpatient CMS G-Code Modifier : CI    Therapy Time   Individual Concurrent Group Co-treatment   Time In 1110         Time Out 1148         Minutes 38         Timed Code Treatment Minutes: [address]

## 2023-03-09 NOTE — CARE COORDINATION
Chart reviewed day 9. Spoke with Jerod Aguilera with [employer], able to deliver high flow concentrator to patients daughters home today, verified address with patient. Provided with daughters contact # for time frame confirmation. Will deliver high flow E tanks to hospital prior to noon. Notified MD. Will d/c with [facility] and RW in room.  Kiara Villarreal RN          CASE MANAGEMENT DISCHARGE SUMMARY      Discharge to: home with [facility] ordered/agency: high Flow O2 concentrator provided by [facility]  Transportation: private       Confirmed discharge plan with:     Patient: yes     Family:  yes per [facility]     Facility/Agency, name:  [facility] faxed per [facility]        RN, name: Luis Greenwood RN

## 2023-03-09 NOTE — DISCHARGE INSTRUCTIONS
FOLLOW UP APPOINTMENT WITH DR. Aki Seay FROM GENERAL SURGERY ON Friday 3/17/23 AT 10:15AM.   OFFICE PHONE # [phone]. PLEASE CALL IF YOU NEED TO RESCHEDULE THIS APPOINTMENT.    OFFICE IS LOCATED AT [facility] [place] [address] building is on the same side of the road/driveway as the Emergency Department              (it is NOT the building across the street with the red windows.)

## 2023-03-10 ENCOUNTER — TELEPHONE (OUTPATIENT)
Dept: SURGERY | Age: 58
End: 2023-03-10

## 2023-03-10 NOTE — TELEPHONE ENCOUNTER
Oscar Noble with [facility] called and needs to discuss Home Care Orders & Medication (Oxycodone) . Please call her and thank you!  #[phone]

## 2023-03-11 ENCOUNTER — TELEPHONE (OUTPATIENT)
Dept: SURGERY | Age: 58
End: 2023-03-11

## 2023-03-11 DIAGNOSIS — K56.609 SMALL BOWEL OBSTRUCTION (HCC): ICD-10-CM

## 2023-03-11 RX ORDER — OXYCODONE HYDROCHLORIDE 5 MG/1
5 TABLET ORAL EVERY 6 HOURS PRN
Qty: 20 TABLET | Refills: 0 | Status: SHIPPED | OUTPATIENT
Start: 2023-03-11 | End: 2023-03-18

## 2023-03-13 ENCOUNTER — OFFICE VISIT (OUTPATIENT)
Dept: FAMILY MEDICINE CLINIC | Age: 58
End: 2023-03-13
Payer: MEDICARE

## 2023-03-13 ENCOUNTER — TELEPHONE (OUTPATIENT)
Dept: SURGERY | Age: 58
End: 2023-03-13

## 2023-03-13 ENCOUNTER — TELEPHONE (OUTPATIENT)
Dept: FAMILY MEDICINE CLINIC | Age: 58
End: 2023-03-13

## 2023-03-13 VITALS — HEIGHT: 61 IN | RESPIRATION RATE: 16 BRPM | WEIGHT: 96 LBS | BODY MASS INDEX: 18.12 KG/M2

## 2023-03-13 DIAGNOSIS — S31.000A SACRAL WOUND, INITIAL ENCOUNTER: Primary | ICD-10-CM

## 2023-03-13 DIAGNOSIS — T81.31XA DEHISCENCE OF OPERATIVE WOUND, INITIAL ENCOUNTER: ICD-10-CM

## 2023-03-13 DIAGNOSIS — B37.0 THRUSH, ORAL: ICD-10-CM

## 2023-03-13 PROCEDURE — 1036F TOBACCO NON-USER: CPT

## 2023-03-13 PROCEDURE — G8482 FLU IMMUNIZE ORDER/ADMIN: HCPCS

## 2023-03-13 PROCEDURE — 1111F DSCHRG MED/CURRENT MED MERGE: CPT

## 2023-03-13 PROCEDURE — G8427 DOCREV CUR MEDS BY ELIG CLIN: HCPCS

## 2023-03-13 PROCEDURE — 3017F COLORECTAL CA SCREEN DOC REV: CPT

## 2023-03-13 PROCEDURE — 99214 OFFICE O/P EST MOD 30 MIN: CPT

## 2023-03-13 PROCEDURE — G8419 CALC BMI OUT NRM PARAM NOF/U: HCPCS

## 2023-03-13 RX ORDER — FLUCONAZOLE 100 MG/1
100 TABLET ORAL DAILY
Qty: 3 TABLET | Refills: 0 | Status: SHIPPED | OUTPATIENT
Start: 2023-03-13 | End: 2023-03-16

## 2023-03-13 RX ORDER — BISMUTH TRIBROMOPH/PETROLATUM 1"X8"
1 BANDAGE TOPICAL 2 TIMES DAILY
Qty: 1 EACH | Refills: 0 | Status: CANCELLED | OUTPATIENT
Start: 2023-03-13

## 2023-03-13 RX ORDER — FOAM BANDAGE 6.625X6.75
1 BANDAGE TOPICAL 2 TIMES DAILY
Qty: 2 EACH | Refills: 0 | Status: SHIPPED | OUTPATIENT
Start: 2023-03-13

## 2023-03-13 ASSESSMENT — ENCOUNTER SYMPTOMS
SHORTNESS OF BREATH: 0
WHEEZING: 0
BLOOD IN STOOL: 0
ABDOMINAL PAIN: 0
COLOR CHANGE: 0
CONSTIPATION: 0
COUGH: 0
SORE THROAT: 0
DIARRHEA: 0

## 2023-03-13 NOTE — TELEPHONE ENCOUNTER
Patient called office this morning 3/13/2023 regarding pain medication and was directed to call the hospital or PCP because she had not been seen in the office yet. The PCP office (Tania HAMEED) called and expressed concern about this situation. Patient has had 2 abdominal procedures while inpatient by Dr. Eunice Harada. The PCP office does not prescribe pain medication. After reviewing her medication list I see that Dr. Severino sent out oxyCodone  1 tab every 6 hours for pain for up 7 days.     Their office # is [phone]  Patient # is [phone]

## 2023-03-13 NOTE — TELEPHONE ENCOUNTER
Pts PCP won't prescribe pt more pain meds.  Pt is not yet an established pt in the office but Dr Todd Mejia did an Abdominal Wall Wound Dehiscence with Evisceration on 2/28/23

## 2023-03-13 NOTE — TELEPHONE ENCOUNTER
Patient called and stated that she is about to run out of her pain medication that was prescribed to her after her first surgery on 2/22/23. Patient stated that she called the surgeons office for a medication refill and the surgeons office stated that they could not do anything for her until she is seen by them on 3/17/23. Patient was tearful and stated that the she is in a lot of abdominal pain and has wounds on her bottom. Patient stated that the pain medication that was given was not refilled or increased after having a second surgery on 2/28/23. Patient stated that she did call on 3/11/23 to the surgeon on call and was given a few days supply but not enough to get her to 3/17/23. Patient requested to come in today, 3/13/23 to see SHARON Thompson so he can assess her wounds.  Patient is scheduled at 1pm.

## 2023-03-13 NOTE — TELEPHONE ENCOUNTER
Per Saadia Nieves, CNP - Pt was prescribed 28 Oxycodones on 2/27/23 upon discharge but pt returned to hospital on 2/28/23 and was admitted until 3/9/23 - Pt was then prescribed another 20 Oxycodone on 3/11/23 - No more pain meds will be prescribed

## 2023-03-13 NOTE — TELEPHONE ENCOUNTER
Pt called wanting a refill on her pain meds - Pt said she was seen over the weekend at hospital but is not yet an established pt here in the office - I explained she will need to call hospital or PCP for a refill on pain meds - Gave pt phone number to hospital

## 2023-03-13 NOTE — TELEPHONE ENCOUNTER
Called and spoke to the General Surgery who stated that they would put a message back to the on call provider, they were not aware that she had surgery done by .

## 2023-03-13 NOTE — PROGRESS NOTES
Bao Ga (:  1965) is a 62 y.o. female,Established patient, here for evaluation of the following chief complaint(s): Wound Check (Pt is here for an open wound on her buttock, ) and Thrush         ASSESSMENT/PLAN:  1. Sacral wound, initial encounter  -     Wound Dressings (ALLEVYN GENTLE BORDER SACRUM) PADS; Apply 1 application topically in the morning and at bedtime, Disp-2 each, R-0Normal  -     [address] [place] [address]  - Patient does have a sacral wound that is stage II. We will plan to use above treatments to help reduce pressure on the wound. Patient was educated to keep the wound clean dry and intact. Patient was educated on changing positions frequently. Patient already does have a chair pad for wheelchair. Educated patient on frequent position evenings and ways to reduce pressure on her coccyx. Patient is agreeable and understands plan at this time. 2. Dehiscence of operative wound, initial encounter  -     AFL (Epic) - Benjamin Wells NP, Pain Management, [address]  - Patient is still having ongoing pain from the area there is no signs of active bleeding infection or swelling noted to the area. Patient was instructed to continue to follow-up with surgeon regarding pain. We will also refer patient to pain management and wound care for further evaluation. Again there is no signs of infection or abnormal signs noted at the area of the wound today. 3. Thrush, oral  -     fluconazole (DIFLUCAN) 100 MG tablet; Take 1 tablet by mouth daily for 3 days, Disp-3 tablet, R-0Normal  -Patient has failed treatment with nystatin. We will plan oral treatment at this time. Patient was educated on side effects of medication. Patient is agreeable and understands at this time. No follow-ups on file. Subjective   SUBJECTIVE/OBJECTIVE:  HPI  Patient presents the office today following abdominal surgery. Patient did have a complicated course.  She had a small bowel obstruction that was surgically operated on.  Patient did experience a wound dehiscence.  Patient ultimately had a follow-up surgery.  Patient presents to the office today for follow-up and for concern of ulcer on her coccyx.  Patient states that she thought she developed an ulcer on her coccyx when she was in the hospital but was told there was nothing that had opened up.  Since discharge patient noticed a sore on the bottom of her coccyx that was noticed by her home health care nurse.  Patient presents the office for further evaluation and treatment.  Patient also developed thrush while in the hospital.  She was being treated with nystatin.  Patient states that it has not been adequately treated.  Patient's been on multiple antibiotics given wound dehiscence.  Patient has no other acute concerns at this time.    Review of Systems   HENT:  Negative for congestion and sore throat.    Respiratory:  Negative for cough, shortness of breath and wheezing.    Cardiovascular:  Negative for chest pain and leg swelling.   Gastrointestinal:  Negative for abdominal pain, blood in stool, constipation and diarrhea.   Endocrine: Negative for cold intolerance and heat intolerance.   Genitourinary:  Negative for difficulty urinating, hematuria and urgency.   Musculoskeletal:  Negative for arthralgias and gait problem.   Skin:  Positive for wound. Negative for color change.   Neurological:  Negative for dizziness, seizures, light-headedness and headaches.   Psychiatric/Behavioral:  Negative for agitation and confusion. The patient is not nervous/anxious.         Objective   Physical Exam  Constitutional:       General: She is not in acute distress.     Appearance: Normal appearance.   HENT:      Right Ear: Tympanic membrane normal.      Left Ear: Tympanic membrane normal.   Eyes:      Pupils: Pupils are equal, round, and reactive to light.   Cardiovascular:      Rate and  Rhythm: Normal rate and regular rhythm. Pulmonary:      Effort: Pulmonary effort is normal.      Breath sounds: Normal breath sounds. Abdominal:      General: Abdomen is flat. Bowel sounds are normal.      Palpations: Abdomen is soft. Comments: Patient does have abdominal dressing and binder in place did visualize wound there is no signs of infection on the abdomen. Musculoskeletal:         General: Normal range of motion. Skin:     General: Skin is warm. Findings: Wound present. Comments: Patient does have a sacral wound that measures 2 cm x 2 cm. Wound is very consistent with a pressure ulcer due to prolonged sitting as well as her low BMI and bony presents. There is no signs of infection or active bleeding at the site. Wound is stage II   Neurological:      General: No focal deficit present. Mental Status: She is alert. Psychiatric:         Mood and Affect: Mood normal.         Behavior: Behavior normal.         Judgment: Judgment normal.          This note was generated completely or in part utilizing Dragon dictation speech recognition software. Occasionally, words are mistranscribed and despite editing, the text may contain inaccuracies due to incorrect word recognition. If further clarification is needed please contact the office at [phone]. An electronic signature was used to authenticate this note.     --ZARI Guillen - CNP

## 2023-03-15 ENCOUNTER — TELEPHONE (OUTPATIENT)
Dept: SURGERY | Age: 58
End: 2023-03-15

## 2023-03-15 NOTE — TELEPHONE ENCOUNTER
Rory Reinoso at [facility] called and wants to know if it's alright for pt to take Immodium for her diarrhea? Also, the pt said she feels like she is going thru detox since she isn't taking any more pain med's. Please call Rory Reinoso # [phone]. Thank you!

## 2023-03-15 NOTE — TELEPHONE ENCOUNTER
Per Mayuri Amezcua CNP, ok for patient to take immodium for diarrhea. Julia Boles with [employer].

## 2023-03-17 ENCOUNTER — OFFICE VISIT (OUTPATIENT)
Dept: SURGERY | Age: 58
End: 2023-03-17

## 2023-03-17 VITALS — HEART RATE: 105 BPM | DIASTOLIC BLOOD PRESSURE: 80 MMHG | SYSTOLIC BLOOD PRESSURE: 143 MMHG

## 2023-03-17 DIAGNOSIS — Z09 POSTOP CHECK: Primary | ICD-10-CM

## 2023-03-17 NOTE — DISCHARGE INSTRUCTIONS
[facility] Physician Orders and Discharge [facility]  [address], [address]  [place], [facility]  Telephone: [phone]      Fax: [phone]      Your home care company: [facility]. Your wound-care supplies will be provided by: [facility]. NAME:  Jeny Royal   YOB: 1965  PRIMARY DIAGNOSIS FOR WOUND CARE CENTER:  Pressure . Wound cleansing:   Do not scrub or use excessive force. Wash hands with soap and water before and after dressing changes. Prior to applying a clean dressing, cleanse wound with normal saline, wound cleanser, or mild soap and water. Ask your physician or nurse before getting the wound(s) wet in the shower. Wound care for home:    Coccyx Wound: You may place lidocaine and let sit for 15-20 minutes prior to dressing change. Then wipe off and cleanse wound. Collagen   Mepilex border or bordered foam dressing   Change three times a week. Please note, all wounds (unless stated otherwise here) were mechanically debrided at the time of cleansing here in the wound-care center today, so a small amount of pain, drainage or bleeding from that process might be expected, and is normal.     All products for home use, including multiple products for a single wound if applicable, are medically necessary in order to achieve the best chance at timely wound healing. See provider documentation for details if needed. Substituted dressings applied in the [facility] today, if applicable:        New orders for this week (labs, imaging, medications, etc.):    Please  prescription for Lidocaine and Tramadol from your pharmacy. You may also purchase over the counter Lidocaine (4% lidocaine cream) from [employer].   Please provide cushion for offloading today in [facility].    Dr. Adolfo Berry will work on ordering you a special cushion for offloading from [facility].   Please try to follow a nutritious diet to assist with wound

## 2023-03-19 NOTE — PROGRESS NOTES
Surgery Post-op Progress Note    HPI:  Notes reviewed, and agree with documentation in pt's chart. Postoperative Follow-up: Patient presents for 5 and 6  week follow-up status post ex lap, lysis of adhesions for SBO, followed by abdominal wound dehiscence, evisceration and repair . Eating a regular diet without difficulty. Bowel movements are Normal.  Pain is controlled without any medications. .     ROS:    10 point review of systems performed; please refer to HPI with pertinent positives, all other ROS are negative    A review of the patient's record including allergies, medication list, tobacco history, family history, problem list, medical history and social history has been completed and updates made to the patient's EMR where indicated. PE:   CONSTITUTIONAL:  awake and alert    ABDOMEN: soft, non-distended, non-tender     INCISION: clean, dry, no drainage, healed, staples, retention sutures in place      ASSESSMENT:   Diagnosis Orders   1.  Postop check              PLAN:    Staples/sutures removed without incident  Continue with routine wound care as discussed  Gradually increase activities as tolerated  Follow-up as needed; please call with questions or concerns

## 2023-03-21 ENCOUNTER — TELEPHONE (OUTPATIENT)
Dept: SURGERY | Age: 58
End: 2023-03-21

## 2023-03-21 NOTE — TELEPHONE ENCOUNTER
Tish Dancer with [facility] called and said they need a Verbal Order for  to see the pt. LM if no answer. Please call # [phone] and thank you!

## 2023-03-22 PROBLEM — Z72.0 TOBACCO ABUSE: Status: RESOLVED | Noted: 2023-01-18 | Resolved: 2023-03-22

## 2023-03-22 PROBLEM — R91.8 PULMONARY INFILTRATES: Status: RESOLVED | Noted: 2023-03-04 | Resolved: 2023-03-22

## 2023-03-22 PROBLEM — E87.6 HYPOKALEMIA: Status: RESOLVED | Noted: 2023-03-07 | Resolved: 2023-03-22

## 2023-03-22 PROBLEM — K56.609 SMALL BOWEL OBSTRUCTION (HCC): Status: RESOLVED | Noted: 2023-02-18 | Resolved: 2023-03-22

## 2023-03-22 PROBLEM — J44.9 CHRONIC OBSTRUCTIVE PULMONARY DISEASE (HCC): Status: ACTIVE | Noted: 2023-03-22

## 2023-03-22 PROBLEM — T81.321A: Status: RESOLVED | Noted: 2023-02-28 | Resolved: 2023-03-22

## 2023-03-22 PROBLEM — T81.31XA: Status: RESOLVED | Noted: 2023-02-28 | Resolved: 2023-03-22

## 2023-03-22 PROBLEM — K42.9 UMBILICAL HERNIA: Status: RESOLVED | Noted: 2023-01-18 | Resolved: 2023-03-22

## 2023-03-22 PROBLEM — J44.1 CHRONIC OBSTRUCTIVE PULMONARY DISEASE WITH ACUTE EXACERBATION (HCC): Status: RESOLVED | Noted: 2023-01-18 | Resolved: 2023-03-22

## 2023-03-22 PROBLEM — D72.829 LEUKOCYTOSIS: Status: RESOLVED | Noted: 2023-03-04 | Resolved: 2023-03-22

## 2023-03-23 ENCOUNTER — HOSPITAL ENCOUNTER (OUTPATIENT)
Dept: WOUND CARE | Age: 58
Discharge: HOME | End: 2023-03-23
Payer: MEDICARE

## 2023-03-23 VITALS
HEART RATE: 107 BPM | BODY MASS INDEX: 18.77 KG/M2 | HEIGHT: 61 IN | DIASTOLIC BLOOD PRESSURE: 70 MMHG | RESPIRATION RATE: 16 BRPM | TEMPERATURE: 98.5 F | WEIGHT: 99.4 LBS | SYSTOLIC BLOOD PRESSURE: 109 MMHG

## 2023-03-23 DIAGNOSIS — M79.18 ACUTE BUTTOCK PAIN: Primary | ICD-10-CM

## 2023-03-23 DIAGNOSIS — L89.312 PRESSURE ULCER OF RIGHT BUTTOCK, STAGE 2 (HCC): ICD-10-CM

## 2023-03-23 PROCEDURE — 97597 DBRDMT OPN WND 1ST 20 CM/<: CPT

## 2023-03-23 PROCEDURE — 99213 OFFICE O/P EST LOW 20 MIN: CPT

## 2023-03-23 RX ORDER — LIDOCAINE 50 MG/G
OINTMENT TOPICAL ONCE
OUTPATIENT
Start: 2023-03-23 | End: 2023-03-23

## 2023-03-23 RX ORDER — BACITRACIN ZINC AND POLYMYXIN B SULFATE 500; 1000 [USP'U]/G; [USP'U]/G
OINTMENT TOPICAL ONCE
OUTPATIENT
Start: 2023-03-23 | End: 2023-03-23

## 2023-03-23 RX ORDER — COLLAGENASE CLOSTRIDIUM HIST.
POWDER (EA) MISCELLANEOUS DAILY
COMMUNITY

## 2023-03-23 RX ORDER — LIDOCAINE 40 MG/G
CREAM TOPICAL ONCE
Status: DISCONTINUED | OUTPATIENT
Start: 2023-03-23 | End: 2023-03-24 | Stop reason: HOSPADM

## 2023-03-23 RX ORDER — TRAMADOL HYDROCHLORIDE 50 MG/1
50 TABLET ORAL 2 TIMES DAILY PRN
Qty: 14 TABLET | Refills: 0 | Status: SHIPPED | OUTPATIENT
Start: 2023-03-23 | End: 2023-03-30

## 2023-03-23 RX ORDER — LIDOCAINE 40 MG/G
CREAM TOPICAL ONCE
OUTPATIENT
Start: 2023-03-23 | End: 2023-03-23

## 2023-03-23 RX ORDER — LIDOCAINE 40 MG/G
CREAM TOPICAL
Qty: 28 G | Refills: 2 | Status: SHIPPED | OUTPATIENT
Start: 2023-03-23

## 2023-03-23 RX ORDER — LIDOCAINE HYDROCHLORIDE 40 MG/ML
SOLUTION TOPICAL ONCE
OUTPATIENT
Start: 2023-03-23 | End: 2023-03-23

## 2023-03-23 ASSESSMENT — PAIN DESCRIPTION - DESCRIPTORS: DESCRIPTORS: ACHING;BURNING

## 2023-03-23 ASSESSMENT — PAIN DESCRIPTION - PAIN TYPE: TYPE: CHRONIC PAIN

## 2023-03-23 ASSESSMENT — PAIN DESCRIPTION - ORIENTATION: ORIENTATION: MID

## 2023-03-23 ASSESSMENT — PAIN SCALES - GENERAL: PAINLEVEL_OUTOF10: 8

## 2023-03-23 ASSESSMENT — PAIN DESCRIPTION - FREQUENCY: FREQUENCY: CONTINUOUS

## 2023-03-23 ASSESSMENT — PAIN DESCRIPTION - LOCATION: LOCATION: COCCYX

## 2023-03-23 ASSESSMENT — PAIN DESCRIPTION - ONSET: ONSET: ON-GOING

## 2023-03-23 NOTE — PLAN OF CARE
Pt's first visit to [address] [place] [address],[address] today. Pt presents with coccyx wound. Pt's female friend present at bedside. Wound debridement per Dr. Mckenzie Gorman. Pt reports that she is ambulatory at home. Plan of care reviewed per Dr. Mckenzie Gorman and [address] [place] [address],[address] staff. Waffle cushion provided today in [address] [place] [address],[address] for offloading. Follow up in [address] [place] [address] in 1 week as ordered. Pt. Aware to call sooner with any problems or questions/concerns. MD orders/D/C instructions reviewed with patient, all questions answered; copy of instructions given to patient.

## 2023-03-23 NOTE — PLAN OF CARE
[address] [facility] Physician Orders and Discharge 800 [facility]  [address], [address]  [place], [facility]  Telephone: [phone]      Fax: [phone]      Your home care company: [facility]. Your wound-care supplies will be provided by: [facility]. NAME:  Yunier Barnes   YOB: 1965  PRIMARY DIAGNOSIS FOR WOUND CARE CENTER:  Pressure . Wound cleansing:   Do not scrub or use excessive force. Wash hands with soap and water before and after dressing changes. Prior to applying a clean dressing, cleanse wound with normal saline, wound cleanser, or mild soap and water. Ask your physician or nurse before getting the wound(s) wet in the shower. Wound care for home:    Coccyx Wound: You may place lidocaine and let sit for 15-20 minutes prior to dressing change. Then wipe off and cleanse wound. Collagen   Mepilex border or bordered foam dressing   Change three times a week. Please note, all wounds (unless stated otherwise here) were mechanically debrided at the time of cleansing here in the wound-care center today, so a small amount of pain, drainage or bleeding from that process might be expected, and is normal.     All products for home use, including multiple products for a single wound if applicable, are medically necessary in order to achieve the best chance at timely wound healing. See provider documentation for details if needed. Substituted dressings applied in the [facility] today, if applicable:        New orders for this week (labs, imaging, medications, etc.):    Please  prescription for Lidocaine and Tramadol from your pharmacy. You may also purchase over the counter Lidocaine (4% lidocaine cream) from [employer].   Please provide cushion for offloading today in [facility].    Dr. Lavell Moore will work on ordering you a special cushion for offloading from [facility].   Please try to follow a nutritious diet to assist with wound

## 2023-03-24 ENCOUNTER — TELEPHONE (OUTPATIENT)
Dept: WOUND CARE | Age: 58
End: 2023-03-24

## 2023-03-24 DIAGNOSIS — L89.312 PRESSURE ULCER OF RIGHT BUTTOCK, STAGE 2 (HCC): Primary | ICD-10-CM

## 2023-03-24 NOTE — TELEPHONE ENCOUNTER
Pt called and notified that Lidocaine prescription is not covered by her insurance as notified by fax to [facility].  Pt v/u and states that she will purchase OTC lidocaine at [facility].

## 2023-03-27 PROBLEM — J98.11 ATELECTASIS: Status: RESOLVED | Noted: 2023-03-04 | Resolved: 2023-03-27

## 2023-03-27 PROBLEM — J15.6: Status: RESOLVED | Noted: 2023-03-07 | Resolved: 2023-03-27

## 2023-03-27 PROBLEM — T81.31XA PERIOPERATIVE DEHISCENCE OF ABDOMINAL WOUND WITH EVISCERATION: Status: RESOLVED | Noted: 2023-02-28 | Resolved: 2023-03-27

## 2023-03-27 PROBLEM — J15.69: Status: RESOLVED | Noted: 2023-03-07 | Resolved: 2023-03-27

## 2023-03-27 PROBLEM — T81.321A PERIOPERATIVE DEHISCENCE OF ABDOMINAL WOUND WITH EVISCERATION: Status: RESOLVED | Noted: 2023-02-28 | Resolved: 2023-03-27

## 2023-03-27 PROBLEM — E87.1 HYPONATREMIA: Status: RESOLVED | Noted: 2023-03-04 | Resolved: 2023-03-27

## 2023-03-29 ENCOUNTER — HOSPITAL ENCOUNTER (OUTPATIENT)
Dept: WOUND CARE | Age: 58
Discharge: HOME | End: 2023-03-29
Payer: MEDICARE

## 2023-03-29 ENCOUNTER — TELEPHONE (OUTPATIENT)
Dept: PULMONOLOGY | Age: 58
End: 2023-03-29

## 2023-03-29 ENCOUNTER — OFFICE VISIT (OUTPATIENT)
Dept: PULMONOLOGY | Age: 58
End: 2023-03-29
Payer: MEDICARE

## 2023-03-29 VITALS
HEIGHT: 61 IN | SYSTOLIC BLOOD PRESSURE: 90 MMHG | HEART RATE: 118 BPM | RESPIRATION RATE: 19 BRPM | WEIGHT: 94 LBS | DIASTOLIC BLOOD PRESSURE: 60 MMHG | OXYGEN SATURATION: 95 % | BODY MASS INDEX: 17.75 KG/M2

## 2023-03-29 VITALS
DIASTOLIC BLOOD PRESSURE: 92 MMHG | RESPIRATION RATE: 20 BRPM | WEIGHT: 94 LBS | TEMPERATURE: 97.5 F | BODY MASS INDEX: 17.75 KG/M2 | OXYGEN SATURATION: 96 % | HEART RATE: 110 BPM | SYSTOLIC BLOOD PRESSURE: 124 MMHG | HEIGHT: 61 IN

## 2023-03-29 DIAGNOSIS — L89.312 PRESSURE ULCER OF RIGHT BUTTOCK, STAGE 2 (HCC): Primary | ICD-10-CM

## 2023-03-29 DIAGNOSIS — J44.9 CHRONIC OBSTRUCTIVE PULMONARY DISEASE, UNSPECIFIED COPD TYPE (HCC): Primary | ICD-10-CM

## 2023-03-29 DIAGNOSIS — M79.18 ACUTE BUTTOCK PAIN: ICD-10-CM

## 2023-03-29 DIAGNOSIS — F33.1 MAJOR DEPRESSIVE DISORDER, RECURRENT, MODERATE (HCC): ICD-10-CM

## 2023-03-29 DIAGNOSIS — F33.42 RECURRENT MAJOR DEPRESSIVE DISORDER, IN FULL REMISSION (HCC): ICD-10-CM

## 2023-03-29 DIAGNOSIS — E46 PROTEIN-CALORIE MALNUTRITION, UNSPECIFIED SEVERITY (HCC): ICD-10-CM

## 2023-03-29 DIAGNOSIS — F33.0 MAJOR DEPRESSIVE DISORDER, RECURRENT, MILD (HCC): ICD-10-CM

## 2023-03-29 PROBLEM — F33.9 MAJOR DEPRESSIVE DISORDER, RECURRENT, UNSPECIFIED (HCC): Status: ACTIVE | Noted: 2023-03-29

## 2023-03-29 PROCEDURE — 1036F TOBACCO NON-USER: CPT | Performed by: INTERNAL MEDICINE

## 2023-03-29 PROCEDURE — 1111F DSCHRG MED/CURRENT MED MERGE: CPT | Performed by: INTERNAL MEDICINE

## 2023-03-29 PROCEDURE — 3078F DIAST BP <80 MM HG: CPT | Performed by: INTERNAL MEDICINE

## 2023-03-29 PROCEDURE — 3074F SYST BP LT 130 MM HG: CPT | Performed by: INTERNAL MEDICINE

## 2023-03-29 PROCEDURE — 97597 DBRDMT OPN WND 1ST 20 CM/<: CPT

## 2023-03-29 PROCEDURE — 3023F SPIROM DOC REV: CPT | Performed by: INTERNAL MEDICINE

## 2023-03-29 PROCEDURE — G8482 FLU IMMUNIZE ORDER/ADMIN: HCPCS | Performed by: INTERNAL MEDICINE

## 2023-03-29 PROCEDURE — G8419 CALC BMI OUT NRM PARAM NOF/U: HCPCS | Performed by: INTERNAL MEDICINE

## 2023-03-29 PROCEDURE — G8427 DOCREV CUR MEDS BY ELIG CLIN: HCPCS | Performed by: INTERNAL MEDICINE

## 2023-03-29 PROCEDURE — 99214 OFFICE O/P EST MOD 30 MIN: CPT | Performed by: INTERNAL MEDICINE

## 2023-03-29 PROCEDURE — 3017F COLORECTAL CA SCREEN DOC REV: CPT | Performed by: INTERNAL MEDICINE

## 2023-03-29 RX ORDER — GUAIFENESIN 600 MG/1
600 TABLET, EXTENDED RELEASE ORAL 2 TIMES DAILY PRN
COMMUNITY
Start: 2023-03-29

## 2023-03-29 RX ORDER — LIDOCAINE 40 MG/G
CREAM TOPICAL ONCE
OUTPATIENT
Start: 2023-03-29 | End: 2023-03-29

## 2023-03-29 RX ORDER — BACITRACIN ZINC AND POLYMYXIN B SULFATE 500; 1000 [USP'U]/G; [USP'U]/G
OINTMENT TOPICAL ONCE
Status: DISCONTINUED | OUTPATIENT
Start: 2023-03-29 | End: 2023-03-30 | Stop reason: HOSPADM

## 2023-03-29 RX ORDER — LIDOCAINE HYDROCHLORIDE 40 MG/ML
SOLUTION TOPICAL ONCE
OUTPATIENT
Start: 2023-03-29 | End: 2023-03-29

## 2023-03-29 RX ORDER — BACITRACIN ZINC AND POLYMYXIN B SULFATE 500; 1000 [USP'U]/G; [USP'U]/G
OINTMENT TOPICAL ONCE
OUTPATIENT
Start: 2023-03-29 | End: 2023-03-29

## 2023-03-29 RX ORDER — TRAMADOL HYDROCHLORIDE 50 MG/1
50 TABLET ORAL EVERY 6 HOURS PRN
Qty: 28 TABLET | Refills: 0 | Status: SHIPPED | OUTPATIENT
Start: 2023-03-29 | End: 2023-04-05

## 2023-03-29 RX ORDER — LIDOCAINE 50 MG/G
OINTMENT TOPICAL ONCE
OUTPATIENT
Start: 2023-03-29 | End: 2023-03-29

## 2023-03-29 RX ORDER — FLUTICASONE FUROATE, UMECLIDINIUM BROMIDE AND VILANTEROL TRIFENATATE 200; 62.5; 25 UG/1; UG/1; UG/1
1 POWDER RESPIRATORY (INHALATION) DAILY
Qty: 1 EACH | Refills: 3 | Status: SHIPPED | OUTPATIENT
Start: 2023-03-29

## 2023-03-29 RX ORDER — LIDOCAINE 40 MG/G
CREAM TOPICAL ONCE
Status: DISCONTINUED | OUTPATIENT
Start: 2023-03-29 | End: 2023-03-30 | Stop reason: HOSPADM

## 2023-03-29 RX ORDER — LIDOCAINE HYDROCHLORIDE 40 MG/ML
SOLUTION TOPICAL ONCE
Status: DISCONTINUED | OUTPATIENT
Start: 2023-03-29 | End: 2023-03-30 | Stop reason: HOSPADM

## 2023-03-29 RX ORDER — LIDOCAINE 50 MG/G
OINTMENT TOPICAL ONCE
Status: DISCONTINUED | OUTPATIENT
Start: 2023-03-29 | End: 2023-03-30 | Stop reason: HOSPADM

## 2023-03-29 ASSESSMENT — PAIN - FUNCTIONAL ASSESSMENT: PAIN_FUNCTIONAL_ASSESSMENT: PREVENTS OR INTERFERES SOME ACTIVE ACTIVITIES AND ADLS

## 2023-03-29 ASSESSMENT — PAIN DESCRIPTION - ORIENTATION: ORIENTATION: MID

## 2023-03-29 ASSESSMENT — PAIN DESCRIPTION - FREQUENCY: FREQUENCY: CONTINUOUS

## 2023-03-29 ASSESSMENT — PAIN DESCRIPTION - ONSET: ONSET: ON-GOING

## 2023-03-29 ASSESSMENT — PAIN DESCRIPTION - LOCATION: LOCATION: COCCYX

## 2023-03-29 ASSESSMENT — PAIN DESCRIPTION - PAIN TYPE: TYPE: CHRONIC PAIN

## 2023-03-29 ASSESSMENT — PAIN DESCRIPTION - DESCRIPTORS: DESCRIPTORS: ACHING;BURNING;THROBBING

## 2023-03-29 ASSESSMENT — PAIN SCALES - GENERAL: PAINLEVEL_OUTOF10: 9

## 2023-03-29 NOTE — PROGRESS NOTES
IMPRESSION:    1-COPD un known   2-hiatal hernia   3-chronic hypoxic resp failure  4-h/o smoking/pneumothorax                PLAN:      -will change trelegy  -start mcuinex  -keep O2  -CT looks ok ,need CTS evaluation for hiatal hernia down the road  _ hold Spiriva and hold adavir  PFT   6 min     Flu and Pneumovax as per rpimary     Thank you for allowing me to participate in [facility]. I will keep following with you ,should you have any concerns ,please contact us at [facility] pulmonary office     Sincerely,        Dorinda Mancini MD  Pulmonary & Critical Care Medicine     NOTE: This report was transcribed using voice recognition software. Every effort was made to ensure accuracy; however, inadvertent computerized transcription errors may be present.

## 2023-03-29 NOTE — PLAN OF CARE
Wound stable, debridement per MD & pt. Tolerated well. Will cont. With current wound care regime with dressings. Pt. using waffle cushion for off-loading currently. Pt. Awaiting a new off-loading cushion through [address] [place] [address] for Tramadol per MD to help with pain control. F/u in [address] [place] [address],[address] in 1 week as ordered, pt. Aware to call sooner with any changes or questions/concerns. Discharge instructions reviewed with patient, all questions answered, copy given to patient. Dressings were applied to all wounds per M.D. Instructions at this visit.
to assist with wound healing      Additional instructions for specific diagnoses:    General comments for pressure ulcers: *  Make sure you stay well-hydrated, and maintain good protein intake. *  Reposition at least every two hours, to keep from having pressure in one spot for too long. *  If you are not sure whether you have the best offloading surfaces (mattress, mattress overlay, chair cushion, heel-protector boots, etc), please ask. *  Moisturize your skin regularly with Vaseline, Aquaphor, Aveeno, CeraVe, Cetaphil, Eucerin, Lubriderm, etc; but keep the skin between your toes dry. *  If you smoke, your wound can not heal properly -- please talk with us when you're ready to quit. F/U Appointment is with Dr. Joshua Worley in 1 week, on                                   at                       .     Your nurse  is Kaye Gonzalez. If we applied slip-resistant hospital socks today, be sure to remove them at least once a day to inspect your toes or feet, even if you're not changing the wraps or dressings underneath. If you see anything concerning (redness, excess moisture, etc), please call and let us know right away. Should you experience any significant changes in your wound(s) (including redness, increased warmth, increased pain, increased drainage, odor, or fever) or have questions about your wound care, please contact the [facility] at [phone] Monday-Thursday from 8:00 am - 4:30 pm, or Friday from 8:00 am - 2:30 pm.  If you need help with your wound outside these hours and cannot wait until we are again available, contact your home-care company (if applicable), your PCP, or go to the nearest emergency room.

## 2023-03-29 NOTE — DISCHARGE INSTRUCTIONS
[address] [facility] Physician Orders and Discharge 800 [facility]  [address], [address]  [place], [facility]  Telephone: [phone]      Fax: [phone]        Your home care company:  [facility]. Your wound-care supplies will be provided by: [employer]. NAME:  Jeremy Fernandes   YOB: 1965  PRIMARY DIAGNOSIS FOR WOUND CARE CENTER:  Pressure . Wound cleansing:   Do not scrub or use excessive force. Wash hands with soap and water before and after dressing changes. Prior to applying a clean dressing, cleanse wound with normal saline, wound cleanser, or mild soap and water. Ask your physician or nurse before getting the wound(s) wet in the shower. Wound care for home:     Coccyx Wound: You may place lidocaine and let sit for 15-20 minutes prior to dressing change. Then wipe off and cleanse wound. Collagen to wound (send extra with patient)  Mepilex border or bordered foam dressing   Change three times a week. Use the waffle cushion at all times when sitting for pressure relief. Please note, all wounds (unless stated otherwise here) were mechanically debrided at the time of cleansing here in the wound-care center today, so a small amount of pain, drainage or bleeding from that process might be expected, and is normal.      All products for home use, including multiple products for a single wound if applicable, are medically necessary in order to achieve the best chance at timely wound healing. See provider documentation for details if needed.      Substituted dressings applied in the [facility] today, if applicable:     N/a     New orders for this week (labs, imaging, medications, etc.):     Script for Tramadol sent to pharmacy per Dr Lior Vasquez 3/29/23    Dr. Lior Vasquez ordered an off-loading cushion for pressure relief through [facility].     Please try to follow a nutritious diet & attempt to increase protein in diet,

## 2023-03-30 NOTE — DISCHARGE INSTRUCTIONS
diet, to assist with wound healing      Additional instructions for specific diagnoses:     General comments for pressure ulcers: *  Make sure you stay well-hydrated, and maintain good protein intake. *  Reposition at least every two hours, to keep from having pressure in one spot for too long. *  If you are not sure whether you have the best offloading surfaces (mattress, mattress overlay, chair cushion, heel-protector boots, etc), please ask. *  Moisturize your skin regularly with Vaseline, Aquaphor, Aveeno, CeraVe, Cetaphil, Eucerin, Lubriderm, etc; but keep the skin between your toes dry. *  If you smoke, your wound can not heal properly -- please talk with us when you're ready to quit. F/U Appointment is with Dr. Ange Ochoa in 1 week, on                                   at                       .     Your nurse  is Kaye Tai. If we applied slip-resistant hospital socks today, be sure to remove them at least once a day to inspect your toes or feet, even if you're not changing the wraps or dressings underneath. If you see anything concerning (redness, excess moisture, etc), please call and let us know right away. Should you experience any significant changes in your wound(s) (including redness, increased warmth, increased pain, increased drainage, odor, or fever) or have questions about your wound care, please contact the [facility] at [phone] Monday-Thursday from 8:00 am - 4:30 pm, or Friday from 8:00 am - 2:30 pm.  If you need help with your wound outside these hours and cannot wait until we are again available, contact your home-care company (if applicable), your PCP, or go to the nearest emergency room.

## 2023-04-03 NOTE — DISCHARGE INSTRUCTIONS
comments for pressure ulcers: *  Make sure you stay well-hydrated, and maintain good protein intake. *  Reposition at least every two hours, to keep from having pressure in one spot for too long. *  If you are not sure whether you have the best offloading surfaces (mattress, mattress overlay, chair cushion, heel-protector boots, etc), please ask. *  Moisturize your skin regularly with Vaseline, Aquaphor, Aveeno, CeraVe, Cetaphil, Eucerin, Lubriderm, etc; but keep the skin between your toes dry. *  If you smoke, your wound can not heal properly -- please talk with us when you're ready to quit. F/U Appointment is with Dr. Levi Mahmood in 1 week, on                                   at                       .     Your nurse  is Kaye/Maria Eugenia/KRISTIN Smith. If we applied slip-resistant hospital socks today, be sure to remove them at least once a day to inspect your toes or feet, even if you're not changing the wraps or dressings underneath. If you see anything concerning (redness, excess moisture, etc), please call and let us know right away. Should you experience any significant changes in your wound(s) (including redness, increased warmth, increased pain, increased drainage, odor, or fever) or have questions about your wound care, please contact the [facility] at [phone] Monday-Thursday from 8:00 am - 4:30 pm, or Friday from 8:00 am - 2:30 pm.  If you need help with your wound outside these hours and cannot wait until we are again available, contact your home-care company (if applicable), your PCP, or go to the nearest emergency room.

## 2023-04-05 ENCOUNTER — HOSPITAL ENCOUNTER (OUTPATIENT)
Dept: WOUND CARE | Age: 58
Discharge: HOME | End: 2023-04-05

## 2023-04-07 ENCOUNTER — HOSPITAL ENCOUNTER (OUTPATIENT)
Dept: WOUND CARE | Age: 58
Discharge: HOME | End: 2023-04-07
Payer: MEDICARE

## 2023-04-07 VITALS
TEMPERATURE: 97.3 F | BODY MASS INDEX: 18.22 KG/M2 | HEART RATE: 105 BPM | DIASTOLIC BLOOD PRESSURE: 82 MMHG | RESPIRATION RATE: 18 BRPM | WEIGHT: 96.5 LBS | HEIGHT: 61 IN | OXYGEN SATURATION: 99 % | SYSTOLIC BLOOD PRESSURE: 121 MMHG

## 2023-04-07 DIAGNOSIS — L89.312 PRESSURE ULCER OF RIGHT BUTTOCK, STAGE 2 (HCC): Primary | ICD-10-CM

## 2023-04-07 PROCEDURE — 97597 DBRDMT OPN WND 1ST 20 CM/<: CPT

## 2023-04-07 RX ORDER — LIDOCAINE 40 MG/G
CREAM TOPICAL ONCE
Status: DISCONTINUED | OUTPATIENT
Start: 2023-04-07 | End: 2023-04-08 | Stop reason: HOSPADM

## 2023-04-07 RX ORDER — LIDOCAINE 50 MG/G
OINTMENT TOPICAL ONCE
OUTPATIENT
Start: 2023-04-07 | End: 2023-04-07

## 2023-04-07 RX ORDER — LIDOCAINE 40 MG/G
CREAM TOPICAL ONCE
OUTPATIENT
Start: 2023-04-07 | End: 2023-04-07

## 2023-04-07 RX ORDER — BACITRACIN ZINC AND POLYMYXIN B SULFATE 500; 1000 [USP'U]/G; [USP'U]/G
OINTMENT TOPICAL ONCE
OUTPATIENT
Start: 2023-04-07 | End: 2023-04-07

## 2023-04-07 RX ORDER — LIDOCAINE HYDROCHLORIDE 40 MG/ML
SOLUTION TOPICAL ONCE
OUTPATIENT
Start: 2023-04-07 | End: 2023-04-07

## 2023-04-07 ASSESSMENT — PAIN DESCRIPTION - ONSET: ONSET: ON-GOING

## 2023-04-07 ASSESSMENT — PAIN DESCRIPTION - PAIN TYPE: TYPE: CHRONIC PAIN

## 2023-04-07 ASSESSMENT — PAIN DESCRIPTION - LOCATION: LOCATION: COCCYX

## 2023-04-07 ASSESSMENT — PAIN SCALES - GENERAL: PAINLEVEL_OUTOF10: 2

## 2023-04-07 ASSESSMENT — PAIN DESCRIPTION - DESCRIPTORS: DESCRIPTORS: ACHING;BURNING;THROBBING

## 2023-04-07 ASSESSMENT — PAIN DESCRIPTION - ORIENTATION: ORIENTATION: MID

## 2023-04-07 ASSESSMENT — PAIN - FUNCTIONAL ASSESSMENT: PAIN_FUNCTIONAL_ASSESSMENT: PREVENTS OR INTERFERES SOME ACTIVE ACTIVITIES AND ADLS

## 2023-04-07 ASSESSMENT — PAIN DESCRIPTION - FREQUENCY: FREQUENCY: INTERMITTENT

## 2023-04-07 NOTE — PLAN OF CARE
215 [facility] Physician Orders and Discharge [address], [address]  [place], [facility]  Telephone: [phone]      Fax: [phone]        Your home care company:  [facility]. Your wound-care supplies will be provided by: [employer]. NAME:  Arlette Aguilar   YOB: 1965  PRIMARY DIAGNOSIS FOR WOUND CARE CENTER:  Pressure . Wound cleansing:   Do not scrub or use excessive force. Wash hands with soap and water before and after dressing changes. Prior to applying a clean dressing, cleanse wound with normal saline, wound cleanser, or mild soap and water. Ask your physician or nurse before getting the wound(s) wet in the shower. Wound care for home:     Coccyx Wound:   Lidocaine   Collagen to wound (send extra with patient)  Mepilex border or bordered foam dressing   Change three times a week. Use the waffle cushion at all times when sitting for pressure relief. Please note, all wounds (unless stated otherwise here) were mechanically debrided at the time of cleansing here in the wound-care center today, so a small amount of pain, drainage or bleeding from that process might be expected, and is normal.      All products for home use, including multiple products for a single wound if applicable, are medically necessary in order to achieve the best chance at timely wound healing. See provider documentation for details if needed.      Substituted dressings applied in the [facility] today, if applicable:     N/a     New orders for this week (labs, imaging, medications, etc.):     Script for Tramadol sent to pharmacy per Dr Roman June 3/29/23     Dr. Roman June ordered an off-loading cushion for pressure relief through [facility].      Please try to follow a nutritious diet & attempt to increase protein in diet, to assist with wound healing      Additional instructions for specific diagnoses:     General

## 2023-04-07 NOTE — PLAN OF CARE
Patient seen in [facility] today for follow up. She reports feeling well. Wound showing signs of healing. Patient complains of 2/10 pain. Wound debridement per Dr. Elvis Monge. Patient tolerated well. Will continue plan of care this week. F/u in [facility] in 1 week as ordered, pt. Aware to call sooner with any changes or questions/concerns. Discharge instructions reviewed with patient, all questions answered, copy given to patient. Dressings were applied to all wounds per M.D. Instructions at this visit.

## 2023-04-12 PROBLEM — F33.0 MAJOR DEPRESSIVE DISORDER, RECURRENT, MILD (HCC): Status: RESOLVED | Noted: 2023-03-29 | Resolved: 2023-04-12

## 2023-04-12 PROBLEM — F33.1 MAJOR DEPRESSIVE DISORDER, RECURRENT, MODERATE (HCC): Status: RESOLVED | Noted: 2023-03-29 | Resolved: 2023-04-12

## 2023-04-28 ENCOUNTER — TELEPHONE (OUTPATIENT)
Dept: FAMILY MEDICINE CLINIC | Age: 58
End: 2023-04-28

## 2023-05-01 ENCOUNTER — OFFICE VISIT (OUTPATIENT)
Dept: FAMILY MEDICINE CLINIC | Age: 58
End: 2023-05-01
Payer: MEDICARE

## 2023-05-01 VITALS
HEIGHT: 61 IN | HEART RATE: 105 BPM | BODY MASS INDEX: 18.61 KG/M2 | DIASTOLIC BLOOD PRESSURE: 80 MMHG | SYSTOLIC BLOOD PRESSURE: 128 MMHG | OXYGEN SATURATION: 94 % | RESPIRATION RATE: 16 BRPM | WEIGHT: 98.6 LBS

## 2023-05-01 DIAGNOSIS — D64.9 ANEMIA, UNSPECIFIED TYPE: ICD-10-CM

## 2023-05-01 DIAGNOSIS — J42 CHRONIC BRONCHITIS, UNSPECIFIED CHRONIC BRONCHITIS TYPE (HCC): Primary | ICD-10-CM

## 2023-05-01 DIAGNOSIS — E55.9 VITAMIN D DEFICIENCY: ICD-10-CM

## 2023-05-01 DIAGNOSIS — R68.89: ICD-10-CM

## 2023-05-01 DIAGNOSIS — E87.6 HYPOKALEMIA: ICD-10-CM

## 2023-05-01 PROBLEM — L89.312 PRESSURE ULCER OF RIGHT BUTTOCK, STAGE 2 (HCC): Status: RESOLVED | Noted: 2023-03-23 | Resolved: 2023-05-01

## 2023-05-01 LAB
25(OH)D3 SERPL-MCNC: 51.2 NG/ML
ALBUMIN SERPL-MCNC: 4.4 G/DL (ref 3.4–5)
ALBUMIN/GLOB SERPL: 1.6 {RATIO} (ref 1.1–2.2)
ALP SERPL-CCNC: 94 U/L (ref 40–129)
ALT SERPL-CCNC: 32 U/L (ref 10–40)
ANION GAP SERPL CALCULATED.3IONS-SCNC: 13 MMOL/L (ref 3–16)
AST SERPL-CCNC: 31 U/L (ref 15–37)
BASOPHILS # BLD: 0 K/UL (ref 0–0.2)
BASOPHILS NFR BLD: 0.6 %
BILIRUB SERPL-MCNC: 0.6 MG/DL (ref 0–1)
BUN SERPL-MCNC: 15 MG/DL (ref 7–20)
CALCIUM SERPL-MCNC: 9.5 MG/DL (ref 8.3–10.6)
CHLORIDE SERPL-SCNC: 95 MMOL/L (ref 99–110)
CO2 SERPL-SCNC: 27 MMOL/L (ref 21–32)
CREAT SERPL-MCNC: 0.7 MG/DL (ref 0.6–1.1)
DEPRECATED RDW RBC AUTO: 15.4 % (ref 12.4–15.4)
EOSINOPHIL # BLD: 0 K/UL (ref 0–0.6)
EOSINOPHIL NFR BLD: 0.3 %
GFR SERPLBLD CREATININE-BSD FMLA CKD-EPI: >60 ML/MIN/{1.73_M2}
GLUCOSE SERPL-MCNC: 115 MG/DL (ref 70–99)
HCT VFR BLD AUTO: 41.2 % (ref 36–48)
HGB BLD-MCNC: 13.7 G/DL (ref 12–16)
LYMPHOCYTES # BLD: 1.4 K/UL (ref 1–5.1)
LYMPHOCYTES NFR BLD: 19.7 %
MCH RBC QN AUTO: 31.7 PG (ref 26–34)
MCHC RBC AUTO-ENTMCNC: 33.1 G/DL (ref 31–36)
MCV RBC AUTO: 95.7 FL (ref 80–100)
MONOCYTES # BLD: 0.5 K/UL (ref 0–1.3)
MONOCYTES NFR BLD: 7.4 %
NEUTROPHILS # BLD: 5.2 K/UL (ref 1.7–7.7)
NEUTROPHILS NFR BLD: 72 %
PLATELET # BLD AUTO: 192 K/UL (ref 135–450)
PMV BLD AUTO: 8.9 FL (ref 5–10.5)
POTASSIUM SERPL-SCNC: 4 MMOL/L (ref 3.5–5.1)
PROT SERPL-MCNC: 7.2 G/DL (ref 6.4–8.2)
RBC # BLD AUTO: 4.31 M/UL (ref 4–5.2)
SODIUM SERPL-SCNC: 135 MMOL/L (ref 136–145)
WBC # BLD AUTO: 7.2 K/UL (ref 4–11)

## 2023-05-01 PROCEDURE — 3023F SPIROM DOC REV: CPT

## 2023-05-01 PROCEDURE — 99213 OFFICE O/P EST LOW 20 MIN: CPT

## 2023-05-01 PROCEDURE — 36415 COLL VENOUS BLD VENIPUNCTURE: CPT

## 2023-05-01 PROCEDURE — 3074F SYST BP LT 130 MM HG: CPT

## 2023-05-01 PROCEDURE — 3079F DIAST BP 80-89 MM HG: CPT

## 2023-05-01 PROCEDURE — 3017F COLORECTAL CA SCREEN DOC REV: CPT

## 2023-05-01 PROCEDURE — G8420 CALC BMI NORM PARAMETERS: HCPCS

## 2023-05-01 PROCEDURE — 1036F TOBACCO NON-USER: CPT

## 2023-05-01 PROCEDURE — G8427 DOCREV CUR MEDS BY ELIG CLIN: HCPCS

## 2023-05-01 RX ORDER — FUROSEMIDE 40 MG/1
40 TABLET ORAL DAILY
Qty: 90 TABLET | Refills: 1 | Status: SHIPPED | OUTPATIENT
Start: 2023-05-01

## 2023-05-01 RX ORDER — MULTIVITAMIN
TABLET ORAL
Status: CANCELLED | OUTPATIENT
Start: 2023-05-01

## 2023-05-01 RX ORDER — TIOTROPIUM BROMIDE INHALATION SPRAY 3.12 UG/1
2 SPRAY, METERED RESPIRATORY (INHALATION) DAILY
Qty: 1 EACH | Refills: 2 | Status: SHIPPED | OUTPATIENT
Start: 2023-05-01

## 2023-05-01 RX ORDER — TRAZODONE HYDROCHLORIDE 100 MG/1
100 TABLET ORAL NIGHTLY
Qty: 90 TABLET | Refills: 0 | Status: SHIPPED | OUTPATIENT
Start: 2023-05-01

## 2023-05-01 ASSESSMENT — ENCOUNTER SYMPTOMS
BLOOD IN STOOL: 0
DIARRHEA: 0
WHEEZING: 0
ABDOMINAL PAIN: 0
SHORTNESS OF BREATH: 0
CONSTIPATION: 0
COLOR CHANGE: 0
SORE THROAT: 0
COUGH: 0

## 2023-05-01 NOTE — PROGRESS NOTES
are equal, round, and reactive to light. Cardiovascular:      Rate and Rhythm: Normal rate and regular rhythm. Pulmonary:      Effort: Pulmonary effort is normal.      Breath sounds: Normal breath sounds. Abdominal:      General: Abdomen is flat. Bowel sounds are normal.      Palpations: Abdomen is soft. Musculoskeletal:         General: Normal range of motion. Skin:     General: Skin is warm. Neurological:      General: No focal deficit present. Mental Status: She is alert. Psychiatric:         Mood and Affect: Mood normal.         Behavior: Behavior normal.         Judgment: Judgment normal.          This note was generated completely or in part utilizing Dragon dictation speech recognition software. Occasionally, words are mistranscribed and despite editing, the text may contain inaccuracies due to incorrect word recognition. If further clarification is needed please contact the office at [phone]. An electronic signature was used to authenticate this note.     --ZARI Andersen - CNP

## 2023-05-01 NOTE — TELEPHONE ENCOUNTER
Refill Request         Last Seen: Last Seen Department: 5/1/2023  Last Seen by PCP: 5/1/2023    Last Written: 11/09/2022        Next Appointment:   Future Appointments   Date Time Provider Queta Asif   6/22/2023 10:00 AM [facility] PULMONARY FUNCTION TESTING [facility] PFT [facility]   6/22/2023 11:15 AM Markus Sylvester MD [facility]   8/1/2023  9:00 AM Benjamin Schilder, APRN - CNP [place] [facility]           Requested Prescriptions     Pending Prescriptions Disp Refills    furosemide (LASIX) 40 MG tablet 60 tablet 0     Sig: Take 1 tablet by mouth daily

## 2023-05-25 ENCOUNTER — TELEPHONE (OUTPATIENT)
Dept: FAMILY MEDICINE CLINIC | Age: 58
End: 2023-05-25

## 2023-05-25 NOTE — TELEPHONE ENCOUNTER
Patient called and stated that her lungs feel heavy, shes coughing and has a sinus headache as well as a runny nose  It started today  She is wondering if you can send in a zpack and a steroid  She said you gave her a step down steroid last time and that worked well

## 2023-05-26 ENCOUNTER — OFFICE VISIT (OUTPATIENT)
Dept: FAMILY MEDICINE CLINIC | Age: 58
End: 2023-05-26
Payer: MEDICARE

## 2023-05-26 VITALS
DIASTOLIC BLOOD PRESSURE: 62 MMHG | BODY MASS INDEX: 18.4 KG/M2 | HEART RATE: 101 BPM | WEIGHT: 97.4 LBS | SYSTOLIC BLOOD PRESSURE: 98 MMHG | OXYGEN SATURATION: 97 %

## 2023-05-26 DIAGNOSIS — J44.1 CHRONIC OBSTRUCTIVE PULMONARY DISEASE WITH ACUTE EXACERBATION (HCC): Primary | ICD-10-CM

## 2023-05-26 PROCEDURE — 3074F SYST BP LT 130 MM HG: CPT

## 2023-05-26 PROCEDURE — 99213 OFFICE O/P EST LOW 20 MIN: CPT

## 2023-05-26 PROCEDURE — G8427 DOCREV CUR MEDS BY ELIG CLIN: HCPCS

## 2023-05-26 PROCEDURE — 3017F COLORECTAL CA SCREEN DOC REV: CPT

## 2023-05-26 PROCEDURE — 1036F TOBACCO NON-USER: CPT

## 2023-05-26 PROCEDURE — 3078F DIAST BP <80 MM HG: CPT

## 2023-05-26 PROCEDURE — G8419 CALC BMI OUT NRM PARAM NOF/U: HCPCS

## 2023-05-26 PROCEDURE — 3023F SPIROM DOC REV: CPT

## 2023-05-26 RX ORDER — PREDNISONE 50 MG/1
50 TABLET ORAL DAILY
Qty: 5 TABLET | Refills: 0 | Status: SHIPPED | OUTPATIENT
Start: 2023-05-26 | End: 2023-05-31

## 2023-05-26 RX ORDER — AZITHROMYCIN 250 MG/1
250 TABLET, FILM COATED ORAL SEE ADMIN INSTRUCTIONS
Qty: 6 TABLET | Refills: 0 | Status: SHIPPED | OUTPATIENT
Start: 2023-05-26 | End: 2023-05-31

## 2023-05-26 RX ORDER — MONTELUKAST SODIUM 10 MG/1
10 TABLET ORAL DAILY
Qty: 30 TABLET | Refills: 3 | Status: SHIPPED | OUTPATIENT
Start: 2023-05-26

## 2023-05-26 SDOH — ECONOMIC STABILITY: FOOD INSECURITY: WITHIN THE PAST 12 MONTHS, THE FOOD YOU BOUGHT JUST DIDN'T LAST AND YOU DIDN'T HAVE MONEY TO GET MORE.: NEVER TRUE

## 2023-05-26 SDOH — ECONOMIC STABILITY: FOOD INSECURITY: WITHIN THE PAST 12 MONTHS, YOU WORRIED THAT YOUR FOOD WOULD RUN OUT BEFORE YOU GOT MONEY TO BUY MORE.: NEVER TRUE

## 2023-05-26 SDOH — ECONOMIC STABILITY: HOUSING INSECURITY
IN THE LAST 12 MONTHS, WAS THERE A TIME WHEN YOU DID NOT HAVE A STEADY PLACE TO SLEEP OR SLEPT IN A SHELTER (INCLUDING NOW)?: NO

## 2023-05-26 SDOH — ECONOMIC STABILITY: INCOME INSECURITY: HOW HARD IS IT FOR YOU TO PAY FOR THE VERY BASICS LIKE FOOD, HOUSING, MEDICAL CARE, AND HEATING?: NOT VERY HARD

## 2023-05-26 ASSESSMENT — ENCOUNTER SYMPTOMS
DIARRHEA: 0
COLOR CHANGE: 0
SINUS PRESSURE: 1
SORE THROAT: 0
ABDOMINAL PAIN: 0
BLOOD IN STOOL: 0
WHEEZING: 0
CONSTIPATION: 0
SHORTNESS OF BREATH: 1
COUGH: 1
SINUS PAIN: 1

## 2023-05-26 NOTE — PROGRESS NOTES
alert.   Psychiatric:         Mood and Affect: Mood normal.         Behavior: Behavior normal.         Judgment: Judgment normal.          This note was generated completely or in part utilizing Dragon dictation speech recognition software. Occasionally, words are mistranscribed and despite editing, the text may contain inaccuracies due to incorrect word recognition. If further clarification is needed please contact the office at [phone]. An electronic signature was used to authenticate this note.     --ZARI Spicer - CNP

## 2023-06-22 ENCOUNTER — TELEPHONE (OUTPATIENT)
Dept: PULMONOLOGY | Age: 58
End: 2023-06-22

## 2023-06-22 ENCOUNTER — HOSPITAL ENCOUNTER (OUTPATIENT)
Dept: PULMONOLOGY | Age: 58
Discharge: HOME | End: 2023-06-22
Payer: MEDICARE

## 2023-06-22 ENCOUNTER — TELEPHONE (OUTPATIENT)
Dept: FAMILY MEDICINE CLINIC | Age: 58
End: 2023-06-22

## 2023-06-22 DIAGNOSIS — J44.9 CHRONIC OBSTRUCTIVE PULMONARY DISEASE, UNSPECIFIED COPD TYPE (HCC): ICD-10-CM

## 2023-06-22 DIAGNOSIS — J44.1 CHRONIC OBSTRUCTIVE PULMONARY DISEASE WITH ACUTE EXACERBATION (HCC): Primary | ICD-10-CM

## 2023-06-22 PROCEDURE — 6370000000 HC RX 637 (ALT 250 FOR IP): Performed by: INTERNAL MEDICINE

## 2023-06-22 RX ORDER — ALBUTEROL SULFATE 90 UG/1
4 AEROSOL, METERED RESPIRATORY (INHALATION) ONCE
Status: DISCONTINUED | OUTPATIENT
Start: 2023-06-22 | End: 2023-06-22

## 2023-06-22 NOTE — TELEPHONE ENCOUNTER
Patient called and stated that she is not happy with the care of her current pulmonologist through [facility]. Patient is requesting a new pulmonologist referral to a provider that is within [facility]. She does not want to go back to [facility].

## 2023-06-22 NOTE — PROGRESS NOTES
Went to [facility] to bring patient up for 10:00am PFT appointment at [phone], the volunteer told me she just left. I called patient cell phone at [phone] to see if she was on campus, no answer, left voicemail that I was ready for her, no call back. score <= 10% disability on OSW LBPQ as indication of improved function w/daily activities. - UNMET FORMALLY/IMPROVED PER PT REPORT 4/23/21  Long term goal 3: score <= 20% disability on Quick Dash as indication of improved function w/daily activities - MET 4/23/21  Long term goal 4: demonstrate ability to raise right shoulder at least 110 deg w/10# weight in hand and place weight on shelf. - IMPROVED/PARTIALLY MET 4/23/21     Changes to goals: NO    Patient Status: [] Continue per initial Plan of Care     [x] Patient now discharged     [] Additional visits requested, Please re-certify for additional visits: If we are requesting more visits, we fully anticipate the patient's condition is expected to improve within the treatment timeframe we are requesting. Electronically signed by:  Shawanda Fulton, PT, 4/23/2021, 6:42 PM    If you have any questions or concerns, please don't hesitate to call.   Thank you for your referral.

## 2023-06-26 ENCOUNTER — TELEPHONE (OUTPATIENT)
Dept: SURGERY | Age: 58
End: 2023-06-26

## 2023-06-26 NOTE — TELEPHONE ENCOUNTER
Pt called she had an Umbilical hernia repair sx on 2/22/23 & Abdominal wound repair sx on 2/28/23 by Dr. Ludmila Ritchie. Pt says now she has a knot in the middle of her stomach to where she feels pregnant but she's not. It moves around and is getting bigger. No pain but some cramping; not everyday. She's still regular. Please give pt a call, thank you.

## 2023-06-27 ENCOUNTER — OFFICE VISIT (OUTPATIENT)
Dept: SURGERY | Age: 58
End: 2023-06-27
Payer: MEDICARE

## 2023-06-27 VITALS
HEART RATE: 88 BPM | DIASTOLIC BLOOD PRESSURE: 63 MMHG | SYSTOLIC BLOOD PRESSURE: 109 MMHG | HEIGHT: 61 IN | WEIGHT: 99.8 LBS | BODY MASS INDEX: 18.84 KG/M2

## 2023-06-27 DIAGNOSIS — K43.2 INCISIONAL HERNIA WITHOUT OBSTRUCTION OR GANGRENE: Primary | ICD-10-CM

## 2023-06-27 DIAGNOSIS — Z01.812 PRE-PROCEDURE LAB EXAM: ICD-10-CM

## 2023-06-27 DIAGNOSIS — K40.90 NON-RECURRENT UNILATERAL INGUINAL HERNIA WITHOUT OBSTRUCTION OR GANGRENE: ICD-10-CM

## 2023-06-27 PROCEDURE — 1036F TOBACCO NON-USER: CPT | Performed by: SURGERY

## 2023-06-27 PROCEDURE — 3074F SYST BP LT 130 MM HG: CPT | Performed by: SURGERY

## 2023-06-27 PROCEDURE — G8427 DOCREV CUR MEDS BY ELIG CLIN: HCPCS | Performed by: SURGERY

## 2023-06-27 PROCEDURE — G8420 CALC BMI NORM PARAMETERS: HCPCS | Performed by: SURGERY

## 2023-06-27 PROCEDURE — 3078F DIAST BP <80 MM HG: CPT | Performed by: SURGERY

## 2023-06-27 PROCEDURE — 3017F COLORECTAL CA SCREEN DOC REV: CPT | Performed by: SURGERY

## 2023-06-27 PROCEDURE — 99214 OFFICE O/P EST MOD 30 MIN: CPT | Performed by: SURGERY

## 2023-06-27 RX ORDER — SODIUM CHLORIDE 0.9 % (FLUSH) 0.9 %
5-40 SYRINGE (ML) INJECTION PRN
OUTPATIENT
Start: 2023-06-27

## 2023-06-27 RX ORDER — SODIUM CHLORIDE 9 MG/ML
INJECTION, SOLUTION INTRAVENOUS PRN
OUTPATIENT
Start: 2023-06-27

## 2023-06-27 RX ORDER — SODIUM CHLORIDE 0.9 % (FLUSH) 0.9 %
5-40 SYRINGE (ML) INJECTION EVERY 12 HOURS SCHEDULED
OUTPATIENT
Start: 2023-06-27

## 2023-06-28 ENCOUNTER — TELEPHONE (OUTPATIENT)
Dept: SURGERY | Age: 58
End: 2023-06-28

## 2023-06-29 ENCOUNTER — TELEPHONE (OUTPATIENT)
Dept: SURGERY | Age: 58
End: 2023-06-29

## 2023-07-07 RX ORDER — TRAZODONE HYDROCHLORIDE 100 MG/1
TABLET ORAL
Qty: 90 TABLET | Refills: 3 | Status: SHIPPED | OUTPATIENT
Start: 2023-07-07

## 2023-07-07 NOTE — TELEPHONE ENCOUNTER
Refill Request       Last Seen: Last Seen Department: 5/26/2023  Last Seen by PCP: 5/26/2023    Last Written: 5/1/23      Next Appointment:   Future Appointments   Date Time Provider [address]   7/11/2023 10:00 AM KALE Scott Rad   7/13/2023  9:00 AM ZARI Rider CNP [place] [facility]   8/1/2023  9:00 AM ZARI Rider CNP [place] [facility]   8/10/2023  9:00 AM Aurora Cornelius MD AND [facility]ST[facility]         Requested Prescriptions     Pending Prescriptions Disp Refills    traZODone (DESYREL) 100 MG tablet [Pharmacy Med Name: TRAZODONE HCL TABS 100MG] 90 tablet 3     Sig: TAKE 1 TABLET NIGHTLY

## 2023-07-11 ENCOUNTER — HOSPITAL ENCOUNTER (OUTPATIENT)
Dept: CT IMAGING | Age: 58
Discharge: HOME | End: 2023-07-11
Attending: SURGERY
Payer: MEDICARE

## 2023-07-11 DIAGNOSIS — K43.2 INCISIONAL HERNIA WITHOUT OBSTRUCTION OR GANGRENE: ICD-10-CM

## 2023-07-11 PROCEDURE — 74176 CT ABD & PELVIS W/O CONTRAST: CPT

## 2023-07-13 ENCOUNTER — OFFICE VISIT (OUTPATIENT)
Dept: FAMILY MEDICINE CLINIC | Age: 58
End: 2023-07-13
Payer: MEDICARE

## 2023-07-13 ENCOUNTER — TELEPHONE (OUTPATIENT)
Dept: CARDIOLOGY CLINIC | Age: 58
End: 2023-07-13

## 2023-07-13 ENCOUNTER — TELEPHONE (OUTPATIENT)
Dept: SURGERY | Age: 58
End: 2023-07-13

## 2023-07-13 VITALS
WEIGHT: 101.2 LBS | HEIGHT: 61 IN | DIASTOLIC BLOOD PRESSURE: 72 MMHG | OXYGEN SATURATION: 97 % | RESPIRATION RATE: 16 BRPM | BODY MASS INDEX: 19.11 KG/M2 | HEART RATE: 110 BPM | SYSTOLIC BLOOD PRESSURE: 110 MMHG

## 2023-07-13 DIAGNOSIS — Z01.818 PREOP EXAMINATION: ICD-10-CM

## 2023-07-13 DIAGNOSIS — Z01.812 PRE-PROCEDURE LAB EXAM: ICD-10-CM

## 2023-07-13 DIAGNOSIS — Z01.810 PREOP CARDIOVASCULAR EXAM: Primary | ICD-10-CM

## 2023-07-13 LAB
BUN SERPL-MCNC: 12 MG/DL (ref 7–20)
CREAT SERPL-MCNC: 0.6 MG/DL (ref 0.6–1.1)
GFR SERPLBLD CREATININE-BSD FMLA CKD-EPI: >60 ML/MIN/{1.73_M2}

## 2023-07-13 PROCEDURE — G8420 CALC BMI NORM PARAMETERS: HCPCS

## 2023-07-13 PROCEDURE — 1036F TOBACCO NON-USER: CPT

## 2023-07-13 PROCEDURE — 3078F DIAST BP <80 MM HG: CPT

## 2023-07-13 PROCEDURE — 99214 OFFICE O/P EST MOD 30 MIN: CPT

## 2023-07-13 PROCEDURE — 3074F SYST BP LT 130 MM HG: CPT

## 2023-07-13 PROCEDURE — 3017F COLORECTAL CA SCREEN DOC REV: CPT

## 2023-07-13 PROCEDURE — 93000 ELECTROCARDIOGRAM COMPLETE: CPT

## 2023-07-13 PROCEDURE — G8427 DOCREV CUR MEDS BY ELIG CLIN: HCPCS

## 2023-07-13 ASSESSMENT — ENCOUNTER SYMPTOMS
WHEEZING: 0
CONSTIPATION: 0
SORE THROAT: 0
BLOOD IN STOOL: 0
SHORTNESS OF BREATH: 0
COLOR CHANGE: 0
COUGH: 0
ABDOMINAL PAIN: 0
DIARRHEA: 0

## 2023-07-13 NOTE — TELEPHONE ENCOUNTER
Pt called says her EKG came back fine with no blockage but she was still referred to go see a cardiologist she doesn't understand why if everything came back fine. She also said she was referred due to the first EKG when she was in the hospital that had blockage. The cardiologist she was referred with the date she cannot make that date due to prior engagement. She wants to know if she truly still needs to see a cardiologist if Dr. Brent Bales has anyone he can refer her to. Please give her a call, thank you.

## 2023-07-13 NOTE — TELEPHONE ENCOUNTER
CARDIAC CLEARANCE REQUEST    What type of procedure are you having:Hernia Repair    Are you taking any blood thinners:unknown    Type on anesthesia:unknown    When is your procedure scheduled for:07/19/23    What physician is performing your procedure:    Phone Number:[phone]    Fax number to send the letter: [phone]

## 2023-07-13 NOTE — TELEPHONE ENCOUNTER
Pt contacted office stating she can not keep 7/17/23 overbook date due to prior committment. Another date given per Allan Maddox PM 7/31/2023 1545 RJM.

## 2023-07-13 NOTE — TELEPHONE ENCOUNTER
Pt is scheduled on 07/17/23 as a new pt with [facility] for preoperative risk assessment. Pt will be assessed at upcoming ov. Sending as an fyi.

## 2023-07-13 NOTE — PROGRESS NOTES
Preoperative Consultation      Diony Rodríguez  YOB: 1965    Date of Service:  7/13/2023    Vitals:    07/13/23 0910   BP: 110/72   Site: Right Upper Arm   Position: Sitting   Pulse: (!) 110   Resp: 16   SpO2: 97%   Weight: 101 lb 3.2 oz (45.9 kg)   Height: 5' 0.98\" (1.549 m)      Wt Readings from Last 2 Encounters:   07/13/23 101 lb 3.2 oz (45.9 kg)   06/27/23 99 lb 12.8 oz (45.3 kg)     BP Readings from Last 3 Encounters:   07/13/23 110/72   06/27/23 109/63   05/26/23 98/62        Chief Complaint   Patient presents with    Pre-op Exam     Pt is here for a pre-op exam, she is having ROBOTIC [place], POSSIBLE OPEN PROCEDURE with  on 07/19/2023 at [address]   Allergen Reactions    Bee Venom Anaphylaxis and Swelling    Doxycycline Hyclate Nausea And Vomiting     Outpatient Medications Marked as Taking for the 7/13/23 encounter (Office Visit) with ZARI Witt CNP   Medication Sig Dispense Refill    traZODone (DESYREL) 100 MG tablet TAKE 1 TABLET NIGHTLY 90 tablet 3    Budeson-Glycopyrrol-Formoterol 160-9-4.8 MCG/ACT AERO Inhale 2 puffs into the lungs in the morning and at bedtime 1 each 0    montelukast (SINGULAIR) 10 MG tablet Take 1 tablet by mouth daily 30 tablet 3    Multiple Vitamin (MULTI-VITAMIN DAILY PO) Take by mouth      furosemide (LASIX) 40 MG tablet Take 1 tablet by mouth daily 90 tablet 1    magnesium oxide (MAG-OX) 400 (240 Mg) MG tablet Take 1 tablet by mouth daily      Turmeric (QC TUMERIC COMPLEX PO) Take by mouth daily      omeprazole (PRILOSEC) 20 MG delayed release capsule TAKE 1 CAPSULE DAILY 90 capsule 3    spironolactone (ALDACTONE) 25 MG tablet Take 1 tablet by mouth daily 90 tablet 0    azelastine (ASTELIN) 0.1 % nasal spray       fluticasone (FLONASE) 50 MCG/ACT nasal spray       ADVAIR -21 MCG/ACT inhaler       albuterol (PROVENTIL) (5 MG/ML) 0.5% nebulizer solution Take 1 mL by

## 2023-07-13 NOTE — TELEPHONE ENCOUNTER
Pt called back said she was able to get into a cardiologist but not until 7/31. Sx is scheduled for 7/19. Pt said she will need to reschedule sx date. Please give her a call, thank you.

## 2023-07-31 ENCOUNTER — OFFICE VISIT (OUTPATIENT)
Dept: CARDIOLOGY CLINIC | Age: 58
End: 2023-07-31
Payer: MEDICARE

## 2023-07-31 VITALS
DIASTOLIC BLOOD PRESSURE: 62 MMHG | HEART RATE: 94 BPM | BODY MASS INDEX: 19.44 KG/M2 | HEIGHT: 60 IN | OXYGEN SATURATION: 97 % | WEIGHT: 99 LBS | SYSTOLIC BLOOD PRESSURE: 94 MMHG

## 2023-07-31 DIAGNOSIS — R94.31 ABNORMAL EKG: Primary | ICD-10-CM

## 2023-07-31 DIAGNOSIS — Z01.810 PREOP CARDIOVASCULAR EXAM: ICD-10-CM

## 2023-07-31 PROCEDURE — 3074F SYST BP LT 130 MM HG: CPT | Performed by: INTERNAL MEDICINE

## 2023-07-31 PROCEDURE — 3078F DIAST BP <80 MM HG: CPT | Performed by: INTERNAL MEDICINE

## 2023-07-31 PROCEDURE — G8427 DOCREV CUR MEDS BY ELIG CLIN: HCPCS | Performed by: INTERNAL MEDICINE

## 2023-07-31 PROCEDURE — 3017F COLORECTAL CA SCREEN DOC REV: CPT | Performed by: INTERNAL MEDICINE

## 2023-07-31 PROCEDURE — 99204 OFFICE O/P NEW MOD 45 MIN: CPT | Performed by: INTERNAL MEDICINE

## 2023-07-31 PROCEDURE — 1036F TOBACCO NON-USER: CPT | Performed by: INTERNAL MEDICINE

## 2023-07-31 PROCEDURE — G8420 CALC BMI NORM PARAMETERS: HCPCS | Performed by: INTERNAL MEDICINE

## 2023-08-02 ENCOUNTER — HOSPITAL ENCOUNTER (OUTPATIENT)
Dept: CARDIOLOGY | Age: 58
Discharge: HOME | End: 2023-08-02
Payer: MEDICARE

## 2023-08-02 ENCOUNTER — TELEPHONE (OUTPATIENT)
Dept: CARDIOLOGY CLINIC | Age: 58
End: 2023-08-02

## 2023-08-02 DIAGNOSIS — R94.31 ABNORMAL EKG: ICD-10-CM

## 2023-08-02 DIAGNOSIS — Z01.810 PREOP CARDIOVASCULAR EXAM: ICD-10-CM

## 2023-08-02 LAB
LV EF: 55 %
LVEF MODALITY: NORMAL

## 2023-08-02 PROCEDURE — 93306 TTE W/DOPPLER COMPLETE: CPT

## 2023-08-02 NOTE — TELEPHONE ENCOUNTER
Created letter and faxed to Dr. Clifford Square office; spoke to [address] [place] [address] at Dr. Darrin Monte office about moving forward with surgery tomorrow.  Called patient and let her know I was faxing letter; TYRESE.

## 2023-08-02 NOTE — TELEPHONE ENCOUNTER
----- Message from Connor Dominguez MD sent at 8/2/2023  4:10 PM EDT -----  Please let the patient know the following: Normal echocardiogram showing normal heart strength and no significant valve disease. Please send letter to surgeon's office to ensure her case is not canceled for tomorrow and that she knows we can move forward with her surgery. Follow up as needed.

## 2023-08-03 ENCOUNTER — HOSPITAL ENCOUNTER (INPATIENT)
Age: 58
LOS: 1 days | Discharge: HOME | End: 2023-08-04
Attending: SURGERY | Admitting: SURGERY
Payer: MEDICARE

## 2023-08-03 ENCOUNTER — ANESTHESIA EVENT (OUTPATIENT)
Dept: OPERATING ROOM | Age: 58
End: 2023-08-03
Payer: MEDICARE

## 2023-08-03 ENCOUNTER — ANESTHESIA (OUTPATIENT)
Dept: OPERATING ROOM | Age: 58
End: 2023-08-03
Payer: MEDICARE

## 2023-08-03 DIAGNOSIS — G89.18 POSTOPERATIVE PAIN: Primary | ICD-10-CM

## 2023-08-03 PROBLEM — Z98.890 S/P REPAIR OF VENTRAL HERNIA: Status: ACTIVE | Noted: 2023-08-03

## 2023-08-03 PROBLEM — Z87.19 S/P REPAIR OF VENTRAL HERNIA: Status: ACTIVE | Noted: 2023-08-03

## 2023-08-03 LAB
ANION GAP SERPL CALCULATED.3IONS-SCNC: 12 MMOL/L (ref 3–16)
BUN SERPL-MCNC: 15 MG/DL (ref 7–20)
CALCIUM SERPL-MCNC: 9.2 MG/DL (ref 8.3–10.6)
CHLORIDE SERPL-SCNC: 104 MMOL/L (ref 99–110)
CO2 SERPL-SCNC: 22 MMOL/L (ref 21–32)
CREAT SERPL-MCNC: 0.6 MG/DL (ref 0.6–1.1)
GFR SERPLBLD CREATININE-BSD FMLA CKD-EPI: >60 ML/MIN/{1.73_M2}
GLUCOSE SERPL-MCNC: 101 MG/DL (ref 70–99)
POTASSIUM SERPL-SCNC: 4.4 MMOL/L (ref 3.5–5.1)
SODIUM SERPL-SCNC: 138 MMOL/L (ref 136–145)

## 2023-08-03 PROCEDURE — 2580000003 HC RX 258: Performed by: SURGERY

## 2023-08-03 PROCEDURE — 7100000000 HC PACU RECOVERY - FIRST 15 MIN: Performed by: SURGERY

## 2023-08-03 PROCEDURE — 0WUF0JZ SUPPLEMENT ABDOMINAL WALL WITH SYNTHETIC SUBSTITUTE, OPEN APPROACH: ICD-10-PCS | Performed by: SURGERY

## 2023-08-03 PROCEDURE — 2580000003 HC RX 258: Performed by: NURSE ANESTHETIST, CERTIFIED REGISTERED

## 2023-08-03 PROCEDURE — 6370000000 HC RX 637 (ALT 250 FOR IP): Performed by: SURGERY

## 2023-08-03 PROCEDURE — 94761 N-INVAS EAR/PLS OXIMETRY MLT: CPT

## 2023-08-03 PROCEDURE — 3600000019 HC SURGERY ROBOT ADDTL 15MIN: Performed by: SURGERY

## 2023-08-03 PROCEDURE — C9399 UNCLASSIFIED DRUGS OR BIOLOG: HCPCS | Performed by: NURSE ANESTHETIST, CERTIFIED REGISTERED

## 2023-08-03 PROCEDURE — 3700000001 HC ADD 15 MINUTES (ANESTHESIA): Performed by: SURGERY

## 2023-08-03 PROCEDURE — 94640 AIRWAY INHALATION TREATMENT: CPT

## 2023-08-03 PROCEDURE — 2500000003 HC RX 250 WO HCPCS: Performed by: SURGERY

## 2023-08-03 PROCEDURE — 6360000002 HC RX W HCPCS: Performed by: SURGERY

## 2023-08-03 PROCEDURE — S2900 ROBOTIC SURGICAL SYSTEM: HCPCS | Performed by: SURGERY

## 2023-08-03 PROCEDURE — 1200000000 HC SEMI PRIVATE

## 2023-08-03 PROCEDURE — 6360000002 HC RX W HCPCS: Performed by: ANESTHESIOLOGY

## 2023-08-03 PROCEDURE — 6360000002 HC RX W HCPCS: Performed by: NURSE ANESTHETIST, CERTIFIED REGISTERED

## 2023-08-03 PROCEDURE — 2500000003 HC RX 250 WO HCPCS: Performed by: NURSE ANESTHETIST, CERTIFIED REGISTERED

## 2023-08-03 PROCEDURE — 49593 RPR AA HRN 1ST 3-10 RDC: CPT | Performed by: SURGERY

## 2023-08-03 PROCEDURE — 6370000000 HC RX 637 (ALT 250 FOR IP): Performed by: ANESTHESIOLOGY

## 2023-08-03 PROCEDURE — 2500000003 HC RX 250 WO HCPCS: Performed by: ANESTHESIOLOGY

## 2023-08-03 PROCEDURE — 2709999900 HC NON-CHARGEABLE SUPPLY: Performed by: SURGERY

## 2023-08-03 PROCEDURE — 80048 BASIC METABOLIC PNL TOTAL CA: CPT

## 2023-08-03 PROCEDURE — 2700000000 HC OXYGEN THERAPY PER DAY

## 2023-08-03 PROCEDURE — 7100000001 HC PACU RECOVERY - ADDTL 15 MIN: Performed by: SURGERY

## 2023-08-03 PROCEDURE — 15734 MUSCLE-SKIN GRAFT TRUNK: CPT | Performed by: SURGERY

## 2023-08-03 PROCEDURE — 3600000009 HC SURGERY ROBOT BASE: Performed by: SURGERY

## 2023-08-03 PROCEDURE — 8E0W4CZ ROBOTIC ASSISTED PROCEDURE OF TRUNK REGION, PERCUTANEOUS ENDOSCOPIC APPROACH: ICD-10-PCS | Performed by: SURGERY

## 2023-08-03 PROCEDURE — 49650 LAP ING HERNIA REPAIR INIT: CPT | Performed by: SURGERY

## 2023-08-03 PROCEDURE — 3700000000 HC ANESTHESIA ATTENDED CARE: Performed by: SURGERY

## 2023-08-03 PROCEDURE — 2720000010 HC SURG SUPPLY STERILE: Performed by: SURGERY

## 2023-08-03 PROCEDURE — C1781 MESH (IMPLANTABLE): HCPCS | Performed by: SURGERY

## 2023-08-03 PROCEDURE — 2580000003 HC RX 258: Performed by: ANESTHESIOLOGY

## 2023-08-03 PROCEDURE — A4217 STERILE WATER/SALINE, 500 ML: HCPCS | Performed by: SURGERY

## 2023-08-03 PROCEDURE — 0YU64JZ SUPPLEMENT LEFT INGUINAL REGION WITH SYNTHETIC SUBSTITUTE, PERCUTANEOUS ENDOSCOPIC APPROACH: ICD-10-PCS | Performed by: SURGERY

## 2023-08-03 DEVICE — MESH CS LEFT LARGE 10CM X 16CM: Type: IMPLANTABLE DEVICE | Site: GROIN | Status: FUNCTIONAL

## 2023-08-03 DEVICE — MESH HERN W10XL15CM POLYPR FLAT L PORE MFIL SFT KNIT LTWT: Type: IMPLANTABLE DEVICE | Site: ABDOMEN | Status: FUNCTIONAL

## 2023-08-03 RX ORDER — PROCHLORPERAZINE MALEATE 5 MG/1
5 TABLET ORAL ONCE
Status: COMPLETED | OUTPATIENT
Start: 2023-08-03 | End: 2023-08-03

## 2023-08-03 RX ORDER — DEXAMETHASONE SODIUM PHOSPHATE 10 MG/ML
INJECTION INTRAMUSCULAR; INTRAVENOUS PRN
Status: DISCONTINUED | OUTPATIENT
Start: 2023-08-03 | End: 2023-08-03 | Stop reason: SDUPTHER

## 2023-08-03 RX ORDER — SODIUM CHLORIDE 0.9 % (FLUSH) 0.9 %
5-40 SYRINGE (ML) INJECTION EVERY 12 HOURS SCHEDULED
Status: DISCONTINUED | OUTPATIENT
Start: 2023-08-03 | End: 2023-08-04 | Stop reason: HOSPADM

## 2023-08-03 RX ORDER — SODIUM CHLORIDE 0.9 % (FLUSH) 0.9 %
5-40 SYRINGE (ML) INJECTION EVERY 12 HOURS SCHEDULED
Status: DISCONTINUED | OUTPATIENT
Start: 2023-08-03 | End: 2023-08-03 | Stop reason: HOSPADM

## 2023-08-03 RX ORDER — ENOXAPARIN SODIUM 100 MG/ML
30 INJECTION SUBCUTANEOUS DAILY
Status: DISCONTINUED | OUTPATIENT
Start: 2023-08-04 | End: 2023-08-04 | Stop reason: HOSPADM

## 2023-08-03 RX ORDER — FAMOTIDINE 10 MG/ML
20 INJECTION, SOLUTION INTRAVENOUS ONCE
Status: COMPLETED | OUTPATIENT
Start: 2023-08-03 | End: 2023-08-03

## 2023-08-03 RX ORDER — HYDROMORPHONE HYDROCHLORIDE 1 MG/ML
0.25 INJECTION, SOLUTION INTRAMUSCULAR; INTRAVENOUS; SUBCUTANEOUS
Status: DISCONTINUED | OUTPATIENT
Start: 2023-08-03 | End: 2023-08-04 | Stop reason: HOSPADM

## 2023-08-03 RX ORDER — LIDOCAINE HYDROCHLORIDE 10 MG/ML
2 INJECTION, SOLUTION INFILTRATION; PERINEURAL
Status: DISCONTINUED | OUTPATIENT
Start: 2023-08-03 | End: 2023-08-03 | Stop reason: HOSPADM

## 2023-08-03 RX ORDER — ONDANSETRON 2 MG/ML
INJECTION INTRAMUSCULAR; INTRAVENOUS PRN
Status: DISCONTINUED | OUTPATIENT
Start: 2023-08-03 | End: 2023-08-03 | Stop reason: SDUPTHER

## 2023-08-03 RX ORDER — FUROSEMIDE 40 MG/1
40 TABLET ORAL EVERY MORNING
Status: DISCONTINUED | OUTPATIENT
Start: 2023-08-04 | End: 2023-08-04 | Stop reason: HOSPADM

## 2023-08-03 RX ORDER — MEPERIDINE HYDROCHLORIDE 50 MG/ML
12.5 INJECTION INTRAMUSCULAR; INTRAVENOUS; SUBCUTANEOUS EVERY 5 MIN PRN
Status: DISCONTINUED | OUTPATIENT
Start: 2023-08-03 | End: 2023-08-03 | Stop reason: HOSPADM

## 2023-08-03 RX ORDER — BUPIVACAINE HYDROCHLORIDE 5 MG/ML
INJECTION, SOLUTION EPIDURAL; INTRACAUDAL PRN
Status: DISCONTINUED | OUTPATIENT
Start: 2023-08-03 | End: 2023-08-03 | Stop reason: HOSPADM

## 2023-08-03 RX ORDER — ACETAMINOPHEN 325 MG/1
650 TABLET ORAL EVERY 4 HOURS PRN
Status: DISCONTINUED | OUTPATIENT
Start: 2023-08-03 | End: 2023-08-04 | Stop reason: HOSPADM

## 2023-08-03 RX ORDER — SODIUM CHLORIDE 0.9 % (FLUSH) 0.9 %
5-40 SYRINGE (ML) INJECTION PRN
Status: DISCONTINUED | OUTPATIENT
Start: 2023-08-03 | End: 2023-08-03 | Stop reason: HOSPADM

## 2023-08-03 RX ORDER — OXYCODONE HYDROCHLORIDE 5 MG/1
10 TABLET ORAL PRN
Status: DISCONTINUED | OUTPATIENT
Start: 2023-08-03 | End: 2023-08-03 | Stop reason: HOSPADM

## 2023-08-03 RX ORDER — HYDROMORPHONE HYDROCHLORIDE 2 MG/ML
INJECTION, SOLUTION INTRAMUSCULAR; INTRAVENOUS; SUBCUTANEOUS PRN
Status: DISCONTINUED | OUTPATIENT
Start: 2023-08-03 | End: 2023-08-03 | Stop reason: SDUPTHER

## 2023-08-03 RX ORDER — TRAZODONE HYDROCHLORIDE 50 MG/1
100 TABLET ORAL NIGHTLY PRN
Status: DISCONTINUED | OUTPATIENT
Start: 2023-08-03 | End: 2023-08-04 | Stop reason: HOSPADM

## 2023-08-03 RX ORDER — FLUTICASONE PROPIONATE 50 MCG
2 SPRAY, SUSPENSION (ML) NASAL 2 TIMES DAILY
Status: DISCONTINUED | OUTPATIENT
Start: 2023-08-03 | End: 2023-08-04 | Stop reason: HOSPADM

## 2023-08-03 RX ORDER — SODIUM CHLORIDE 0.9 % (FLUSH) 0.9 %
5-40 SYRINGE (ML) INJECTION PRN
Status: DISCONTINUED | OUTPATIENT
Start: 2023-08-03 | End: 2023-08-04 | Stop reason: HOSPADM

## 2023-08-03 RX ORDER — MIDAZOLAM HYDROCHLORIDE 1 MG/ML
1 INJECTION INTRAMUSCULAR; INTRAVENOUS
Status: DISCONTINUED | OUTPATIENT
Start: 2023-08-03 | End: 2023-08-03

## 2023-08-03 RX ORDER — PHENYLEPHRINE HCL IN 0.9% NACL 1 MG/10 ML
SYRINGE (ML) INTRAVENOUS PRN
Status: DISCONTINUED | OUTPATIENT
Start: 2023-08-03 | End: 2023-08-03 | Stop reason: SDUPTHER

## 2023-08-03 RX ORDER — ONDANSETRON 2 MG/ML
4 INJECTION INTRAMUSCULAR; INTRAVENOUS
Status: COMPLETED | OUTPATIENT
Start: 2023-08-03 | End: 2023-08-03

## 2023-08-03 RX ORDER — OXYCODONE HYDROCHLORIDE 5 MG/1
5 TABLET ORAL EVERY 4 HOURS PRN
Status: DISCONTINUED | OUTPATIENT
Start: 2023-08-03 | End: 2023-08-04 | Stop reason: HOSPADM

## 2023-08-03 RX ORDER — ONDANSETRON 4 MG/1
4 TABLET, ORALLY DISINTEGRATING ORAL EVERY 8 HOURS PRN
Status: DISCONTINUED | OUTPATIENT
Start: 2023-08-03 | End: 2023-08-04 | Stop reason: HOSPADM

## 2023-08-03 RX ORDER — MONTELUKAST SODIUM 10 MG/1
10 TABLET ORAL
Status: DISCONTINUED | OUTPATIENT
Start: 2023-08-03 | End: 2023-08-04 | Stop reason: HOSPADM

## 2023-08-03 RX ORDER — SPIRONOLACTONE 25 MG/1
25 TABLET ORAL DAILY
Status: DISCONTINUED | OUTPATIENT
Start: 2023-08-04 | End: 2023-08-04 | Stop reason: HOSPADM

## 2023-08-03 RX ORDER — PROPOFOL 10 MG/ML
INJECTION, EMULSION INTRAVENOUS PRN
Status: DISCONTINUED | OUTPATIENT
Start: 2023-08-03 | End: 2023-08-03 | Stop reason: SDUPTHER

## 2023-08-03 RX ORDER — SODIUM CHLORIDE 9 MG/ML
INJECTION, SOLUTION INTRAVENOUS PRN
Status: DISCONTINUED | OUTPATIENT
Start: 2023-08-03 | End: 2023-08-04 | Stop reason: HOSPADM

## 2023-08-03 RX ORDER — ROCURONIUM BROMIDE 10 MG/ML
INJECTION, SOLUTION INTRAVENOUS PRN
Status: DISCONTINUED | OUTPATIENT
Start: 2023-08-03 | End: 2023-08-03 | Stop reason: SDUPTHER

## 2023-08-03 RX ORDER — FENTANYL CITRATE 50 UG/ML
INJECTION, SOLUTION INTRAMUSCULAR; INTRAVENOUS PRN
Status: DISCONTINUED | OUTPATIENT
Start: 2023-08-03 | End: 2023-08-03 | Stop reason: SDUPTHER

## 2023-08-03 RX ORDER — HYDROMORPHONE HYDROCHLORIDE 1 MG/ML
0.5 INJECTION, SOLUTION INTRAMUSCULAR; INTRAVENOUS; SUBCUTANEOUS
Status: DISCONTINUED | OUTPATIENT
Start: 2023-08-03 | End: 2023-08-04 | Stop reason: HOSPADM

## 2023-08-03 RX ORDER — OXYCODONE HYDROCHLORIDE 5 MG/1
5 TABLET ORAL PRN
Status: DISCONTINUED | OUTPATIENT
Start: 2023-08-03 | End: 2023-08-03 | Stop reason: HOSPADM

## 2023-08-03 RX ORDER — SODIUM CHLORIDE 9 MG/ML
INJECTION, SOLUTION INTRAVENOUS PRN
Status: DISCONTINUED | OUTPATIENT
Start: 2023-08-03 | End: 2023-08-03 | Stop reason: HOSPADM

## 2023-08-03 RX ORDER — SODIUM CHLORIDE, SODIUM LACTATE, POTASSIUM CHLORIDE, CALCIUM CHLORIDE 600; 310; 30; 20 MG/100ML; MG/100ML; MG/100ML; MG/100ML
INJECTION, SOLUTION INTRAVENOUS CONTINUOUS
Status: DISCONTINUED | OUTPATIENT
Start: 2023-08-03 | End: 2023-08-03 | Stop reason: HOSPADM

## 2023-08-03 RX ORDER — ONDANSETRON 2 MG/ML
4 INJECTION INTRAMUSCULAR; INTRAVENOUS EVERY 6 HOURS PRN
Status: DISCONTINUED | OUTPATIENT
Start: 2023-08-03 | End: 2023-08-04 | Stop reason: HOSPADM

## 2023-08-03 RX ORDER — SODIUM CHLORIDE 9 MG/ML
INJECTION, SOLUTION INTRAVENOUS CONTINUOUS
Status: DISCONTINUED | OUTPATIENT
Start: 2023-08-03 | End: 2023-08-04

## 2023-08-03 RX ORDER — KETAMINE HYDROCHLORIDE 50 MG/ML
INJECTION, SOLUTION, CONCENTRATE INTRAMUSCULAR; INTRAVENOUS PRN
Status: DISCONTINUED | OUTPATIENT
Start: 2023-08-03 | End: 2023-08-03 | Stop reason: SDUPTHER

## 2023-08-03 RX ORDER — MAGNESIUM HYDROXIDE 1200 MG/15ML
LIQUID ORAL CONTINUOUS PRN
Status: DISCONTINUED | OUTPATIENT
Start: 2023-08-03 | End: 2023-08-03 | Stop reason: HOSPADM

## 2023-08-03 RX ORDER — OXYCODONE HYDROCHLORIDE 5 MG/1
10 TABLET ORAL EVERY 4 HOURS PRN
Status: DISCONTINUED | OUTPATIENT
Start: 2023-08-03 | End: 2023-08-04 | Stop reason: HOSPADM

## 2023-08-03 RX ORDER — LIDOCAINE HYDROCHLORIDE 20 MG/ML
INJECTION, SOLUTION INFILTRATION; PERINEURAL PRN
Status: DISCONTINUED | OUTPATIENT
Start: 2023-08-03 | End: 2023-08-03 | Stop reason: SDUPTHER

## 2023-08-03 RX ADMIN — SODIUM CHLORIDE, SODIUM LACTATE, POTASSIUM CHLORIDE, AND CALCIUM CHLORIDE: .6; .31; .03; .02 INJECTION, SOLUTION INTRAVENOUS at 10:15

## 2023-08-03 RX ADMIN — PROCHLORPERAZINE MALEATE 5 MG: 5 TABLET ORAL at 15:43

## 2023-08-03 RX ADMIN — MONTELUKAST 10 MG: 10 TABLET, FILM COATED ORAL at 21:38

## 2023-08-03 RX ADMIN — HYDROMORPHONE HYDROCHLORIDE 0.5 MG: 1 INJECTION, SOLUTION INTRAMUSCULAR; INTRAVENOUS; SUBCUTANEOUS at 15:01

## 2023-08-03 RX ADMIN — Medication 100 MCG: at 10:32

## 2023-08-03 RX ADMIN — SODIUM CHLORIDE: 9 INJECTION, SOLUTION INTRAVENOUS at 21:37

## 2023-08-03 RX ADMIN — SUGAMMADEX 200 MG: 100 INJECTION, SOLUTION INTRAVENOUS at 13:53

## 2023-08-03 RX ADMIN — Medication 100 MCG: at 10:25

## 2023-08-03 RX ADMIN — FAMOTIDINE 20 MG: 10 INJECTION INTRAVENOUS at 09:01

## 2023-08-03 RX ADMIN — HYDROMORPHONE HYDROCHLORIDE 0.5 MG: 1 INJECTION, SOLUTION INTRAMUSCULAR; INTRAVENOUS; SUBCUTANEOUS at 19:53

## 2023-08-03 RX ADMIN — ALBUTEROL SULFATE 5 MG: 2.5 SOLUTION RESPIRATORY (INHALATION) at 22:03

## 2023-08-03 RX ADMIN — HYDROMORPHONE HYDROCHLORIDE 1 MG: 2 INJECTION, SOLUTION INTRAMUSCULAR; INTRAVENOUS; SUBCUTANEOUS at 14:00

## 2023-08-03 RX ADMIN — ONDANSETRON 4 MG: 2 INJECTION INTRAMUSCULAR; INTRAVENOUS at 10:24

## 2023-08-03 RX ADMIN — FLUTICASONE PROPIONATE 2 SPRAY: 50 SPRAY, METERED NASAL at 22:00

## 2023-08-03 RX ADMIN — Medication 100 MCG: at 10:29

## 2023-08-03 RX ADMIN — DEXMEDETOMIDINE HYDROCHLORIDE 10 MCG: 100 INJECTION, SOLUTION INTRAVENOUS at 12:20

## 2023-08-03 RX ADMIN — ROCURONIUM BROMIDE 10 MG: 50 INJECTION, SOLUTION INTRAVENOUS at 11:00

## 2023-08-03 RX ADMIN — ONDANSETRON 4 MG: 2 INJECTION INTRAMUSCULAR; INTRAVENOUS at 14:26

## 2023-08-03 RX ADMIN — Medication 100 MCG: at 10:33

## 2023-08-03 RX ADMIN — FENTANYL CITRATE 100 MCG: 50 INJECTION, SOLUTION INTRAMUSCULAR; INTRAVENOUS at 10:21

## 2023-08-03 RX ADMIN — Medication 10 ML: at 21:39

## 2023-08-03 RX ADMIN — MIDAZOLAM 1 MG: 1 INJECTION INTRAMUSCULAR; INTRAVENOUS at 09:01

## 2023-08-03 RX ADMIN — SODIUM CHLORIDE, SODIUM LACTATE, POTASSIUM CHLORIDE, AND CALCIUM CHLORIDE: .6; .31; .03; .02 INJECTION, SOLUTION INTRAVENOUS at 13:17

## 2023-08-03 RX ADMIN — DEXAMETHASONE SODIUM PHOSPHATE 10 MG: 10 INJECTION INTRAMUSCULAR; INTRAVENOUS at 10:24

## 2023-08-03 RX ADMIN — LIDOCAINE HYDROCHLORIDE 80 MG: 20 INJECTION, SOLUTION INFILTRATION; PERINEURAL at 10:21

## 2023-08-03 RX ADMIN — HYDROMORPHONE HYDROCHLORIDE 1 MG: 2 INJECTION, SOLUTION INTRAMUSCULAR; INTRAVENOUS; SUBCUTANEOUS at 13:50

## 2023-08-03 RX ADMIN — PROPOFOL 200 MG: 10 INJECTION, EMULSION INTRAVENOUS at 10:21

## 2023-08-03 RX ADMIN — ROCURONIUM BROMIDE 50 MG: 50 INJECTION, SOLUTION INTRAVENOUS at 10:21

## 2023-08-03 RX ADMIN — ROCURONIUM BROMIDE 20 MG: 50 INJECTION, SOLUTION INTRAVENOUS at 11:50

## 2023-08-03 RX ADMIN — Medication 100 MCG: at 10:36

## 2023-08-03 RX ADMIN — HYDROMORPHONE HYDROCHLORIDE 0.5 MG: 1 INJECTION, SOLUTION INTRAMUSCULAR; INTRAVENOUS; SUBCUTANEOUS at 17:33

## 2023-08-03 RX ADMIN — HYDROMORPHONE HYDROCHLORIDE 0.5 MG: 1 INJECTION, SOLUTION INTRAMUSCULAR; INTRAVENOUS; SUBCUTANEOUS at 14:28

## 2023-08-03 RX ADMIN — Medication 100 MCG: at 12:22

## 2023-08-03 RX ADMIN — DEXMEDETOMIDINE HYDROCHLORIDE 10 MCG: 100 INJECTION, SOLUTION INTRAVENOUS at 11:30

## 2023-08-03 RX ADMIN — KETAMINE HYDROCHLORIDE 30 MG: 50 INJECTION INTRAMUSCULAR; INTRAVENOUS at 10:25

## 2023-08-03 RX ADMIN — CEFAZOLIN 1000 MG: 1 INJECTION, POWDER, FOR SOLUTION INTRAMUSCULAR; INTRAVENOUS at 10:15

## 2023-08-03 RX ADMIN — SODIUM CHLORIDE, SODIUM LACTATE, POTASSIUM CHLORIDE, AND CALCIUM CHLORIDE: .6; .31; .03; .02 INJECTION, SOLUTION INTRAVENOUS at 11:03

## 2023-08-03 ASSESSMENT — PAIN SCALES - GENERAL
PAINLEVEL_OUTOF10: 7
PAINLEVEL_OUTOF10: 10
PAINLEVEL_OUTOF10: 7
PAINLEVEL_OUTOF10: 7
PAINLEVEL_OUTOF10: 6

## 2023-08-03 ASSESSMENT — ENCOUNTER SYMPTOMS: SHORTNESS OF BREATH: 1

## 2023-08-03 ASSESSMENT — PAIN DESCRIPTION - LOCATION
LOCATION: ABDOMEN

## 2023-08-03 ASSESSMENT — LIFESTYLE VARIABLES: SMOKING_STATUS: 0

## 2023-08-03 ASSESSMENT — PAIN DESCRIPTION - DESCRIPTORS: DESCRIPTORS: STABBING

## 2023-08-03 ASSESSMENT — PAIN DESCRIPTION - PAIN TYPE
TYPE: SURGICAL PAIN

## 2023-08-03 ASSESSMENT — PAIN - FUNCTIONAL ASSESSMENT: PAIN_FUNCTIONAL_ASSESSMENT: NONE - DENIES PAIN

## 2023-08-03 ASSESSMENT — PAIN DESCRIPTION - ORIENTATION: ORIENTATION: ANTERIOR;LOWER

## 2023-08-03 NOTE — ANESTHESIA PRE PROCEDURE
Abdominal:       Abdomen: soft. Vascular: negative vascular ROS. Other Findings:           Anesthesia Plan      general     ASA 3     (DISCUSSED 2 PIV, POSS. BRITT, POSS POP VENT/ICU)  Induction: intravenous. MIPS: Postoperative opioids intended and Prophylactic antiemetics administered. Anesthetic plan and risks discussed with patient. Use of blood products discussed with patient whom consented to blood products. Plan discussed with CRNA. This pre-anesthesia assessment may be used as a history and physical.    DOS STAFF ADDENDUM:    Pt seen and examined, chart reviewed (including anesthesia, drug and allergy history). No interval changes to history and physical examination. Anesthetic plan, risks, benefits, alternatives, and personnel involved discussed with patient. Patient verbalized an understanding and agrees to proceed.       Filipe Coleman MD  August 3, 2023  8:54 AM      Filipe Coleman MD   8/3/2023

## 2023-08-03 NOTE — BRIEF OP NOTE
Brief Postoperative Note      Patient: Abhi Sorto  YOB: 1965  MRN: [id number]    Date of Procedure: 8/3/2023    Pre-Op Diagnosis Codes:     * Incisional hernia, without obstruction or gangrene [K43.2]     * Non-recurrent unilateral inguinal hernia without obstruction or gangrene [K40.90]    Post-Op Diagnosis: Same       Procedure(s):  ROBOTIC LEFT  INGUINAL HERNIA REPAIR WITH MESH,  OPEN INCISIONAL HERNIA REPAIR WITH MESH    Surgeon(s):  Jaelyn Henao MD    Assistant:  Surgical Assistant: Andreia Mayes    Anesthesia: General    Estimated Blood Loss (mL): less than 50     Complications: None    Specimens:   * No specimens in log *    Implants:  Implant Name Type Inv.  Item Serial No.  Lot No. LRB No. Used Action   MESH CS LEFT LARGE 10CM X 16CM - [id number]  MESH CS LEFT LARGE 10CM X 16CM  BARD DAVOL-WD [id number] Left 1 Implanted   MESH GREER [id number] POLYPR FLAT L PORE MFIL SFT KNIT LTWT - [id number]  MESH GREER [id number] POLYPR FLAT L PORE MFIL SFT KNIT LTWT  BARD DAVOL-WD [id number] N/A 1 Implanted         Drains:   Urinary Catheter 08/03/23 [place] (Active)       Findings: 1) large indirect LIH, repaired w/ robotic preperitoneal approach, large 3D Mid mesh placement            2) lower midline incisional hernia w/ associated wide fascial attenuation            3) open midline repair w/ component separation, retrorectal [id number] soft Prolene mesh reinforcement    FVB#:[id number]         Electronically signed by Jaelyn Henao MD on 8/3/2023 at 11:18 PM

## 2023-08-03 NOTE — H&P
I have reviewed the history and physical and the Cardiology clearance note and examined the patient. I find no relevant changes. I have reviewed with the patient and/or family members, during the preoperative office visit the risks, benefits, and alternatives to the procedure.     Valery Bautista MD

## 2023-08-04 VITALS
HEIGHT: 60 IN | SYSTOLIC BLOOD PRESSURE: 130 MMHG | OXYGEN SATURATION: 95 % | BODY MASS INDEX: 19.44 KG/M2 | TEMPERATURE: 99.3 F | DIASTOLIC BLOOD PRESSURE: 74 MMHG | HEART RATE: 91 BPM | WEIGHT: 99 LBS | RESPIRATION RATE: 18 BRPM

## 2023-08-04 LAB
BASOPHILS # BLD: 0.1 K/UL (ref 0–0.2)
BASOPHILS NFR BLD: 0.8 %
DEPRECATED RDW RBC AUTO: 14.6 % (ref 12.4–15.4)
EOSINOPHIL # BLD: 0 K/UL (ref 0–0.6)
EOSINOPHIL NFR BLD: 0.2 %
HCT VFR BLD AUTO: 38.1 % (ref 36–48)
HGB BLD-MCNC: 12.7 G/DL (ref 12–16)
LYMPHOCYTES # BLD: 1.1 K/UL (ref 1–5.1)
LYMPHOCYTES NFR BLD: 13.5 %
MCH RBC QN AUTO: 32.4 PG (ref 26–34)
MCHC RBC AUTO-ENTMCNC: 33.2 G/DL (ref 31–36)
MCV RBC AUTO: 97.6 FL (ref 80–100)
MONOCYTES # BLD: 0.7 K/UL (ref 0–1.3)
MONOCYTES NFR BLD: 8.7 %
NEUTROPHILS # BLD: 6.3 K/UL (ref 1.7–7.7)
NEUTROPHILS NFR BLD: 76.8 %
PLATELET # BLD AUTO: 94 K/UL (ref 135–450)
PLATELET BLD QL SMEAR: ABNORMAL
PMV BLD AUTO: 8.6 FL (ref 5–10.5)
RBC # BLD AUTO: 3.91 M/UL (ref 4–5.2)
SLIDE REVIEW: ABNORMAL
WBC # BLD AUTO: 8.2 K/UL (ref 4–11)

## 2023-08-04 PROCEDURE — 2500000003 HC RX 250 WO HCPCS: Performed by: SURGERY

## 2023-08-04 PROCEDURE — 2700000000 HC OXYGEN THERAPY PER DAY

## 2023-08-04 PROCEDURE — 94640 AIRWAY INHALATION TREATMENT: CPT

## 2023-08-04 PROCEDURE — 94761 N-INVAS EAR/PLS OXIMETRY MLT: CPT

## 2023-08-04 PROCEDURE — 6360000002 HC RX W HCPCS: Performed by: SURGERY

## 2023-08-04 PROCEDURE — 99024 POSTOP FOLLOW-UP VISIT: CPT | Performed by: SURGERY

## 2023-08-04 PROCEDURE — 85025 COMPLETE CBC W/AUTO DIFF WBC: CPT

## 2023-08-04 PROCEDURE — 6370000000 HC RX 637 (ALT 250 FOR IP): Performed by: SURGERY

## 2023-08-04 PROCEDURE — 6360000002 HC RX W HCPCS: Performed by: INTERNAL MEDICINE

## 2023-08-04 RX ORDER — OXYCODONE HYDROCHLORIDE AND ACETAMINOPHEN 5; 325 MG/1; MG/1
1 TABLET ORAL EVERY 4 HOURS PRN
Qty: 20 TABLET | Refills: 0 | Status: SHIPPED | OUTPATIENT
Start: 2023-08-04 | End: 2023-08-07

## 2023-08-04 RX ORDER — ALBUTEROL SULFATE 2.5 MG/3ML
2.5 SOLUTION RESPIRATORY (INHALATION) EVERY 6 HOURS PRN
Status: DISCONTINUED | OUTPATIENT
Start: 2023-08-04 | End: 2023-08-04

## 2023-08-04 RX ORDER — ALBUTEROL SULFATE 2.5 MG/3ML
2.5 SOLUTION RESPIRATORY (INHALATION) EVERY 4 HOURS
Status: DISCONTINUED | OUTPATIENT
Start: 2023-08-04 | End: 2023-08-04 | Stop reason: HOSPADM

## 2023-08-04 RX ADMIN — FUROSEMIDE 40 MG: 40 TABLET ORAL at 08:56

## 2023-08-04 RX ADMIN — HYDROMORPHONE HYDROCHLORIDE 0.5 MG: 1 INJECTION, SOLUTION INTRAMUSCULAR; INTRAVENOUS; SUBCUTANEOUS at 02:14

## 2023-08-04 RX ADMIN — ALBUTEROL SULFATE 2.5 MG: 2.5 SOLUTION RESPIRATORY (INHALATION) at 07:59

## 2023-08-04 RX ADMIN — ALBUTEROL SULFATE 2.5 MG: 2.5 SOLUTION RESPIRATORY (INHALATION) at 11:26

## 2023-08-04 RX ADMIN — ONDANSETRON 4 MG: 4 TABLET, ORALLY DISINTEGRATING ORAL at 05:25

## 2023-08-04 RX ADMIN — SPIRONOLACTONE 25 MG: 25 TABLET ORAL at 08:56

## 2023-08-04 RX ADMIN — ALBUTEROL SULFATE 2.5 MG: 2.5 SOLUTION RESPIRATORY (INHALATION) at 02:26

## 2023-08-04 RX ADMIN — HYDROMORPHONE HYDROCHLORIDE 0.5 MG: 1 INJECTION, SOLUTION INTRAMUSCULAR; INTRAVENOUS; SUBCUTANEOUS at 05:17

## 2023-08-04 RX ADMIN — OXYCODONE HYDROCHLORIDE 10 MG: 5 TABLET ORAL at 09:04

## 2023-08-04 RX ADMIN — ACETAMINOPHEN 650 MG: 325 TABLET ORAL at 08:55

## 2023-08-04 RX ADMIN — FLUTICASONE PROPIONATE 2 SPRAY: 50 SPRAY, METERED NASAL at 08:59

## 2023-08-04 ASSESSMENT — PAIN SCALES - GENERAL
PAINLEVEL_OUTOF10: 5
PAINLEVEL_OUTOF10: 10
PAINLEVEL_OUTOF10: 8
PAINLEVEL_OUTOF10: 8
PAINLEVEL_OUTOF10: 5
PAINLEVEL_OUTOF10: 10

## 2023-08-04 ASSESSMENT — PAIN DESCRIPTION - LOCATION
LOCATION: ABDOMEN
LOCATION: HEAD;ABDOMEN
LOCATION: HEAD

## 2023-08-04 ASSESSMENT — PAIN DESCRIPTION - DESCRIPTORS
DESCRIPTORS: ACHING
DESCRIPTORS: SHARP

## 2023-08-04 ASSESSMENT — PAIN DESCRIPTION - ORIENTATION
ORIENTATION: ANTERIOR
ORIENTATION: ANTERIOR

## 2023-08-04 NOTE — OP NOTE
incised above the inguinal region in a standard fashion  and the peritoneal flap created. We dissected the flap down across the  inguinal structures and identified the pubic ramus and tubercle  medially. I then cleared the peritoneum off of the iliopubic tract  laterally and dissected down around onto the psoas muscle. This helped  isolate the large indirect hernia sac extending out the inguinal canal.   I released the hernia sac off its attachments and gradually and  carefully reduced it until I could then completely transect and release  the sac from the inguinal canal.  The sac inverted nicely back into the  peritoneal cavity. I continued dissecting the flap down below the  inguinal region to make additional room for the mesh. I now inserted a large mid-weight mesh patch into the preperitoneal  space and secured it with 2 0 Vicryl sutures to the pubic ramus and then  to the upper abdominal wall. The peritoneal flap was then closed with a  running 3-0 barbed suture. At this point, I was satisfied with the  inguinal hernia repair. I re-evaluated the midline and tried to determine the best approach for  repair. Due to the degree of attenuation and bowing of the lower  midline, even where there was not the actual hernia defect, I felt that  I would need to do an open approach to create a component separation  technique and hopefully try to debride back some of the attenuated  fascia to try to help restore some of the abdominal wall strength. I  therefore undocked the robot. The trocars was removed and the  insufflation released. The patient's previous periumbilical midline  incision was reopened sharply and dissection carried down to the scarred  thickened fascia. The fascia was opened into the peritoneal cavity and  then we opened the fascia for the whole length of the incision to  include through the 3 x 3 cm fascial defect that was located near the  lower end of the incision.   I then cleared the with 3-0 Vicryl  subcutaneous sutures followed by a running 4-0 Monocryl subcuticular  sutures. Trocar sites were all closed in a similar fashion. The  patient was then extubated and taken to the recovery room in stable  condition.         Octavia Singh MD    D: 08/03/2023 23:17:56       T: 08/04/2023 3:12:13     [id number]  Job#: [id number]     Doc#: [id number]    CC:

## 2023-08-04 NOTE — PLAN OF CARE
Problem: Discharge Planning  Goal: Discharge to home or other facility with appropriate resources  8/4/2023 1223 by Tresa Ramos RN  Outcome: Adequate for Discharge  8/4/2023 0040 by Avtar Partida RN  Outcome: Progressing     Problem: Pain  Goal: Verbalizes/displays adequate comfort level or baseline comfort level  8/4/2023 1223 by Tresa Ramos RN  Outcome: Adequate for Discharge  8/4/2023 0040 by Avtar Partida RN  Outcome: Progressing     Problem: Safety - Adult  Goal: Free from fall injury  8/4/2023 1223 by Tresa Ramos RN  Outcome: Adequate for Discharge  8/4/2023 0040 by Avtar Partida RN  Outcome: Progressing     Problem: ABCDS Injury Assessment  Goal: Absence of physical injury  Outcome: Adequate for Discharge

## 2023-08-04 NOTE — DISCHARGE SUMMARY
Surgery Discharge Summary    Patient Identification  Aleksandar Cantrell is a 62 y.o. female. :  1965  Admit Date:  8/3/2023    Discharge date:   2023                                  Disposition: home    Discharge Diagnoses:   Principal Problem:    Incisional hernia without obstruction or gangrene  Active Problems:    Non-recurrent unilateral inguinal hernia without obstruction or gangrene    S/P repair of ventral hernia  Resolved Problems:    * No resolved hospital problems. *      Discharge condition: good    Discharge Medications:     Current Discharge Medication List        CONTINUE these medications which have NOT CHANGED    Details   Omega-3 Fatty Acids (FISH OIL ADULT GUMMIES PO) Take 1 each by mouth daily      Casanthranol-Docusate Sodium (LAXATIVE PLUS STOOL SOFTENER PO) Take by mouth      traZODone (DESYREL) 100 MG tablet TAKE 1 TABLET NIGHTLY  Qty: 90 tablet, Refills: 3      Budeson-Glycopyrrol-Formoterol 160-9-4.8 MCG/ACT AERO Inhale 2 puffs into the lungs in the morning and at bedtime  Qty: 1 each, Refills: 0    Associated Diagnoses: Chronic obstructive pulmonary disease with acute exacerbation (HCC)      montelukast (SINGULAIR) 10 MG tablet Take 1 tablet by mouth daily  Qty: 30 tablet, Refills: 3    Associated Diagnoses: Chronic obstructive pulmonary disease with acute exacerbation (HCC)      Multiple Vitamin (MULTI-VITAMIN DAILY PO) Take by mouth      furosemide (LASIX) 40 MG tablet Take 1 tablet by mouth daily  Qty: 90 tablet, Refills: 1      magnesium oxide (MAG-OX) 400 (240 Mg) MG tablet Take 1 tablet by mouth daily      Turmeric (QC TUMERIC COMPLEX PO) Take 1 each by mouth daily      omeprazole (PRILOSEC) 20 MG delayed release capsule TAKE 1 CAPSULE DAILY  Qty: 90 capsule, Refills: 3    Comments: YOUR PATIENT HAS REQUESTED A REFILL OF THIS MEDICATION, PREVIOUSLY AUTHORIZED BY ANOTHER PRESCRIBER.       spironolactone (ALDACTONE) 25 MG tablet Take 1 tablet by mouth daily  Qty: 90 tablet, Refills: 0      azelastine (ASTELIN) 0.1 % nasal spray 1 spray by Nasal route 2 times daily      fluticasone (FLONASE) 50 MCG/ACT nasal spray 2 sprays by Nasal route in the morning and at bedtime      ADVAIR -21 MCG/ACT inhaler       albuterol (PROVENTIL) (5 MG/ML) 0.5% nebulizer solution Take 1 mL by nebulization 4 times daily as needed for Wheezing  Qty: 120 each, Refills: 3    Associated Diagnoses: Chronic obstructive pulmonary disease with acute exacerbation (HCC)      Handicap Placard MISC by Does not apply route  Qty: 1 each, Refills: 0    Associated Diagnoses: Chronic obstructive pulmonary disease with acute exacerbation (HCC)      levalbuterol (XOPENEX) 0.31 MG/3ML nebulization Take 3 mLs by nebulization every 4 hours as needed for Wheezing  Qty: 3 mL, Refills: 2    Associated Diagnoses: Chronic obstructive pulmonary disease with acute exacerbation ([address])                Current Discharge Medication List        START taking these medications    Details   oxyCODONE-acetaminophen (PERCOCET) 5-325 MG per tablet Take 1 tablet by mouth every 4 hours as needed for Pain for up to 3 days. Max Daily Amount: 6 tablets  Qty: 20 tablet, Refills: 0    Comments: Reduce doses taken as pain becomes manageable  Associated Diagnoses: Postoperative pain               Most Recent Labs:    CBC:   Recent Labs     08/04/23  0654   WBC 8.2   HGB 12.7   HCT 38.1   PLT 94*     BMP:    Recent Labs     08/03/23  0853      K 4.4      CO2 22   BUN 15   CREATININE 0.6   GLUCOSE 101*     Hepatic: No results for input(s): AST, ALT, ALB, BILITOT, ALKPHOS in the last 72 hours. PT/INR:  No results for input(s): INR in the last 72 hours. Consults: Hospital Medicine    Surgery: combined left inguinal/midline incisional hernia repairs    Patient Instructions:    Activity: no heavy lifting, pushing, pulling for 2 weeks, no driving for 1 weeks or while on analgesics  Diet: As tolerated  Follow-up with Dr Megan Teixeira in 2

## 2023-08-04 NOTE — DISCHARGE INSTRUCTIONS
[facility] GENERAL SURGERY [facility] AND [facility]. Dana Goetz M.D. [address]                [address] M[address]D. [address] [facility], [address] [facility],[address]         [address] Hannah White M.D                         [phone]          [facility] [address] [place] [address]                                                        POST-OPERATIVE INSTRUCTIONS HERNIA REPAIR    Call the office to schedule your post-operative appointment with your surgeon for two (2) weeks. If you still have bandages over your incisions, you  may remove them in 2-3 days. Place an ice pack over your incisions on and off (15min) at a time for the next 2 days. General guidelines for activity:      Avoid strenuous activity or lifting anything heavier than 15 pounds. It is OK to be up and walking around. Going up and down stairs is   also OK. Do what is comfortable: stop and rest when you feel tired. You will have pain medicine ordered. Take as directed. Do NOT drive for one week and while taking your narcotic pain medicine. Watch for signs of infection:    Excessive warmth or bright redness around your incisions    Leakage of bloody or cloudy fluid from you incisions    Fever over 100.5    If you experience constipation  Increase your water intake. Increase your activity; walking is best.  A stool softener or mild laxative may be necessary if you still have not had a bowel  movement ; call the office for further instructions. Please take note: IF you do not take all of your narcotic pain medication, we ask that you dispose of these responsibly.    Drug drop off boxes are located in the [facility] and [address] [place] [address] emergency

## 2023-08-07 ENCOUNTER — TELEPHONE (OUTPATIENT)
Dept: FAMILY MEDICINE CLINIC | Age: 58
End: 2023-08-07

## 2023-08-08 ENCOUNTER — TELEPHONE (OUTPATIENT)
Dept: SURGERY | Age: 58
End: 2023-08-08

## 2023-08-08 NOTE — TELEPHONE ENCOUNTER
Pt called and said her vagina is dark purple almost black. She isn't so much worried about that but wants to know if she can shave? Please give pt a call, thank you.

## 2023-08-10 ENCOUNTER — OFFICE VISIT (OUTPATIENT)
Dept: PULMONOLOGY | Age: 58
End: 2023-08-10
Payer: MEDICARE

## 2023-08-10 VITALS
OXYGEN SATURATION: 97 % | RESPIRATION RATE: 16 BRPM | HEIGHT: 60 IN | SYSTOLIC BLOOD PRESSURE: 124 MMHG | DIASTOLIC BLOOD PRESSURE: 78 MMHG | BODY MASS INDEX: 19.59 KG/M2 | TEMPERATURE: 98.9 F | WEIGHT: 99.8 LBS | HEART RATE: 94 BPM

## 2023-08-10 DIAGNOSIS — J96.11 CHRONIC RESPIRATORY FAILURE WITH HYPOXIA (HCC): ICD-10-CM

## 2023-08-10 DIAGNOSIS — Z87.891 FORMER SMOKER: ICD-10-CM

## 2023-08-10 DIAGNOSIS — J44.9 CHRONIC OBSTRUCTIVE PULMONARY DISEASE, UNSPECIFIED COPD TYPE (HCC): Primary | ICD-10-CM

## 2023-08-10 PROCEDURE — 3074F SYST BP LT 130 MM HG: CPT | Performed by: STUDENT IN AN ORGANIZED HEALTH CARE EDUCATION/TRAINING PROGRAM

## 2023-08-10 PROCEDURE — G8420 CALC BMI NORM PARAMETERS: HCPCS | Performed by: STUDENT IN AN ORGANIZED HEALTH CARE EDUCATION/TRAINING PROGRAM

## 2023-08-10 PROCEDURE — G8427 DOCREV CUR MEDS BY ELIG CLIN: HCPCS | Performed by: STUDENT IN AN ORGANIZED HEALTH CARE EDUCATION/TRAINING PROGRAM

## 2023-08-10 PROCEDURE — 1111F DSCHRG MED/CURRENT MED MERGE: CPT | Performed by: STUDENT IN AN ORGANIZED HEALTH CARE EDUCATION/TRAINING PROGRAM

## 2023-08-10 PROCEDURE — 3017F COLORECTAL CA SCREEN DOC REV: CPT | Performed by: STUDENT IN AN ORGANIZED HEALTH CARE EDUCATION/TRAINING PROGRAM

## 2023-08-10 PROCEDURE — 3078F DIAST BP <80 MM HG: CPT | Performed by: STUDENT IN AN ORGANIZED HEALTH CARE EDUCATION/TRAINING PROGRAM

## 2023-08-10 PROCEDURE — 3023F SPIROM DOC REV: CPT | Performed by: STUDENT IN AN ORGANIZED HEALTH CARE EDUCATION/TRAINING PROGRAM

## 2023-08-10 PROCEDURE — 99213 OFFICE O/P EST LOW 20 MIN: CPT | Performed by: STUDENT IN AN ORGANIZED HEALTH CARE EDUCATION/TRAINING PROGRAM

## 2023-08-10 PROCEDURE — 1036F TOBACCO NON-USER: CPT | Performed by: STUDENT IN AN ORGANIZED HEALTH CARE EDUCATION/TRAINING PROGRAM

## 2023-08-10 RX ORDER — BUDESONIDE, GLYCOPYRROLATE, AND FORMOTEROL FUMARATE 160; 9; 4.8 UG/1; UG/1; UG/1
2 AEROSOL, METERED RESPIRATORY (INHALATION) 2 TIMES DAILY
Qty: 2 EACH | Refills: 0 | Status: SHIPPED | COMMUNITY
Start: 2023-08-10

## 2023-08-10 ASSESSMENT — ENCOUNTER SYMPTOMS
NAUSEA: 0
SHORTNESS OF BREATH: 1
EYE ITCHING: 0
SORE THROAT: 0
BACK PAIN: 0
VOMITING: 0
EYE REDNESS: 0
STRIDOR: 0
TROUBLE SWALLOWING: 0
ABDOMINAL DISTENTION: 0
WHEEZING: 0
COUGH: 1
DIARRHEA: 0
EYE DISCHARGE: 0
ABDOMINAL PAIN: 1
EYE PAIN: 0
COLOR CHANGE: 0
CONSTIPATION: 0

## 2023-08-10 NOTE — PROGRESS NOTES
MA Communication:   The following orders are received by verbal communication from   Joey Powell MD    Orders include:  PFT 6 min walk        FU 6 mo       Breztri samples

## 2023-08-10 NOTE — PROGRESS NOTES
[facility] Pulmonary Follow-up  [address] [facility], [address] [facility],[address]  [address] (: 1965 ) is a 62 y.o. female here for an evaluation of   Chief Complaint   Patient presents with    COPD     R/B Shiv Carry saw Becky Chen         SUBJECTIVE/OBJECTIVE:  Patient is a 60-year-old female with significant past medical history of tobacco dependence and chronic hypoxic respiratory failure that presents to [facility] pulmonary clinic for follow-up visit. Patient had a recent inguinal hernia surgery and is having pain in her abdomen. She endorses shortness of breath at baseline, she uses up to 5 L nasal cannula. She has shortness of breath with exertion, but she is able to do her activities of daily living. She denies any fever, chills, chest pain, nausea, vomiting, abdominal pain. Patient quit smoking 15 years ago. She was smoking around 2 packs/day for about 30 years. She uses marijuana oil, she does not smoke marijuana. She denies any other illicit drug use, no alcohol use. Review of Systems   Constitutional:  Negative for activity change, appetite change, chills, diaphoresis and fatigue. HENT:  Negative for congestion, sore throat and trouble swallowing. Eyes:  Negative for pain, discharge, redness and itching. Respiratory:  Positive for cough and shortness of breath. Negative for wheezing and stridor. Cardiovascular:  Negative for chest pain, palpitations and leg swelling. Gastrointestinal:  Positive for abdominal pain. Negative for abdominal distention, constipation, diarrhea, nausea and vomiting. Endocrine: Negative for polydipsia, polyphagia and polyuria. Genitourinary:  Negative for difficulty urinating. Musculoskeletal:  Negative for back pain, myalgias and neck pain. Skin:  Negative for color change. Neurological:  Negative for dizziness, weakness and light-headedness.    Psychiatric/Behavioral:  Negative for agitation and behavioral

## 2023-08-10 NOTE — PATIENT INSTRUCTIONS
Remember to bring a list of pulmonary medications and any CPAP or BiPAP machines to your next appointment with the office. Please keep all of your future appointments scheduled by [facility] office. Out of respect for other patients and providers, you may be asked to reschedule your appointment if you arrive later than your scheduled appointment time. Appointments cancelled less than 24hrs in advance will be considered a no show. Patients with three missed appointments within 1 year or four missed appointments within 2 years can be dismissed from the practice. Please be aware that our physicians are required to work in the Intensive Care Unit at [facility].  Your appointment may need to be rescheduled if they are designated to work during your appointment time. You may receive a survey regarding the care you received during your visit. Your input is valuable to us. We encourage you to complete and return your survey. We hope you will choose us in the future for your healthcare needs. Pt instructed of all future appointment dates & times, including radiology, labs, procedures & referrals. If procedures were scheduled preparation instructions provided. Instructions on future appointments with [facility] Pulmonary were given.

## 2023-08-18 ENCOUNTER — OFFICE VISIT (OUTPATIENT)
Dept: SURGERY | Age: 58
End: 2023-08-18

## 2023-08-18 VITALS
WEIGHT: 97.6 LBS | HEIGHT: 60 IN | DIASTOLIC BLOOD PRESSURE: 75 MMHG | HEART RATE: 97 BPM | SYSTOLIC BLOOD PRESSURE: 120 MMHG | BODY MASS INDEX: 19.16 KG/M2

## 2023-08-18 DIAGNOSIS — Z09 POSTOP CHECK: Primary | ICD-10-CM

## 2023-08-18 PROCEDURE — 99024 POSTOP FOLLOW-UP VISIT: CPT | Performed by: SURGERY

## 2023-08-18 NOTE — PROGRESS NOTES
.  Surgery Post-op Progress Note    HPI:  Notes reviewed, and agree with documentation in pt's chart. Postoperative Follow-up: Patient presents for 2 week follow-up status post open repair of lower midline incisional hernia, with component separation and retrorectus mesh placement technique . Eating a regular diet without difficulty. Bowel movements are Normal.  Pain is controlled without any medications. .     ROS:    10 point review of systems performed; please refer to HPI with pertinent positives, all other ROS are negative    A review of the patient's record including allergies, medication list, tobacco history, family history, problem list, medical history and social history has been completed and updates made to the patient's EMR where indicated. PE:   CONSTITUTIONAL:  awake and alert    ABDOMEN: soft, non-distended, non-tender     INCISION: clean, dry, no drainage, healed, moderate fluctuent swelling centered around the lower midline incision, no erythema, no drainage, non-tender. Fascia seems intact under this area of fluctuence      ASSESSMENT:   Diagnosis Orders   1. Postop check              PLAN:    I attempted to aspirate this presumed seroma, using local anesthesia and sterile skin cleansing. However, with placement of a large bore needle through the skin of the lower abdomen, no fluid return was noted. Will reassess in 2 weeks to see if gradual spontaneous resorption/reduction of the fluctuence occurs. If symptoms worsen rather than improve she will come back sooner.

## 2023-08-18 NOTE — PATIENT INSTRUCTIONS
Continue with routine wound care as discussed  Gradually increase activities as tolerated  Follow-up in 2 weeks or as needed; please call with questions or concerns

## 2023-08-21 ENCOUNTER — HOSPITAL ENCOUNTER (OUTPATIENT)
Dept: PULMONOLOGY | Age: 58
Discharge: HOME | End: 2023-08-21
Payer: MEDICARE

## 2023-08-21 DIAGNOSIS — J96.11 CHRONIC RESPIRATORY FAILURE WITH HYPOXIA (HCC): ICD-10-CM

## 2023-08-21 LAB
DLCO %PRED: 65 %
DLCO PRED: NORMAL
DLCO/VA %PRED: NORMAL
DLCO/VA PRED: NORMAL
DLCO/VA: NORMAL
DLCO: NORMAL
EXPIRATORY TIME-POST: NORMAL
EXPIRATORY TIME: NORMAL
FEF 25-75% %CHNG: NORMAL
FEF 25-75% %PRED-POST: NORMAL
FEF 25-75% %PRED-PRE: NORMAL
FEF 25-75% PRED: NORMAL
FEF 25-75%-POST: NORMAL
FEF 25-75%-PRE: NORMAL
FEV1 %PRED-POST: 51 %
FEV1 %PRED-PRE: 45 %
FEV1 PRED: NORMAL
FEV1-POST: NORMAL
FEV1-PRE: NORMAL
FEV1/FVC %PRED-POST: 56 %
FEV1/FVC %PRED-PRE: 51 %
FEV1/FVC PRED: NORMAL
FEV1/FVC-POST: NORMAL
FEV1/FVC-PRE: NORMAL
FVC %PRED-POST: 90 L
FVC %PRED-PRE: 86 %
FVC PRED: NORMAL
FVC-POST: NORMAL
FVC-PRE: NORMAL
GAW %PRED: NORMAL
GAW PRED: NORMAL
GAW: NORMAL
IC %PRED: NORMAL
IC PRED: NORMAL
IC: NORMAL
MEP: NORMAL
MIP: NORMAL
MVV %PRED-PRE: NORMAL
MVV PRED: NORMAL
MVV-PRE: NORMAL
PEF %PRED-POST: NORMAL
PEF %PRED-PRE: NORMAL
PEF PRED: NORMAL
PEF%CHNG: NORMAL
PEF-POST: NORMAL
PEF-PRE: NORMAL
RAW %PRED: NORMAL
RAW PRED: NORMAL
RAW: NORMAL
RV %PRED: NORMAL
RV PRED: NORMAL
RV: NORMAL
SVC %PRED: NORMAL
SVC PRED: NORMAL
SVC: NORMAL
TLC %PRED: 123 %
TLC PRED: NORMAL
TLC: NORMAL
VA %PRED: NORMAL
VA PRED: NORMAL
VA: NORMAL
VTG %PRED: NORMAL
VTG PRED: NORMAL
VTG: NORMAL

## 2023-08-21 PROCEDURE — 94726 PLETHYSMOGRAPHY LUNG VOLUMES: CPT

## 2023-08-21 PROCEDURE — 6370000000 HC RX 637 (ALT 250 FOR IP): Performed by: STUDENT IN AN ORGANIZED HEALTH CARE EDUCATION/TRAINING PROGRAM

## 2023-08-21 PROCEDURE — 94729 DIFFUSING CAPACITY: CPT

## 2023-08-21 PROCEDURE — 94060 EVALUATION OF WHEEZING: CPT

## 2023-08-21 PROCEDURE — 94618 PULMONARY STRESS TESTING: CPT

## 2023-08-21 RX ORDER — ALBUTEROL SULFATE 90 UG/1
4 AEROSOL, METERED RESPIRATORY (INHALATION) ONCE
Status: COMPLETED | OUTPATIENT
Start: 2023-08-21 | End: 2023-08-21

## 2023-08-21 RX ADMIN — Medication 4 PUFF: at 10:38

## 2023-08-21 ASSESSMENT — PULMONARY FUNCTION TESTS
FEV1_PERCENT_PREDICTED_PRE: 45
FEV1/FVC_PERCENT_PREDICTED_PRE: 51
FVC_PERCENT_PREDICTED_PRE: 86
FEV1_PERCENT_PREDICTED_POST: 51
FEV1/FVC_PERCENT_PREDICTED_POST: 56
FVC_PERCENT_PREDICTED_POST: 90

## 2023-08-24 NOTE — PROCEDURES
Pulmonary Function Testing      Patient name:  Ruma Rivas     [address] [facility] Unit #:   [id number]   Date of test:  8/21/2023   Date of interpretation:   8/24/2023    Ms. Ruma Rivas is a 62y.o. year-old  The spirometry data were acceptable and reproducible. Spirometry:  Flow volume loops were obstructed. The FEV-1/FVC ratio was decreased. The FEV-1 was 1.00 liters (45% of predicted), which was severely decreased. The FVC was 2.41 liters (86% of predicted), which was normal. Response to inhaled bronchodilators (albuterol) was nonsignificant. Lung volumes:  Lung volumes were tested by plethysmography. The total lung capacity was 5.48 liters (123% of predicted), which was increased. The residual volume was 3.42 liters (194% of predicted), which was increased. Diffusion capacity was found to be 69% which is decreased even when adjusted for alveolar ventilation. Interpretation:  Full PFT done. Spirometry shows a very severe obstructive lung pattern with no bronchodilator effect. Lung volumes show air trapping and hyperinflation. Diffusion shows decreased diffusion despite alveolar ventilation adjustment. Flow volume loops are consistent with an obstructive pattern. Comments:      6-minute walk test  Starting room air sat was 95%. Patient's heart rate was 95 and respiratory rate of 16. Dyspnea modified Jim score of 3. Patient was able to complete the 6-minute walk test but at the minute for marked the patient started having audible wheezing and coughing. Patient's lowest O2 saturation was 92%. Impression  No desaturation. Lowest O2 saturation was 92% on room air. Patient was only able to complete 840 feet. This was 46% of expected distance.
[address] Detailed

## 2023-08-28 RX ORDER — AZELASTINE 1 MG/ML
1 SPRAY, METERED NASAL 2 TIMES DAILY
Qty: 30 ML | Refills: 1 | Status: SHIPPED | OUTPATIENT
Start: 2023-08-28 | End: 2023-08-30 | Stop reason: SDUPTHER

## 2023-08-28 RX ORDER — SPIRONOLACTONE 25 MG/1
TABLET ORAL
Qty: 90 TABLET | Refills: 3 | Status: SHIPPED | OUTPATIENT
Start: 2023-08-28

## 2023-08-28 RX ORDER — FLUTICASONE PROPIONATE 50 MCG
2 SPRAY, SUSPENSION (ML) NASAL 2 TIMES DAILY
Qty: 16 G | Refills: 1 | Status: SHIPPED | OUTPATIENT
Start: 2023-08-28 | End: 2023-08-30 | Stop reason: SDUPTHER

## 2023-08-28 NOTE — TELEPHONE ENCOUNTER
Refill Request     Last Seen: Last Seen Department: 7/13/2023  Last Seen by PCP: 7/13/2023    Last Written: 01/27/2023      Next Appointment:   Future Appointments   Date Time Provider [address] [place] [address]   9/1/2023 11:00 AM Lotus Watson MD AND [facility]   2/12/2024  9:00 AM Faisal Bauer MD AND [facility]             Requested Prescriptions     Pending Prescriptions Disp Refills    spironolactone (ALDACTONE) 25 MG tablet [Pharmacy Med Name: SPIRONOLACTONE TABS 25MG] 90 tablet 3     Sig: TAKE 1 TABLET DAILY

## 2023-08-28 NOTE — TELEPHONE ENCOUNTER
Refill Request         Last Seen: Last Seen Department: 2023  Last Seen by PCP: 2023    Last Written: 01/10/2023        Next Appointment:   Future Appointments   Date Time Provider [address]   2023 11:00 AM Marina Bingham MD AND [facility]   2024  9:00 AM Trino Manning MD AND [facility]           Requested Prescriptions     Pending Prescriptions Disp Refills    azelastine (ASTELIN) 0.1 % nasal spray 30 mL 1     Si spray by Nasal route 2 times daily    fluticasone (FLONASE) 50 MCG/ACT nasal spray 16 g 1     Si sprays by Nasal route in the morning and at bedtime

## 2023-08-30 ENCOUNTER — TELEPHONE (OUTPATIENT)
Dept: FAMILY MEDICINE CLINIC | Age: 58
End: 2023-08-30

## 2023-08-30 RX ORDER — AZELASTINE 1 MG/ML
1 SPRAY, METERED NASAL 2 TIMES DAILY
Qty: 30 ML | Refills: 1 | Status: SHIPPED | OUTPATIENT
Start: 2023-08-30

## 2023-08-30 RX ORDER — FLUTICASONE PROPIONATE 50 MCG
2 SPRAY, SUSPENSION (ML) NASAL 2 TIMES DAILY
Qty: 16 G | Refills: 1 | Status: SHIPPED | OUTPATIENT
Start: 2023-08-30

## 2023-08-30 NOTE — TELEPHONE ENCOUNTER
Patient called and stated that her Azelastine and Fluticasone were sent to the wrong pharmacy.  She is requesting that they be resent to [facility] and not [facility].

## 2023-09-01 ENCOUNTER — OFFICE VISIT (OUTPATIENT)
Dept: SURGERY | Age: 58
End: 2023-09-01

## 2023-09-01 VITALS
WEIGHT: 97.4 LBS | BODY MASS INDEX: 19.12 KG/M2 | SYSTOLIC BLOOD PRESSURE: 118 MMHG | HEIGHT: 60 IN | DIASTOLIC BLOOD PRESSURE: 60 MMHG

## 2023-09-01 DIAGNOSIS — Z09 POSTOP CHECK: Primary | ICD-10-CM

## 2023-09-01 PROCEDURE — 99024 POSTOP FOLLOW-UP VISIT: CPT | Performed by: SURGERY

## 2023-09-01 NOTE — PROGRESS NOTES
Surgery Post-op Progress Note    HPI:  Notes reviewed, and agree with documentation in pt's chart. Postoperative Follow-up: Patient presents for 4 week follow-up status post open retrorectus repair of incisoinal hernia . She notes continued improvement in how she feels, with increasing activities. Moving her bowels, ambulating. Minimal to no discomfort at incision site    ROS:    10 point review of systems performed; please refer to HPI with pertinent positives, all other ROS are negative    A review of the patient's record including allergies, medication list, tobacco history, family history, problem list, medical history and social history has been completed and updates made to the patient's EMR where indicated. PE:   CONSTITUTIONAL:  awake and alert    ABDOMEN: soft, non-distended, non-tender     INCISION: clean, dry, no drainage, healed, marked improvement/resolution in the subcutaneous seroma/hematoma overlying the fascial repair      ASSESSMENT:   Diagnosis Orders   1.  Postop check        Doing well overall, steady recovery      PLAN:    Continue with routine wound care as discussed  Gradually increase activities as tolerated  Follow-up as needed; please call with questions or concerns

## 2023-09-15 RX ORDER — AZELASTINE 1 MG/ML
1 SPRAY, METERED NASAL 2 TIMES DAILY
Qty: 30 ML | Refills: 1 | Status: SHIPPED | OUTPATIENT
Start: 2023-09-15

## 2023-10-03 NOTE — PROGRESS NOTES
Impressions   08/22/2022 2:56 PM PDT    IMPRESSION:  No mammographic evidence of malignancy. BI-RADS CATEGORY (1) Negative. RECOMMENDATIONS:  Routine annual screening mammography. A negative mammogram report should not be interpreted so as to delay biopsy of a clinically suspicious palpable mass. Normal and callback notification letters are sent to the patient. Notification regarding need for breast biopsy or surgical consultation is directed to the ordering physician and patient. Imaging Results - BI screening mammography with cad bilateral (08/19/2022 10:12 AM PDT)  Narrative   08/22/2022 2:56 PM PDT    EXAMINATION:  BI SCREENING MAMMOGRAPHY WITH CAD BILATERAL    DATE/TIME:  8/19/2022 10:12 AM    HISTORY:  Screening mammography. COMPARISON:  Mammograms dated 3/19/2021, 3/21/2019, 8/1/2016. FINDINGS:      BREAST DENSITY:  There are scattered areas of fibroglandular density. No dominant masses, malignant appearing calcifications, or secondary signs of carcinoma are evident in either breast. This is a stable examination with no significant interval change since the previous mammogram.    This examination was interpreted in conjunction with computer assisted detection software.

## 2023-10-09 ENCOUNTER — TELEPHONE (OUTPATIENT)
Dept: FAMILY MEDICINE CLINIC | Age: 58
End: 2023-10-09

## 2023-10-09 NOTE — TELEPHONE ENCOUNTER
She is coming in on Thursday for a flu shot, she wanted to know due to her medical history can she get the high dose?  She said that is what she has been getting in the past.

## 2023-10-09 NOTE — TELEPHONE ENCOUNTER
Patient is calling stating that she is getting a bill for her visit on 1/27/23. She wants to know if you can look into this. She is saying that it was a pre op but it was a telehealth visit for a AWV.

## 2023-10-09 NOTE — TELEPHONE ENCOUNTER
Patient called and wanted to know if she can have referral for dermatology. She wasn't sure if she needed to be seen to get one or not.

## 2023-10-12 ENCOUNTER — NURSE ONLY (OUTPATIENT)
Dept: FAMILY MEDICINE CLINIC | Age: 58
End: 2023-10-12
Payer: MEDICARE

## 2023-10-12 DIAGNOSIS — Z23 FLU VACCINE NEED: Primary | ICD-10-CM

## 2023-10-12 DIAGNOSIS — J44.1 CHRONIC OBSTRUCTIVE PULMONARY DISEASE WITH ACUTE EXACERBATION (HCC): ICD-10-CM

## 2023-10-12 PROCEDURE — 90694 VACC AIIV4 NO PRSRV 0.5ML IM: CPT

## 2023-10-12 PROCEDURE — G0008 ADMIN INFLUENZA VIRUS VAC: HCPCS

## 2023-10-12 NOTE — TELEPHONE ENCOUNTER
Gibran Moss [phone] (home)    is requesting refill(s) of medication Pro HFA- Albuteral Sulfate Inhaler to preferred pharmacy [facility]    Last OV July 2023 (pertaining to medication)   Last refill ? (per medication requested)  Next office visit scheduled or attempted Yes  Date 11/30/23- 6 month f/u

## 2023-10-13 ENCOUNTER — TELEPHONE (OUTPATIENT)
Dept: FAMILY MEDICINE CLINIC | Age: 58
End: 2023-10-13

## 2023-10-13 DIAGNOSIS — J44.1 CHRONIC OBSTRUCTIVE PULMONARY DISEASE WITH ACUTE EXACERBATION (HCC): ICD-10-CM

## 2023-10-13 DIAGNOSIS — J44.1 CHRONIC OBSTRUCTIVE PULMONARY DISEASE WITH ACUTE EXACERBATION (HCC): Primary | ICD-10-CM

## 2023-10-13 NOTE — TELEPHONE ENCOUNTER
[facility] stating that there is no dosage for the albuterol that was sent over.

## 2023-10-16 RX ORDER — ALBUTEROL SULFATE 2.5 MG/3ML
2.5 SOLUTION RESPIRATORY (INHALATION) 4 TIMES DAILY PRN
Qty: 120 EACH | Refills: 3 | Status: SHIPPED | OUTPATIENT
Start: 2023-10-16

## 2023-10-17 RX ORDER — LEVALBUTEROL TARTRATE 45 UG/1
1 AEROSOL, METERED ORAL EVERY 4 HOURS PRN
Qty: 1 EACH | Refills: 3 | Status: SHIPPED | OUTPATIENT
Start: 2023-10-17 | End: 2024-10-16

## 2023-10-17 RX ORDER — FLUTICASONE PROPIONATE 50 MCG
2 SPRAY, SUSPENSION (ML) NASAL 2 TIMES DAILY
Qty: 16 G | Refills: 3 | Status: SHIPPED | OUTPATIENT
Start: 2023-10-17 | End: 2023-10-17

## 2023-10-17 RX ORDER — LEVALBUTEROL TARTRATE 45 UG/1
1 AEROSOL, METERED ORAL EVERY 4 HOURS PRN
Qty: 1 EACH | Refills: 3 | Status: SHIPPED | OUTPATIENT
Start: 2023-10-17 | End: 2023-10-17

## 2023-10-17 RX ORDER — FLUTICASONE PROPIONATE 50 MCG
2 SPRAY, SUSPENSION (ML) NASAL 2 TIMES DAILY
Qty: 16 G | Refills: 3 | Status: SHIPPED | OUTPATIENT
Start: 2023-10-17

## 2023-10-17 NOTE — TELEPHONE ENCOUNTER
Patient asking for alternative to albuterol as the pharmacy does not have it in stock.  Pt also requesting a 90 day supply of flonase, pended

## 2023-10-19 ENCOUNTER — TELEPHONE (OUTPATIENT)
Dept: FAMILY MEDICINE CLINIC | Age: 58
End: 2023-10-19

## 2023-10-19 DIAGNOSIS — J44.1 CHRONIC OBSTRUCTIVE PULMONARY DISEASE WITH ACUTE EXACERBATION (HCC): Primary | ICD-10-CM

## 2023-10-19 RX ORDER — ALBUTEROL SULFATE 90 UG/1
2 AEROSOL, METERED RESPIRATORY (INHALATION) EVERY 6 HOURS PRN
Qty: 18 G | Refills: 3 | Status: SHIPPED | OUTPATIENT
Start: 2023-10-19

## 2023-10-19 NOTE — TELEPHONE ENCOUNTER
Patient called and stated that her pharmacy send her a prescription for a Sulfinex Inhaler. patient stated that she is not able to afford this inhaler monthly and did not know if there is something else she can be prescribed.

## 2023-10-19 NOTE — TELEPHONE ENCOUNTER
Patient called back and stated that she is going to work with the pharmacy because she believes that they charged her for the Xopenex (brand name inhaler) rather than the Levalbuterol, which is more affordable.

## 2023-10-30 ENCOUNTER — TELEPHONE (OUTPATIENT)
Dept: FAMILY MEDICINE CLINIC | Age: 58
End: 2023-10-30

## 2023-10-30 ENCOUNTER — COMMUNITY OUTREACH (OUTPATIENT)
Dept: FAMILY MEDICINE CLINIC | Age: 58
End: 2023-10-30

## 2023-10-30 ENCOUNTER — OFFICE VISIT (OUTPATIENT)
Dept: FAMILY MEDICINE CLINIC | Age: 58
End: 2023-10-30
Payer: MEDICARE

## 2023-10-30 VITALS
RESPIRATION RATE: 16 BRPM | HEIGHT: 60 IN | OXYGEN SATURATION: 94 % | WEIGHT: 99.6 LBS | HEART RATE: 107 BPM | SYSTOLIC BLOOD PRESSURE: 130 MMHG | DIASTOLIC BLOOD PRESSURE: 86 MMHG | BODY MASS INDEX: 19.56 KG/M2

## 2023-10-30 DIAGNOSIS — J44.1 CHRONIC OBSTRUCTIVE PULMONARY DISEASE WITH ACUTE EXACERBATION (HCC): Primary | ICD-10-CM

## 2023-10-30 PROBLEM — D69.6 THROMBOCYTOPENIA, UNSPECIFIED (HCC): Status: ACTIVE | Noted: 2023-10-30

## 2023-10-30 PROCEDURE — G8427 DOCREV CUR MEDS BY ELIG CLIN: HCPCS

## 2023-10-30 PROCEDURE — 3075F SYST BP GE 130 - 139MM HG: CPT

## 2023-10-30 PROCEDURE — 3017F COLORECTAL CA SCREEN DOC REV: CPT

## 2023-10-30 PROCEDURE — G8420 CALC BMI NORM PARAMETERS: HCPCS

## 2023-10-30 PROCEDURE — G8484 FLU IMMUNIZE NO ADMIN: HCPCS

## 2023-10-30 PROCEDURE — 3079F DIAST BP 80-89 MM HG: CPT

## 2023-10-30 PROCEDURE — 1036F TOBACCO NON-USER: CPT

## 2023-10-30 PROCEDURE — 3023F SPIROM DOC REV: CPT

## 2023-10-30 PROCEDURE — 99213 OFFICE O/P EST LOW 20 MIN: CPT

## 2023-10-30 RX ORDER — ALBUTEROL SULFATE 90 UG/1
2 AEROSOL, METERED RESPIRATORY (INHALATION) EVERY 6 HOURS PRN
Qty: 18 G | Refills: 3 | Status: SHIPPED | OUTPATIENT
Start: 2023-10-30

## 2023-10-30 RX ORDER — AZITHROMYCIN 250 MG/1
250 TABLET, FILM COATED ORAL SEE ADMIN INSTRUCTIONS
Qty: 6 TABLET | Refills: 0 | Status: SHIPPED | OUTPATIENT
Start: 2023-10-30 | End: 2023-11-04

## 2023-10-30 RX ORDER — PREDNISONE 50 MG/1
50 TABLET ORAL DAILY
Qty: 5 TABLET | Refills: 0 | Status: SHIPPED | OUTPATIENT
Start: 2023-10-30 | End: 2023-11-04

## 2023-10-30 RX ORDER — FUROSEMIDE 40 MG/1
40 TABLET ORAL EVERY MORNING
Qty: 30 TABLET | Refills: 0 | Status: SHIPPED | OUTPATIENT
Start: 2023-10-30 | End: 2023-11-29

## 2023-10-30 ASSESSMENT — ENCOUNTER SYMPTOMS
CONSTIPATION: 0
BLOOD IN STOOL: 0
COUGH: 1
SHORTNESS OF BREATH: 1
COLOR CHANGE: 0
ABDOMINAL PAIN: 0
WHEEZING: 1
SORE THROAT: 0
DIARRHEA: 0

## 2023-10-30 NOTE — TELEPHONE ENCOUNTER
Patient called stating that she is not feeling well. She took a covid test and it was negative. She has a really bad cough, congestion and headache. Her oxygen is at 92. She isnt wanting to come in and would like a virtual visit. Please advise.

## 2023-10-30 NOTE — PROGRESS NOTES
John Bajwa (:  1965) is a 62 y.o. female,Established patient, here for evaluation of the following chief complaint(s):  Cough (Since Saturday, taking tylenol cold and flu, ) and Nasal Congestion         ASSESSMENT/PLAN:  1. Chronic obstructive pulmonary disease with acute exacerbation (HCC)  -     azithromycin (ZITHROMAX) 250 MG tablet; Take 1 tablet by mouth See Admin Instructions for 5 days 500mg on day 1 followed by 250mg on days 2 - 5, Disp-6 tablet, R-0Normal  -     albuterol sulfate HFA (PROVENTIL HFA) 108 (90 Base) MCG/ACT inhaler; Inhale 2 puffs into the lungs every 6 hours as needed for Wheezing, Disp-18 g, R-3Normal  -     predniSONE (DELTASONE) 50 MG tablet; Take 1 tablet by mouth daily for 5 days, Disp-5 tablet, R-0Normal  -Patient presents to the office today for respiratory illness. Patient endorses some mild shortness of breath denies any fever or chills does endorse sick contacts which have gotten better did have negative at home COVID test.  Patient does have some mild shortness of breath and coughing with a lot of congestion. No fever reported at this time. Patient did have a large amount of wheezing throughout her lungs did have some rhonchorous sounds in the left upper lung. Patient was slightly tachycardic today we will treat patient as a COPD exacerbation with close monitoring. Patient was educated if symptoms worsen she needs to present to the ER if she has any more respiratory distress she needs to present to the ER. We will treat as a COPD exacerbation given patient normally wears oxygen presents to the office today not wearing oxygen and his oxygenation was at 94%. Clinically appears well with a lot of wheezing patient was instructed to continue to use nebulizer machine as prescribed. Patient was instructed to take medications as listed above and call the office for worsening respiratory symptoms or seek emergency help.   Patient is agreeable and understands plan at this

## 2023-10-30 NOTE — TELEPHONE ENCOUNTER
Refill Request         Last Seen: Last Seen Department: 7/13/2023  Last Seen by PCP: 7/13/2023    Last Written: 05/01/2023         Next Appointment:   Future Appointments   Date Time Provider [facility] [address]   11/30/2023  9:00 AM ZARI Rider - CNP [place] [facility]   2/20/2024  9:00 AM Aurora Cornelius MD AND TRISTAN CUEVAS           Requested Prescriptions     Pending Prescriptions Disp Refills    furosemide (LASIX) 40 MG tablet [Pharmacy Med Name: FUROSEMIDE TABS 40MG] 90 tablet 3     Sig: TAKE 1 TABLET DAILY

## 2023-10-30 NOTE — TELEPHONE ENCOUNTER
Patient called and stated that the pharmacy didn't give her her inhaler, I just called her pharmacy and the inhaler is being filled now  Pt informed

## 2023-10-30 NOTE — PROGRESS NOTES
Patient's HM shows they are overdue for [facility] and  files searched with  success.   Results attached to order and HM updated

## 2023-11-01 ENCOUNTER — TELEPHONE (OUTPATIENT)
Dept: FAMILY MEDICINE CLINIC | Age: 58
End: 2023-11-01

## 2023-11-01 NOTE — TELEPHONE ENCOUNTER
Patient called stating she is feeling better. She wanted me to tell you thank you for taking such good care of her.

## 2023-11-28 ENCOUNTER — TELEPHONE (OUTPATIENT)
Dept: PULMONOLOGY | Age: 58
End: 2023-11-28

## 2023-11-28 DIAGNOSIS — J44.9 CHRONIC OBSTRUCTIVE PULMONARY DISEASE, UNSPECIFIED COPD TYPE (HCC): Primary | ICD-10-CM

## 2023-11-30 ENCOUNTER — HOSPITAL ENCOUNTER (OUTPATIENT)
Dept: WOMENS IMAGING | Age: 58
Discharge: HOME | End: 2023-11-30
Payer: MEDICARE

## 2023-11-30 ENCOUNTER — OFFICE VISIT (OUTPATIENT)
Dept: FAMILY MEDICINE CLINIC | Age: 58
End: 2023-11-30
Payer: MEDICARE

## 2023-11-30 VITALS
SYSTOLIC BLOOD PRESSURE: 120 MMHG | WEIGHT: 96.8 LBS | BODY MASS INDEX: 18.9 KG/M2 | OXYGEN SATURATION: 93 % | DIASTOLIC BLOOD PRESSURE: 70 MMHG | HEART RATE: 99 BPM

## 2023-11-30 VITALS — BODY MASS INDEX: 19.04 KG/M2 | HEIGHT: 60 IN | WEIGHT: 97 LBS

## 2023-11-30 DIAGNOSIS — K74.60 CIRRHOSIS OF LIVER WITHOUT ASCITES, UNSPECIFIED HEPATIC CIRRHOSIS TYPE (HCC): ICD-10-CM

## 2023-11-30 DIAGNOSIS — D69.6 THROMBOCYTOPENIA, UNSPECIFIED (HCC): ICD-10-CM

## 2023-11-30 DIAGNOSIS — K21.9 GASTROESOPHAGEAL REFLUX DISEASE WITHOUT ESOPHAGITIS: ICD-10-CM

## 2023-11-30 DIAGNOSIS — Z12.31 BREAST CANCER SCREENING BY MAMMOGRAM: ICD-10-CM

## 2023-11-30 DIAGNOSIS — L98.9 SKIN ABNORMALITIES: ICD-10-CM

## 2023-11-30 DIAGNOSIS — Z12.4 PAP SMEAR FOR CERVICAL CANCER SCREENING: ICD-10-CM

## 2023-11-30 DIAGNOSIS — F33.1 MODERATE EPISODE OF RECURRENT MAJOR DEPRESSIVE DISORDER (HCC): ICD-10-CM

## 2023-11-30 DIAGNOSIS — I10 PRIMARY HYPERTENSION: Primary | ICD-10-CM

## 2023-11-30 DIAGNOSIS — J42 CHRONIC BRONCHITIS, UNSPECIFIED CHRONIC BRONCHITIS TYPE (HCC): ICD-10-CM

## 2023-11-30 PROBLEM — H26.9 CORTICAL CATARACT OF BOTH EYES: Chronic | Status: ACTIVE | Noted: 2019-04-01

## 2023-11-30 PROBLEM — R79.9 ABNORMAL FINDING OF BLOOD CHEMISTRY: Status: ACTIVE | Noted: 2018-12-07

## 2023-11-30 PROBLEM — H52.203 ASTIGMATISM OF BOTH EYES: Chronic | Status: ACTIVE | Noted: 2017-01-11

## 2023-11-30 PROBLEM — R19.4 BOWEL HABIT CHANGES: Status: ACTIVE | Noted: 2018-08-15

## 2023-11-30 PROBLEM — R68.81 EARLY SATIETY: Status: ACTIVE | Noted: 2018-12-07

## 2023-11-30 PROBLEM — R60.0 BILATERAL LOWER EXTREMITY EDEMA: Status: ACTIVE | Noted: 2017-05-04

## 2023-11-30 PROBLEM — H53.8 BLURRY VISION: Chronic | Status: ACTIVE | Noted: 2017-01-11

## 2023-11-30 PROBLEM — H53.149 ASTHENOPIA: Chronic | Status: ACTIVE | Noted: 2017-01-11

## 2023-11-30 PROBLEM — F17.209 NICOTINE-RELATED DISORDER: Status: ACTIVE | Noted: 2017-05-04

## 2023-11-30 PROBLEM — D64.9 ANEMIA: Status: ACTIVE | Noted: 2023-03-10

## 2023-11-30 LAB
ALBUMIN SERPL-MCNC: 4.6 G/DL (ref 3.4–5)
ALBUMIN/GLOB SERPL: 1.8 {RATIO} (ref 1.1–2.2)
ALP SERPL-CCNC: 97 U/L (ref 40–129)
ALT SERPL-CCNC: 41 U/L (ref 10–40)
ANION GAP SERPL CALCULATED.3IONS-SCNC: 12 MMOL/L (ref 3–16)
AST SERPL-CCNC: 38 U/L (ref 15–37)
BASOPHILS # BLD: 0.1 K/UL (ref 0–0.2)
BASOPHILS NFR BLD: 1.4 %
BILIRUB SERPL-MCNC: 0.6 MG/DL (ref 0–1)
BUN SERPL-MCNC: 10 MG/DL (ref 7–20)
CALCIUM SERPL-MCNC: 9.5 MG/DL (ref 8.3–10.6)
CHLORIDE SERPL-SCNC: 96 MMOL/L (ref 99–110)
CO2 SERPL-SCNC: 29 MMOL/L (ref 21–32)
CREAT SERPL-MCNC: 0.7 MG/DL (ref 0.6–1.1)
DEPRECATED RDW RBC AUTO: 17.3 % (ref 12.4–15.4)
EOSINOPHIL # BLD: 0 K/UL (ref 0–0.6)
EOSINOPHIL NFR BLD: 0.7 %
GFR SERPLBLD CREATININE-BSD FMLA CKD-EPI: >60 ML/MIN/{1.73_M2}
GLUCOSE SERPL-MCNC: 97 MG/DL (ref 70–99)
HCT VFR BLD AUTO: 40 % (ref 36–48)
HGB BLD-MCNC: 13.5 G/DL (ref 12–16)
LYMPHOCYTES # BLD: 1.2 K/UL (ref 1–5.1)
LYMPHOCYTES NFR BLD: 25.4 %
MCH RBC QN AUTO: 29.3 PG (ref 26–34)
MCHC RBC AUTO-ENTMCNC: 33.7 G/DL (ref 31–36)
MCV RBC AUTO: 87 FL (ref 80–100)
MONOCYTES # BLD: 0.4 K/UL (ref 0–1.3)
MONOCYTES NFR BLD: 8.4 %
NEUTROPHILS # BLD: 3.1 K/UL (ref 1.7–7.7)
NEUTROPHILS NFR BLD: 64.1 %
PLATELET # BLD AUTO: 144 K/UL (ref 135–450)
PMV BLD AUTO: 8 FL (ref 5–10.5)
POTASSIUM SERPL-SCNC: 3.9 MMOL/L (ref 3.5–5.1)
PROT SERPL-MCNC: 7.1 G/DL (ref 6.4–8.2)
RBC # BLD AUTO: 4.6 M/UL (ref 4–5.2)
SODIUM SERPL-SCNC: 137 MMOL/L (ref 136–145)
WBC # BLD AUTO: 4.8 K/UL (ref 4–11)

## 2023-11-30 PROCEDURE — 3078F DIAST BP <80 MM HG: CPT

## 2023-11-30 PROCEDURE — 99214 OFFICE O/P EST MOD 30 MIN: CPT

## 2023-11-30 PROCEDURE — G8420 CALC BMI NORM PARAMETERS: HCPCS

## 2023-11-30 PROCEDURE — G8484 FLU IMMUNIZE NO ADMIN: HCPCS

## 2023-11-30 PROCEDURE — 3074F SYST BP LT 130 MM HG: CPT

## 2023-11-30 PROCEDURE — 3017F COLORECTAL CA SCREEN DOC REV: CPT

## 2023-11-30 PROCEDURE — 3023F SPIROM DOC REV: CPT

## 2023-11-30 PROCEDURE — 36415 COLL VENOUS BLD VENIPUNCTURE: CPT

## 2023-11-30 PROCEDURE — 77063 BREAST TOMOSYNTHESIS BI: CPT

## 2023-11-30 PROCEDURE — 1036F TOBACCO NON-USER: CPT

## 2023-11-30 PROCEDURE — G8427 DOCREV CUR MEDS BY ELIG CLIN: HCPCS

## 2023-11-30 RX ORDER — OMEPRAZOLE 20 MG/1
20 CAPSULE, DELAYED RELEASE ORAL DAILY
Qty: 90 CAPSULE | Refills: 0 | Status: SHIPPED | OUTPATIENT
Start: 2023-11-30 | End: 2024-02-28

## 2023-11-30 RX ORDER — AZELASTINE 1 MG/ML
1 SPRAY, METERED NASAL 2 TIMES DAILY
Qty: 3 EACH | Refills: 0 | Status: SHIPPED | OUTPATIENT
Start: 2023-11-30 | End: 2024-02-28

## 2023-11-30 RX ORDER — SPIRONOLACTONE 25 MG/1
25 TABLET ORAL DAILY
Qty: 90 TABLET | Refills: 3 | Status: SHIPPED | OUTPATIENT
Start: 2023-11-30

## 2023-11-30 RX ORDER — FUROSEMIDE 40 MG/1
40 TABLET ORAL PRN
Qty: 30 TABLET | Refills: 0 | Status: SHIPPED | OUTPATIENT
Start: 2023-11-30 | End: 2023-12-30

## 2023-11-30 RX ORDER — TRAZODONE HYDROCHLORIDE 150 MG/1
150 TABLET ORAL NIGHTLY
Qty: 90 TABLET | Refills: 0 | Status: SHIPPED | OUTPATIENT
Start: 2023-11-30 | End: 2024-02-28

## 2023-11-30 RX ORDER — FLUTICASONE PROPIONATE 50 MCG
2 SPRAY, SUSPENSION (ML) NASAL 2 TIMES DAILY
Qty: 3 EACH | Refills: 0 | Status: SHIPPED | OUTPATIENT
Start: 2023-11-30 | End: 2024-02-28

## 2023-11-30 RX ORDER — FAMOTIDINE 20 MG/1
20 TABLET, FILM COATED ORAL NIGHTLY PRN
Qty: 30 TABLET | Refills: 0 | Status: SHIPPED | OUTPATIENT
Start: 2023-11-30 | End: 2023-11-30 | Stop reason: SDUPTHER

## 2023-11-30 RX ORDER — FUROSEMIDE 40 MG/1
40 TABLET ORAL EVERY MORNING
Qty: 30 TABLET | Refills: 0 | Status: CANCELLED | OUTPATIENT
Start: 2023-11-30 | End: 2023-12-30

## 2023-11-30 RX ORDER — FAMOTIDINE 20 MG/1
20 TABLET, FILM COATED ORAL NIGHTLY PRN
Qty: 30 TABLET | Refills: 0 | Status: SHIPPED | OUTPATIENT
Start: 2023-11-30 | End: 2023-12-30

## 2023-11-30 RX ORDER — BUDESONIDE, GLYCOPYRROLATE, AND FORMOTEROL FUMARATE 160; 9; 4.8 UG/1; UG/1; UG/1
2 AEROSOL, METERED RESPIRATORY (INHALATION) 2 TIMES DAILY
Qty: 3 EACH | Refills: 1 | Status: SHIPPED | OUTPATIENT
Start: 2023-11-30

## 2023-11-30 ASSESSMENT — ENCOUNTER SYMPTOMS
BLOOD IN STOOL: 0
SHORTNESS OF BREATH: 0
SORE THROAT: 0
COUGH: 0
WHEEZING: 0
ABDOMINAL PAIN: 0
CONSTIPATION: 0
COLOR CHANGE: 0
DIARRHEA: 0

## 2023-11-30 NOTE — PROGRESS NOTES
Clarence Hurtado (:  1965) is a 62 y.o. female,Established patient, here for evaluation of the following chief complaint(s):  Follow-up (6 month f/u )         ASSESSMENT/PLAN:  1. Primary hypertension  - Blood pressure in office today is well controlled not currently on any medication we will continue to monitor has had some bouts of hypertension however not requiring any medication at this time we will continue to monitor  BP Readings from Last 3 Encounters:   23 120/70   10/30/23 130/86   23 118/60        2. Moderate episode of recurrent major depressive disorder (HCC)  -     traZODone (DESYREL) 150 MG tablet; Take 1 tablet by mouth nightly, Disp-90 tablet, R-0Normal  - Patient was placed on trazodone for depression after she lost her  she has previously been on 100 mg as medications also helped her sleep however this is the 5-year anniversary of her  is having some depressive symptoms. Discussed alternative options with patient regarding medication. Plan at this time is to increase trazodone to 150. Patient was agreeable and understands plan at this time. Patient was educated on therapy and counseling patient was offered psychology referral.  Patient declined at this time. Patient was educated on worsening mood and when to seek emergency help. Patient is agreeable understands plan at this time. 2023     8:08 AM 2023     9:18 AM   PHQ Scores   PHQ2 Score 2 0   PHQ9 Score 2 0       3. Cirrhosis of liver without ascites, unspecified hepatic cirrhosis type (HCC)  -     Terri Mena MD, Gastroenterology, [facility]  -     Comprehensive Metabolic Panel; Future  -     furosemide (LASIX) 40 MG tablet; Take 1 tablet by mouth as needed (Weight gain more than 3 pounds) Please take 1 tablet daily for weight gain over 3 pounds in 1 day., Disp-30 tablet, R-0Normal  -     spironolactone (ALDACTONE) 25 MG tablet;  Take 1 tablet by mouth daily, Disp-90 tablet,

## 2023-11-30 NOTE — TELEPHONE ENCOUNTER
Refill Request     Last Seen: Last Seen Department: 11/30/2023  Last Seen by PCP: 11/30/2023    Last Written: furosemide - 10/30/2023, 30 tabets, 0 refills      Next Appointment:   Future Appointments   Date Time Provider [address]  [facility]   1/4/2024 10:00 AM ZARI Navarro - CNP [place] [facility]   2/20/2024  9:00 AM Beryl Villeda MD AND P[facility]M [facility]           Requested Prescriptions     Pending Prescriptions Disp Refills    furosemide (LASIX) 40 MG tablet 30 tablet 0     Sig: Take 1 tablet by mouth every morning

## 2023-12-11 DIAGNOSIS — J44.9 CHRONIC OBSTRUCTIVE PULMONARY DISEASE, UNSPECIFIED COPD TYPE (HCC): ICD-10-CM

## 2023-12-11 DIAGNOSIS — J44.1 CHRONIC OBSTRUCTIVE PULMONARY DISEASE WITH ACUTE EXACERBATION (HCC): ICD-10-CM

## 2023-12-11 NOTE — TELEPHONE ENCOUNTER
Patient requesting a 3 month supply [facility] (Script on 11/30/2023 was not sent to the pharmacy)    She is also requesting a script be sent to [facility] for Albuterol neb soln. Scripts are pended if appropriate.

## 2023-12-12 RX ORDER — BUDESONIDE, GLYCOPYRROLATE, AND FORMOTEROL FUMARATE 160; 9; 4.8 UG/1; UG/1; UG/1
2 AEROSOL, METERED RESPIRATORY (INHALATION) 2 TIMES DAILY
Qty: 3 EACH | Refills: 1 | Status: SHIPPED | OUTPATIENT
Start: 2023-12-12

## 2023-12-12 RX ORDER — ALBUTEROL SULFATE 2.5 MG/3ML
2.5 SOLUTION RESPIRATORY (INHALATION) 4 TIMES DAILY PRN
Qty: 120 EACH | Refills: 3 | Status: SHIPPED | OUTPATIENT
Start: 2023-12-12

## 2023-12-13 DIAGNOSIS — K74.60 CIRRHOSIS OF LIVER WITHOUT ASCITES, UNSPECIFIED HEPATIC CIRRHOSIS TYPE (HCC): ICD-10-CM

## 2023-12-13 RX ORDER — FUROSEMIDE 40 MG/1
TABLET ORAL
Qty: 30 TABLET | Refills: 11 | OUTPATIENT
Start: 2023-12-13

## 2023-12-13 NOTE — TELEPHONE ENCOUNTER
Refill Request         Last Seen: Last Seen Department: 11/30/2023  Last Seen by PCP: 11/30/2023    Last Written: 11/30/2023      Next Appointment:   Future Appointments   Date Time Provider Department Center   1/4/2024 10:00 AM Andrew Cerrato APRN - CNP [facility]   2/20/2024  9:00 AM Guillermo Cardona MD AND [facility]           Requested Prescriptions     Pending Prescriptions Disp Refills    furosemide (LASIX) 40 MG tablet [Pharmacy Med Name: FUROSEMIDE TABS 40MG] 30 tablet 11     Sig: TAKE 1 TABLET DAILY AS NEEDED FOR WEIGHT GAIN MORE THAN 3 POUNDS IN 1 DAY

## 2023-12-28 DIAGNOSIS — K21.9 GASTROESOPHAGEAL REFLUX DISEASE WITHOUT ESOPHAGITIS: ICD-10-CM

## 2023-12-28 RX ORDER — FAMOTIDINE 20 MG/1
20 TABLET, FILM COATED ORAL NIGHTLY PRN
Qty: 90 TABLET | Refills: 0 | Status: SHIPPED | OUTPATIENT
Start: 2023-12-28 | End: 2024-03-27

## 2023-12-28 NOTE — TELEPHONE ENCOUNTER
Refill Request         Last Seen: Last Seen Department: 11/30/2023  Last Seen by PCP: 11/30/2023    Last Written: 12/18/2023      Next Appointment:   Future Appointments   Date Time Provider [address] [place] [address]   1/4/2024 10:00 AM ZARI Carrillo CNP [place] [facility]angel [facility]   2/20/2024  9:00 AM Cherry Fay MD AND [facility]         Requested Prescriptions     Pending Prescriptions Disp Refills    famotidine (PEPCID) 20 MG tablet 90 tablet 0     Sig: Take 1 tablet by mouth nightly as needed (heartburn)

## 2024-01-04 ENCOUNTER — OFFICE VISIT (OUTPATIENT)
Dept: FAMILY MEDICINE CLINIC | Age: 59
End: 2024-01-04
Payer: MEDICARE

## 2024-01-04 VITALS
OXYGEN SATURATION: 94 % | WEIGHT: 101 LBS | SYSTOLIC BLOOD PRESSURE: 126 MMHG | BODY MASS INDEX: 19.83 KG/M2 | HEART RATE: 98 BPM | HEIGHT: 60 IN | DIASTOLIC BLOOD PRESSURE: 84 MMHG | RESPIRATION RATE: 16 BRPM

## 2024-01-04 DIAGNOSIS — E46 PROTEIN-CALORIE MALNUTRITION, UNSPECIFIED SEVERITY (HCC): ICD-10-CM

## 2024-01-04 DIAGNOSIS — F33.1 MODERATE EPISODE OF RECURRENT MAJOR DEPRESSIVE DISORDER (HCC): Primary | ICD-10-CM

## 2024-01-04 DIAGNOSIS — K21.9 GASTROESOPHAGEAL REFLUX DISEASE WITHOUT ESOPHAGITIS: ICD-10-CM

## 2024-01-04 DIAGNOSIS — K74.60 CIRRHOSIS OF LIVER WITHOUT ASCITES, UNSPECIFIED HEPATIC CIRRHOSIS TYPE (HCC): ICD-10-CM

## 2024-01-04 DIAGNOSIS — D69.6 THROMBOCYTOPENIA, UNSPECIFIED (HCC): ICD-10-CM

## 2024-01-04 DIAGNOSIS — J42 CHRONIC BRONCHITIS, UNSPECIFIED CHRONIC BRONCHITIS TYPE (HCC): ICD-10-CM

## 2024-01-04 PROCEDURE — 3017F COLORECTAL CA SCREEN DOC REV: CPT

## 2024-01-04 PROCEDURE — 1036F TOBACCO NON-USER: CPT

## 2024-01-04 PROCEDURE — 3023F SPIROM DOC REV: CPT

## 2024-01-04 PROCEDURE — 3074F SYST BP LT 130 MM HG: CPT

## 2024-01-04 PROCEDURE — G8420 CALC BMI NORM PARAMETERS: HCPCS

## 2024-01-04 PROCEDURE — 3079F DIAST BP 80-89 MM HG: CPT

## 2024-01-04 PROCEDURE — 99213 OFFICE O/P EST LOW 20 MIN: CPT

## 2024-01-04 PROCEDURE — G8484 FLU IMMUNIZE NO ADMIN: HCPCS

## 2024-01-04 PROCEDURE — G8427 DOCREV CUR MEDS BY ELIG CLIN: HCPCS

## 2024-01-04 RX ORDER — FUROSEMIDE 40 MG/1
40 TABLET ORAL PRN
Qty: 90 TABLET | Refills: 0 | Status: SHIPPED | OUTPATIENT
Start: 2024-01-04 | End: 2024-04-03

## 2024-01-04 RX ORDER — FAMOTIDINE 20 MG/1
20 TABLET, FILM COATED ORAL NIGHTLY PRN
Qty: 90 TABLET | Refills: 0 | Status: SHIPPED | OUTPATIENT
Start: 2024-01-04 | End: 2024-04-03

## 2024-01-04 ASSESSMENT — ENCOUNTER SYMPTOMS
SORE THROAT: 0
WHEEZING: 0
CONSTIPATION: 0
ABDOMINAL PAIN: 0
DIARRHEA: 0
COUGH: 0
SHORTNESS OF BREATH: 0
BLOOD IN STOOL: 0
COLOR CHANGE: 0

## 2024-01-04 ASSESSMENT — PATIENT HEALTH QUESTIONNAIRE - PHQ9
SUM OF ALL RESPONSES TO PHQ QUESTIONS 1-9: 10
2. FEELING DOWN, DEPRESSED OR HOPELESS: 3
SUM OF ALL RESPONSES TO PHQ QUESTIONS 1-9: 10
4. FEELING TIRED OR HAVING LITTLE ENERGY: 1
10. IF YOU CHECKED OFF ANY PROBLEMS, HOW DIFFICULT HAVE THESE PROBLEMS MADE IT FOR YOU TO DO YOUR WORK, TAKE CARE OF THINGS AT HOME, OR GET ALONG WITH OTHER PEOPLE: 1
SUM OF ALL RESPONSES TO PHQ9 QUESTIONS 1 & 2: 3
3. TROUBLE FALLING OR STAYING ASLEEP: 1
7. TROUBLE CONCENTRATING ON THINGS, SUCH AS READING THE NEWSPAPER OR WATCHING TELEVISION: 1
6. FEELING BAD ABOUT YOURSELF - OR THAT YOU ARE A FAILURE OR HAVE LET YOURSELF OR YOUR FAMILY DOWN: 1
8. MOVING OR SPEAKING SO SLOWLY THAT OTHER PEOPLE COULD HAVE NOTICED. OR THE OPPOSITE, BEING SO FIGETY OR RESTLESS THAT YOU HAVE BEEN MOVING AROUND A LOT MORE THAN USUAL: 1
9. THOUGHTS THAT YOU WOULD BE BETTER OFF DEAD, OR OF HURTING YOURSELF: 1
5. POOR APPETITE OR OVEREATING: 1
1. LITTLE INTEREST OR PLEASURE IN DOING THINGS: 0
SUM OF ALL RESPONSES TO PHQ QUESTIONS 1-9: 9
SUM OF ALL RESPONSES TO PHQ QUESTIONS 1-9: 10

## 2024-01-04 ASSESSMENT — COLUMBIA-SUICIDE SEVERITY RATING SCALE - C-SSRS
2. HAVE YOU ACTUALLY HAD ANY THOUGHTS OF KILLING YOURSELF?: NO
1. WITHIN THE PAST MONTH, HAVE YOU WISHED YOU WERE DEAD OR WISHED YOU COULD GO TO SLEEP AND NOT WAKE UP?: NO
6. HAVE YOU EVER DONE ANYTHING, STARTED TO DO ANYTHING, OR PREPARED TO DO ANYTHING TO END YOUR LIFE?: NO

## 2024-01-04 NOTE — PROGRESS NOTES
Jina Amor (:  1965) is a 59 y.o. female,Established patient, here for evaluation of the following chief complaint(s):  Depression (Pt is here for a 5 week follow up )         ASSESSMENT/PLAN:  1. Moderate episode of recurrent major depressive disorder (HCC)  - Patient is on trazodone mood seems to be doing well.  Holidays are always hard for patient endorses some episodes of crying.  This is when she lost her  makes it difficult for her at times.  Did recommend patient establish with psychology.  Patient was given appointment since patient feels that medication other than trazodone are not going to help her at this time.  Patient denies any thoughts of harming her self or others.  Overall doing fairly well coping and managing symptoms.      2024    10:03 AM 2023     8:08 AM 2023     9:18 AM   PHQ Scores   PHQ2 Score 3 2 0   PHQ9 Score 10 2 0       2. Gastroesophageal reflux disease without esophagitis  -     famotidine (PEPCID) 20 MG tablet; Take 1 tablet by mouth nightly as needed (heartburn), Disp-90 tablet, R-0Normal  Heartburn stable continue Pepcid  3. Protein-calorie malnutrition, unspecified severity (HCC)  - Weight currently stable, continues to eat healthy high-protein diet  4. Cirrhosis of liver without ascites, unspecified hepatic cirrhosis type (HCC)  -     furosemide (LASIX) 40 MG tablet; Take 1 tablet by mouth as needed (Weight gain more than 3 pounds) Please take 1 tablet daily for weight gain over 3 pounds in 1 day., Disp-90 tablet, R-0Normal  - Stable we will continue at this time recommended follow-up with GI specialist  5. Chronic bronchitis, unspecified chronic bronchitis type (HCC)  - Doing better off of oxygen continues to use inhaler regimen.  6. Thrombocytopenia, unspecified (HCC)  -Currently stable continue to monitor.    Return in about 3 months (around 2024) for Mood .         Subjective   SUBJECTIVE/OBJECTIVE:  HPI: Patient presents to the office

## 2024-01-10 DIAGNOSIS — K21.9 GASTROESOPHAGEAL REFLUX DISEASE WITHOUT ESOPHAGITIS: ICD-10-CM

## 2024-01-10 RX ORDER — FAMOTIDINE 20 MG/1
TABLET, FILM COATED ORAL
Qty: 30 TABLET | Refills: 11 | Status: SHIPPED | OUTPATIENT
Start: 2024-01-10 | End: 2024-01-11 | Stop reason: SDUPTHER

## 2024-01-10 NOTE — TELEPHONE ENCOUNTER
Refill Request     Last Seen: Last Seen Department: 1/4/2024  Last Seen by PCP: Visit date not found    Last Written: 01/04/2024        Next Appointment:   Future Appointments   Date Time Provider Department Center   2/15/2024 10:00 AM Tonio Menjivar [facility]   2/16/2024  1:00 PM Gaby Ramos DO EAST OB/GYN [facility]   2/20/2024  9:00 AM Guillermo Cardona MD AND PULM [facility]   4/8/2024  9:30 AM Andrew Cerrato, APRN - CNP [place] [facility]           Requested Prescriptions     Pending Prescriptions Disp Refills    famotidine (PEPCID) 20 MG tablet [Pharmacy Med Name: FAMOTIDINE TABS 20MG] 30 tablet 11     Sig: TAKE 1 TABLET NIGHTLY AS NEEDED FOR HEARTBURN

## 2024-01-11 DIAGNOSIS — K21.9 GASTROESOPHAGEAL REFLUX DISEASE WITHOUT ESOPHAGITIS: ICD-10-CM

## 2024-01-11 NOTE — TELEPHONE ENCOUNTER
[facility] requesting 90 day prescription of famotidine   Medication pending with 90 tablets

## 2024-01-12 RX ORDER — FAMOTIDINE 20 MG/1
TABLET, FILM COATED ORAL
Qty: 90 TABLET | Refills: 1 | Status: SHIPPED | OUTPATIENT
Start: 2024-01-12

## 2024-01-15 ENCOUNTER — TELEPHONE (OUTPATIENT)
Dept: FAMILY MEDICINE CLINIC | Age: 59
End: 2024-01-15

## 2024-01-15 RX ORDER — PREDNISONE 50 MG/1
50 TABLET ORAL DAILY
Qty: 5 TABLET | Refills: 0 | Status: SHIPPED | OUTPATIENT
Start: 2024-01-15 | End: 2024-01-15

## 2024-01-15 RX ORDER — AZITHROMYCIN 250 MG/1
250 TABLET, FILM COATED ORAL SEE ADMIN INSTRUCTIONS
Qty: 6 TABLET | Refills: 0 | Status: SHIPPED | OUTPATIENT
Start: 2024-01-15 | End: 2024-01-20

## 2024-01-15 RX ORDER — AZITHROMYCIN 250 MG/1
250 TABLET, FILM COATED ORAL SEE ADMIN INSTRUCTIONS
Qty: 6 TABLET | Refills: 0 | Status: SHIPPED | OUTPATIENT
Start: 2024-01-15 | End: 2024-01-15

## 2024-01-15 RX ORDER — PREDNISONE 50 MG/1
50 TABLET ORAL DAILY
Qty: 5 TABLET | Refills: 0 | Status: SHIPPED | OUTPATIENT
Start: 2024-01-15 | End: 2024-01-20

## 2024-01-15 NOTE — TELEPHONE ENCOUNTER
Patient stated that she was exposed to a respiratory illness from her grandson and is now having low energy, congestion, cough, and runny nose. She has not had a fever and tested negative for Covid. She is requesting Prednisone and a Zpack called in for her.  She wanted to ask as she stated that she does not have the energy to leave the house.   [facility] Viviana Hauser

## 2024-01-26 ENCOUNTER — TELEMEDICINE (OUTPATIENT)
Dept: FAMILY MEDICINE CLINIC | Age: 59
End: 2024-01-26
Payer: MEDICARE

## 2024-01-26 DIAGNOSIS — Z00.00 MEDICARE ANNUAL WELLNESS VISIT, SUBSEQUENT: Primary | ICD-10-CM

## 2024-01-26 PROCEDURE — 3017F COLORECTAL CA SCREEN DOC REV: CPT

## 2024-01-26 PROCEDURE — G0439 PPPS, SUBSEQ VISIT: HCPCS

## 2024-01-26 PROCEDURE — G8484 FLU IMMUNIZE NO ADMIN: HCPCS

## 2024-01-26 ASSESSMENT — PATIENT HEALTH QUESTIONNAIRE - PHQ9
9. THOUGHTS THAT YOU WOULD BE BETTER OFF DEAD, OR OF HURTING YOURSELF: 0
SUM OF ALL RESPONSES TO PHQ QUESTIONS 1-9: 2
10. IF YOU CHECKED OFF ANY PROBLEMS, HOW DIFFICULT HAVE THESE PROBLEMS MADE IT FOR YOU TO DO YOUR WORK, TAKE CARE OF THINGS AT HOME, OR GET ALONG WITH OTHER PEOPLE: 0
7. TROUBLE CONCENTRATING ON THINGS, SUCH AS READING THE NEWSPAPER OR WATCHING TELEVISION: 0
SUM OF ALL RESPONSES TO PHQ QUESTIONS 1-9: 2
SUM OF ALL RESPONSES TO PHQ9 QUESTIONS 1 & 2: 2
5. POOR APPETITE OR OVEREATING: 0
SUM OF ALL RESPONSES TO PHQ QUESTIONS 1-9: 2
SUM OF ALL RESPONSES TO PHQ QUESTIONS 1-9: 2
3. TROUBLE FALLING OR STAYING ASLEEP: 0
1. LITTLE INTEREST OR PLEASURE IN DOING THINGS: 1
4. FEELING TIRED OR HAVING LITTLE ENERGY: 0
8. MOVING OR SPEAKING SO SLOWLY THAT OTHER PEOPLE COULD HAVE NOTICED. OR THE OPPOSITE, BEING SO FIGETY OR RESTLESS THAT YOU HAVE BEEN MOVING AROUND A LOT MORE THAN USUAL: 0
6. FEELING BAD ABOUT YOURSELF - OR THAT YOU ARE A FAILURE OR HAVE LET YOURSELF OR YOUR FAMILY DOWN: 0
2. FEELING DOWN, DEPRESSED OR HOPELESS: 1

## 2024-01-26 NOTE — PATIENT INSTRUCTIONS
find time to do the things you want and need to do.  Get enough sleep. Your body recovers from the stresses of the day while you are sleeping.  Get support. Your family, friends, and community can make a difference in how you experience stress.  Limit your news feed. Avoid or limit time on social media or news that may make you feel stressed.  Do something active. Exercise or activity can help reduce stress. Walking is a great way to get started.  Where can you learn more?  Go to https://www.[employer].net/patientEd and enter N032 to learn more about \"Learning About Stress.\"  Current as of: February 26, 2023               Content Version: 13.9  © 2006-2023 [employer].   Care instructions adapted under license by [employer]. If you have questions about a medical condition or this instruction, always ask your healthcare professional. [employer] disclaims any warranty or liability for your use of this information.           Hearing Loss: Care Instructions  Overview     Hearing loss is a sudden or slow decrease in how well you hear. It can range from slight to profound. Permanent hearing loss can occur with aging. It also can happen when you are exposed long-term to loud noise. Examples include listening to loud music, riding motorcycles, or being around other loud machines.  Hearing loss can affect your work and home life. It can make you feel lonely or depressed. You may feel that you have lost your independence. But hearing aids and other devices can help you hear better and feel connected to others.  Follow-up care is a key part of your treatment and safety. Be sure to make and go to all appointments, and call your doctor if you are having problems. It's also a good idea to know your test results and keep a list of the medicines you take.  How can you care for yourself at home?  Avoid loud noises whenever possible. This helps keep your hearing from getting worse.  Always wear hearing

## 2024-01-26 NOTE — PROGRESS NOTES
conducted with patient's (and/or legal guardian's) consent. Patient identification was verified, and a caregiver was present when appropriate.   The patient was located at Home: [address] [place] [address]  Provider was located at Facility (Appt Dept): [address] [place] [address]

## 2024-02-07 DIAGNOSIS — K21.9 GASTROESOPHAGEAL REFLUX DISEASE WITHOUT ESOPHAGITIS: ICD-10-CM

## 2024-02-07 RX ORDER — OMEPRAZOLE 20 MG/1
20 CAPSULE, DELAYED RELEASE ORAL DAILY
Qty: 90 CAPSULE | Refills: 0 | Status: SHIPPED | OUTPATIENT
Start: 2024-02-07 | End: 2024-05-07

## 2024-02-07 NOTE — TELEPHONE ENCOUNTER
Refill Request       Last Seen: Last Seen Department: 1/26/2024  Last Seen by PCP: 1/26/2024    Last Written: 11/30/2023        Next Appointment:   Future Appointments   Date Time Provider Department Center   2/15/2024 10:00 AM Tonio Menjivar [facility] PS[facility]CH[facility]   2/16/2024  1:00 PM Gaby Ramos DO EAST OB/GYN [facility]   2/20/2024  9:00 AM Guillermo Cardona MD AND PULM [facility]   4/8/2024  9:30 AM Andrew Cerrato, APRN - CNP [place] waqar [facility]           Requested Prescriptions     Pending Prescriptions Disp Refills    omeprazole (PRILOSEC) 20 MG delayed release capsule 90 capsule 0     Sig: Take 1 capsule by mouth daily

## 2024-02-12 DIAGNOSIS — F33.1 MODERATE EPISODE OF RECURRENT MAJOR DEPRESSIVE DISORDER (HCC): ICD-10-CM

## 2024-02-12 RX ORDER — TRAZODONE HYDROCHLORIDE 150 MG/1
150 TABLET ORAL NIGHTLY
Qty: 90 TABLET | Refills: 3 | Status: SHIPPED | OUTPATIENT
Start: 2024-02-12

## 2024-02-12 NOTE — TELEPHONE ENCOUNTER
Refill Request       Last Seen: Last Seen Department: 1/26/2024  Last Seen by PCP: 1/26/2024    Last Written: 11/30/2023        Next Appointment:   Future Appointments   Date Time Provider Department Center   2/15/2024 10:00 AM Tonio Menjivar [facility] PSYCHG [facility]   2/16/2024  1:00 PM Gaby Ramos DO EAST OB/GYN [facility]   2/20/2024  9:00 AM Guillermo Carodna MD AND PULM [facility]   4/8/2024  9:30 AM Andrew Cerrato, APRN - CNP [place] [facility]           Requested Prescriptions     Pending Prescriptions Disp Refills    traZODone (DESYREL) 150 MG tablet [Pharmacy Med Name: TRAZODONE HCL TABS 150MG] 90 tablet 3     Sig: TAKE 1 TABLET NIGHTLY

## 2024-02-15 ENCOUNTER — OFFICE VISIT (OUTPATIENT)
Dept: PSYCHOLOGY | Age: 59
End: 2024-02-15
Payer: MEDICARE

## 2024-02-15 DIAGNOSIS — F32.1 CURRENT MODERATE EPISODE OF MAJOR DEPRESSIVE DISORDER, UNSPECIFIED WHETHER RECURRENT (HCC): Primary | ICD-10-CM

## 2024-02-15 DIAGNOSIS — F43.22 ADJUSTMENT DISORDER WITH ANXIOUS MOOD: ICD-10-CM

## 2024-02-15 PROCEDURE — 1036F TOBACCO NON-USER: CPT | Performed by: PSYCHOLOGIST

## 2024-02-15 PROCEDURE — 90791 PSYCH DIAGNOSTIC EVALUATION: CPT | Performed by: PSYCHOLOGIST

## 2024-02-15 NOTE — PROGRESS NOTES
Behavioral Health Consultation  Tonio Menjivar M.A.  Psychology Assistant  Shea Anderson, Ph.D.  Supervising Psychologist   2/15/2024  10:10 AM EST      Time spent with Patient: 40 minutes  This is patient's first [facility] appointment.    Reason for Consult:    Chief Complaint   Patient presents with    Depression    Anxiety       Pt provided informed consent for the behavioral health program. Discussed with patient model of service to include the limits of confidentiality (i.e. abuse reporting, suicide intervention, etc.) and short-term intervention focused approach. Reviewed provision of services under supervision of licensed psychologist and obtained written consent.  Pt indicated understanding.  Feedback given to PCP.    S:  Patient presents with concerns regarding depression. Sx have been present for about 5 years since her  passed and have been worse over the last few months. Sx are aggravated by recent stress, specifically watching conflict in her daughter's marriage. States that she is constantly worrying about what is going to happen and tends to get stuck on the worst case scenario. Says that as a mom and grandmother, she wants the best for her daughter and grandchildren and is scared about how a potential divorce or separation may impact them both. Patient used to find distractions as helpful for coping with symptoms but states it is harder to find things to distract her in [place] compared to when she lived in [place]. Goals for treatment include improving her mood. Specifically want to find ways to cope with stress and reduce her worrying.     Patient lives alone.  passed away 5 years ago. Originally from [place] and moved her to be closer to daughter and her family that lives in [place]. Lives very close to her daughter while her other 3 children live further away. Patient does not work. Daily routine is some what consistent and primarily involves looking after her grandchildren.

## 2024-02-16 ENCOUNTER — OFFICE VISIT (OUTPATIENT)
Dept: OBGYN CLINIC | Age: 59
End: 2024-02-16

## 2024-02-16 VITALS
SYSTOLIC BLOOD PRESSURE: 106 MMHG | DIASTOLIC BLOOD PRESSURE: 70 MMHG | HEIGHT: 60 IN | WEIGHT: 99.4 LBS | BODY MASS INDEX: 19.52 KG/M2 | HEART RATE: 104 BPM

## 2024-02-16 DIAGNOSIS — K43.9 VENTRAL HERNIA WITHOUT OBSTRUCTION OR GANGRENE: ICD-10-CM

## 2024-02-16 DIAGNOSIS — Z01.419 ENCNTR FOR GYN EXAM (GENERAL) (ROUTINE) W/O ABN FINDINGS: Primary | ICD-10-CM

## 2024-02-16 DIAGNOSIS — Z12.4 PAP SMEAR FOR CERVICAL CANCER SCREENING: ICD-10-CM

## 2024-02-16 NOTE — PROGRESS NOTES
Para Term  AB Living   4 4 4         SAB IAB Ectopic Molar Multiple Live Births             4      # Outcome Date GA Lbr Nestor/2nd Weight Sex Delivery Anes PTL Lv   4 Term            3 Term            2 Term            1 Term                Gynecologic History  Menstrual History:  LMP: In her 30s with endometrial ablation   Age of Menarche: 12  Postmenopausal bleeding: Denies     Sexual History:  Contraception: see above  Currently is not  sexually active  5 Lifetime partners  Denies history of STIs  Denies sexual problems    Pap History:  History of abnormal pap smears: see above  Last pap: see above      Medical History:  Past Medical History:   Diagnosis Date    Alopecia     Chronic hip pain     Chronic obstructive pulmonary disease with acute exacerbation (HCC) 2023    severe stage 4    Chronic respiratory failure (HCC)     Depression     GERD (gastroesophageal reflux disease)     Perioperative dehiscence of abdominal wound with evisceration 2023    Pneumonia due to Enterobacter species 2023    Pressure ulcer of right buttock, stage 2 (HCC) 2023    healed 2023    Sciatica     right, chronic    Small bowel obstruction (HCC) 2023    Tobacco abuse 2023    Umbilical hernia 2023    Wears glasses        Medications:  Current Outpatient Medications   Medication Sig Dispense Refill    azelastine (ASTELIN) 0.1 % nasal spray USE 1 SPRAY NASALLY TWICE A DAY 90 mL 3    Budeson-Glycopyrrol-Formoterol (BREZTRI AEROSPHERE) 160-9-4.8 MCG/ACT AERO Inhale 2 puffs into the lungs 2 times daily Lot [id number] 2026 3 each 0    traZODone (DESYREL) 150 MG tablet TAKE 1 TABLET NIGHTLY 90 tablet 3    omeprazole (PRILOSEC) 20 MG delayed release capsule Take 1 capsule by mouth daily 90 capsule 0    famotidine (PEPCID) 20 MG tablet TAKE 1 TABLET NIGHTLY AS NEEDED FOR HEARTBURN 90 tablet 1    furosemide (LASIX) 40 MG tablet Take 1 tablet by mouth as needed (Weight gain

## 2024-02-20 ENCOUNTER — OFFICE VISIT (OUTPATIENT)
Dept: PULMONOLOGY | Age: 59
End: 2024-02-20
Payer: MEDICARE

## 2024-02-20 VITALS
HEIGHT: 60 IN | BODY MASS INDEX: 19.73 KG/M2 | WEIGHT: 100.5 LBS | HEART RATE: 107 BPM | RESPIRATION RATE: 16 BRPM | TEMPERATURE: 98 F | DIASTOLIC BLOOD PRESSURE: 82 MMHG | OXYGEN SATURATION: 98 % | SYSTOLIC BLOOD PRESSURE: 132 MMHG

## 2024-02-20 DIAGNOSIS — J44.9 COPD, SEVERE (HCC): Primary | ICD-10-CM

## 2024-02-20 DIAGNOSIS — Z23 ENCOUNTER FOR IMMUNIZATION: ICD-10-CM

## 2024-02-20 DIAGNOSIS — Z87.891 FORMER SMOKER: ICD-10-CM

## 2024-02-20 LAB
HPV HR 12 DNA SPEC QL NAA+PROBE: NOT DETECTED
HPV16 DNA SPEC QL NAA+PROBE: NOT DETECTED
HPV16+18+H RISK 12 DNA SPEC-IMP: NORMAL
HPV18 DNA SPEC QL NAA+PROBE: NOT DETECTED

## 2024-02-20 PROCEDURE — G8484 FLU IMMUNIZE NO ADMIN: HCPCS | Performed by: STUDENT IN AN ORGANIZED HEALTH CARE EDUCATION/TRAINING PROGRAM

## 2024-02-20 PROCEDURE — G8420 CALC BMI NORM PARAMETERS: HCPCS | Performed by: STUDENT IN AN ORGANIZED HEALTH CARE EDUCATION/TRAINING PROGRAM

## 2024-02-20 PROCEDURE — 3075F SYST BP GE 130 - 139MM HG: CPT | Performed by: STUDENT IN AN ORGANIZED HEALTH CARE EDUCATION/TRAINING PROGRAM

## 2024-02-20 PROCEDURE — 3079F DIAST BP 80-89 MM HG: CPT | Performed by: STUDENT IN AN ORGANIZED HEALTH CARE EDUCATION/TRAINING PROGRAM

## 2024-02-20 PROCEDURE — 99214 OFFICE O/P EST MOD 30 MIN: CPT | Performed by: STUDENT IN AN ORGANIZED HEALTH CARE EDUCATION/TRAINING PROGRAM

## 2024-02-20 PROCEDURE — 90677 PCV20 VACCINE IM: CPT | Performed by: STUDENT IN AN ORGANIZED HEALTH CARE EDUCATION/TRAINING PROGRAM

## 2024-02-20 PROCEDURE — G8427 DOCREV CUR MEDS BY ELIG CLIN: HCPCS | Performed by: STUDENT IN AN ORGANIZED HEALTH CARE EDUCATION/TRAINING PROGRAM

## 2024-02-20 PROCEDURE — G0009 ADMIN PNEUMOCOCCAL VACCINE: HCPCS | Performed by: STUDENT IN AN ORGANIZED HEALTH CARE EDUCATION/TRAINING PROGRAM

## 2024-02-20 PROCEDURE — 3023F SPIROM DOC REV: CPT | Performed by: STUDENT IN AN ORGANIZED HEALTH CARE EDUCATION/TRAINING PROGRAM

## 2024-02-20 PROCEDURE — 3017F COLORECTAL CA SCREEN DOC REV: CPT | Performed by: STUDENT IN AN ORGANIZED HEALTH CARE EDUCATION/TRAINING PROGRAM

## 2024-02-20 PROCEDURE — 1036F TOBACCO NON-USER: CPT | Performed by: STUDENT IN AN ORGANIZED HEALTH CARE EDUCATION/TRAINING PROGRAM

## 2024-02-20 RX ORDER — BUDESONIDE, GLYCOPYRROLATE, AND FORMOTEROL FUMARATE 160; 9; 4.8 UG/1; UG/1; UG/1
2 AEROSOL, METERED RESPIRATORY (INHALATION) 2 TIMES DAILY
Qty: 3 EACH | Refills: 0 | Status: SHIPPED | COMMUNITY
Start: 2024-02-20

## 2024-02-20 ASSESSMENT — ENCOUNTER SYMPTOMS
EYE PAIN: 0
EYE REDNESS: 0
CONSTIPATION: 0
SHORTNESS OF BREATH: 0
BACK PAIN: 0
COLOR CHANGE: 0
ABDOMINAL DISTENTION: 0
NAUSEA: 0
EYE ITCHING: 0
ABDOMINAL PAIN: 0
VOMITING: 0
STRIDOR: 0
COUGH: 0
DIARRHEA: 0
SORE THROAT: 1
WHEEZING: 0
EYE DISCHARGE: 0
TROUBLE SWALLOWING: 0

## 2024-02-20 NOTE — PROGRESS NOTES
[facility] Pulmonary Follow-up  [address] [place] [address]  [phone]        Jina Amor (: 1965 ) is a 59 y.o. female here for an evaluation of   Chief Complaint   Patient presents with    COPD    Follow-up     6 mo  PT done 24         SUBJECTIVE/OBJECTIVE:    Patient is a 59-year-old female with significant past medical history of tobacco dependence and chronic hypoxic respiratory failure that presents to [facility] pulmonary clinic for follow-up visit.  Patient had a recent URI and also laryngitis.  She has been off oxygen therapy since her 6-minute walk test, and tolerating well.  She is also on Breztri with albuterol as needed for shortness of breath.  She says inhaler is helping her.     Patient quit smoking 16 years ago.  She was smoking around 2 packs/day for about 30 years.  She uses marijuana oil, she does not smoke marijuana.  She denies any other illicit drug use, no alcohol use.      Review of Systems   Constitutional:  Negative for activity change, appetite change, chills, diaphoresis and fatigue.   HENT:  Positive for sore throat. Negative for congestion and trouble swallowing.    Eyes:  Negative for pain, discharge, redness and itching.   Respiratory:  Negative for cough, shortness of breath, wheezing and stridor.    Cardiovascular:  Negative for chest pain, palpitations and leg swelling.   Gastrointestinal:  Negative for abdominal distention, abdominal pain, constipation, diarrhea, nausea and vomiting.   Endocrine: Negative for polydipsia, polyphagia and polyuria.   Genitourinary:  Negative for difficulty urinating.   Musculoskeletal:  Negative for back pain, myalgias and neck pain.   Skin:  Negative for color change.   Neurological:  Negative for dizziness, weakness and light-headedness.   Psychiatric/Behavioral:  Negative for agitation and behavioral problems.          Vitals:    24 0859   BP: 132/82   Pulse: (!) 107   Resp: 16   Temp: 98 °F (36.7 °C)

## 2024-02-20 NOTE — PROGRESS NOTES
MA Communication:  The following orders are received by verbal communication from   Guillermo Cardona MD    Orders include:  prev 20

## 2024-02-20 NOTE — PATIENT INSTRUCTIONS
Remember to bring a list of pulmonary medications and any CPAP or BiPAP machines to your next appointment with the office.     Please keep all of your future appointments scheduled by [facility] Pulmonary office. Out of respect for other patients and providers, you may be asked to reschedule your appointment if you arrive later than your scheduled appointment time. Appointments cancelled less than 24hrs in advance will be considered a no show. Patients with three missed appointments within 1 year or four missed appointments within 2 years can be dismissed from the practice.     Please be aware that our physicians are required to work in the Intensive Care Unit at [facility].  Your appointment may need to be rescheduled if they are designated to work during your appointment time.      You may receive a survey regarding the care you received during your visit.  Your input is valuable to us.  We encourage you to complete and return your survey.  We hope you will choose us in the future for your healthcare needs.     Pt instructed of all future appointment dates & times, including radiology, labs, procedures & referrals. If procedures were scheduled preparation instructions provided. Instructions on future appointments with [facility] Pulmonary were given.

## 2024-02-22 RX ORDER — AZELASTINE 1 MG/ML
SPRAY, METERED NASAL
Qty: 90 ML | Refills: 3 | Status: SHIPPED | OUTPATIENT
Start: 2024-02-22

## 2024-02-22 NOTE — TELEPHONE ENCOUNTER
Refill Request     Last Seen: Last Seen Department: 1/26/2024  Last Seen by PCP: 1/26/2024    Last Written: astelin nasal spray - 11/30/2023, 3 each, 0 refills      Next Appointment:   Future Appointments   Date Time Provider Department Center   2/29/2024 10:00 AM Tonio Menjivar [facility] PSYCH[facility]   4/8/2024  9:30 AM [facility]   8/20/2024  9:00 AM Guillermo Cardona MD AND PULMIRNA [facility]           Requested Prescriptions     Pending Prescriptions Disp Refills    azelastine (ASTELIN) 0.1 % nasal spray [Pharmacy Med Name: AZELASTINE NASAL SPRAY 30ML 0.1% 137MCG] 90 mL 3     Sig: USE 1 SPRAY NASALLY TWICE A DAY

## 2024-03-11 ENCOUNTER — OFFICE VISIT (OUTPATIENT)
Dept: FAMILY MEDICINE CLINIC | Age: 59
End: 2024-03-11
Payer: MEDICARE

## 2024-03-11 VITALS
RESPIRATION RATE: 16 BRPM | WEIGHT: 100.4 LBS | HEART RATE: 98 BPM | DIASTOLIC BLOOD PRESSURE: 86 MMHG | BODY MASS INDEX: 19.71 KG/M2 | OXYGEN SATURATION: 93 % | SYSTOLIC BLOOD PRESSURE: 136 MMHG | HEIGHT: 60 IN

## 2024-03-11 DIAGNOSIS — K42.9 UMBILICAL HERNIA WITHOUT OBSTRUCTION AND WITHOUT GANGRENE: ICD-10-CM

## 2024-03-11 DIAGNOSIS — M54.50 CHRONIC BILATERAL LOW BACK PAIN WITHOUT SCIATICA: Primary | ICD-10-CM

## 2024-03-11 DIAGNOSIS — I70.8 ATHEROSCLEROSIS OF BOTH ILIAC ARTERIES: ICD-10-CM

## 2024-03-11 DIAGNOSIS — G89.29 CHRONIC BILATERAL LOW BACK PAIN WITHOUT SCIATICA: Primary | ICD-10-CM

## 2024-03-11 DIAGNOSIS — K74.60 CIRRHOSIS OF LIVER WITHOUT ASCITES, UNSPECIFIED HEPATIC CIRRHOSIS TYPE (HCC): ICD-10-CM

## 2024-03-11 DIAGNOSIS — M43.10 DEGENERATIVE SPONDYLOLISTHESIS: ICD-10-CM

## 2024-03-11 DIAGNOSIS — J96.11 CHRONIC RESPIRATORY FAILURE WITH HYPOXIA (HCC): ICD-10-CM

## 2024-03-11 PROBLEM — J44.9 CHRONIC OBSTRUCTIVE PULMONARY DISEASE (HCC): Status: RESOLVED | Noted: 2023-03-22 | Resolved: 2024-03-11

## 2024-03-11 PROBLEM — D69.6 THROMBOCYTOPENIA, UNSPECIFIED (HCC): Status: RESOLVED | Noted: 2023-10-30 | Resolved: 2024-03-11

## 2024-03-11 PROBLEM — E44.0 MODERATE MALNUTRITION (HCC): Status: RESOLVED | Noted: 2023-02-27 | Resolved: 2024-03-11

## 2024-03-11 PROBLEM — J96.21 ACUTE ON CHRONIC RESPIRATORY FAILURE WITH HYPOXIA (HCC): Status: RESOLVED | Noted: 2023-03-03 | Resolved: 2024-03-11

## 2024-03-11 PROCEDURE — 3079F DIAST BP 80-89 MM HG: CPT

## 2024-03-11 PROCEDURE — G8420 CALC BMI NORM PARAMETERS: HCPCS

## 2024-03-11 PROCEDURE — G8484 FLU IMMUNIZE NO ADMIN: HCPCS

## 2024-03-11 PROCEDURE — 3017F COLORECTAL CA SCREEN DOC REV: CPT

## 2024-03-11 PROCEDURE — G8427 DOCREV CUR MEDS BY ELIG CLIN: HCPCS

## 2024-03-11 PROCEDURE — 3075F SYST BP GE 130 - 139MM HG: CPT

## 2024-03-11 PROCEDURE — 1036F TOBACCO NON-USER: CPT

## 2024-03-11 PROCEDURE — 99213 OFFICE O/P EST LOW 20 MIN: CPT

## 2024-03-11 ASSESSMENT — ENCOUNTER SYMPTOMS
WHEEZING: 0
ABDOMINAL PAIN: 0
COLOR CHANGE: 0
COUGH: 0
CONSTIPATION: 0
BLOOD IN STOOL: 0
SORE THROAT: 0
DIARRHEA: 0
SHORTNESS OF BREATH: 0

## 2024-03-11 NOTE — PROGRESS NOTES
Jina Amor (:  1965) is a 59 y.o. female,Established patient, here for evaluation of the following chief complaint(s):  Paperwork (Pt is here for disability paperwork )         ASSESSMENT/PLAN:  1. Chronic bilateral low back pain without sciatica  -     XR LUMBAR SPINE (2-3 VIEWS); Future  -     Kem Obregon MD, Orthopedic Surgery (Spine), [facility]  - Chronic back pain does have hip pain as well as some slight curvature of the spine leading to some decreased and uneven pelvic structure  2. Umbilical hernia without obstruction and without gangrene  -     US ABDOMEN LIMITED; Future  - Patient previously had hernia surgery for the umbilical area.  Endorses very similar symptoms with small hernia developed.  Will repeat ultrasound and refer to general surgeon  3. Chronic respiratory failure with hypoxia (HCC)  - See COPD is well-managed currently sees pulmonology is off of oxygen for the most part.  He is managed by inhaler regiment Breztri albuterol inhaler as well as Singulair.  Overall doing well without any acute concerns.  4. Cirrhosis of liver without ascites, unspecified hepatic cirrhosis type (HCC)  -Currently doing well remains on Lasix and spironolactone for cirrhosis.  Encourage patient to follow-up with GI specialist.        Subjective   SUBJECTIVE/OBJECTIVE:  HPI: Patient presents to the office today for follow-up regarding chronic health conditions cirrhosis, COPD, umbilical hernia and chronic back pain.  Endorses changing and shifting of her hips endorses that her OB/GYN said she was having scoliosis and needed to be evaluated.  Patient occasionally has some hip pain and lower back pain on and off.  Overall patient's breathing well currently not on oxygen previously requiring oxygen is doing well follow-up with pulmonology follows with GI for liver cirrhosis no issues at this current time.    Review of Systems   HENT:  Negative for congestion and sore throat.    Respiratory:

## 2024-03-12 ENCOUNTER — HOSPITAL ENCOUNTER (OUTPATIENT)
Dept: ULTRASOUND IMAGING | Age: 59
Discharge: HOME | End: 2024-03-12
Payer: MEDICARE

## 2024-03-12 ENCOUNTER — HOSPITAL ENCOUNTER (OUTPATIENT)
Dept: GENERAL RADIOLOGY | Age: 59
Discharge: HOME | End: 2024-03-12
Payer: MEDICARE

## 2024-03-12 DIAGNOSIS — G89.29 CHRONIC BILATERAL LOW BACK PAIN WITHOUT SCIATICA: ICD-10-CM

## 2024-03-12 DIAGNOSIS — M54.50 CHRONIC BILATERAL LOW BACK PAIN WITHOUT SCIATICA: ICD-10-CM

## 2024-03-12 DIAGNOSIS — K42.9 UMBILICAL HERNIA WITHOUT OBSTRUCTION AND WITHOUT GANGRENE: ICD-10-CM

## 2024-03-12 PROCEDURE — 72100 X-RAY EXAM L-S SPINE 2/3 VWS: CPT

## 2024-03-12 PROCEDURE — 76705 ECHO EXAM OF ABDOMEN: CPT

## 2024-03-13 ENCOUNTER — OFFICE VISIT (OUTPATIENT)
Dept: ORTHOPEDIC SURGERY | Age: 59
End: 2024-03-13
Payer: MEDICARE

## 2024-03-13 ENCOUNTER — TELEPHONE (OUTPATIENT)
Dept: ORTHOPEDIC SURGERY | Age: 59
End: 2024-03-13

## 2024-03-13 VITALS — WEIGHT: 100 LBS | HEIGHT: 60 IN | BODY MASS INDEX: 19.63 KG/M2

## 2024-03-13 DIAGNOSIS — I70.8 ATHEROSCLEROSIS OF BOTH ILIAC ARTERIES: Primary | ICD-10-CM

## 2024-03-13 DIAGNOSIS — M43.10 DEGENERATIVE SPONDYLOLISTHESIS: Primary | ICD-10-CM

## 2024-03-13 PROCEDURE — 3017F COLORECTAL CA SCREEN DOC REV: CPT | Performed by: ORTHOPAEDIC SURGERY

## 2024-03-13 PROCEDURE — 1036F TOBACCO NON-USER: CPT | Performed by: ORTHOPAEDIC SURGERY

## 2024-03-13 PROCEDURE — G8420 CALC BMI NORM PARAMETERS: HCPCS | Performed by: ORTHOPAEDIC SURGERY

## 2024-03-13 PROCEDURE — 99204 OFFICE O/P NEW MOD 45 MIN: CPT | Performed by: ORTHOPAEDIC SURGERY

## 2024-03-13 PROCEDURE — G8427 DOCREV CUR MEDS BY ELIG CLIN: HCPCS | Performed by: ORTHOPAEDIC SURGERY

## 2024-03-13 PROCEDURE — G8484 FLU IMMUNIZE NO ADMIN: HCPCS | Performed by: ORTHOPAEDIC SURGERY

## 2024-03-13 RX ORDER — ATORVASTATIN CALCIUM 20 MG/1
20 TABLET, FILM COATED ORAL DAILY
Qty: 90 TABLET | Refills: 0 | Status: SHIPPED | OUTPATIENT
Start: 2024-03-13 | End: 2024-03-13

## 2024-03-13 RX ORDER — GABAPENTIN 300 MG/1
300 CAPSULE ORAL 4 TIMES DAILY
Qty: 120 CAPSULE | Refills: 2 | Status: SHIPPED | OUTPATIENT
Start: 2024-03-13 | End: 2024-03-14

## 2024-03-13 RX ORDER — ATORVASTATIN CALCIUM 20 MG/1
20 TABLET, FILM COATED ORAL DAILY
Qty: 90 TABLET | Refills: 0 | Status: SHIPPED | OUTPATIENT
Start: 2024-03-13 | End: 2024-06-11

## 2024-03-13 NOTE — TELEPHONE ENCOUNTER
Patient called and her Gabapentin rx went to wrong pharmacy, [facility] so moved to to [facility] per her request.

## 2024-03-13 NOTE — TELEPHONE ENCOUNTER
Prescription Refill     Medication Name:  ATORVASTATIN & GABAPENTIN  Pharmacy: [id number] [place] [facility] [address] - P [phone] - F [phone]   Patient Contact Number:  [phone]     THE ABOVE MEDICATIONS WERE SENT TO [facility] AND PATIENT WOULD LIKE TO HAVE THEM SENT TO THE ABOVE PHARMACY     [facility] ([address])

## 2024-03-13 NOTE — PROGRESS NOTES
3    Multiple Vitamin (MULTI-VITAMIN DAILY PO), Take by mouth, Disp: , Rfl:     Handicap Placard MISC, by Does not apply route, Disp: 1 each, Rfl: 0  Allergies:  Bee venom and Doxycycline hyclate  Social History:    reports that she quit smoking about 15 years ago. Her smoking use included cigarettes. She has never used smokeless tobacco. She reports that she does not currently use alcohol. She reports current drug use. Drug: Marijuana (Weed).  Family History:   Family History   Problem Relation Age of Onset    Alzheimer's Disease Mother     Breast Cancer Mother 70 - 79    Melanoma Father        REVIEW OF SYSTEMS: Full ROS noted & scanned   CONSTITUTIONAL: Denies unexplained weight loss, fevers, chills or fatigue  NEUROLOGICAL: Denies unsteady gait or progressive weakness  MUSCULOSKELETAL: Denies joint swelling or redness  PSYCHOLOGICAL: Denies anxiety, depression   SKIN: Denies skin changes, delayed healing, rash, itching   HEMATOLOGIC: Denies easy bleeding or bruising  ENDOCRINE: Denies excessive thirst, urination, heat/cold  RESPIRATORY: Denies current dyspnea, cough  GI: Denies nausea, vomiting, diarrhea   : Denies bowel or bladder issues      PHYSICAL EXAM:    Vitals: Height 1.524 m (5'), weight 45.4 kg (100 lb).    GENERAL EXAM:  General Apparence: Patient is adequately groomed with no evidence of malnutrition.  Orientation: The patient is oriented to time, place and person.   Mood & Affect:The patient's mood and affect are appropriate.  Vascular: Examination reveals no swelling tenderness in upper or lower extremities. Good capillary refill.  Lymphatic: The lymphatic examination bilaterally reveals all areas to be without enlargement or induration  Sensation: Sensation is intact without deficit  Coordination/Balance: Good coordination     LUMBAR/SACRAL EXAMINATION:  Inspection: Local inspection shows no step-off or bruising.  Lumbar alignment is normal.  Sagittal and Coronal balance is neutral.

## 2024-03-14 RX ORDER — GABAPENTIN 300 MG/1
300 CAPSULE ORAL 4 TIMES DAILY
Qty: 120 CAPSULE | Refills: 0 | Status: SHIPPED | OUTPATIENT
Start: 2024-03-14 | End: 2024-04-13

## 2024-03-21 ENCOUNTER — TELEPHONE (OUTPATIENT)
Dept: ORTHOPEDIC SURGERY | Age: 59
End: 2024-03-21

## 2024-03-29 DIAGNOSIS — K74.60 CIRRHOSIS OF LIVER WITHOUT ASCITES, UNSPECIFIED HEPATIC CIRRHOSIS TYPE (HCC): ICD-10-CM

## 2024-04-01 RX ORDER — FUROSEMIDE 40 MG/1
TABLET ORAL
Qty: 30 TABLET | Refills: 0 | Status: SHIPPED | OUTPATIENT
Start: 2024-04-01

## 2024-04-01 NOTE — TELEPHONE ENCOUNTER
Refill Request     Last Seen: Last Seen Department: 3/11/2024  Last Seen by PCP: 3/11/2024    Last Written: 1/4/24      Next Appointment:   Future Appointments   Date Time Provider Department Center   4/2/2024  1:00 PM Pierre Harvey MD [facility]   4/8/2024  9:30 AM Andrew Cerrato, APRN - CNP [facility]   4/24/2024  9:15 AM Richie Estrella MD [facility]   8/20/2024  9:00 AM Guillermo Cardona MD AND [facility]           Requested Prescriptions     Pending Prescriptions Disp Refills    furosemide (LASIX) 40 MG tablet [Pharmacy Med Name: FUROSEMIDE TABS 40MG] 90 tablet 3     Sig: TAKE 1 TABLET DAILY AS NEEDED FOR WEIGHT GAIN OVER 3 POUNDS IN 1 DAY

## 2024-04-02 ENCOUNTER — INITIAL CONSULT (OUTPATIENT)
Dept: SURGERY | Age: 59
End: 2024-04-02
Payer: MEDICARE

## 2024-04-02 VITALS
HEIGHT: 60 IN | DIASTOLIC BLOOD PRESSURE: 78 MMHG | SYSTOLIC BLOOD PRESSURE: 118 MMHG | BODY MASS INDEX: 19.79 KG/M2 | WEIGHT: 100.8 LBS

## 2024-04-02 DIAGNOSIS — K43.2 RECURRENT INCISIONAL HERNIA: Primary | ICD-10-CM

## 2024-04-02 DIAGNOSIS — Z01.812 PRE-PROCEDURE LAB EXAM: ICD-10-CM

## 2024-04-02 PROCEDURE — 99213 OFFICE O/P EST LOW 20 MIN: CPT | Performed by: SURGERY

## 2024-04-02 PROCEDURE — 1036F TOBACCO NON-USER: CPT | Performed by: SURGERY

## 2024-04-02 PROCEDURE — G8427 DOCREV CUR MEDS BY ELIG CLIN: HCPCS | Performed by: SURGERY

## 2024-04-02 PROCEDURE — 3078F DIAST BP <80 MM HG: CPT | Performed by: SURGERY

## 2024-04-02 PROCEDURE — G8420 CALC BMI NORM PARAMETERS: HCPCS | Performed by: SURGERY

## 2024-04-02 PROCEDURE — 3074F SYST BP LT 130 MM HG: CPT | Performed by: SURGERY

## 2024-04-02 PROCEDURE — 3017F COLORECTAL CA SCREEN DOC REV: CPT | Performed by: SURGERY

## 2024-04-02 NOTE — PROGRESS NOTES
performed by Pierre Harvey MD at [facility] OR    COLONOSCOPY      ENDOMETRIAL ABLATION      ENDOSCOPY, COLON, DIAGNOSTIC      HERNIA REPAIR N/A 8/3/2023    ROBOTIC LEFT  INGUINAL HERNIA REPAIR WITH MESH,  OPEN INCISIONAL HERNIA REPAIR WITH MESH performed by Pierre Harvey MD at [facility] OR    LAPAROTOMY N/A 02/22/2023    UMBILLICAL HERNIA REPAIR performed by Pierre Harvey MD at [facility] OR    THUMB ARTHROSCOPY      TUBAL LIGATION         Current Medications:   No current facility-administered medications for this visit.  Prior to Admission medications    Medication Sig Start Date End Date Taking? Authorizing Provider   furosemide (LASIX) 40 MG tablet TAKE 1 TABLET DAILY AS NEEDED FOR WEIGHT GAIN OVER 3 POUNDS IN 1 DAY 4/1/24   Trevor Doan DO   gabapentin (NEURONTIN) 300 MG capsule Take 1 capsule by mouth 4 times daily for 120 doses. 3/14/24 4/13/24  Kem Pizano MD   atorvastatin (LIPITOR) 20 MG tablet Take 1 tablet by mouth daily 3/13/24 6/11/24  Andrew Cerrato APRN - CNP   azelastine (ASTELIN) 0.1 % nasal spray USE 1 SPRAY NASALLY TWICE A DAY 2/22/24   Andrew Cerrato APRN - CNP   Budeson-Glycopyrrol-Formoterol (BREZTRI AEROSPHERE) 160-9-4.8 MCG/ACT AERO Inhale 2 puffs into the lungs 2 times daily Lot [id number] 2/28/2026 2/20/24   Guillermo Cardona MD   traZODone (DESYREL) 150 MG tablet TAKE 1 TABLET NIGHTLY 2/12/24   Andrew Cerrato APRN - CNP   omeprazole (PRILOSEC) 20 MG delayed release capsule Take 1 capsule by mouth daily 2/7/24 5/7/24  Andrew Cerrato APRN - CNP   famotidine (PEPCID) 20 MG tablet TAKE 1 TABLET NIGHTLY AS NEEDED FOR HEARTBURN 1/12/24   Andrew Cerrato APRN - CNP   Budeson-Glycopyrrol-Formoterol (BREZTRI AEROSPHERE) 160-9-4.8 MCG/ACT AERO Inhale 2 puffs into the lungs 2 times daily 12/12/23   Susan Carrero APRN - CNP   albuterol (PROVENTIL) (2.5 MG/3ML) 0.083% nebulizer solution Take 3 mLs by nebulization 4 times daily as needed for Wheezing 12/12/23   Susan Carrero,

## 2024-04-05 NOTE — H&P
HISTORY OF PRESENT ILLNESS:    Ms. Jina Amor  is a pleasant 59 y.o. female presents today for evaluation of chronic pain in her right buttock and groin.  She rates that pain 7/10.  Her symptoms are substantially worse with standing and ambulating she reports numbness in her toes and weakness of her hands and legs.  She denies saddle anesthesia and bowel or bladder dysfunction.       Current/Past Treatment:   Physical Therapy: Yes  Chiropractic: Yes  Injection: No  Medications:  none      Past Medical History:   Past Medical History        Past Medical History:   Diagnosis Date    Acute on chronic respiratory failure with hypoxia (HCC) 03/03/2023    Alopecia      Chronic hip pain      Chronic obstructive pulmonary disease (HCC) 03/22/2023    Chronic obstructive pulmonary disease with acute exacerbation ([facility]) 01/18/2023     severe stage 4    Chronic respiratory failure ([facility])      Depression      GERD (gastroesophageal reflux disease)      Moderate malnutrition ([facility]) 02/27/2023    Perioperative dehiscence of abdominal wound with evisceration 02/28/2023    Pneumonia due to Enterobacter species 03/07/2023    Pressure ulcer of right buttock, stage 2 (HCC) 03/23/2023     healed March 2023    Sciatica       right, chronic    Small bowel obstruction (HCC) 02/18/2023    Thrombocytopenia, unspecified ([facility]) 10/30/2023    Tobacco abuse 01/18/2023    Umbilical hernia 01/18/2023    Wears glasses           Past Surgical History:     Past Surgical History         Past Surgical History:   Procedure Laterality Date    ABDOMEN SURGERY N/A 02/28/2023     ABDOMINAL  WOUND CLOSURE performed by Pierre Harvey MD at [facility] OR    COLONOSCOPY        ENDOMETRIAL ABLATION        ENDOSCOPY, COLON, DIAGNOSTIC        HERNIA REPAIR N/A 8/3/2023     ROBOTIC LEFT  INGUINAL HERNIA REPAIR WITH MESH,  OPEN INCISIONAL HERNIA REPAIR WITH MESH performed by Pierre Harvey MD at [facility] OR    LAPAROTOMY N/A 02/22/2023     UMBILLICAL HERNIA REPAIR

## 2024-04-09 ENCOUNTER — PREP FOR PROCEDURE (OUTPATIENT)
Dept: ORTHOPEDIC SURGERY | Age: 59
End: 2024-04-09

## 2024-04-09 ENCOUNTER — HOSPITAL ENCOUNTER (OUTPATIENT)
Dept: CT IMAGING | Age: 59
Discharge: HOME | End: 2024-04-09
Attending: SURGERY
Payer: MEDICARE

## 2024-04-09 DIAGNOSIS — K43.2 RECURRENT INCISIONAL HERNIA: ICD-10-CM

## 2024-04-09 PROCEDURE — 6360000004 HC RX CONTRAST MEDICATION: Performed by: SURGERY

## 2024-04-09 PROCEDURE — 74177 CT ABD & PELVIS W/CONTRAST: CPT

## 2024-04-09 RX ADMIN — IOPAMIDOL 75 ML: 755 INJECTION, SOLUTION INTRAVENOUS at 09:42

## 2024-04-10 ENCOUNTER — HOSPITAL ENCOUNTER (OUTPATIENT)
Age: 59
Setting detail: OUTPATIENT SURGERY
Discharge: HOME | End: 2024-04-10
Attending: ORTHOPAEDIC SURGERY | Admitting: ORTHOPAEDIC SURGERY
Payer: MEDICARE

## 2024-04-10 ENCOUNTER — OFFICE VISIT (OUTPATIENT)
Dept: FAMILY MEDICINE CLINIC | Age: 59
End: 2024-04-10
Payer: MEDICARE

## 2024-04-10 VITALS
BODY MASS INDEX: 19.63 KG/M2 | OXYGEN SATURATION: 96 % | HEART RATE: 83 BPM | HEIGHT: 60 IN | RESPIRATION RATE: 12 BRPM | WEIGHT: 100 LBS | SYSTOLIC BLOOD PRESSURE: 116 MMHG | TEMPERATURE: 98.7 F | DIASTOLIC BLOOD PRESSURE: 74 MMHG

## 2024-04-10 VITALS
RESPIRATION RATE: 16 BRPM | WEIGHT: 100.8 LBS | DIASTOLIC BLOOD PRESSURE: 68 MMHG | SYSTOLIC BLOOD PRESSURE: 104 MMHG | OXYGEN SATURATION: 92 % | BODY MASS INDEX: 19.79 KG/M2 | HEIGHT: 60 IN | HEART RATE: 93 BPM

## 2024-04-10 DIAGNOSIS — I70.8 ATHEROSCLEROSIS OF BOTH ILIAC ARTERIES: ICD-10-CM

## 2024-04-10 DIAGNOSIS — I10 PRIMARY HYPERTENSION: ICD-10-CM

## 2024-04-10 DIAGNOSIS — F33.1 MODERATE EPISODE OF RECURRENT MAJOR DEPRESSIVE DISORDER (HCC): Primary | ICD-10-CM

## 2024-04-10 DIAGNOSIS — E78.2 MIXED HYPERLIPIDEMIA: ICD-10-CM

## 2024-04-10 LAB
ALBUMIN SERPL-MCNC: 4.5 G/DL (ref 3.4–5)
ALBUMIN/GLOB SERPL: 1.8 {RATIO} (ref 1.1–2.2)
ALP SERPL-CCNC: 79 U/L (ref 40–129)
ALT SERPL-CCNC: 35 U/L (ref 10–40)
ANION GAP SERPL CALCULATED.3IONS-SCNC: 14 MMOL/L (ref 3–16)
AST SERPL-CCNC: 34 U/L (ref 15–37)
BASOPHILS # BLD: 0 K/UL (ref 0–0.2)
BASOPHILS NFR BLD: 0.5 %
BILIRUB SERPL-MCNC: 0.4 MG/DL (ref 0–1)
BUN SERPL-MCNC: 18 MG/DL (ref 7–20)
CALCIUM SERPL-MCNC: 9.3 MG/DL (ref 8.3–10.6)
CHLORIDE SERPL-SCNC: 104 MMOL/L (ref 99–110)
CHOLEST SERPL-MCNC: 205 MG/DL (ref 0–199)
CO2 SERPL-SCNC: 24 MMOL/L (ref 21–32)
CREAT SERPL-MCNC: 0.8 MG/DL (ref 0.6–1.1)
DEPRECATED RDW RBC AUTO: 16.5 % (ref 12.4–15.4)
EOSINOPHIL # BLD: 0.1 K/UL (ref 0–0.6)
EOSINOPHIL NFR BLD: 0.8 %
GFR SERPLBLD CREATININE-BSD FMLA CKD-EPI: 85 ML/MIN/{1.73_M2}
GLUCOSE SERPL-MCNC: 96 MG/DL (ref 70–99)
HCT VFR BLD AUTO: 40.8 % (ref 36–48)
HDLC SERPL-MCNC: 69 MG/DL (ref 40–60)
HGB BLD-MCNC: 13.4 G/DL (ref 12–16)
LDLC SERPL CALC-MCNC: 125 MG/DL
LYMPHOCYTES # BLD: 1 K/UL (ref 1–5.1)
LYMPHOCYTES NFR BLD: 14.8 %
MCH RBC QN AUTO: 29.3 PG (ref 26–34)
MCHC RBC AUTO-ENTMCNC: 32.9 G/DL (ref 31–36)
MCV RBC AUTO: 88.9 FL (ref 80–100)
MONOCYTES # BLD: 0.5 K/UL (ref 0–1.3)
MONOCYTES NFR BLD: 7.6 %
NEUTROPHILS # BLD: 5.2 K/UL (ref 1.7–7.7)
NEUTROPHILS NFR BLD: 76.3 %
PLATELET # BLD AUTO: 116 K/UL (ref 135–450)
PMV BLD AUTO: 9.1 FL (ref 5–10.5)
POTASSIUM SERPL-SCNC: 4 MMOL/L (ref 3.5–5.1)
PROT SERPL-MCNC: 7 G/DL (ref 6.4–8.2)
RBC # BLD AUTO: 4.59 M/UL (ref 4–5.2)
SODIUM SERPL-SCNC: 142 MMOL/L (ref 136–145)
TRIGL SERPL-MCNC: 57 MG/DL (ref 0–150)
VLDLC SERPL CALC-MCNC: 11 MG/DL
WBC # BLD AUTO: 6.9 K/UL (ref 4–11)

## 2024-04-10 PROCEDURE — 3600000002 HC SURGERY LEVEL 2 BASE: Performed by: ORTHOPAEDIC SURGERY

## 2024-04-10 PROCEDURE — 6360000002 HC RX W HCPCS: Performed by: ORTHOPAEDIC SURGERY

## 2024-04-10 PROCEDURE — 3017F COLORECTAL CA SCREEN DOC REV: CPT

## 2024-04-10 PROCEDURE — 7100000010 HC PHASE II RECOVERY - FIRST 15 MIN: Performed by: ORTHOPAEDIC SURGERY

## 2024-04-10 PROCEDURE — 3074F SYST BP LT 130 MM HG: CPT

## 2024-04-10 PROCEDURE — G8420 CALC BMI NORM PARAMETERS: HCPCS

## 2024-04-10 PROCEDURE — 1036F TOBACCO NON-USER: CPT

## 2024-04-10 PROCEDURE — G8427 DOCREV CUR MEDS BY ELIG CLIN: HCPCS

## 2024-04-10 PROCEDURE — 6360000004 HC RX CONTRAST MEDICATION: Performed by: ORTHOPAEDIC SURGERY

## 2024-04-10 PROCEDURE — 2500000003 HC RX 250 WO HCPCS: Performed by: ORTHOPAEDIC SURGERY

## 2024-04-10 PROCEDURE — 36415 COLL VENOUS BLD VENIPUNCTURE: CPT

## 2024-04-10 PROCEDURE — 3078F DIAST BP <80 MM HG: CPT

## 2024-04-10 PROCEDURE — 99214 OFFICE O/P EST MOD 30 MIN: CPT

## 2024-04-10 RX ORDER — LIDOCAINE HYDROCHLORIDE 10 MG/ML
INJECTION, SOLUTION EPIDURAL; INFILTRATION; INTRACAUDAL; PERINEURAL PRN
Status: DISCONTINUED | OUTPATIENT
Start: 2024-04-10 | End: 2024-04-10 | Stop reason: ALTCHOICE

## 2024-04-10 RX ORDER — ATORVASTATIN CALCIUM 40 MG/1
40 TABLET, FILM COATED ORAL DAILY
Qty: 90 TABLET | Refills: 0 | Status: SHIPPED | OUTPATIENT
Start: 2024-04-10 | End: 2024-07-09

## 2024-04-10 RX ORDER — DEXAMETHASONE SODIUM PHOSPHATE 10 MG/ML
INJECTION, SOLUTION INTRAMUSCULAR; INTRAVENOUS
Status: DISCONTINUED
Start: 2024-04-10 | End: 2024-04-10 | Stop reason: HOSPADM

## 2024-04-10 RX ORDER — TRAZODONE HYDROCHLORIDE 100 MG/1
TABLET ORAL
Qty: 11 TABLET | Refills: 0 | Status: SHIPPED | OUTPATIENT
Start: 2024-04-10 | End: 2024-04-24

## 2024-04-10 ASSESSMENT — PAIN - FUNCTIONAL ASSESSMENT
PAIN_FUNCTIONAL_ASSESSMENT: 0-10
PAIN_FUNCTIONAL_ASSESSMENT: PREVENTS OR INTERFERES WITH MANY ACTIVE NOT PASSIVE ACTIVITIES

## 2024-04-10 ASSESSMENT — ENCOUNTER SYMPTOMS
SORE THROAT: 0
CONSTIPATION: 0
COLOR CHANGE: 0
BLOOD IN STOOL: 0
WHEEZING: 0
SHORTNESS OF BREATH: 0
DIARRHEA: 0
COUGH: 0
ABDOMINAL PAIN: 0

## 2024-04-10 NOTE — PROGRESS NOTES
Jina Amor (:  1965) is a 59 y.o. female,Established patient, here for evaluation of the following chief complaint(s):  Depression (Pt is here for a 3 month follow up )         ASSESSMENT/PLAN:  1. Moderate episode of recurrent major depressive disorder (HCC)  -     traZODone (DESYREL) 100 MG tablet; Take 1 tablet by mouth nightly for 7 days, THEN 0.5 tablets nightly for 7 days., Disp-11 tablet, R-0Normal  - Patient endorses depressive symptoms is tearful in office today about missing her family has tried therapy and counseling which has not seemed to help her mood.  Trazodone has not helped with any of her depression or insomnia.  Patient open to other medications at this current time.  Had lengthy discussion with patient regarding options discussed other medications with patient in office.  Today it was felt that patient would be better suited to have completed GeneSight first before initiating other medications he is agreeable to weaning off of trazodone which she states does not help her.  Patient was educated and given instructions on weaning off of trazodone.  Patient was educated on worsening mood and when to seek emergency care.  Patient is agreeable and understands plan at this time.  Will have patient back in office for follow-up for mood after completing GeneSight with the plan of initiating medications based off her GeneSight results.  Patient is agreeable and understands plan at this time.      2024    10:25 AM 2024    10:03 AM 2023     8:08 AM 2023     9:18 AM   PHQ Scores   PHQ2 Score 2 3 2 0   PHQ9 Score 2 10 2 0       2. Primary hypertension  -     Comprehensive Metabolic Panel; Future  -     CBC with Auto Differential; Future  - Blood pressure slightly low in office today currently only taking Lasix for cirrhosis as well as spironolactone will continue to monitor blood pressure closely.  3. Mixed hyperlipidemia  -     Lipid Panel; Future  -     CBC with Auto

## 2024-04-10 NOTE — DISCHARGE INSTRUCTIONS
[facility]    [phone]    Post Pain Management Injection    PATIENT INSTRUCTIONS:     -Resume Normal Diet  -Other    ACTIVITY:    -No driving or operating machinery for 8 hours post procedure without sedation and 24 hours with sedation.  If you are seen driving during this time the proper authorities will be notified.  -Do not stay alone for 4-6 hours after the procedure.  -If you have had IV sedation, do not sign legal documents, make any major decisions, or be involved in work decisions for the remainder of the day.  -May shower or bathe.  -Resume normal activity when full movement/sensation has returned in extremities.           3)  SITE CARE:    -Observe puncture site for signs of infection (redness, warmth swelling, drainage with a foul odor, fever or increased tenderness).           4)  EXPECTED SIDE EFFECTS:    -Numbness/tingling/weakness in extremities, if this lasts more than 6 hours notify Dr. Pizano  -Muscle stiffness, soreness at puncture site (soreness may last 2-4 days).    DIABETIC PATIENTS ONLY:    -Increased glucose levels in all diabetic patients who have received a steroid injection.  -Monitor blood sugars frequently for the first 5 days following procedure.      -Adjust medication accordingly.           6)  TO REACH DR. Pizano: Call [phone]    ADDITIONAL INSTRUCTIONS:    Follow-up as scheduled or call for appointment if not already done.  Patients taking Coumadin may resume taking as before the procedure.

## 2024-04-10 NOTE — PROGRESS NOTES
Pt arrived to PACU,A&O,no c/o new pain and/or numbness or tingling, vitals stable, site unremarkable

## 2024-04-10 NOTE — H&P
I have reviewed the history and physical and examined the patient and find no relevant changes.    I have reviewed with the patient and/or family the risks, benefits, and alternatives to the procedure.    MELINA ALLEN MD  4/10/2024 No intake/output data recorded.

## 2024-04-10 NOTE — OP NOTE
Operative Note      Patient: Jina Amor  YOB: 1965  MRN: [id number]    Date of Procedure: 4/10/2024    Pre-Op Diagnosis Codes:     * Degenerative spondylolisthesis [M43.10]    Post-Op Diagnosis: Same       Procedure(s):  BILATERAL LUMBAR FOUR LUMBAR FIVE EPIDURAL STEROID INJECTION SITE CONFIRMED BY FLUOROSCOPY    Surgeon(s):  Kem Pizano MD    Assistant:   * No surgical staff found *    Anesthesia: None    Estimated Blood Loss (mL): Minimal    Complications: None    Specimens:   * No specimens in log *    Implants:  * No implants in log *      Drains: * No LDAs found *    Findings:  Infection Present At Time Of Surgery (PATOS) (choose all levels that have infection present):  No infection present  Other Findings: na    Detailed Description of Procedure:     The patient was admitted through pre-op and written consent was obtained.  The patient was advised of the risks and benefits of the procedure, including but not limited to the following: bleeding, pain, infection, temporary paralysis, nerve damage and spinal headache.  The patient was given the opportunity to ask questions.  There were no contraindications for this procedure.    The appropriate area was prepped and draped in a sterile fashion.  Landmarks were identified and marked.  A 22G spinal needle was advanced to the right L4 neural foramen using fluoroscopic guidance with ideal needle tip placement confirmed by multiple views.  Injection of contrast showed epidural flow.  There were no signs of intravascular or intrathecal injection.    5 mg dexamethasone and 1cc 1% lidocaine were then injected per site.     There were no complications and the patient tolerated the procedure well.  The patient was transferred to the recovery area and monitored.  Discharge instructions were given.  The patient is to contact me for any post-procedure concerns.  The patient is to follow up as scheduled.    Estimated blood loss: none    The same procedure

## 2024-04-16 ENCOUNTER — TELEPHONE (OUTPATIENT)
Dept: FAMILY MEDICINE CLINIC | Age: 59
End: 2024-04-16

## 2024-04-16 RX ORDER — PREDNISONE 50 MG/1
50 TABLET ORAL DAILY
Qty: 5 TABLET | Refills: 0 | Status: SHIPPED | OUTPATIENT
Start: 2024-04-16 | End: 2024-04-21

## 2024-04-16 NOTE — TELEPHONE ENCOUNTER
Patient called stating that she is not feeling well. She has a cough, congested. She wanted to know if you will send in a zpack and steroid for her.

## 2024-04-24 ENCOUNTER — OFFICE VISIT (OUTPATIENT)
Dept: CARDIOLOGY CLINIC | Age: 59
End: 2024-04-24
Payer: MEDICARE

## 2024-04-24 VITALS
BODY MASS INDEX: 19.44 KG/M2 | DIASTOLIC BLOOD PRESSURE: 70 MMHG | OXYGEN SATURATION: 92 % | WEIGHT: 99 LBS | HEART RATE: 111 BPM | HEIGHT: 60 IN | SYSTOLIC BLOOD PRESSURE: 122 MMHG

## 2024-04-24 DIAGNOSIS — I73.9 CLAUDICATION (HCC): Primary | ICD-10-CM

## 2024-04-24 PROCEDURE — 99214 OFFICE O/P EST MOD 30 MIN: CPT | Performed by: INTERNAL MEDICINE

## 2024-04-24 PROCEDURE — 3078F DIAST BP <80 MM HG: CPT | Performed by: INTERNAL MEDICINE

## 2024-04-24 PROCEDURE — G8420 CALC BMI NORM PARAMETERS: HCPCS | Performed by: INTERNAL MEDICINE

## 2024-04-24 PROCEDURE — 3074F SYST BP LT 130 MM HG: CPT | Performed by: INTERNAL MEDICINE

## 2024-04-24 PROCEDURE — 3017F COLORECTAL CA SCREEN DOC REV: CPT | Performed by: INTERNAL MEDICINE

## 2024-04-24 PROCEDURE — G8427 DOCREV CUR MEDS BY ELIG CLIN: HCPCS | Performed by: INTERNAL MEDICINE

## 2024-04-24 PROCEDURE — 1036F TOBACCO NON-USER: CPT | Performed by: INTERNAL MEDICINE

## 2024-04-24 NOTE — PROGRESS NOTES
160-9-4.8 MCG/ACT AERO Inhale 2 puffs into the lungs 2 times daily 3 each 1    albuterol (PROVENTIL) (2.5 MG/3ML) 0.083% nebulizer solution Take 3 mLs by nebulization 4 times daily as needed for Wheezing 120 each 3    spironolactone (ALDACTONE) 25 MG tablet Take 1 tablet by mouth daily 90 tablet 3    albuterol sulfate HFA (PROVENTIL HFA) 108 (90 Base) MCG/ACT inhaler Inhale 2 puffs into the lungs every 6 hours as needed for Wheezing 18 g 3    Omega-3 Fatty Acids (FISH OIL ADULT GUMMIES PO) Take 1 each by mouth daily      montelukast (SINGULAIR) 10 MG tablet Take 1 tablet by mouth daily 30 tablet 3    Multiple Vitamin (MULTI-VITAMIN DAILY PO) Take by mouth      Handicap Placard MISC by Does not apply route 1 each 0    fluticasone (FLONASE) 50 MCG/ACT nasal spray 2 sprays by Nasal route in the morning and at bedtime 3 each 0     No current facility-administered medications for this visit.       Review of Systems:  Constitutional: No unanticipated weight loss. There's been no change in energy level, sleep pattern, or activity level. No fevers, chills.   Eyes: No visual changes or diplopia. No scleral icterus.  ENT: No Headaches, hearing loss or vertigo. No mouth sores or sore throat.  Cardiovascular: as reviewed in HPI  Respiratory: No cough or wheezing, no sputum production. No hemoptysis.     Gastrointestinal: No abdominal pain, appetite loss, blood in stools. No change in bowel or bladder habits.  Genitourinary: No dysuria, trouble voiding, or hematuria.  Musculoskeletal:  No gait disturbance, no joint complaints.  Integumentary: No rash or pruritis.  Neurological: No headache, diplopia, change in muscle strength, numbness or tingling.   Psychiatric: No anxiety or depression.  Endocrine: No temperature intolerance. No excessive thirst, fluid intake, or urination. No tremor.  Hematologic/Lymphatic: No abnormal bruising or bleeding, blood clots or swollen lymph nodes.  Allergic/Immunologic: No nasal congestion or

## 2024-04-30 DIAGNOSIS — K74.60 CIRRHOSIS OF LIVER WITHOUT ASCITES, UNSPECIFIED HEPATIC CIRRHOSIS TYPE (HCC): ICD-10-CM

## 2024-04-30 DIAGNOSIS — K21.9 GASTROESOPHAGEAL REFLUX DISEASE WITHOUT ESOPHAGITIS: ICD-10-CM

## 2024-04-30 RX ORDER — FUROSEMIDE 40 MG/1
TABLET ORAL
Qty: 30 TABLET | Refills: 11 | Status: SHIPPED | OUTPATIENT
Start: 2024-04-30

## 2024-04-30 RX ORDER — OMEPRAZOLE 20 MG/1
20 CAPSULE, DELAYED RELEASE ORAL DAILY
Qty: 90 CAPSULE | Refills: 3 | Status: SHIPPED | OUTPATIENT
Start: 2024-04-30

## 2024-04-30 NOTE — TELEPHONE ENCOUNTER
Refill Request       Last Seen: Last Seen Department: 4/10/2024  Last Seen by PCP: 4/10/2024    Last Written: 04/01/2024      Next Appointment:   Future Appointments   Date Time Provider Department Center   5/13/2024  9:00 AM Anderw Cerrato, ZARI - CNP [place] [facility] [facility]   5/15/2024  9:00 AM SCHEDULE, [facility]   8/20/2024  9:00 [facility]   11/6/2024  9:00 AM Richie Estrella MD [facility]         Requested Prescriptions     Pending Prescriptions Disp Refills    omeprazole (PRILOSEC) 20 MG delayed release capsule [Pharmacy Med Name: OMEPRAZOLE DR CAPS 20MG] 90 capsule 3     Sig: TAKE 1 CAPSULE DAILY    furosemide (LASIX) 40 MG tablet [Pharmacy Med Name: FUROSEMIDE TABS 40MG] 30 tablet 11     Sig: TAKE 1 TABLET DAILY AS NEEDED FOR WEIGHT GAIN OVER 3 POUNDS IN 1 DAY

## 2024-05-13 ENCOUNTER — OFFICE VISIT (OUTPATIENT)
Dept: FAMILY MEDICINE CLINIC | Age: 59
End: 2024-05-13
Payer: MEDICARE

## 2024-05-13 VITALS
DIASTOLIC BLOOD PRESSURE: 76 MMHG | RESPIRATION RATE: 16 BRPM | HEIGHT: 60 IN | OXYGEN SATURATION: 95 % | SYSTOLIC BLOOD PRESSURE: 114 MMHG | BODY MASS INDEX: 19.75 KG/M2 | WEIGHT: 100.6 LBS | HEART RATE: 96 BPM

## 2024-05-13 DIAGNOSIS — J44.1 CHRONIC OBSTRUCTIVE PULMONARY DISEASE WITH ACUTE EXACERBATION (HCC): ICD-10-CM

## 2024-05-13 DIAGNOSIS — F33.0 MILD EPISODE OF RECURRENT MAJOR DEPRESSIVE DISORDER (HCC): Primary | ICD-10-CM

## 2024-05-13 DIAGNOSIS — I70.8 ATHEROSCLEROSIS OF BOTH ILIAC ARTERIES: ICD-10-CM

## 2024-05-13 PROCEDURE — 3074F SYST BP LT 130 MM HG: CPT

## 2024-05-13 PROCEDURE — 1036F TOBACCO NON-USER: CPT

## 2024-05-13 PROCEDURE — G8420 CALC BMI NORM PARAMETERS: HCPCS

## 2024-05-13 PROCEDURE — 3023F SPIROM DOC REV: CPT

## 2024-05-13 PROCEDURE — 3078F DIAST BP <80 MM HG: CPT

## 2024-05-13 PROCEDURE — 99213 OFFICE O/P EST LOW 20 MIN: CPT

## 2024-05-13 PROCEDURE — G8427 DOCREV CUR MEDS BY ELIG CLIN: HCPCS

## 2024-05-13 PROCEDURE — 3017F COLORECTAL CA SCREEN DOC REV: CPT

## 2024-05-13 RX ORDER — ATORVASTATIN CALCIUM 40 MG/1
40 TABLET, FILM COATED ORAL DAILY
Qty: 90 TABLET | Refills: 0 | Status: SHIPPED | OUTPATIENT
Start: 2024-05-13 | End: 2024-08-11

## 2024-05-13 RX ORDER — MONTELUKAST SODIUM 10 MG/1
10 TABLET ORAL DAILY
Qty: 90 TABLET | Refills: 0 | Status: SHIPPED | OUTPATIENT
Start: 2024-05-13 | End: 2024-08-11

## 2024-05-13 ASSESSMENT — ENCOUNTER SYMPTOMS
CONSTIPATION: 0
DIARRHEA: 0
COUGH: 0
ABDOMINAL PAIN: 0
BLOOD IN STOOL: 0
SORE THROAT: 0
SHORTNESS OF BREATH: 0
WHEEZING: 0
COLOR CHANGE: 0

## 2024-05-13 ASSESSMENT — PATIENT HEALTH QUESTIONNAIRE - PHQ9
7. TROUBLE CONCENTRATING ON THINGS, SUCH AS READING THE NEWSPAPER OR WATCHING TELEVISION: NOT AT ALL
SUM OF ALL RESPONSES TO PHQ9 QUESTIONS 1 & 2: 0
SUM OF ALL RESPONSES TO PHQ QUESTIONS 1-9: 2
10. IF YOU CHECKED OFF ANY PROBLEMS, HOW DIFFICULT HAVE THESE PROBLEMS MADE IT FOR YOU TO DO YOUR WORK, TAKE CARE OF THINGS AT HOME, OR GET ALONG WITH OTHER PEOPLE: NOT DIFFICULT AT ALL
8. MOVING OR SPEAKING SO SLOWLY THAT OTHER PEOPLE COULD HAVE NOTICED. OR THE OPPOSITE, BEING SO FIGETY OR RESTLESS THAT YOU HAVE BEEN MOVING AROUND A LOT MORE THAN USUAL: NOT AT ALL
5. POOR APPETITE OR OVEREATING: NOT AT ALL
SUM OF ALL RESPONSES TO PHQ QUESTIONS 1-9: 2
4. FEELING TIRED OR HAVING LITTLE ENERGY: SEVERAL DAYS
6. FEELING BAD ABOUT YOURSELF - OR THAT YOU ARE A FAILURE OR HAVE LET YOURSELF OR YOUR FAMILY DOWN: NOT AT ALL
2. FEELING DOWN, DEPRESSED OR HOPELESS: NOT AT ALL
SUM OF ALL RESPONSES TO PHQ QUESTIONS 1-9: 2
9. THOUGHTS THAT YOU WOULD BE BETTER OFF DEAD, OR OF HURTING YOURSELF: NOT AT ALL
1. LITTLE INTEREST OR PLEASURE IN DOING THINGS: NOT AT ALL
3. TROUBLE FALLING OR STAYING ASLEEP: SEVERAL DAYS
SUM OF ALL RESPONSES TO PHQ QUESTIONS 1-9: 2

## 2024-05-13 NOTE — PROGRESS NOTES
Jina Amor (:  1965) is a 59 y.o. female,Established patient, here for evaluation of the following chief complaint(s):  Depression (Pt is here for a 4 week follow up )      Assessment & Plan   ASSESSMENT/PLAN:  1. Mild episode of recurrent major depressive disorder (HCC)  -Patient endorses that mood is well-controlled currently was able to wean off of trazodone endorses that same.  No worsening issues with sleep overall she feels that her mood is much better since taking herself off of trazodone.  Did complete GeneSight at last office visit.  Did spend a lengthy amount of time educating patient on results of GeneSight.  Trazodone was found to be in her red category.  Overall patient is happy with her mood currently not having any episodes of crying or depression.  Endorses that she was previously having multiple episodes while on trazodone.  Ultimately is doing significantly better at this time and does not wanting any further medication.  Patient was educated on worsening mood and when to seek further care.  Patient is agreeable and understands plan at this time.  PHQ scores did not increase significantly since weaning off of trazodone.  Will continue to monitor mood.  Patient was instructed to reach out if her mood changes or worsens.      2024     8:59 AM 2024    10:25 AM 2024    10:03 AM 2023     8:08 AM 2023     9:18 AM   PHQ Scores   PHQ2 Score 0 2 3 2 0   PHQ9 Score 2 2 10 2 0       2. Atherosclerosis of both iliac arteries  -     atorvastatin (LIPITOR) 40 MG tablet; Take 1 tablet by mouth daily, Disp-90 tablet, R-0Normal  - Continue atorvastatin will follow with vascular surgeon with further imaging and testing  3. Chronic obstructive pulmonary disease with acute exacerbation (HCC)  -     montelukast (SINGULAIR) 10 MG tablet; Take 1 tablet by mouth daily, Disp-90 tablet, R-0Normal  -Doing well at this current time encourage patient to continue incentive spirometer as well

## 2024-05-17 DIAGNOSIS — J44.9 CHRONIC OBSTRUCTIVE PULMONARY DISEASE, UNSPECIFIED COPD TYPE (HCC): ICD-10-CM

## 2024-05-17 RX ORDER — BUDESONIDE, GLYCOPYRROLATE, AND FORMOTEROL FUMARATE 160; 9; 4.8 UG/1; UG/1; UG/1
2 AEROSOL, METERED RESPIRATORY (INHALATION) 2 TIMES DAILY
Qty: 32.1 G | Refills: 1 | Status: SHIPPED | OUTPATIENT
Start: 2024-05-17

## 2024-05-24 ENCOUNTER — SCHEDULED TELEPHONE ENCOUNTER (OUTPATIENT)
Dept: SURGERY | Age: 59
End: 2024-05-24

## 2024-05-24 DIAGNOSIS — K44.9 HIATAL HERNIA: Primary | ICD-10-CM

## 2024-05-24 DIAGNOSIS — K43.2 RECURRENT INCISIONAL HERNIA: ICD-10-CM

## 2024-05-24 DIAGNOSIS — K80.20 SYMPTOMATIC CHOLELITHIASIS: ICD-10-CM

## 2024-05-26 PROBLEM — K80.20 SYMPTOMATIC CHOLELITHIASIS: Status: ACTIVE | Noted: 2024-05-26

## 2024-05-26 PROBLEM — K43.2 RECURRENT INCISIONAL HERNIA: Status: ACTIVE | Noted: 2024-05-26

## 2024-05-28 ENCOUNTER — TELEPHONE (OUTPATIENT)
Dept: SURGERY | Age: 59
End: 2024-05-28

## 2024-05-31 ENCOUNTER — HOSPITAL ENCOUNTER (OUTPATIENT)
Dept: GENERAL RADIOLOGY | Age: 59
Discharge: HOME | End: 2024-05-31
Attending: SURGERY
Payer: MEDICARE

## 2024-05-31 DIAGNOSIS — K44.9 HIATAL HERNIA: ICD-10-CM

## 2024-05-31 PROCEDURE — 74240 X-RAY XM UPR GI TRC 1CNTRST: CPT

## 2024-06-11 ENCOUNTER — TELEPHONE (OUTPATIENT)
Dept: FAMILY MEDICINE CLINIC | Age: 59
End: 2024-06-11

## 2024-06-11 DIAGNOSIS — J44.1 CHRONIC OBSTRUCTIVE PULMONARY DISEASE WITH ACUTE EXACERBATION (HCC): Primary | ICD-10-CM

## 2024-06-11 RX ORDER — AZITHROMYCIN 250 MG/1
TABLET, FILM COATED ORAL
Qty: 6 TABLET | Refills: 0 | Status: SHIPPED | OUTPATIENT
Start: 2024-06-11 | End: 2024-06-21

## 2024-06-11 RX ORDER — PREDNISONE 50 MG/1
50 TABLET ORAL DAILY
Qty: 5 TABLET | Refills: 0 | Status: SHIPPED | OUTPATIENT
Start: 2024-06-11 | End: 2024-06-16

## 2024-06-11 NOTE — TELEPHONE ENCOUNTER
Started Sunday, sinus congestion, cough, chest congestion, has been using mucinex and zycam. She is asking for a zpak and prednisone as she has a colonoscopy coming up that she does not want to reschedule.

## 2024-06-13 NOTE — PROGRESS NOTES
Jina A Zuleyma    Age 59 y.o.    female    1965    MRN [id number]    6/20/2024  Arrival Time_____________  OR Time____________60 Min     Procedure(s):  COLONOSCOPY  ESOPHAGOGASTRODUODENOSCOPY                      Monitor Anesthesia Care    Surgeon(s):  Sue Woodard, MD       Phone [phone] (home)     [facility]  Date  Info Source  Home  Cell         Work  _____________________________________________________________________  _____________________________________________________________________  _____________________________________________________________________  _____________________________________________________________________  _____________________________________________________________________    PCP _____________________________ Phone_________________     H&P  ________________  Bringing      Chart              Epic      DOS      Called________  EKG ________________   Bringing      Chart              Epic      DOS      Called________  LABS________________   Bringing     Chart              Epic      DOS      Called________  Cardiac Clearance ______ Bringing      Chart              Epic      DOS      Called________  Pulmonary Clearance____ Bringing      Chart              Epic      DOS      Called________    Cardiologist________________________ Phone___________________________  Pulmonologist_______________________Phone___________________________    ? Advance Directives   ? Spiritism concerns / Waiver on Chart            PAT Communications________________  ? Pre-op Instructions Given /Understood          _________________________________  ? Directions to Surgery Center                          _________________________________  ? Transportation Home_______________      __________________________________  ? Crutches/Walker__________________        __________________________________    Orders: Hard copy/ EPIC                 Transcribed/ EPIC              _______Wt.    ________Pharmacy

## 2024-06-14 RX ORDER — FLUTICASONE PROPIONATE 50 MCG
1 SPRAY, SUSPENSION (ML) NASAL 2 TIMES DAILY
COMMUNITY

## 2024-06-14 NOTE — PROGRESS NOTES
Date and time of surgery :  06/20/24 at 0900            Arrival Time:0800      Bring Picture ID and insurance card.  Please wear simple, loose fitting clothing to the hospital.   Do not bring valuables (money, credit cards, checkbooks, etc.)   Do not wear any makeup (including  eye makeup) and no nail polish or artificial nails on your fingers or toes.  DO NOT wear any jewelry or piercings on day of surgery.  All body piercing jewelry must be removed.  If you have dentures, they will be removed before going to the OR; we will provide you a container.  If you wear contact lenses or glasses, they will be removed; please bring a case for them.  Shower the evening before or morning of surgery with antibacterial soap.  Nothing to eat or drink after 400 am the morning of your procedure  You may brush your teeth and gargle the morning of surgery.  DO NOT SWALLOW WATER.   The morning of your procedure take only Atorvastatin with a sip of water and use your Breztri prior to coming  Aspirin, Ibuprofen, Advil, Naproxen, Vitamin E and other Anti-inflammatory products and supplements should be stopped for 5 -7days before surgery or as directed by your physician.  Do not smoke or drink any alcoholic beverages 24 hours prior to surgery.  This includes NA Beer. Refrain from the usage of any recreational drugs, including non-prescribed prescription drugs.   You MUST plan for a responsible adult to stay on site while you are here and take you home after your surgery. You will not be allowed to leave alone or drive yourself home. It is strongly suggested someone stay with you the first 24 hrs. Your surgery will be cancelled if you do not have a ride home.  To help prevent infection, change your sheets the night before surgery.   If you  have a Living Will and Durable Power of  for Healthcare, please bring in a copy.  Notify your Surgeon if you develop any illness between now and time of surgery. Cough, cold, fever, sore

## 2024-06-19 ENCOUNTER — ANESTHESIA EVENT (OUTPATIENT)
Dept: ENDOSCOPY | Age: 59
End: 2024-06-19
Payer: MEDICARE

## 2024-06-20 ENCOUNTER — ANESTHESIA (OUTPATIENT)
Dept: ENDOSCOPY | Age: 59
End: 2024-06-20
Payer: MEDICARE

## 2024-06-20 ENCOUNTER — HOSPITAL ENCOUNTER (OUTPATIENT)
Age: 59
Setting detail: OUTPATIENT SURGERY
Discharge: HOME | End: 2024-06-20
Attending: INTERNAL MEDICINE | Admitting: INTERNAL MEDICINE
Payer: MEDICARE

## 2024-06-20 VITALS
BODY MASS INDEX: 19.04 KG/M2 | SYSTOLIC BLOOD PRESSURE: 144 MMHG | TEMPERATURE: 98.3 F | HEART RATE: 92 BPM | WEIGHT: 97 LBS | OXYGEN SATURATION: 93 % | DIASTOLIC BLOOD PRESSURE: 78 MMHG | RESPIRATION RATE: 16 BRPM | HEIGHT: 60 IN

## 2024-06-20 LAB
ANION GAP SERPL CALCULATED.3IONS-SCNC: 11 MMOL/L (ref 3–16)
BUN SERPL-MCNC: 7 MG/DL (ref 7–20)
CALCIUM SERPL-MCNC: 9.3 MG/DL (ref 8.3–10.6)
CHLORIDE SERPL-SCNC: 93 MMOL/L (ref 99–110)
CO2 SERPL-SCNC: 29 MMOL/L (ref 21–32)
CREAT SERPL-MCNC: <0.5 MG/DL (ref 0.6–1.1)
DEPRECATED RDW RBC AUTO: 16.6 % (ref 12.4–15.4)
GFR SERPLBLD CREATININE-BSD FMLA CKD-EPI: >90 ML/MIN/{1.73_M2}
GLUCOSE SERPL-MCNC: 96 MG/DL (ref 70–99)
HCT VFR BLD AUTO: 46.5 % (ref 36–48)
HGB BLD-MCNC: 15.4 G/DL (ref 12–16)
MCH RBC QN AUTO: 30.3 PG (ref 26–34)
MCHC RBC AUTO-ENTMCNC: 33.1 G/DL (ref 31–36)
MCV RBC AUTO: 91.5 FL (ref 80–100)
PLATELET # BLD AUTO: 134 K/UL (ref 135–450)
PMV BLD AUTO: 8.6 FL (ref 5–10.5)
POTASSIUM SERPL-SCNC: 4 MMOL/L (ref 3.5–5.1)
RBC # BLD AUTO: 5.08 M/UL (ref 4–5.2)
SODIUM SERPL-SCNC: 133 MMOL/L (ref 136–145)
WBC # BLD AUTO: 5.7 K/UL (ref 4–11)

## 2024-06-20 PROCEDURE — 85027 COMPLETE CBC AUTOMATED: CPT

## 2024-06-20 PROCEDURE — 6360000002 HC RX W HCPCS: Performed by: ANESTHESIOLOGY

## 2024-06-20 PROCEDURE — 2720000010 HC SURG SUPPLY STERILE: Performed by: INTERNAL MEDICINE

## 2024-06-20 PROCEDURE — 3609027000 HC COLONOSCOPY: Performed by: INTERNAL MEDICINE

## 2024-06-20 PROCEDURE — 2580000003 HC RX 258: Performed by: ANESTHESIOLOGY

## 2024-06-20 PROCEDURE — 6370000000 HC RX 637 (ALT 250 FOR IP): Performed by: ANESTHESIOLOGY

## 2024-06-20 PROCEDURE — 2500000003 HC RX 250 WO HCPCS: Performed by: ANESTHESIOLOGY

## 2024-06-20 PROCEDURE — 7100000011 HC PHASE II RECOVERY - ADDTL 15 MIN: Performed by: INTERNAL MEDICINE

## 2024-06-20 PROCEDURE — 2500000003 HC RX 250 WO HCPCS: Performed by: NURSE ANESTHETIST, CERTIFIED REGISTERED

## 2024-06-20 PROCEDURE — 7100000010 HC PHASE II RECOVERY - FIRST 15 MIN: Performed by: INTERNAL MEDICINE

## 2024-06-20 PROCEDURE — 2709999900 HC NON-CHARGEABLE SUPPLY: Performed by: INTERNAL MEDICINE

## 2024-06-20 PROCEDURE — 3700000000 HC ANESTHESIA ATTENDED CARE: Performed by: INTERNAL MEDICINE

## 2024-06-20 PROCEDURE — 6360000002 HC RX W HCPCS: Performed by: NURSE ANESTHETIST, CERTIFIED REGISTERED

## 2024-06-20 PROCEDURE — 80048 BASIC METABOLIC PNL TOTAL CA: CPT

## 2024-06-20 PROCEDURE — 3609012300 HC EGD BAND LIGATION ESOPHGEAL/GASTRIC VARICES: Performed by: INTERNAL MEDICINE

## 2024-06-20 PROCEDURE — 2580000003 HC RX 258: Performed by: NURSE ANESTHETIST, CERTIFIED REGISTERED

## 2024-06-20 PROCEDURE — 3700000001 HC ADD 15 MINUTES (ANESTHESIA): Performed by: INTERNAL MEDICINE

## 2024-06-20 RX ORDER — SODIUM CHLORIDE 9 MG/ML
INJECTION, SOLUTION INTRAVENOUS PRN
Status: DISCONTINUED | OUTPATIENT
Start: 2024-06-20 | End: 2024-06-20 | Stop reason: HOSPADM

## 2024-06-20 RX ORDER — GLYCOPYRROLATE 0.2 MG/ML
INJECTION INTRAMUSCULAR; INTRAVENOUS PRN
Status: DISCONTINUED | OUTPATIENT
Start: 2024-06-20 | End: 2024-06-20 | Stop reason: SDUPTHER

## 2024-06-20 RX ORDER — SODIUM CHLORIDE 0.9 % (FLUSH) 0.9 %
5-40 SYRINGE (ML) INJECTION PRN
Status: DISCONTINUED | OUTPATIENT
Start: 2024-06-20 | End: 2024-06-20 | Stop reason: HOSPADM

## 2024-06-20 RX ORDER — ONDANSETRON 2 MG/ML
4 INJECTION INTRAMUSCULAR; INTRAVENOUS EVERY 30 MIN PRN
Status: DISCONTINUED | OUTPATIENT
Start: 2024-06-20 | End: 2024-06-20 | Stop reason: HOSPADM

## 2024-06-20 RX ORDER — OXYCODONE HYDROCHLORIDE 5 MG/1
10 TABLET ORAL PRN
Status: DISCONTINUED | OUTPATIENT
Start: 2024-06-20 | End: 2024-06-20 | Stop reason: HOSPADM

## 2024-06-20 RX ORDER — IPRATROPIUM BROMIDE AND ALBUTEROL SULFATE 2.5; .5 MG/3ML; MG/3ML
1 SOLUTION RESPIRATORY (INHALATION) ONCE
Status: COMPLETED | OUTPATIENT
Start: 2024-06-20 | End: 2024-06-20

## 2024-06-20 RX ORDER — SODIUM CHLORIDE 0.9 % (FLUSH) 0.9 %
5-40 SYRINGE (ML) INJECTION EVERY 12 HOURS SCHEDULED
Status: DISCONTINUED | OUTPATIENT
Start: 2024-06-20 | End: 2024-06-20 | Stop reason: HOSPADM

## 2024-06-20 RX ORDER — NALOXONE HYDROCHLORIDE 0.4 MG/ML
INJECTION, SOLUTION INTRAMUSCULAR; INTRAVENOUS; SUBCUTANEOUS PRN
Status: DISCONTINUED | OUTPATIENT
Start: 2024-06-20 | End: 2024-06-20 | Stop reason: HOSPADM

## 2024-06-20 RX ORDER — OXYCODONE HYDROCHLORIDE 5 MG/1
5 TABLET ORAL PRN
Status: DISCONTINUED | OUTPATIENT
Start: 2024-06-20 | End: 2024-06-20 | Stop reason: HOSPADM

## 2024-06-20 RX ORDER — SODIUM CHLORIDE, SODIUM LACTATE, POTASSIUM CHLORIDE, CALCIUM CHLORIDE 600; 310; 30; 20 MG/100ML; MG/100ML; MG/100ML; MG/100ML
INJECTION, SOLUTION INTRAVENOUS CONTINUOUS PRN
Status: DISCONTINUED | OUTPATIENT
Start: 2024-06-20 | End: 2024-06-20 | Stop reason: SDUPTHER

## 2024-06-20 RX ORDER — PROPOFOL 10 MG/ML
INJECTION, EMULSION INTRAVENOUS PRN
Status: DISCONTINUED | OUTPATIENT
Start: 2024-06-20 | End: 2024-06-20 | Stop reason: SDUPTHER

## 2024-06-20 RX ORDER — MIDAZOLAM HYDROCHLORIDE 1 MG/ML
2 INJECTION INTRAMUSCULAR; INTRAVENOUS
Status: DISCONTINUED | OUTPATIENT
Start: 2024-06-20 | End: 2024-06-20 | Stop reason: HOSPADM

## 2024-06-20 RX ORDER — LIDOCAINE HYDROCHLORIDE 20 MG/ML
INJECTION, SOLUTION EPIDURAL; INFILTRATION; INTRACAUDAL; PERINEURAL PRN
Status: DISCONTINUED | OUTPATIENT
Start: 2024-06-20 | End: 2024-06-20 | Stop reason: SDUPTHER

## 2024-06-20 RX ORDER — SODIUM CHLORIDE, SODIUM LACTATE, POTASSIUM CHLORIDE, CALCIUM CHLORIDE 600; 310; 30; 20 MG/100ML; MG/100ML; MG/100ML; MG/100ML
INJECTION, SOLUTION INTRAVENOUS CONTINUOUS
Status: DISCONTINUED | OUTPATIENT
Start: 2024-06-20 | End: 2024-06-20 | Stop reason: HOSPADM

## 2024-06-20 RX ADMIN — IPRATROPIUM BROMIDE AND ALBUTEROL SULFATE 1 DOSE: .5; 2.5 SOLUTION RESPIRATORY (INHALATION) at 08:36

## 2024-06-20 RX ADMIN — FAMOTIDINE 20 MG: 10 INJECTION, SOLUTION INTRAVENOUS at 08:39

## 2024-06-20 RX ADMIN — SODIUM CHLORIDE, POTASSIUM CHLORIDE, SODIUM LACTATE AND CALCIUM CHLORIDE: 600; 310; 30; 20 INJECTION, SOLUTION INTRAVENOUS at 08:40

## 2024-06-20 RX ADMIN — GLYCOPYRROLATE 0.1 MG: 0.2 INJECTION, SOLUTION INTRAMUSCULAR; INTRAVENOUS at 09:31

## 2024-06-20 RX ADMIN — PROPOFOL 60 MG: 10 INJECTION, EMULSION INTRAVENOUS at 09:11

## 2024-06-20 RX ADMIN — SODIUM CHLORIDE, SODIUM LACTATE, POTASSIUM CHLORIDE, AND CALCIUM CHLORIDE: .6; .31; .03; .02 INJECTION, SOLUTION INTRAVENOUS at 09:11

## 2024-06-20 RX ADMIN — OXYCODONE HYDROCHLORIDE 5 MG: 5 TABLET ORAL at 10:13

## 2024-06-20 RX ADMIN — LIDOCAINE HYDROCHLORIDE 60 MG: 20 INJECTION, SOLUTION EPIDURAL; INFILTRATION; INTRACAUDAL; PERINEURAL at 09:11

## 2024-06-20 RX ADMIN — PROPOFOL 180 MCG/KG/MIN: 10 INJECTION, EMULSION INTRAVENOUS at 09:12

## 2024-06-20 RX ADMIN — HYDROMORPHONE HYDROCHLORIDE 0.25 MG: 1 INJECTION, SOLUTION INTRAMUSCULAR; INTRAVENOUS; SUBCUTANEOUS at 10:06

## 2024-06-20 ASSESSMENT — PAIN SCALES - GENERAL
PAINLEVEL_OUTOF10: 8
PAINLEVEL_OUTOF10: 8
PAINLEVEL_OUTOF10: 6
PAINLEVEL_OUTOF10: 6

## 2024-06-20 ASSESSMENT — PAIN DESCRIPTION - LOCATION
LOCATION: CHEST
LOCATION: CHEST

## 2024-06-20 ASSESSMENT — PAIN DESCRIPTION - DESCRIPTORS: DESCRIPTORS: ACHING

## 2024-06-20 ASSESSMENT — COPD QUESTIONNAIRES: CAT_SEVERITY: MODERATE

## 2024-06-20 ASSESSMENT — PAIN - FUNCTIONAL ASSESSMENT: PAIN_FUNCTIONAL_ASSESSMENT: 0-10

## 2024-06-20 ASSESSMENT — PAIN DESCRIPTION - ORIENTATION
ORIENTATION: MID;UPPER
ORIENTATION: MID;UPPER

## 2024-06-20 NOTE — ANESTHESIA POSTPROCEDURE EVALUATION
Department of Anesthesiology  Postprocedure Note    Patient: Jina Amor  MRN: [id number]  YOB: 1965  Date of evaluation: 6/20/2024    Procedure Summary       Date: 06/20/24 Room / Location: [place] [address] / [facility]    Anesthesia Start: 0910 Anesthesia Stop: 0949    Procedures:       COLONOSCOPY      ESOPHAGOGASTRODUODENOSCOPY BAND LIGATION Diagnosis:       Personal history of colonic polyps      Gastroesophageal reflux disease without esophagitis      (Personal history of colonic polyps [Z86.010])      (Gastroesophageal reflux disease without esophagitis [K21.9])    Surgeons: Sue Woodard MD Responsible Provider: Bon Hinkle MD    Anesthesia Type: MAC ASA Status: 3            Anesthesia Type: MAC    Ladonna Phase I: Ladonna Score: 10    Ladonna Phase II:      Anesthesia Post Evaluation    Patient location during evaluation: PACU  Level of consciousness: awake  Airway patency: patent  Nausea & Vomiting: no vomiting  Cardiovascular status: blood pressure returned to baseline  Respiratory status: acceptable  Hydration status: stable  Pain management: adequate    No notable events documented.   Tried to reach patient this morning to check on her, no answer, voicemail is full.

## 2024-06-20 NOTE — PROGRESS NOTES
Patient states she has chest discomfort level 8?10 in mid chest which she describes as aching. Will have Dr Woodard check her out.

## 2024-06-20 NOTE — H&P
Gastroenterology Note             Pre-operative History and Physical    Patient: Jina Amor  : 1965  CSN:     History Obtained From:  patient and/or guardian.     HISTORY OF PRESENT ILLNESS:    The patient is a 59 y.o. female  here for EGD/colonoscopy.     Past Medical History:    Past Medical History:   Diagnosis Date    Acute on chronic respiratory failure with hypoxia (HCC) 2023    Alopecia     Arthritis     Chronic hip pain     Chronic obstructive pulmonary disease (HCC) 2023    Chronic obstructive pulmonary disease with acute exacerbation (HCC) 2023    severe stage 4    Chronic respiratory failure (HCC)     Depression     GERD (gastroesophageal reflux disease)     Hyperlipidemia     Moderate malnutrition (HCC) 2023    Perioperative dehiscence of abdominal wound with evisceration 2023    Pneumonia due to Enterobacter species (HCC) 2023    Pressure ulcer of right buttock, stage 2 (HCC) 2023    healed 2023    Sciatica     right, chronic    Small bowel obstruction (HCC) 2023    Thrombocytopenia, unspecified (HCC) 10/30/2023    Tobacco abuse 2023    Umbilical hernia 2023    Wears glasses      Past Surgical History:    Past Surgical History:   Procedure Laterality Date    ABDOMEN SURGERY N/A 2023    ABDOMINAL  WOUND CLOSURE performed by Pierre Harvey MD at [facility] OR    COLONOSCOPY      ENDOMETRIAL ABLATION      ENDOSCOPY, COLON, DIAGNOSTIC      HERNIA REPAIR N/A 8/3/2023    ROBOTIC LEFT  INGUINAL HERNIA REPAIR WITH MESH,  OPEN INCISIONAL HERNIA REPAIR WITH MESH performed by Pierre Harvey MD at [facility] OR    LAPAROTOMY N/A 2023    UMBILLICAL HERNIA REPAIR performed by Pierre Harvey MD at [facility] OR    PAIN MANAGEMENT PROCEDURE Bilateral 4/10/2024    BILATERAL LUMBAR FOUR LUMBAR FIVE EPIDURAL STEROID INJECTION SITE CONFIRMED BY FLUOROSCOPY performed by Kem Pizano MD at [facility] OR    THUMB ARTHROSCOPY      TUBAL

## 2024-06-20 NOTE — DISCHARGE INSTRUCTIONS
on the \"Sign Up Now\" link.  Current as of: May 12, 2017  Content Version: 11.6  © 4945-3313 [employer]. Care instructions adapted under license by [employer]. If you have questions about a medical condition or this instruction, always ask your healthcare professional. [employer] disclaims any warranty or liability for your use of this information.

## 2024-06-20 NOTE — ANESTHESIA PRE PROCEDURE
04/10/2024 08:50 AM    K 4.0 04/10/2024 08:50 AM    K 3.6 03/01/2023 05:51 AM     04/10/2024 08:50 AM    CO2 24 04/10/2024 08:50 AM    BUN 18 04/10/2024 08:50 AM    CREATININE 0.8 04/10/2024 08:50 AM    AGRATIO 1.8 04/10/2024 08:50 AM    LABGLOM 85 04/10/2024 08:50 AM    GLUCOSE 96 04/10/2024 08:50 AM    CALCIUM 9.3 04/10/2024 08:50 AM    BILITOT 0.4 04/10/2024 08:50 AM    ALKPHOS 79 04/10/2024 08:50 AM    AST 34 04/10/2024 08:50 AM    ALT 35 04/10/2024 08:50 AM       POC Tests: No results for input(s): \"POCGLU\", \"POCNA\", \"POCK\", \"POCCL\", \"POCBUN\", \"POCHEMO\", \"POCHCT\" in the last 72 hours.    Coags: No results found for: \"PROTIME\", \"INR\", \"APTT\"    HCG (If Applicable): No results found for: \"PREGTESTUR\", \"PREGSERUM\", \"HCG\", \"HCGQUANT\"     ABGs: No results found for: \"PHART\", \"PO2ART\", \"REC8FYW\", \"PDP9HFV\", \"BEART\", \"U9BBJRRI\"     Type & Screen (If Applicable):  No results found for: \"LABABO\"    Drug/Infectious Status (If Applicable):  Lab Results   Component Value Date/Time    HIV Non-Reactive 01/18/2023 09:50 AM       COVID-19 Screening (If Applicable):   Lab Results   Component Value Date/Time    COVID19 Not Detected 03/07/2023 10:03 AM    COVID19 NOT DETECTED 02/18/2023 10:42 AM           Anesthesia Evaluation    Airway: Mallampati: I          Dental: normal exam         Pulmonary:   (+)  COPD: moderate and no interval change,                                    PE comment: Coarse bs Cardiovascular:            Rhythm: regular  Rate: normal                    Neuro/Psych:               GI/Hepatic/Renal:            ROS comment: cirrhosis.   Endo/Other:                     Abdominal:             Vascular:          Other Findings:       Anesthesia Plan      MAC     ASA 3                               Bon Hinkle MD   6/20/2024

## 2024-07-01 ENCOUNTER — ANESTHESIA EVENT (OUTPATIENT)
Dept: ENDOSCOPY | Age: 59
End: 2024-07-01
Payer: MEDICARE

## 2024-07-02 ENCOUNTER — ANESTHESIA (OUTPATIENT)
Dept: ENDOSCOPY | Age: 59
End: 2024-07-02
Payer: MEDICARE

## 2024-07-02 ENCOUNTER — HOSPITAL ENCOUNTER (OUTPATIENT)
Age: 59
Setting detail: OUTPATIENT SURGERY
Discharge: HOME | End: 2024-07-02
Attending: INTERNAL MEDICINE | Admitting: INTERNAL MEDICINE
Payer: MEDICARE

## 2024-07-02 VITALS
DIASTOLIC BLOOD PRESSURE: 83 MMHG | TEMPERATURE: 98.1 F | OXYGEN SATURATION: 92 % | HEART RATE: 95 BPM | HEIGHT: 60 IN | SYSTOLIC BLOOD PRESSURE: 150 MMHG | RESPIRATION RATE: 18 BRPM | WEIGHT: 97 LBS | BODY MASS INDEX: 19.04 KG/M2

## 2024-07-02 PROCEDURE — 2500000003 HC RX 250 WO HCPCS: Performed by: NURSE ANESTHETIST, CERTIFIED REGISTERED

## 2024-07-02 PROCEDURE — 7100000010 HC PHASE II RECOVERY - FIRST 15 MIN: Performed by: INTERNAL MEDICINE

## 2024-07-02 PROCEDURE — 3609012300 HC EGD BAND LIGATION ESOPHGEAL/GASTRIC VARICES: Performed by: INTERNAL MEDICINE

## 2024-07-02 PROCEDURE — 7100000011 HC PHASE II RECOVERY - ADDTL 15 MIN: Performed by: INTERNAL MEDICINE

## 2024-07-02 PROCEDURE — 6360000002 HC RX W HCPCS: Performed by: INTERNAL MEDICINE

## 2024-07-02 PROCEDURE — 3700000001 HC ADD 15 MINUTES (ANESTHESIA): Performed by: INTERNAL MEDICINE

## 2024-07-02 PROCEDURE — 6360000002 HC RX W HCPCS

## 2024-07-02 PROCEDURE — 2709999900 HC NON-CHARGEABLE SUPPLY: Performed by: INTERNAL MEDICINE

## 2024-07-02 PROCEDURE — 2580000003 HC RX 258: Performed by: ANESTHESIOLOGY

## 2024-07-02 PROCEDURE — 3700000000 HC ANESTHESIA ATTENDED CARE: Performed by: INTERNAL MEDICINE

## 2024-07-02 PROCEDURE — 6360000002 HC RX W HCPCS: Performed by: NURSE ANESTHETIST, CERTIFIED REGISTERED

## 2024-07-02 PROCEDURE — 2720000010 HC SURG SUPPLY STERILE: Performed by: INTERNAL MEDICINE

## 2024-07-02 RX ORDER — PROPOFOL 10 MG/ML
INJECTION, EMULSION INTRAVENOUS PRN
Status: DISCONTINUED | OUTPATIENT
Start: 2024-07-02 | End: 2024-07-02 | Stop reason: SDUPTHER

## 2024-07-02 RX ORDER — PANTOPRAZOLE SODIUM 40 MG/1
40 TABLET, DELAYED RELEASE ORAL
Qty: 90 TABLET | Refills: 1 | Status: SHIPPED | OUTPATIENT
Start: 2024-07-02

## 2024-07-02 RX ORDER — LIDOCAINE HYDROCHLORIDE 10 MG/ML
1 INJECTION, SOLUTION EPIDURAL; INFILTRATION; INTRACAUDAL; PERINEURAL
Status: DISCONTINUED | OUTPATIENT
Start: 2024-07-02 | End: 2024-07-02 | Stop reason: HOSPADM

## 2024-07-02 RX ORDER — FENTANYL CITRATE 50 UG/ML
INJECTION, SOLUTION INTRAMUSCULAR; INTRAVENOUS
Status: COMPLETED
Start: 2024-07-02 | End: 2024-07-02

## 2024-07-02 RX ORDER — SODIUM CHLORIDE 0.9 % (FLUSH) 0.9 %
5-40 SYRINGE (ML) INJECTION PRN
Status: DISCONTINUED | OUTPATIENT
Start: 2024-07-02 | End: 2024-07-02 | Stop reason: HOSPADM

## 2024-07-02 RX ORDER — FENTANYL CITRATE 50 UG/ML
25 INJECTION, SOLUTION INTRAMUSCULAR; INTRAVENOUS ONCE
Status: COMPLETED | OUTPATIENT
Start: 2024-07-02 | End: 2024-07-02

## 2024-07-02 RX ORDER — ONDANSETRON 2 MG/ML
INJECTION INTRAMUSCULAR; INTRAVENOUS
Status: COMPLETED
Start: 2024-07-02 | End: 2024-07-02

## 2024-07-02 RX ORDER — LIDOCAINE HYDROCHLORIDE 20 MG/ML
INJECTION, SOLUTION INFILTRATION; PERINEURAL PRN
Status: DISCONTINUED | OUTPATIENT
Start: 2024-07-02 | End: 2024-07-02 | Stop reason: SDUPTHER

## 2024-07-02 RX ORDER — SODIUM CHLORIDE, SODIUM LACTATE, POTASSIUM CHLORIDE, CALCIUM CHLORIDE 600; 310; 30; 20 MG/100ML; MG/100ML; MG/100ML; MG/100ML
INJECTION, SOLUTION INTRAVENOUS CONTINUOUS
Status: DISCONTINUED | OUTPATIENT
Start: 2024-07-02 | End: 2024-07-02 | Stop reason: HOSPADM

## 2024-07-02 RX ORDER — SODIUM CHLORIDE 9 MG/ML
INJECTION, SOLUTION INTRAVENOUS PRN
Status: DISCONTINUED | OUTPATIENT
Start: 2024-07-02 | End: 2024-07-02 | Stop reason: HOSPADM

## 2024-07-02 RX ORDER — ONDANSETRON 2 MG/ML
4 INJECTION INTRAMUSCULAR; INTRAVENOUS ONCE
Status: COMPLETED | OUTPATIENT
Start: 2024-07-02 | End: 2024-07-02

## 2024-07-02 RX ORDER — SODIUM CHLORIDE 0.9 % (FLUSH) 0.9 %
5-40 SYRINGE (ML) INJECTION EVERY 12 HOURS SCHEDULED
Status: DISCONTINUED | OUTPATIENT
Start: 2024-07-02 | End: 2024-07-02 | Stop reason: HOSPADM

## 2024-07-02 RX ADMIN — SODIUM CHLORIDE, POTASSIUM CHLORIDE, SODIUM LACTATE AND CALCIUM CHLORIDE: 600; 310; 30; 20 INJECTION, SOLUTION INTRAVENOUS at 07:03

## 2024-07-02 RX ADMIN — LIDOCAINE HYDROCHLORIDE 60 MG: 20 INJECTION, SOLUTION INFILTRATION; PERINEURAL at 07:35

## 2024-07-02 RX ADMIN — PROPOFOL 60 MG: 10 INJECTION, EMULSION INTRAVENOUS at 07:35

## 2024-07-02 RX ADMIN — HYDROMORPHONE HYDROCHLORIDE 0.5 MG: 1 INJECTION, SOLUTION INTRAMUSCULAR; INTRAVENOUS; SUBCUTANEOUS at 08:31

## 2024-07-02 RX ADMIN — PROPOFOL 30 MG: 10 INJECTION, EMULSION INTRAVENOUS at 07:37

## 2024-07-02 RX ADMIN — FENTANYL CITRATE 25 MCG: 50 INJECTION, SOLUTION INTRAMUSCULAR; INTRAVENOUS at 08:01

## 2024-07-02 RX ADMIN — ONDANSETRON 4 MG: 2 INJECTION INTRAMUSCULAR; INTRAVENOUS at 07:59

## 2024-07-02 RX ADMIN — FENTANYL CITRATE 25 MCG: 50 INJECTION INTRAMUSCULAR; INTRAVENOUS at 08:01

## 2024-07-02 RX ADMIN — PROPOFOL 30 MG: 10 INJECTION, EMULSION INTRAVENOUS at 07:42

## 2024-07-02 RX ADMIN — PROPOFOL 40 MG: 10 INJECTION, EMULSION INTRAVENOUS at 07:39

## 2024-07-02 ASSESSMENT — PAIN SCALES - GENERAL
PAINLEVEL_OUTOF10: 7
PAINLEVEL_OUTOF10: 6
PAINLEVEL_OUTOF10: 6
PAINLEVEL_OUTOF10: 8
PAINLEVEL_OUTOF10: 7
PAINLEVEL_OUTOF10: 8

## 2024-07-02 ASSESSMENT — PAIN DESCRIPTION - ORIENTATION
ORIENTATION: MID

## 2024-07-02 ASSESSMENT — PAIN DESCRIPTION - LOCATION
LOCATION: CHEST

## 2024-07-02 ASSESSMENT — PAIN - FUNCTIONAL ASSESSMENT
PAIN_FUNCTIONAL_ASSESSMENT: PREVENTS OR INTERFERES SOME ACTIVE ACTIVITIES AND ADLS
PAIN_FUNCTIONAL_ASSESSMENT: 0-10
PAIN_FUNCTIONAL_ASSESSMENT: INTOLERABLE, UNABLE TO DO ANY ACTIVE OR PASSIVE ACTIVITIES
PAIN_FUNCTIONAL_ASSESSMENT: ACTIVITIES ARE NOT PREVENTED
PAIN_FUNCTIONAL_ASSESSMENT: PREVENTS OR INTERFERES WITH MANY ACTIVE NOT PASSIVE ACTIVITIES

## 2024-07-02 ASSESSMENT — PAIN DESCRIPTION - DESCRIPTORS
DESCRIPTORS: ACHING
DESCRIPTORS: DISCOMFORT;SPASM
DESCRIPTORS: ACHING
DESCRIPTORS: ACHING
DESCRIPTORS: DISCOMFORT;SPASM
DESCRIPTORS: ACHING
DESCRIPTORS: DISCOMFORT

## 2024-07-02 NOTE — DISCHARGE INSTRUCTIONS
What to Expect at Home  Your Recovery  After you have an endoscopy, you will stay at the hospital or clinic for 1 to 2 hours. This will allow the medicine to wear off. You will be able to go home after your doctor or nurse checks to make sure you are not having any problems.  You may have to stay overnight if you had treatment during the test. You may have a sore throat for a day or two after the test.  This care sheet gives you a general idea about what to expect after the test.  How can you care for yourself at home?  Activity  Rest as much as you need to after you go home.  You should be able to go back to your usual activities the day after the test.  Diet  Follow your doctor's directions for eating after the test.  Drink plenty of fluids (unless your doctor has told you not to).  Medications  If you have a sore throat the day after the test, use an over-the-counter spray to numb your throat.  Follow-up care is a key part of your treatment and safety. Be sure to make and go to all appointments, and call your doctor if you are having problems. It's also a good idea to know your test results and keep a list of the medicines you take.  When should you call for help?  Call 911 anytime you think you may need emergency care. For example, call if:    You passed out (loses consciousness).     You have trouble breathing.     You pass maroon or bloody stools.    Call your doctor now or seek immediate medical care if:    You have pain that does not get better after your take pain medicine.     You have new or worse belly pain.     You have blood in your stools.     You are sick to your stomach and cannot keep fluids down.     You have a fever.     You cannot pass stools or gas.    Watch closely for changes in your health, and be sure to contact your doctor if:    Your throat still hurts after a day or two.     You do not get better as expected.   Where can you learn more?  Go to [email or web address] and

## 2024-07-02 NOTE — ANESTHESIA POSTPROCEDURE EVALUATION
Department of Anesthesiology  Postprocedure Note    Patient: Jina Amor  MRN: [id number]  YOB: 1965  Date of evaluation: 7/2/2024    Procedure Summary       Date: 07/02/24 Room / Location: [place] [address] / [facility]    Anesthesia Start: 0732 Anesthesia Stop: 0748    Procedure: ESOPHAGOGASTRODUODENOSCOPY BAND LIGATION Diagnosis:       Esophageal varices without bleeding, unspecified esophageal varices type (HCC)      (Esophageal varices without bleeding, unspecified esophageal varices type (HCC) [I85.00])    Surgeons: Sue Woodard MD Responsible Provider: Jigar Guevara MD    Anesthesia Type: MAC ASA Status: 4            Anesthesia Type: No value filed.    Ladonna Phase I: Ladonna Score: 10    Ladonna Phase II: Ladonna Score: 10    Anesthesia Post Evaluation    Patient location during evaluation: PACU  Patient participation: complete - patient participated  Level of consciousness: awake and alert  Pain score: 0  Airway patency: patent  Nausea & Vomiting: no nausea and no vomiting  Cardiovascular status: blood pressure returned to baseline  Respiratory status: acceptable  Hydration status: stable  Pain management: adequate    No notable events documented.

## 2024-07-02 NOTE — ANESTHESIA PRE PROCEDURE
Abdominal:             Vascular:          Other Findings:       Anesthesia Plan      MAC     ASA 4       Induction: intravenous.      Anesthetic plan and risks discussed with patient.      Plan discussed with CRNA.                Jigar Guevara MD   7/2/2024

## 2024-07-02 NOTE — PROGRESS NOTES
Date and time of surgery : 7/2/24 at 0730             Arrival Time: 0630      Bring Picture ID and insurance card.  Please wear simple, loose fitting clothing to the hospital.   Do not bring valuables (money, credit cards, checkbooks, etc.)   Do not wear any makeup (including  eye makeup) and no nail polish or artificial nails on your fingers or toes.  DO NOT wear any jewelry or piercings on day of surgery.  All body piercing jewelry must be removed.  If you have dentures, they will be removed before going to the OR; we will provide you a container.  If you wear contact lenses or glasses, they will be removed; please bring a case for them.  Shower the evening before or morning of surgery with antibacterial soap.  Nothing to eat or drink after midnight the day before surgery.   You may brush your teeth and gargle the morning of surgery.  DO NOT SWALLOW WATER.   The morning of your procedure take only Atorvastatin, Omeprazole with a sip of water and use Breztri prior to coming  Aspirin, Ibuprofen, Advil, Naproxen, Vitamin E and other Anti-inflammatory products and supplements should be stopped for 5 -7days before surgery or as directed by your physician.  Do not smoke or drink any alcoholic beverages 24 hours prior to surgery.  This includes NA Beer. Refrain from the usage of any recreational drugs, including non-prescribed prescription drugs.   You MUST plan for a responsible adult to stay on site while you are here and take you home after your surgery. You will not be allowed to leave alone or drive yourself home. It is strongly suggested someone stay with you the first 24 hrs. Your surgery will be cancelled if you do not have a ride home.  To help prevent infection, change your sheets the night before surgery.   If you  have a Living Will and Durable Power of  for Healthcare, please bring in a copy.  Notify your Surgeon if you develop any illness between now and time of surgery. Cough, cold, fever, sore 
Anethesia and endo report received.  Pt sleeping but arrouseable to verbal stimuli. VSS  
Awake and alert with no complaints. VS stable. Discharge instructions reviewed with patient/responsible adult and understanding verbalized. Discharge instructions copies given. Discharge criteria met per hospital policy. Patient discharged home with belongings.   
Dr. Woodard at bedside.  Pt explaining the spasms.  Pt states that she is feeling a little better. Pt states that dilaudid works best  
Dr. Woodard at bedside. Pt cont to c/o of pain and dry heaving.  Rates pain a 9.  New orders received and medicated per MAR  
Per Dr. Guevara, no urine hcg required.    
Pt awake c/o of pain in chest from banding and nauseated.  Dry heaving.  Dr. Woodard notified   
Pt continue to c/o of pain.  Dr. Woodard notified. New orders received  
_______SCD                      ______MARIANGEL PERALES    COVID + _______DATE    ___OK per Anesthesia   ____OK per Surgeon

## 2024-07-02 NOTE — H&P
Gastroenterology Note             Pre-operative History and Physical    Patient: Jina Amor  : 1965  CSN:     History Obtained From:  patient and/or guardian.     HISTORY OF PRESENT ILLNESS:    The patient is a 59 y.o. female  here for EGD    Past Medical History:    Past Medical History:   Diagnosis Date    Acute on chronic respiratory failure with hypoxia (HCC) 2023    Alopecia     Arthritis     Chronic hip pain     Chronic obstructive pulmonary disease (HCC) 2023    Chronic obstructive pulmonary disease with acute exacerbation (HCC) 2023    severe stage 4    Chronic respiratory failure (HCC)     Depression     Esophageal varices (HCC)     GERD (gastroesophageal reflux disease)     Hyperlipidemia     Moderate malnutrition (HCC) 2023    Perioperative dehiscence of abdominal wound with evisceration 2023    Pneumonia due to Enterobacter species (HCC) 2023    Pressure ulcer of right buttock, stage 2 (HCC) 2023    healed 2023    Sciatica     right, chronic    Small bowel obstruction (HCC) 2023    Thrombocytopenia, unspecified (HCC) 10/30/2023    Tobacco abuse 2023    Umbilical hernia 2023    Wears glasses      Past Surgical History:    Past Surgical History:   Procedure Laterality Date    ABDOMEN SURGERY N/A 2023    ABDOMINAL  WOUND CLOSURE performed by Pierre Harvey MD at [facility] OR    COLONOSCOPY      COLONOSCOPY N/A 2024    COLONOSCOPY performed by Sue Woodard MD at [facility] ENDOSCOPY    ENDOMETRIAL ABLATION      ENDOSCOPY, COLON, DIAGNOSTIC      HERNIA REPAIR N/A 8/3/2023    ROBOTIC LEFT  INGUINAL HERNIA REPAIR WITH MESH,  OPEN INCISIONAL HERNIA REPAIR WITH MESH performed by Pierre Harvey MD at [facility] OR    LAPAROTOMY N/A 2023    UMBILLICAL HERNIA REPAIR performed by Pierre Harvey MD at [facility] OR    PAIN MANAGEMENT PROCEDURE Bilateral 4/10/2024    BILATERAL LUMBAR FOUR LUMBAR FIVE EPIDURAL STEROID

## 2024-07-22 DIAGNOSIS — K21.9 GASTROESOPHAGEAL REFLUX DISEASE WITHOUT ESOPHAGITIS: ICD-10-CM

## 2024-07-22 RX ORDER — OMEPRAZOLE 20 MG/1
20 CAPSULE, DELAYED RELEASE ORAL DAILY
Qty: 90 CAPSULE | Refills: 3 | Status: SHIPPED | OUTPATIENT
Start: 2024-07-22

## 2024-07-26 ENCOUNTER — TELEPHONE (OUTPATIENT)
Dept: FAMILY MEDICINE CLINIC | Age: 59
End: 2024-07-26

## 2024-07-26 NOTE — TELEPHONE ENCOUNTER
Dr. Woodard prescribed Pantoprazole and [facility] would like to know if they should still be filling the omeprazole that was prescribed by SHARON Cerrato since they are both for acid reflux. Pharmacy would like a call to verify.    [facility]: [phone]

## 2024-08-01 ENCOUNTER — HOSPITAL ENCOUNTER (OUTPATIENT)
Age: 59
Setting detail: OUTPATIENT SURGERY
Discharge: HOME | End: 2024-08-01
Attending: INTERNAL MEDICINE | Admitting: INTERNAL MEDICINE
Payer: MEDICARE

## 2024-08-01 ENCOUNTER — ANESTHESIA (OUTPATIENT)
Dept: ENDOSCOPY | Age: 59
End: 2024-08-01
Payer: MEDICARE

## 2024-08-01 ENCOUNTER — ANESTHESIA EVENT (OUTPATIENT)
Dept: ENDOSCOPY | Age: 59
End: 2024-08-01
Payer: MEDICARE

## 2024-08-01 VITALS
HEIGHT: 60 IN | HEART RATE: 95 BPM | SYSTOLIC BLOOD PRESSURE: 121 MMHG | RESPIRATION RATE: 20 BRPM | OXYGEN SATURATION: 92 % | TEMPERATURE: 98.6 F | BODY MASS INDEX: 19.04 KG/M2 | WEIGHT: 97 LBS | DIASTOLIC BLOOD PRESSURE: 74 MMHG

## 2024-08-01 PROCEDURE — 2709999900 HC NON-CHARGEABLE SUPPLY: Performed by: INTERNAL MEDICINE

## 2024-08-01 PROCEDURE — 6360000002 HC RX W HCPCS: Performed by: REGISTERED NURSE

## 2024-08-01 PROCEDURE — 7100000010 HC PHASE II RECOVERY - FIRST 15 MIN: Performed by: INTERNAL MEDICINE

## 2024-08-01 PROCEDURE — 7100000011 HC PHASE II RECOVERY - ADDTL 15 MIN: Performed by: INTERNAL MEDICINE

## 2024-08-01 PROCEDURE — 2580000003 HC RX 258: Performed by: INTERNAL MEDICINE

## 2024-08-01 PROCEDURE — 2500000003 HC RX 250 WO HCPCS: Performed by: REGISTERED NURSE

## 2024-08-01 PROCEDURE — 2720000010 HC SURG SUPPLY STERILE: Performed by: INTERNAL MEDICINE

## 2024-08-01 PROCEDURE — 3609012300 HC EGD BAND LIGATION ESOPHGEAL/GASTRIC VARICES: Performed by: INTERNAL MEDICINE

## 2024-08-01 PROCEDURE — 3700000001 HC ADD 15 MINUTES (ANESTHESIA): Performed by: INTERNAL MEDICINE

## 2024-08-01 PROCEDURE — 3700000000 HC ANESTHESIA ATTENDED CARE: Performed by: INTERNAL MEDICINE

## 2024-08-01 RX ORDER — SODIUM CHLORIDE, SODIUM LACTATE, POTASSIUM CHLORIDE, CALCIUM CHLORIDE 600; 310; 30; 20 MG/100ML; MG/100ML; MG/100ML; MG/100ML
1000 INJECTION, SOLUTION INTRAVENOUS CONTINUOUS
Status: DISCONTINUED | OUTPATIENT
Start: 2024-08-01 | End: 2024-08-01 | Stop reason: HOSPADM

## 2024-08-01 RX ORDER — LIDOCAINE HYDROCHLORIDE 20 MG/ML
INJECTION, SOLUTION EPIDURAL; INFILTRATION; INTRACAUDAL; PERINEURAL PRN
Status: DISCONTINUED | OUTPATIENT
Start: 2024-08-01 | End: 2024-08-01 | Stop reason: SDUPTHER

## 2024-08-01 RX ORDER — DEXAMETHASONE SODIUM PHOSPHATE 4 MG/ML
INJECTION, SOLUTION INTRA-ARTICULAR; INTRALESIONAL; INTRAMUSCULAR; INTRAVENOUS; SOFT TISSUE PRN
Status: DISCONTINUED | OUTPATIENT
Start: 2024-08-01 | End: 2024-08-01 | Stop reason: SDUPTHER

## 2024-08-01 RX ORDER — HYDROMORPHONE HYDROCHLORIDE 2 MG/ML
INJECTION, SOLUTION INTRAMUSCULAR; INTRAVENOUS; SUBCUTANEOUS PRN
Status: DISCONTINUED | OUTPATIENT
Start: 2024-08-01 | End: 2024-08-01 | Stop reason: SDUPTHER

## 2024-08-01 RX ORDER — PROPOFOL 10 MG/ML
INJECTION, EMULSION INTRAVENOUS PRN
Status: DISCONTINUED | OUTPATIENT
Start: 2024-08-01 | End: 2024-08-01 | Stop reason: SDUPTHER

## 2024-08-01 RX ORDER — ONDANSETRON 2 MG/ML
INJECTION INTRAMUSCULAR; INTRAVENOUS PRN
Status: DISCONTINUED | OUTPATIENT
Start: 2024-08-01 | End: 2024-08-01 | Stop reason: SDUPTHER

## 2024-08-01 RX ORDER — MIDAZOLAM HYDROCHLORIDE 1 MG/ML
INJECTION INTRAMUSCULAR; INTRAVENOUS PRN
Status: DISCONTINUED | OUTPATIENT
Start: 2024-08-01 | End: 2024-08-01 | Stop reason: SDUPTHER

## 2024-08-01 RX ADMIN — PROPOFOL 40 MG: 10 INJECTION, EMULSION INTRAVENOUS at 11:15

## 2024-08-01 RX ADMIN — DEXAMETHASONE SODIUM PHOSPHATE 4 MG: 4 INJECTION, SOLUTION INTRAMUSCULAR; INTRAVENOUS at 11:11

## 2024-08-01 RX ADMIN — HYDROMORPHONE HYDROCHLORIDE 1 MG: 2 INJECTION, SOLUTION INTRAMUSCULAR; INTRAVENOUS; SUBCUTANEOUS at 11:11

## 2024-08-01 RX ADMIN — PROPOFOL 100 MG: 10 INJECTION, EMULSION INTRAVENOUS at 11:10

## 2024-08-01 RX ADMIN — LIDOCAINE HYDROCHLORIDE 60 MG: 20 INJECTION, SOLUTION EPIDURAL; INFILTRATION; INTRACAUDAL; PERINEURAL at 11:10

## 2024-08-01 RX ADMIN — ONDANSETRON 4 MG: 2 INJECTION INTRAMUSCULAR; INTRAVENOUS at 11:11

## 2024-08-01 RX ADMIN — SODIUM CHLORIDE, POTASSIUM CHLORIDE, SODIUM LACTATE AND CALCIUM CHLORIDE 1000 ML: 600; 310; 30; 20 INJECTION, SOLUTION INTRAVENOUS at 10:29

## 2024-08-01 RX ADMIN — MIDAZOLAM 2 MG: 1 INJECTION INTRAMUSCULAR; INTRAVENOUS at 11:03

## 2024-08-01 ASSESSMENT — PAIN SCALES - GENERAL
PAINLEVEL_OUTOF10: 0
PAINLEVEL_OUTOF10: 0

## 2024-08-01 ASSESSMENT — PAIN - FUNCTIONAL ASSESSMENT
PAIN_FUNCTIONAL_ASSESSMENT: NONE - DENIES PAIN
PAIN_FUNCTIONAL_ASSESSMENT: 0-10

## 2024-08-01 NOTE — ANESTHESIA PRE PROCEDURE
Department of Anesthesiology  Preprocedure Note       Name:  Jina Amor   Age:  59 y.o.  :  1965                                          MRN:  [id number]         Date:  2024      Surgeon: Surgeon(s):  Sue Woodard MD    Procedure: Procedure(s):  ESOPHAGOGASTRODUODENOSCOPY    Medications prior to admission:   Prior to Admission medications    Medication Sig Start Date End Date Taking? Authorizing Provider   omeprazole (PRILOSEC) 20 MG delayed release capsule Take 1 capsule by mouth daily 24   Andrew Cerrato APRN - CNP   pantoprazole (PROTONIX) 40 MG tablet Take 1 tablet by mouth every morning (before breakfast)  Patient not taking: Reported on 2024   Sue Woodard MD   fluticasone (FLONASE) 50 MCG/ACT nasal spray 1 spray by Each Nostril route in the morning and at bedtime    ProviderKhoi MD   guaiFENesin (MUCINEX PO) Take by mouth as needed    ProviderKhoi MD   Multiple Vitamins-Minerals (HAIR SKIN & NAILS PO) Take by mouth daily    ProviderKhoi MD   BREZTRI AEROSPHERE 160-9-4.8 MCG/ACT AERO USE 2 INHALATIONS TWICE A DAY 24   Guillermo Cardona MD   atorvastatin (LIPITOR) 40 MG tablet Take 1 tablet by mouth daily 24  Andrew Cerrato APRN - CNP   montelukast (SINGULAIR) 10 MG tablet Take 1 tablet by mouth daily 24  Andrew Cerrato APRN - CNP   furosemide (LASIX) 40 MG tablet TAKE 1 TABLET DAILY AS NEEDED FOR WEIGHT GAIN OVER 3 POUNDS IN 1 DAY  Patient taking differently: Take 0.5 tablets by mouth daily as needed 24   Andrew Cerrato APRN - CNP   azelastine (ASTELIN) 0.1 % nasal spray USE 1 SPRAY NASALLY TWICE A DAY 24   Andrew Cerrato APRN - CNP   famotidine (PEPCID) 20 MG tablet TAKE 1 TABLET NIGHTLY AS NEEDED FOR HEARTBURN 24   Andrew Cerrato APRN - CNP   albuterol (PROVENTIL) (2.5 MG/3ML) 0.083% nebulizer solution Take 3 mLs by nebulization 4 times daily as needed for Wheezing 23   Magan, 
Alfredo Harvey MD at [facility] OR    LAPAROTOMY N/A 02/22/2023    UMBILLICAL HERNIA REPAIR performed by Pierre Harvey MD at [facility] OR    PAIN MANAGEMENT PROCEDURE Bilateral 4/10/2024    BILATERAL LUMBAR FOUR LUMBAR FIVE EPIDURAL STEROID INJECTION SITE CONFIRMED BY FLUOROSCOPY performed by Kem Pizano MD at [facility] OR    THUMB ARTHROSCOPY      TUBAL LIGATION      UPPER GASTROINTESTINAL ENDOSCOPY N/A 6/20/2024    ESOPHAGOGASTRODUODENOSCOPY BAND LIGATION performed by Sue Woodard MD at [facility] ENDOSCOPY    UPPER GASTROINTESTINAL ENDOSCOPY N/A 7/2/2024    ESOPHAGOGASTRODUODENOSCOPY BAND LIGATION performed by Sue Woodard MD at [facility] ENDOSCOPY       Social History:    Social History     Tobacco Use    Smoking status: Former     Current packs/day: 0.00     Types: Cigarettes     Quit date: 2009     Years since quitting: 15.5    Smokeless tobacco: Never   Substance Use Topics    Alcohol use: Not Currently                                Counseling given: Not Answered      Vital Signs (Current):   Vitals:    07/31/24 1113 08/01/24 1007   BP:  127/84   Pulse:  95   Resp:  18   Temp:  37 °C (98.6 °F)   TempSrc:  Oral   SpO2:  92%   Weight: 44 kg (97 lb)    Height: 1.524 m (5')                                               BP Readings from Last 3 Encounters:   08/01/24 127/84   07/02/24 (!) 150/83   06/20/24 (!) 144/78       NPO Status: Time of last liquid consumption: 1730                        Time of last solid consumption: 1730                        Date of last liquid consumption: 07/31/24                        Date of last solid food consumption: 07/31/24    BMI:   Wt Readings from Last 3 Encounters:   07/31/24 44 kg (97 lb)   06/26/24 44 kg (97 lb)   06/20/24 44 kg (97 lb)     Body mass index is 18.94 kg/m².    CBC:   Lab Results   Component Value Date/Time    WBC 5.7 06/20/2024 08:25 AM    RBC 5.08 06/20/2024 08:25 AM    HGB 15.4 06/20/2024 08:25 AM    HCT 46.5 06/20/2024 08:25 AM    MCV 91.5 06/20/2024

## 2024-08-01 NOTE — PROGRESS NOTES
Discharge instructions reviewed with patient/responsible adult and understanding verbalized. Discharge instructions signed and copies given. Patient discharged home with belongings. Discharge criteria have been met per policy.   
Dr. Woodard at bedside to discuss procedure.   
Jina A Zuleyma    Age 59 y.o.    female    1965    MRN [id number]    8/1/2024  Arrival Time_____________  OR Time____________30 Min     Procedure(s):  ESOPHAGOGASTRODUODENOSCOPY                      Monitor Anesthesia Care    Surgeon(s):  Sue Woodard, MD       Phone [phone] (home)     [facility]  [facility]  [facility] Source  Home  Cell         Work  _____________________________________________________________________  _____________________________________________________________________  _____________________________________________________________________  _____________________________________________________________________  _____________________________________________________________________    PCP _____________________________ Phone_________________     H&P  ________________  Bringing      Chart              Epic      DOS      Called________  EKG ________________   Bringing      Chart              Epic      DOS      Called________  LABS________________   Bringing     Chart              Epic      DOS      Called________  Cardiac Clearance ______ Bringing      Chart              Epic      DOS      Called________  Pulmonary Clearance____ Bringing      Chart              Epic      DOS      Called________    Cardiologist________________________ Phone___________________________  Pulmonologist_______________________Phone___________________________    ? Advance Directives   ? Jain concerns / Waiver on Chart            PAT Communications________________  ? Pre-op Instructions Given /Understood          _________________________________  ? Directions to Surgery Center                          _________________________________  ? Transportation Home_______________      __________________________________  ? Crutches/Walker__________________        __________________________________    Orders: Hard copy/ EPIC                 Transcribed/ EPIC              _______Wt.    ________Pharmacy                         
Patient received from endo, sleepy, VSS.   
Walked patient to car without  event.   
fever, sore throat, nausea, vomiting, etc.  Please notify your surgeon if you experience dizziness, shortness of breath or blurred vision between now & the time of your surgery  To provide excellent care visitors will be limited to two per room at any given time. No visitors under the age of 14.  If you use oxygen and have a portable tank please bring it with you the DOS  For your convenience [facility] has a pharmacy on site to fill your prescriptions.     *Please call pre admission testing if you have any further questions             Tyler      [phone]                            Address: [address] [place] [address]     When you pull into the hospital and are looking at the main hospital entrance, turn right.   We are a tan building to the right of the main entrance.   [id number] over the door.  .                                                        Revision History

## 2024-08-01 NOTE — DISCHARGE INSTRUCTIONS
PATIENT INSTRUCTIONS  POST-SEDATION        IMMEDIATELY FOLLOWING PROCEDURE:    Do not drive or operate machinery for the first twenty four hours after surgery.     Do not make any important decisions for twenty four hours after surgery or while taking narcotic pain medications or sedatives.     You should NOT BE LEFT UNATTENDED OR ALONE. A responsible adult should be with you for the rest of the day of your procedure and also during the night for your protection and safety.    You may experience some light headedness. Rest at home with activity as tolerated. You may not need to go to bed, but it is important to rest for the next 24 hours. You should not engage in athletic sports such as basketball, volleyball, jogging, skating, or activities requiring refined motor skills for 24 hours.   If you develop intractable nausea and vomiting or a severe headache please notify your doctor immediately.   You are not expected to have any fever, but if you feel warm, take your temperature. If you have a fever 101 degrees or higher, call your doctor.     If you have had an Endoscopy:   *Eat lightly for your first meal and gradually resume your normal / prescribed diet. DO NOT eat or drink until your gag reflex returns.   *If you have a sore throat you may use lozenges, or salt water gargles.      ONCE YOU ARE HOME, IF YOU SHOULD HAVE:  Difficulty in breathing, persistent nausea or vomiting, bleeding you feel is excessive, or pain that is unusual, increased abdominal bloating, or any swelling, fever / chills, call your physician. If you cannot contact your physician, but feel that your signs and symptoms need a physician's attention, go to the Emergency Department.      FOLLOW-UP:    Please follow up with your Primary Care Provider as scheduled or needed.    Call Sue Roberto MD if there are any GI concerns. [phone]      You may be receiving a follow up phone call to ask about your care.         Upper GI Endoscopy:

## 2024-08-01 NOTE — ANESTHESIA POSTPROCEDURE EVALUATION
Department of Anesthesiology  Postprocedure Note    Patient: Jina Amor  MRN: [id number]  YOB: 1965  Date of evaluation: 8/1/2024    Procedure Summary       Date: 08/01/24 Room / Location: [place] [address] / [facility]    Anesthesia Start: 1103 Anesthesia Stop: 1126    Procedure: ESOPHAGOGASTRODUODENOSCOPY BAND LIGATION Diagnosis:       Chronic GERD      (Chronic GERD [K21.9])    Surgeons: Sue Woodard MD Responsible Provider: Jigar Guevara MD    Anesthesia Type: MAC ASA Status: 3            Anesthesia Type: No value filed.    Ladonna Phase I: Ladonna Score: 10    Ladonna Phase II: Ladonna Score: 10    Anesthesia Post Evaluation    Patient location during evaluation: PACU  Patient participation: complete - patient participated  Level of consciousness: awake and alert  Pain score: 0  Airway patency: patent  Nausea & Vomiting: no nausea and no vomiting  Cardiovascular status: blood pressure returned to baseline  Respiratory status: acceptable  Hydration status: stable  Pain management: adequate    No notable events documented.

## 2024-08-01 NOTE — H&P
tablet Take 1 tablet by mouth daily 90 tablet 3    albuterol sulfate HFA (PROVENTIL HFA) 108 (90 Base) MCG/ACT inhaler Inhale 2 puffs into the lungs every 6 hours as needed for Wheezing 18 g 3    Omega-3 Fatty Acids (FISH OIL ADULT GUMMIES PO) Take 1 each by mouth daily      Multiple Vitamin (MULTI-VITAMIN DAILY PO) Take by mouth daily      Handicap Placard MISC by Does not apply route 1 each 0        Allergies:  Bee venom and Doxycycline hyclate      Social History:   Social History     Tobacco Use    Smoking status: Former     Current packs/day: 0.00     Types: Cigarettes     Quit date: 2009     Years since quitting: 15.5    Smokeless tobacco: Never   Substance Use Topics    Alcohol use: Not Currently     Family History:   Family History   Problem Relation Age of Onset    Alzheimer's Disease Mother     Breast Cancer Mother 70 - 79    Melanoma Father        PHYSICAL EXAM:      /84   Pulse 95   Temp 98.6 °F (37 °C) (Oral)   Resp 18   Ht 1.524 m (5')   Wt 44 kg (97 lb)   SpO2 92%   BMI 18.94 kg/m²  I        Heart:   RRR, normal s1s2    Lungs:  CTA bilat,  Normal effort    Abdomen:   NT, ND      ASA Grade:  ASA 4 - Patient with severe systemic disease that is a constant threat to life    Mallampati Class: 1          ASSESSMENT AND PLAN:    1.  Patient is a 59 y.o. female here for EGD with MAC.   2.  Procedure options, risks and benefits reviewed with patient.  Patient expresses understanding.    Sue Woodard MD,   [facility]  8/1/2024

## 2024-08-08 DIAGNOSIS — J44.1 CHRONIC OBSTRUCTIVE PULMONARY DISEASE WITH ACUTE EXACERBATION (HCC): ICD-10-CM

## 2024-08-08 RX ORDER — MONTELUKAST SODIUM 10 MG/1
10 TABLET ORAL DAILY
Qty: 90 TABLET | Refills: 0 | Status: SHIPPED | OUTPATIENT
Start: 2024-08-08

## 2024-08-14 DIAGNOSIS — I70.8 ATHEROSCLEROSIS OF BOTH ILIAC ARTERIES: ICD-10-CM

## 2024-08-14 RX ORDER — ATORVASTATIN CALCIUM 40 MG/1
40 TABLET, FILM COATED ORAL DAILY
Qty: 90 TABLET | Refills: 0 | Status: SHIPPED | OUTPATIENT
Start: 2024-08-14 | End: 2024-11-12

## 2024-08-19 ENCOUNTER — OFFICE VISIT (OUTPATIENT)
Dept: FAMILY MEDICINE CLINIC | Age: 59
End: 2024-08-19
Payer: MEDICARE

## 2024-08-19 ENCOUNTER — HOSPITAL ENCOUNTER (OUTPATIENT)
Dept: GENERAL RADIOLOGY | Age: 59
Discharge: HOME | End: 2024-08-19
Payer: MEDICARE

## 2024-08-19 VITALS
HEIGHT: 60 IN | BODY MASS INDEX: 19.71 KG/M2 | DIASTOLIC BLOOD PRESSURE: 72 MMHG | OXYGEN SATURATION: 91 % | WEIGHT: 100.4 LBS | SYSTOLIC BLOOD PRESSURE: 120 MMHG | HEART RATE: 104 BPM

## 2024-08-19 DIAGNOSIS — R07.81 RIB PAIN: ICD-10-CM

## 2024-08-19 DIAGNOSIS — J44.1 CHRONIC OBSTRUCTIVE PULMONARY DISEASE WITH ACUTE EXACERBATION (HCC): ICD-10-CM

## 2024-08-19 DIAGNOSIS — R07.81 RIB PAIN: Primary | ICD-10-CM

## 2024-08-19 PROCEDURE — G8420 CALC BMI NORM PARAMETERS: HCPCS

## 2024-08-19 PROCEDURE — 3078F DIAST BP <80 MM HG: CPT

## 2024-08-19 PROCEDURE — 3074F SYST BP LT 130 MM HG: CPT

## 2024-08-19 PROCEDURE — 3017F COLORECTAL CA SCREEN DOC REV: CPT

## 2024-08-19 PROCEDURE — 3023F SPIROM DOC REV: CPT

## 2024-08-19 PROCEDURE — G8427 DOCREV CUR MEDS BY ELIG CLIN: HCPCS

## 2024-08-19 PROCEDURE — 71046 X-RAY EXAM CHEST 2 VIEWS: CPT

## 2024-08-19 PROCEDURE — 1036F TOBACCO NON-USER: CPT

## 2024-08-19 PROCEDURE — 99213 OFFICE O/P EST LOW 20 MIN: CPT

## 2024-08-19 RX ORDER — AZITHROMYCIN 250 MG/1
TABLET, FILM COATED ORAL
Qty: 6 TABLET | Refills: 0 | Status: SHIPPED | OUTPATIENT
Start: 2024-08-19 | End: 2024-08-29

## 2024-08-19 RX ORDER — METHYLPREDNISOLONE 4 MG/1
TABLET ORAL
Qty: 1 KIT | Refills: 0 | Status: SHIPPED | OUTPATIENT
Start: 2024-08-19 | End: 2024-08-20 | Stop reason: ALTCHOICE

## 2024-08-19 SDOH — ECONOMIC STABILITY: FOOD INSECURITY: WITHIN THE PAST 12 MONTHS, YOU WORRIED THAT YOUR FOOD WOULD RUN OUT BEFORE YOU GOT MONEY TO BUY MORE.: NEVER TRUE

## 2024-08-19 SDOH — ECONOMIC STABILITY: INCOME INSECURITY: HOW HARD IS IT FOR YOU TO PAY FOR THE VERY BASICS LIKE FOOD, HOUSING, MEDICAL CARE, AND HEATING?: NOT HARD AT ALL

## 2024-08-19 SDOH — ECONOMIC STABILITY: FOOD INSECURITY: WITHIN THE PAST 12 MONTHS, THE FOOD YOU BOUGHT JUST DIDN'T LAST AND YOU DIDN'T HAVE MONEY TO GET MORE.: NEVER TRUE

## 2024-08-19 ASSESSMENT — ENCOUNTER SYMPTOMS
DIARRHEA: 0
COLOR CHANGE: 0
SORE THROAT: 0
WHEEZING: 0
BLOOD IN STOOL: 0
ABDOMINAL PAIN: 0
COUGH: 0
SHORTNESS OF BREATH: 0
CONSTIPATION: 0

## 2024-08-19 ASSESSMENT — PATIENT HEALTH QUESTIONNAIRE - PHQ9
8. MOVING OR SPEAKING SO SLOWLY THAT OTHER PEOPLE COULD HAVE NOTICED. OR THE OPPOSITE, BEING SO FIGETY OR RESTLESS THAT YOU HAVE BEEN MOVING AROUND A LOT MORE THAN USUAL: NOT AT ALL
9. THOUGHTS THAT YOU WOULD BE BETTER OFF DEAD, OR OF HURTING YOURSELF: NOT AT ALL
4. FEELING TIRED OR HAVING LITTLE ENERGY: SEVERAL DAYS
6. FEELING BAD ABOUT YOURSELF - OR THAT YOU ARE A FAILURE OR HAVE LET YOURSELF OR YOUR FAMILY DOWN: NOT AT ALL
SUM OF ALL RESPONSES TO PHQ QUESTIONS 1-9: 6
10. IF YOU CHECKED OFF ANY PROBLEMS, HOW DIFFICULT HAVE THESE PROBLEMS MADE IT FOR YOU TO DO YOUR WORK, TAKE CARE OF THINGS AT HOME, OR GET ALONG WITH OTHER PEOPLE: SOMEWHAT DIFFICULT
SUM OF ALL RESPONSES TO PHQ9 QUESTIONS 1 & 2: 2
SUM OF ALL RESPONSES TO PHQ QUESTIONS 1-9: 6
SUM OF ALL RESPONSES TO PHQ QUESTIONS 1-9: 6
3. TROUBLE FALLING OR STAYING ASLEEP: NEARLY EVERY DAY
7. TROUBLE CONCENTRATING ON THINGS, SUCH AS READING THE NEWSPAPER OR WATCHING TELEVISION: NOT AT ALL
SUM OF ALL RESPONSES TO PHQ QUESTIONS 1-9: 6
1. LITTLE INTEREST OR PLEASURE IN DOING THINGS: SEVERAL DAYS
5. POOR APPETITE OR OVEREATING: NOT AT ALL
2. FEELING DOWN, DEPRESSED OR HOPELESS: SEVERAL DAYS

## 2024-08-19 NOTE — PROGRESS NOTES
Jina Amor (:  1965) is a 59 y.o. female,Established patient, here for evaluation of the following chief complaint(s):  Depression and Other (Pain on right side under rib since yesterday.)      Assessment & Plan   ASSESSMENT/PLAN:  1. Rib pain  -     XR CHEST STANDARD (2 VW); Future  - Rib pain associated with coughing.  Complete chest x-ray to ensure patient did not fracture any ribs.  Patient previously fractured ribs with coughing spell.  2. Chronic obstructive pulmonary disease with acute exacerbation (HCC)  -     XR CHEST STANDARD (2 VW); Future  -     azithromycin (ZITHROMAX) 250 MG tablet; 500mg on day 1 followed by 250mg on days 2 - 5, Disp-6 tablet, R-0Normal  -     methylPREDNISolone (MEDROL DOSEPACK) 4 MG tablet; Take by mouth., Disp-1 kit, R-0Normal  -Treat patient currently has a COPD exacerbation with medications listed above.  Patient is agreeable understands plan at this time educated on worsening respiratory failure and when to seek further care.  Patient is agreeable understands plan at this time           Subjective   SUBJECTIVE/OBJECTIVE:  HPI: Patient comes to the office today for follow-up regarding mood.  Endorses mood is doing well.  Comes the office today with complaints of right-sided rib pain.  Started yesterday endorses a lot of    Review of Systems   HENT:  Negative for congestion and sore throat.    Respiratory:  Negative for cough, shortness of breath and wheezing.    Cardiovascular:  Negative for chest pain and leg swelling.   Gastrointestinal:  Negative for abdominal pain, blood in stool, constipation and diarrhea.   Endocrine: Negative for cold intolerance and heat intolerance.   Genitourinary:  Negative for difficulty urinating, hematuria and urgency.   Musculoskeletal:  Negative for arthralgias and gait problem.   Skin:  Negative for color change.   Neurological:  Negative for dizziness, seizures, light-headedness and headaches.   Psychiatric/Behavioral:  Negative for

## 2024-08-20 ENCOUNTER — OFFICE VISIT (OUTPATIENT)
Dept: PULMONOLOGY | Age: 59
End: 2024-08-20
Payer: MEDICARE

## 2024-08-20 VITALS
DIASTOLIC BLOOD PRESSURE: 80 MMHG | HEIGHT: 60 IN | TEMPERATURE: 98.8 F | SYSTOLIC BLOOD PRESSURE: 144 MMHG | BODY MASS INDEX: 19.46 KG/M2 | HEART RATE: 117 BPM | WEIGHT: 99.13 LBS | OXYGEN SATURATION: 90 % | RESPIRATION RATE: 18 BRPM

## 2024-08-20 DIAGNOSIS — Z87.891 FORMER SMOKER: ICD-10-CM

## 2024-08-20 DIAGNOSIS — J44.1 COPD EXACERBATION (HCC): Primary | ICD-10-CM

## 2024-08-20 PROCEDURE — 3080F DIAST BP >= 90 MM HG: CPT | Performed by: STUDENT IN AN ORGANIZED HEALTH CARE EDUCATION/TRAINING PROGRAM

## 2024-08-20 PROCEDURE — 3023F SPIROM DOC REV: CPT | Performed by: STUDENT IN AN ORGANIZED HEALTH CARE EDUCATION/TRAINING PROGRAM

## 2024-08-20 PROCEDURE — 3017F COLORECTAL CA SCREEN DOC REV: CPT | Performed by: STUDENT IN AN ORGANIZED HEALTH CARE EDUCATION/TRAINING PROGRAM

## 2024-08-20 PROCEDURE — 99213 OFFICE O/P EST LOW 20 MIN: CPT | Performed by: STUDENT IN AN ORGANIZED HEALTH CARE EDUCATION/TRAINING PROGRAM

## 2024-08-20 PROCEDURE — G8420 CALC BMI NORM PARAMETERS: HCPCS | Performed by: STUDENT IN AN ORGANIZED HEALTH CARE EDUCATION/TRAINING PROGRAM

## 2024-08-20 PROCEDURE — G8427 DOCREV CUR MEDS BY ELIG CLIN: HCPCS | Performed by: STUDENT IN AN ORGANIZED HEALTH CARE EDUCATION/TRAINING PROGRAM

## 2024-08-20 PROCEDURE — 1036F TOBACCO NON-USER: CPT | Performed by: STUDENT IN AN ORGANIZED HEALTH CARE EDUCATION/TRAINING PROGRAM

## 2024-08-20 PROCEDURE — 3077F SYST BP >= 140 MM HG: CPT | Performed by: STUDENT IN AN ORGANIZED HEALTH CARE EDUCATION/TRAINING PROGRAM

## 2024-08-20 RX ORDER — BUDESONIDE, GLYCOPYRROLATE, AND FORMOTEROL FUMARATE 160; 9; 4.8 UG/1; UG/1; UG/1
2 AEROSOL, METERED RESPIRATORY (INHALATION) 2 TIMES DAILY
Qty: 3 EACH | Refills: 0 | Status: SHIPPED | COMMUNITY
Start: 2024-08-20 | End: 2024-10-19

## 2024-08-20 RX ORDER — PREDNISONE 20 MG/1
20 TABLET ORAL 2 TIMES DAILY
Qty: 10 TABLET | Refills: 0 | Status: SHIPPED | OUTPATIENT
Start: 2024-08-20 | End: 2024-08-25

## 2024-08-20 ASSESSMENT — ENCOUNTER SYMPTOMS
DIARRHEA: 0
WHEEZING: 0
ABDOMINAL PAIN: 0
CONSTIPATION: 0
BACK PAIN: 0
COLOR CHANGE: 0
TROUBLE SWALLOWING: 0
COUGH: 1
EYE PAIN: 0
VOMITING: 0
EYE REDNESS: 0
SORE THROAT: 0
EYE ITCHING: 0
NAUSEA: 0
ABDOMINAL DISTENTION: 0
SHORTNESS OF BREATH: 1
EYE DISCHARGE: 0
STRIDOR: 0

## 2024-08-20 NOTE — PROGRESS NOTES
MA Communication:  The following orders are received by verbal communication from   Guillermo Cardona MD    Orders include:  Breztri samples       2 wk med   
(!) 154/97   Pulse: (!) 117   Resp: 18   Temp: 98.8 °F (37.1 °C)   TempSrc: Temporal   SpO2: 90%   Weight: 45 kg (99 lb 2 oz)   Height: 1.524 m (5')        Physical Exam  Constitutional:       General: She is not in acute distress.     Appearance: She is not toxic-appearing.   HENT:      Head: Normocephalic and atraumatic.      Nose: Nose normal.      Mouth/Throat:      Pharynx: No oropharyngeal exudate.   Eyes:      General: No scleral icterus.        Right eye: No discharge.         Left eye: No discharge.   Cardiovascular:      Rate and Rhythm: Normal rate and regular rhythm.      Heart sounds: No murmur heard.     No friction rub. No gallop.   Pulmonary:      Breath sounds: No wheezing.      Comments: Diminished breath sounds in all lung fields  Abdominal:      General: Abdomen is flat. Bowel sounds are normal.      Palpations: Abdomen is soft.   Musculoskeletal:         General: No swelling. Normal range of motion.      Cervical back: Normal range of motion.   Skin:     General: Skin is warm and dry.   Neurological:      General: No focal deficit present.      Mental Status: She is alert and oriented to person, place, and time.   Psychiatric:         Mood and Affect: Mood normal.            Data  Spirometry 4/21/2021  Moderate obstructive defect with mildly reduced diffusion.  Lung volumes and bronchodilator response were not assessed.     ECHO 7/31/23   Conclusions      Summary   Normal left ventricle systolic function with an estimated ejection fraction   of 55%.   No regional wall motion abnormalities are seen.   Grade I diastolic dysfunction with normal filing pressure.   Trace mitral regurgitation.   Mild tricuspid regurgitation.   Systolic pulmonary artery pressure (SPAP) is normal and estimated at 27 mmHg   (right atrial pressure 3 mmHg).     Chest x-ray 3/3/2023  Low lung volumes bilaterally.  Costophrenic angles are difficult to see bilaterally.  No consolidation seen.     CT chest abdomen pelvis

## 2024-08-20 NOTE — PATIENT INSTRUCTIONS
Remember to bring a list of pulmonary medications and any CPAP or BiPAP machines to your next appointment with the office.     Please keep all of your future appointments scheduled by [facility] [place] [facility] office. Out of respect for other patients and providers, you may be asked to reschedule your appointment if you arrive later than your scheduled appointment time. Appointments cancelled less than 24hrs in advance will be considered a no show. Patients with three missed appointments within 1 year or four missed appointments within 2 years can be dismissed from the practice.     Please be aware that our physicians are required to work in the Intensive Care Unit at [facility].  Your appointment may need to be rescheduled if they are designated to work during your appointment time.      You may receive a survey regarding the care you received during your visit.  Your input is valuable to us.  We encourage you to complete and return your survey.  We hope you will choose us in the future for your healthcare needs.     Pt instructed of all future appointment dates & times, including radiology, labs, procedures & referrals. If procedures were scheduled preparation instructions provided. Instructions on future appointments with [facility] were given.      In the next few weeks, you will be receiving a survey from [facility] regarding your visit today.  We would greatly appreciate it if you would take just a few minutes to fill that out.  It is very important to us that our patients receive top notch care and our surveys help keep us accountable. However, if your experience was not a good one, we want to hear about that as well. This is a key way we can keep track of problems and strive to correct any for future visits.    Again, we appreciate your time and thank you for choosing [facility]!    Cleo WELCH

## 2024-08-22 NOTE — PROGRESS NOTES
Jina A Zuleyma    Age 59 y.o.    female    1965    MRN [id number]    8/29/2024  Arrival Time_____________  OR Time____________30 Min     Procedure(s):  ESOPHAGOGASTRODUODENOSCOPY                      Monitor Anesthesia Care    Surgeon(s):  Sue Woodard, MD       Phone [phone] (home)     [facility]  Date  Info Source  Home  Cell         Work  _____________________________________________________________________  _____________________________________________________________________  _____________________________________________________________________  _____________________________________________________________________  _____________________________________________________________________    PCP _____________________________ Phone_________________     H&P  ________________  Bringing      Chart              Epic      DOS      Called________  EKG ________________   Bringing      Chart              Epic      DOS      Called________  LABS________________   Bringing     Chart              Epic      DOS      Called________  Cardiac Clearance ______ Bringing      Chart              Epic      DOS      Called________  Pulmonary Clearance____ Bringing      Chart              Epic      DOS      Called________    Cardiologist________________________ Phone___________________________  Pulmonologist_______________________Phone___________________________    ? Advance Directives   ? Restoration concerns / Waiver on Chart            PAT Communications________________  ? Pre-op Instructions Given /Understood          _________________________________  ? Directions to Surgery Center                          _________________________________  ? Transportation Home_______________      __________________________________  ? Crutches/Walker__________________        __________________________________    Orders: Hard copy/ EPIC                 Transcribed/ EPIC              _______Wt.    ________Pharmacy

## 2024-08-26 NOTE — PROGRESS NOTES
Date and time of surgery : 8/29/24 at 0830             Arrival Time: 0730      Bring Picture ID and insurance card.  Please wear simple, loose fitting clothing to the hospital.   Do not bring valuables (money, credit cards, checkbooks, etc.)   Do not wear any makeup (including  eye makeup) and no nail polish or artificial nails on your fingers or toes.  DO NOT wear any jewelry or piercings on day of surgery.  All body piercing jewelry must be removed.  If you have dentures, they will be removed before going to the OR; we will provide you a container.  If you wear contact lenses or glasses, they will be removed; please bring a case for them.  Shower the evening before or morning of surgery with antibacterial soap.  Nothing to eat or drink after midnight the day before surgery.   You may brush your teeth and gargle the morning of surgery.  DO NOT SWALLOW WATER.   The morning of your procedure take only Protonix and Atorvastatin and use Breztri inhaler prior to coming  Aspirin, Ibuprofen, Advil, Naproxen, Vitamin E and other Anti-inflammatory products and supplements should be stopped for 5 -7days before surgery or as directed by your physician.  Do not smoke or drink any alcoholic beverages 24 hours prior to surgery.  This includes NA Beer. Refrain from the usage of any recreational drugs, including non-prescribed prescription drugs.   You MUST plan for a responsible adult to stay on site while you are here and take you home after your surgery. You will not be allowed to leave alone or drive yourself home. It is strongly suggested someone stay with you the first 24 hrs. Your surgery will be cancelled if you do not have a ride home.  To help prevent infection, change your sheets the night before surgery.   If you  have a Living Will and Durable Power of  for Healthcare, please bring in a copy.  Notify your Surgeon if you develop any illness between now and time of surgery. Cough, cold, fever, sore throat,

## 2024-08-28 ENCOUNTER — ANESTHESIA EVENT (OUTPATIENT)
Dept: ENDOSCOPY | Age: 59
End: 2024-08-28
Payer: MEDICARE

## 2024-08-29 ENCOUNTER — HOSPITAL ENCOUNTER (OUTPATIENT)
Age: 59
Setting detail: OUTPATIENT SURGERY
Discharge: HOME | End: 2024-08-29
Attending: INTERNAL MEDICINE | Admitting: INTERNAL MEDICINE
Payer: MEDICARE

## 2024-08-29 ENCOUNTER — ANESTHESIA (OUTPATIENT)
Dept: ENDOSCOPY | Age: 59
End: 2024-08-29
Payer: MEDICARE

## 2024-08-29 VITALS
WEIGHT: 97 LBS | RESPIRATION RATE: 16 BRPM | OXYGEN SATURATION: 95 % | TEMPERATURE: 98.1 F | SYSTOLIC BLOOD PRESSURE: 100 MMHG | DIASTOLIC BLOOD PRESSURE: 67 MMHG | HEIGHT: 60 IN | HEART RATE: 86 BPM | BODY MASS INDEX: 19.04 KG/M2

## 2024-08-29 PROCEDURE — 7100000011 HC PHASE II RECOVERY - ADDTL 15 MIN: Performed by: INTERNAL MEDICINE

## 2024-08-29 PROCEDURE — 2580000003 HC RX 258: Performed by: STUDENT IN AN ORGANIZED HEALTH CARE EDUCATION/TRAINING PROGRAM

## 2024-08-29 PROCEDURE — 6370000000 HC RX 637 (ALT 250 FOR IP)

## 2024-08-29 PROCEDURE — 6360000002 HC RX W HCPCS: Performed by: NURSE ANESTHETIST, CERTIFIED REGISTERED

## 2024-08-29 PROCEDURE — 2580000003 HC RX 258: Performed by: NURSE ANESTHETIST, CERTIFIED REGISTERED

## 2024-08-29 PROCEDURE — 3609017100 HC EGD: Performed by: INTERNAL MEDICINE

## 2024-08-29 PROCEDURE — 2500000003 HC RX 250 WO HCPCS: Performed by: NURSE ANESTHETIST, CERTIFIED REGISTERED

## 2024-08-29 PROCEDURE — 3700000000 HC ANESTHESIA ATTENDED CARE: Performed by: INTERNAL MEDICINE

## 2024-08-29 PROCEDURE — 2709999900 HC NON-CHARGEABLE SUPPLY: Performed by: INTERNAL MEDICINE

## 2024-08-29 PROCEDURE — 7100000010 HC PHASE II RECOVERY - FIRST 15 MIN: Performed by: INTERNAL MEDICINE

## 2024-08-29 RX ORDER — SODIUM CHLORIDE 0.9 % (FLUSH) 0.9 %
5-40 SYRINGE (ML) INJECTION PRN
Status: DISCONTINUED | OUTPATIENT
Start: 2024-08-29 | End: 2024-08-29 | Stop reason: HOSPADM

## 2024-08-29 RX ORDER — SCOLOPAMINE TRANSDERMAL SYSTEM 1 MG/1
PATCH, EXTENDED RELEASE TRANSDERMAL
Status: DISCONTINUED
Start: 2024-08-29 | End: 2024-08-29 | Stop reason: HOSPADM

## 2024-08-29 RX ORDER — SODIUM CHLORIDE, SODIUM LACTATE, POTASSIUM CHLORIDE, CALCIUM CHLORIDE 600; 310; 30; 20 MG/100ML; MG/100ML; MG/100ML; MG/100ML
INJECTION, SOLUTION INTRAVENOUS CONTINUOUS
Status: DISCONTINUED | OUTPATIENT
Start: 2024-08-29 | End: 2024-08-29 | Stop reason: HOSPADM

## 2024-08-29 RX ORDER — ONDANSETRON 2 MG/ML
INJECTION INTRAMUSCULAR; INTRAVENOUS PRN
Status: DISCONTINUED | OUTPATIENT
Start: 2024-08-29 | End: 2024-08-29 | Stop reason: SDUPTHER

## 2024-08-29 RX ORDER — LIDOCAINE HYDROCHLORIDE 20 MG/ML
INJECTION, SOLUTION INFILTRATION; PERINEURAL PRN
Status: DISCONTINUED | OUTPATIENT
Start: 2024-08-29 | End: 2024-08-29 | Stop reason: SDUPTHER

## 2024-08-29 RX ORDER — PROPOFOL 10 MG/ML
INJECTION, EMULSION INTRAVENOUS PRN
Status: DISCONTINUED | OUTPATIENT
Start: 2024-08-29 | End: 2024-08-29 | Stop reason: SDUPTHER

## 2024-08-29 RX ORDER — SODIUM CHLORIDE 0.9 % (FLUSH) 0.9 %
5-40 SYRINGE (ML) INJECTION EVERY 12 HOURS SCHEDULED
Status: DISCONTINUED | OUTPATIENT
Start: 2024-08-29 | End: 2024-08-29 | Stop reason: HOSPADM

## 2024-08-29 RX ORDER — LIDOCAINE HYDROCHLORIDE 10 MG/ML
0.3 INJECTION, SOLUTION EPIDURAL; INFILTRATION; INTRACAUDAL; PERINEURAL
Status: DISCONTINUED | OUTPATIENT
Start: 2024-08-29 | End: 2024-08-29 | Stop reason: HOSPADM

## 2024-08-29 RX ORDER — SCOLOPAMINE TRANSDERMAL SYSTEM 1 MG/1
1 PATCH, EXTENDED RELEASE TRANSDERMAL ONCE
Status: DISCONTINUED | OUTPATIENT
Start: 2024-08-29 | End: 2024-08-29 | Stop reason: HOSPADM

## 2024-08-29 RX ORDER — SODIUM CHLORIDE 9 MG/ML
INJECTION, SOLUTION INTRAVENOUS PRN
Status: DISCONTINUED | OUTPATIENT
Start: 2024-08-29 | End: 2024-08-29 | Stop reason: HOSPADM

## 2024-08-29 RX ORDER — DEXAMETHASONE SODIUM PHOSPHATE 10 MG/ML
INJECTION INTRAMUSCULAR; INTRAVENOUS PRN
Status: DISCONTINUED | OUTPATIENT
Start: 2024-08-29 | End: 2024-08-29 | Stop reason: SDUPTHER

## 2024-08-29 RX ORDER — PROPOFOL 10 MG/ML
INJECTION, EMULSION INTRAVENOUS CONTINUOUS PRN
Status: DISCONTINUED | OUTPATIENT
Start: 2024-08-29 | End: 2024-08-29 | Stop reason: SDUPTHER

## 2024-08-29 RX ADMIN — DEXMEDETOMIDINE HYDROCHLORIDE 8 MCG: 100 INJECTION, SOLUTION INTRAVENOUS at 08:39

## 2024-08-29 RX ADMIN — LIDOCAINE HYDROCHLORIDE 60 MG: 20 INJECTION, SOLUTION INFILTRATION; PERINEURAL at 08:37

## 2024-08-29 RX ADMIN — SODIUM CHLORIDE, POTASSIUM CHLORIDE, SODIUM LACTATE AND CALCIUM CHLORIDE: 600; 310; 30; 20 INJECTION, SOLUTION INTRAVENOUS at 07:54

## 2024-08-29 RX ADMIN — PROPOFOL 50 MG: 10 INJECTION, EMULSION INTRAVENOUS at 08:38

## 2024-08-29 RX ADMIN — DEXAMETHASONE SODIUM PHOSPHATE 5 MG: 10 INJECTION INTRAMUSCULAR; INTRAVENOUS at 08:41

## 2024-08-29 RX ADMIN — PROPOFOL 30 MG: 10 INJECTION, EMULSION INTRAVENOUS at 08:39

## 2024-08-29 RX ADMIN — DEXMEDETOMIDINE HYDROCHLORIDE 10 MCG: 100 INJECTION, SOLUTION INTRAVENOUS at 08:37

## 2024-08-29 RX ADMIN — PROPOFOL 150 MCG/KG/MIN: 10 INJECTION, EMULSION INTRAVENOUS at 08:38

## 2024-08-29 RX ADMIN — ONDANSETRON 4 MG: 2 INJECTION INTRAMUSCULAR; INTRAVENOUS at 08:41

## 2024-08-29 ASSESSMENT — PAIN SCALES - GENERAL
PAINLEVEL_OUTOF10: 0

## 2024-08-29 ASSESSMENT — PAIN - FUNCTIONAL ASSESSMENT: PAIN_FUNCTIONAL_ASSESSMENT: 0-10

## 2024-08-29 NOTE — ANESTHESIA PRE PROCEDURE
Pulse:  (!) 101   Resp:  18   Temp:  98.1 °F (36.7 °C)   TempSrc:  Oral   SpO2:  93%   Weight: 44 kg (97 lb) 44 kg (97 lb)   Height: 1.524 m (5') 1.524 m (5')                                              BP Readings from Last 3 Encounters:   08/29/24 113/84   08/20/24 (!) 144/80   08/19/24 120/72       NPO Status: Time of last liquid consumption: 2359                        Time of last solid consumption: 1800                        Date of last liquid consumption: 08/28/24                        Date of last solid food consumption: 08/28/24    BMI:   Wt Readings from Last 3 Encounters:   08/29/24 44 kg (97 lb)   08/20/24 45 kg (99 lb 2 oz)   08/19/24 45.5 kg (100 lb 6.4 oz)     Body mass index is 18.94 kg/m².    CBC:   Lab Results   Component Value Date/Time    WBC 5.7 06/20/2024 08:25 AM    RBC 5.08 06/20/2024 08:25 AM    HGB 15.4 06/20/2024 08:25 AM    HCT 46.5 06/20/2024 08:25 AM    MCV 91.5 06/20/2024 08:25 AM    RDW 16.6 06/20/2024 08:25 AM     06/20/2024 08:25 AM       CMP:   Lab Results   Component Value Date/Time     06/20/2024 08:25 AM    K 4.0 06/20/2024 08:25 AM    K 3.6 03/01/2023 05:51 AM    CL 93 06/20/2024 08:25 AM    CO2 29 06/20/2024 08:25 AM    BUN 7 06/20/2024 08:25 AM    CREATININE <0.5 06/20/2024 08:25 AM    AGRATIO 1.8 04/10/2024 08:50 AM    LABGLOM >90 06/20/2024 08:25 AM    LABGLOM 85 04/10/2024 08:50 AM    GLUCOSE 96 06/20/2024 08:25 AM    CALCIUM 9.3 06/20/2024 08:25 AM    BILITOT 0.4 04/10/2024 08:50 AM    ALKPHOS 79 04/10/2024 08:50 AM    AST 34 04/10/2024 08:50 AM    ALT 35 04/10/2024 08:50 AM       POC Tests: No results for input(s): \"POCGLU\", \"POCNA\", \"POCK\", \"POCCL\", \"POCBUN\", \"POCHEMO\", \"POCHCT\" in the last 72 hours.    Coags: No results found for: \"PROTIME\", \"INR\", \"APTT\"    HCG (If Applicable): No results found for: \"PREGTESTUR\", \"PREGSERUM\", \"HCG\", \"HCGQUANT\"     ABGs: No results found for: \"PHART\", \"PO2ART\", \"PIM5OFK\", \"DLH7MDU\", \"BEART\", \"F9ZICAGN\"     Type &  Screen (If Applicable):  No results found for: \"LABABO\"    Drug/Infectious Status (If Applicable):  Lab Results   Component Value Date/Time    HIV Non-Reactive 01/18/2023 09:50 AM       COVID-19 Screening (If Applicable):   Lab Results   Component Value Date/Time    COVID19 Not Detected 03/07/2023 10:03 AM    COVID19 NOT DETECTED 02/18/2023 10:42 AM           Anesthesia Evaluation     history of anesthetic complications: PONV.  Airway: Mallampati: III  TM distance: >3 FB   Neck ROM: full  Mouth opening: > = 3 FB   Dental: normal exam         Pulmonary:normal exam    (+)  COPD (at patient baseline):                                     Cardiovascular:    (+) hypertension:                  Neuro/Psych:   (+) neuromuscular disease:, psychiatric history:depression/anxiety             GI/Hepatic/Renal:   (+) hiatal hernia, GERD:, liver disease: esophageal varices          Endo/Other:                     Abdominal:             Vascular:          Other Findings:         Anesthesia Plan      MAC     ASA 3       Induction: intravenous.      Anesthetic plan and risks discussed with patient.                      Álvaro Chowdary MD   8/29/2024

## 2024-08-29 NOTE — DISCHARGE INSTRUCTIONS
PATIENT INSTRUCTIONS  POST-SEDATION        IMMEDIATELY FOLLOWING PROCEDURE:    Do not drive or operate machinery for the first twenty four hours after surgery.     Do not make any important decisions for twenty four hours after surgery or while taking narcotic pain medications or sedatives.     You should NOT BE LEFT UNATTENDED OR ALONE. A responsible adult should be with you for the rest of the day of your procedure and also during the night for your protection and safety.    You may experience some light headedness. Rest at home with activity as tolerated. You may not need to go to bed, but it is important to rest for the next 24 hours. You should not engage in athletic sports such as basketball, volleyball, jogging, skating, or activities requiring refined motor skills for 24 hours.   If you develop intractable nausea and vomiting or a severe headache please notify your doctor immediately.   You are not expected to have any fever, but if you feel warm, take your temperature. If you have a fever 101 degrees or higher, call your doctor.     If you have had an Endoscopy:   *Eat lightly for your first meal and gradually resume your normal / prescribed diet. DO NOT eat or drink until your gag reflex returns.   *If you have a sore throat you may use lozenges, or salt water gargles.      ONCE YOU ARE HOME, IF YOU SHOULD HAVE:  Difficulty in breathing, persistent nausea or vomiting, bleeding you feel is excessive, or pain that is unusual, increased abdominal bloating, or any swelling, fever / chills, call your physician. If you cannot contact your physician, but feel that your signs and symptoms need a physician's attention, go to the Emergency Department.      FOLLOW-UP:    Please follow up with your Primary Care Provider as scheduled or needed.    Call Sue Roberto MD if there are any GI concerns. [phone]      You may be receiving a follow up phone call to ask about your care.         Upper GI Endoscopy:  sign in to your [employer] account. Enter [id number] in the Search Health Information box to learn more about \"Upper GI Endoscopy: What to Expect at Home.\"     If you do not have an account, please click on the \"Sign Up Now\" link.  Current as of: May 12, 2017  Content Version: 11.6  © 6840-6475 [employer]. Care instructions adapted under license by [employer]. If you have questions about a medical condition or this instruction, always ask your healthcare professional. [employer] disclaims any warranty or liability for your use of this information.

## 2024-08-29 NOTE — PROGRESS NOTES
Awake and alert with no complaints. VS stable. Discharge instructions reviewed with patient/responsible adult and understanding verbalized. Discharge instructions copies given. Discharge criteria met per hospital policy. Up in chair - waiting for ride.

## 2024-09-03 ENCOUNTER — OFFICE VISIT (OUTPATIENT)
Dept: PULMONOLOGY | Age: 59
End: 2024-09-03
Payer: MEDICARE

## 2024-09-03 VITALS
OXYGEN SATURATION: 94 % | WEIGHT: 103.8 LBS | RESPIRATION RATE: 16 BRPM | SYSTOLIC BLOOD PRESSURE: 137 MMHG | TEMPERATURE: 98.6 F | DIASTOLIC BLOOD PRESSURE: 89 MMHG | BODY MASS INDEX: 20.38 KG/M2 | HEART RATE: 99 BPM | HEIGHT: 60 IN

## 2024-09-03 DIAGNOSIS — J44.9 COPD, SEVERE (HCC): Primary | ICD-10-CM

## 2024-09-03 DIAGNOSIS — Z87.891 FORMER SMOKER, STOPPED SMOKING MANY YEARS AGO: ICD-10-CM

## 2024-09-03 DIAGNOSIS — J43.2 CENTRILOBULAR EMPHYSEMA (HCC): ICD-10-CM

## 2024-09-03 PROCEDURE — G8420 CALC BMI NORM PARAMETERS: HCPCS | Performed by: NURSE PRACTITIONER

## 2024-09-03 PROCEDURE — G8427 DOCREV CUR MEDS BY ELIG CLIN: HCPCS | Performed by: NURSE PRACTITIONER

## 2024-09-03 PROCEDURE — 3075F SYST BP GE 130 - 139MM HG: CPT | Performed by: NURSE PRACTITIONER

## 2024-09-03 PROCEDURE — 3023F SPIROM DOC REV: CPT | Performed by: NURSE PRACTITIONER

## 2024-09-03 PROCEDURE — 3079F DIAST BP 80-89 MM HG: CPT | Performed by: NURSE PRACTITIONER

## 2024-09-03 PROCEDURE — 3017F COLORECTAL CA SCREEN DOC REV: CPT | Performed by: NURSE PRACTITIONER

## 2024-09-03 PROCEDURE — 1036F TOBACCO NON-USER: CPT | Performed by: NURSE PRACTITIONER

## 2024-09-03 PROCEDURE — 99214 OFFICE O/P EST MOD 30 MIN: CPT | Performed by: NURSE PRACTITIONER

## 2024-09-03 RX ORDER — GABAPENTIN 300 MG/1
CAPSULE ORAL
COMMUNITY

## 2024-09-03 ASSESSMENT — ENCOUNTER SYMPTOMS
ABDOMINAL PAIN: 1
RHINORRHEA: 0
SORE THROAT: 0
WHEEZING: 1
CHEST TIGHTNESS: 0
COUGH: 1
SHORTNESS OF BREATH: 1
EYE PAIN: 0

## 2024-09-03 NOTE — PROGRESS NOTES
MA Communication:  The following orders are received by verbal communication from Susan Carrero CNP    Orders include:  3 mon with Dulce    
Position: Sitting, Cuff Size: Small Adult)   Pulse 99   Temp 98.6 °F (37 °C) (Infrared)   Resp 16   Ht 1.524 m (5')   Wt 47.1 kg (103 lb 12.8 oz)   SpO2 94%   BMI 20.27 kg/m²         Imaging /Test:   EXAMINATION:  TWO XRAY VIEWS OF THE CHEST     8/19/2024 10:37 am     COMPARISON:  None.     HISTORY:  ORDERING SYSTEM PROVIDED HISTORY: Rib pain  TECHNOLOGIST PROVIDED HISTORY:  Reason for exam:->rib pain  Reason for Exam: rib pain under right breast, cough, COPD     FINDINGS:  There is some elevation of the right hemidiaphragm with hypoventilatory  changes in the right lung base.  Left lung appears clear.  There is a  moderate hiatal hernia.  The heart appears normal.  There is moderate wedging  of a midthoracic vertebral body, unchanged from the prior CT of 02/18/2023.  There are several healed right rib fractures.     IMPRESSION:  Elevation of the right hemidiaphragm with hypoventilatory changes in the the  right lung base.  Underlying pleural effusion cannot be excluded.    EXAMINATION:  CT OF THE CHEST, ABDOMEN, AND PELVIS WITHOUT CONTRAST 2/18/2023 11:32 am     TECHNIQUE:  CT of the chest, abdomen and pelvis was performed without the administration  of intravenous contrast. Multiplanar reformatted images are provided for  review. Automated exposure control, iterative reconstruction, and/or weight  based adjustment of the mA/kV was utilized to reduce the radiation dose to as  low as reasonably achievable.     COMPARISON:  None     HISTORY:  ORDERING SYSTEM PROVIDED HISTORY: shortness of breath  TECHNOLOGIST PROVIDED HISTORY:  Reason for exam:->shortness of breath  Additional Contrast?->None  Reason for Exam: N/V/C since Tuesday, known COPD, sob  Relevant Medical/Surgical History: ovaries removed, tubal ligation, smoked x  30 yrs. Quit 10 yrs ago     FINDINGS:     Chest:     Mediastinum: Heart is normal in size.  There is moderate atherosclerotic  calcification coronary arteries.  Aorta and pulmonary arteries

## 2024-09-03 NOTE — PATIENT INSTRUCTIONS
Call with worsening symptoms such as increased shortness of breath, productive cough, wheezing or symptoms not responding to treatment plan.     Your current pulmonary medications are controlling/treating your COPD.  Stay compliant.  Reviewed present pulmonary medications and side effects.  Reviewed proper inhaler usage.    Return to clinic in 3 months with Dr. Cardona.   Remember to bring a list of pulmonary medications and any CPAP or BiPAP machines to your next appointment with the office.     Please keep all of your future appointments scheduled by [facility] [place] [facility] office. Out of respect for other patients and providers, you may be asked to reschedule your appointment if you arrive later than your scheduled appointment time. Appointments cancelled less than 24hrs in advance will be considered a no show. Patients with three missed appointments within 1 year or four missed appointments within 2 years can be dismissed from the practice.     Please be aware that our physicians are required to work in the Intensive Care Unit at [facility].  Your appointment may need to be rescheduled if they are designated to work during your appointment time.      You may receive a survey regarding the care you received during your visit.  Your input is valuable to us.  We encourage you to complete and return your survey.  We hope you will choose us in the future for your healthcare needs.     Pt instructed of all future appointment dates & times, including radiology, labs, procedures & referrals. If procedures were scheduled preparation instructions provided. Instructions on future appointments with [facility] were given.      In the next few weeks, you will be receiving a survey from [facility] regarding your visit today.  We would greatly appreciate it if you would take just a few minutes to fill that out.  It is very important to us that our patients receive top notch care

## 2024-09-26 LAB
ALBUMIN SERPL-MCNC: 4.2 G/DL (ref 3.4–5)
ALBUMIN/GLOB SERPL: 2.2 {RATIO} (ref 1.1–2.2)
ALP SERPL-CCNC: 78 U/L (ref 40–129)
ALT SERPL-CCNC: 38 U/L (ref 10–40)
ANION GAP SERPL CALCULATED.3IONS-SCNC: 10 MMOL/L (ref 3–16)
AST SERPL-CCNC: 40 U/L (ref 15–37)
BASOPHILS # BLD: 0 K/UL (ref 0–0.2)
BASOPHILS NFR BLD: 0.5 %
BILIRUB SERPL-MCNC: 0.5 MG/DL (ref 0–1)
BUN SERPL-MCNC: 10 MG/DL (ref 7–20)
CALCIUM SERPL-MCNC: 9.3 MG/DL (ref 8.3–10.6)
CHLORIDE SERPL-SCNC: 93 MMOL/L (ref 99–110)
CO2 SERPL-SCNC: 25 MMOL/L (ref 21–32)
CREAT SERPL-MCNC: 0.6 MG/DL (ref 0.6–1.1)
DEPRECATED RDW RBC AUTO: 15.4 % (ref 12.4–15.4)
EOSINOPHIL # BLD: 0 K/UL (ref 0–0.6)
EOSINOPHIL NFR BLD: 0.6 %
GFR SERPLBLD CREATININE-BSD FMLA CKD-EPI: >90 ML/MIN/{1.73_M2}
GLUCOSE SERPL-MCNC: 78 MG/DL (ref 70–99)
HCT VFR BLD AUTO: 43.2 % (ref 36–48)
HGB BLD-MCNC: 14.4 G/DL (ref 12–16)
INR PPP: 0.95 (ref 0.85–1.15)
LYMPHOCYTES # BLD: 0.6 K/UL (ref 1–5.1)
LYMPHOCYTES NFR BLD: 9.9 %
MCH RBC QN AUTO: 31.6 PG (ref 26–34)
MCHC RBC AUTO-ENTMCNC: 33.3 G/DL (ref 31–36)
MCV RBC AUTO: 94.8 FL (ref 80–100)
MONOCYTES # BLD: 0.4 K/UL (ref 0–1.3)
MONOCYTES NFR BLD: 6.6 %
NEUTROPHILS # BLD: 4.8 K/UL (ref 1.7–7.7)
NEUTROPHILS NFR BLD: 82.4 %
PLATELET # BLD AUTO: 121 K/UL (ref 135–450)
PMV BLD AUTO: 9.3 FL (ref 5–10.5)
POTASSIUM SERPL-SCNC: 4.9 MMOL/L (ref 3.5–5.1)
PROT SERPL-MCNC: 6.1 G/DL (ref 6.4–8.2)
PROTHROMBIN TIME: 12.8 SEC (ref 11.9–14.9)
RBC # BLD AUTO: 4.56 M/UL (ref 4–5.2)
SODIUM SERPL-SCNC: 128 MMOL/L (ref 136–145)
WBC # BLD AUTO: 5.8 K/UL (ref 4–11)

## 2024-09-28 LAB
AFP-TM SERPL-MCNC: 3.8 UG/L
CERULOPLASMIN SERPL-MCNC: 24 MG/DL (ref 16–45)
SMA IGG SER-ACNC: 3 UNITS (ref 0–19)

## 2024-09-30 LAB — MITOCHONDRIA M2 AB SER IA-ACNC: <0.5 U/ML (ref 0–4)

## 2024-10-01 ENCOUNTER — HOSPITAL ENCOUNTER (OUTPATIENT)
Dept: ULTRASOUND IMAGING | Age: 59
Discharge: HOME | End: 2024-10-01
Attending: INTERNAL MEDICINE
Payer: MEDICARE

## 2024-10-01 DIAGNOSIS — K70.30 ALCOHOLIC CIRRHOSIS OF LIVER WITHOUT ASCITES (HCC): ICD-10-CM

## 2024-10-01 PROCEDURE — 76700 US EXAM ABDOM COMPLETE: CPT

## 2024-10-21 ENCOUNTER — TELEPHONE (OUTPATIENT)
Dept: PULMONOLOGY | Age: 59
End: 2024-10-21

## 2024-10-21 DIAGNOSIS — J44.1 COPD EXACERBATION (HCC): ICD-10-CM

## 2024-10-21 DIAGNOSIS — Z87.891 FORMER SMOKER, STOPPED SMOKING MANY YEARS AGO: Primary | ICD-10-CM

## 2024-10-21 RX ORDER — AZITHROMYCIN 250 MG/1
TABLET, FILM COATED ORAL
Qty: 1 PACKET | Refills: 0 | Status: SHIPPED | OUTPATIENT
Start: 2024-10-21 | End: 2024-10-31

## 2024-10-21 RX ORDER — PREDNISONE 20 MG/1
20 TABLET ORAL 2 TIMES DAILY
Qty: 10 TABLET | Refills: 0 | Status: SHIPPED | OUTPATIENT
Start: 2024-10-21 | End: 2024-10-26

## 2024-10-21 NOTE — TELEPHONE ENCOUNTER
Pt is having increased SOB, coughing, low grade fever at night, stuffy nose and just feels horrible.  Pt went out of town to visit son and is now sick.    Pt states she took COVID test and it is negative.  Pt requesting Antibiotic and Prednisone sent to pharmacy.

## 2024-10-21 NOTE — TELEPHONE ENCOUNTER
Patient called stated she's been sick and needing PredniSONE and a Zpack to [facility] Sury Hauser. Please call patient and advise.

## 2024-10-21 NOTE — TELEPHONE ENCOUNTER
Guillermo Cardona MD  You1 hour ago (1:40 PM)       Sent order for Prednisone burst and z suman.    KD       Pt informed of provider response and verbalized understanding.

## 2024-10-21 NOTE — TELEPHONE ENCOUNTER
Called pt to get further clarification of symptoms.  LM asking patient to return call to office.

## 2024-10-25 ENCOUNTER — TELEPHONE (OUTPATIENT)
Dept: PULMONOLOGY | Age: 59
End: 2024-10-25

## 2024-10-25 ENCOUNTER — HOSPITAL ENCOUNTER (EMERGENCY)
Age: 59
Discharge: HOME | End: 2024-10-25
Payer: MEDICARE

## 2024-10-25 ENCOUNTER — APPOINTMENT (OUTPATIENT)
Dept: GENERAL RADIOLOGY | Age: 59
End: 2024-10-25
Payer: MEDICARE

## 2024-10-25 ENCOUNTER — APPOINTMENT (OUTPATIENT)
Dept: CT IMAGING | Age: 59
End: 2024-10-25
Payer: MEDICARE

## 2024-10-25 VITALS
DIASTOLIC BLOOD PRESSURE: 60 MMHG | BODY MASS INDEX: 18.88 KG/M2 | WEIGHT: 100 LBS | HEIGHT: 61 IN | RESPIRATION RATE: 23 BRPM | SYSTOLIC BLOOD PRESSURE: 159 MMHG | OXYGEN SATURATION: 91 % | HEART RATE: 64 BPM | TEMPERATURE: 97.8 F

## 2024-10-25 DIAGNOSIS — R10.9 ABDOMINAL PAIN, UNSPECIFIED ABDOMINAL LOCATION: Primary | ICD-10-CM

## 2024-10-25 DIAGNOSIS — J44.1 COPD EXACERBATION (HCC): ICD-10-CM

## 2024-10-25 LAB
ALBUMIN SERPL-MCNC: 4.7 G/DL (ref 3.4–5)
ALBUMIN/GLOB SERPL: 1.7 {RATIO} (ref 1.1–2.2)
ALP SERPL-CCNC: 67 U/L (ref 40–129)
ALT SERPL-CCNC: 49 U/L (ref 10–40)
ANION GAP SERPL CALCULATED.3IONS-SCNC: 14 MMOL/L (ref 3–16)
AST SERPL-CCNC: 40 U/L (ref 15–37)
BASE EXCESS BLDV CALC-SCNC: -2.5 MMOL/L (ref -3–3)
BASOPHILS # BLD: 0 K/UL (ref 0–0.2)
BASOPHILS NFR BLD: 0 %
BILIRUB SERPL-MCNC: 0.7 MG/DL (ref 0–1)
BILIRUB UR QL STRIP.AUTO: NEGATIVE
BUN SERPL-MCNC: 10 MG/DL (ref 7–20)
CALCIUM SERPL-MCNC: 9.3 MG/DL (ref 8.3–10.6)
CHLORIDE SERPL-SCNC: 95 MMOL/L (ref 99–110)
CLARITY UR: CLEAR
CO2 BLDV-SCNC: 22 MMOL/L
CO2 SERPL-SCNC: 22 MMOL/L (ref 21–32)
COHGB MFR BLDV: 4.4 % (ref 0–1.5)
COLOR UR: YELLOW
CREAT SERPL-MCNC: 0.6 MG/DL (ref 0.6–1.1)
DEPRECATED RDW RBC AUTO: 16.2 % (ref 12.4–15.4)
EKG ATRIAL RATE: 99 BPM
EKG DIAGNOSIS: NORMAL
EKG P AXIS: 90 DEGREES
EKG P-R INTERVAL: 136 MS
EKG Q-T INTERVAL: 366 MS
EKG QRS DURATION: 112 MS
EKG QTC CALCULATION (BAZETT): 469 MS
EKG R AXIS: -72 DEGREES
EKG T AXIS: 20 DEGREES
EKG VENTRICULAR RATE: 99 BPM
EOSINOPHIL # BLD: 0 K/UL (ref 0–0.6)
EOSINOPHIL NFR BLD: 0 %
FLUAV RNA RESP QL NAA+PROBE: NOT DETECTED
FLUBV RNA RESP QL NAA+PROBE: NOT DETECTED
GFR SERPLBLD CREATININE-BSD FMLA CKD-EPI: >90 ML/MIN/{1.73_M2}
GLUCOSE SERPL-MCNC: 115 MG/DL (ref 70–99)
GLUCOSE UR STRIP.AUTO-MCNC: NEGATIVE MG/DL
HCO3 BLDV-SCNC: 21 MMOL/L (ref 23–29)
HCT VFR BLD AUTO: 46.2 % (ref 36–48)
HGB BLD-MCNC: 15.2 G/DL (ref 12–16)
HGB UR QL STRIP.AUTO: NEGATIVE
KETONES UR STRIP.AUTO-MCNC: NEGATIVE MG/DL
LEUKOCYTE ESTERASE UR QL STRIP.AUTO: NEGATIVE
LIPASE SERPL-CCNC: 83 U/L (ref 13–60)
LYMPHOCYTES # BLD: 0.4 K/UL (ref 1–5.1)
LYMPHOCYTES NFR BLD: 4.8 %
MCH RBC QN AUTO: 31.5 PG (ref 26–34)
MCHC RBC AUTO-ENTMCNC: 32.8 G/DL (ref 31–36)
MCV RBC AUTO: 96 FL (ref 80–100)
METHGB MFR BLDV: 0.2 %
MONOCYTES # BLD: 0.1 K/UL (ref 0–1.3)
MONOCYTES NFR BLD: 1.4 %
NEUTROPHILS # BLD: 8.1 K/UL (ref 1.7–7.7)
NEUTROPHILS NFR BLD: 93.8 %
NITRITE UR QL STRIP.AUTO: NEGATIVE
NT-PROBNP SERPL-MCNC: 102 PG/ML (ref 0–124)
O2 THERAPY: ABNORMAL
PCO2 BLDV: 33 MMHG (ref 40–50)
PH BLDV: 7.42 [PH] (ref 7.35–7.45)
PH UR STRIP.AUTO: 7 [PH] (ref 5–8)
PLATELET # BLD AUTO: 145 K/UL (ref 135–450)
PMV BLD AUTO: 8.1 FL (ref 5–10.5)
PO2 BLDV: 60.3 MMHG (ref 25–40)
POTASSIUM SERPL-SCNC: 4.4 MMOL/L (ref 3.5–5.1)
PROT SERPL-MCNC: 7.5 G/DL (ref 6.4–8.2)
PROT UR STRIP.AUTO-MCNC: NEGATIVE MG/DL
RBC # BLD AUTO: 4.82 M/UL (ref 4–5.2)
S PYO AG THROAT QL: NEGATIVE
SAO2 % BLDV: 91 %
SARS-COV-2 RNA RESP QL NAA+PROBE: NOT DETECTED
SODIUM SERPL-SCNC: 131 MMOL/L (ref 136–145)
SP GR UR STRIP.AUTO: 1.01 (ref 1–1.03)
TROPONIN, HIGH SENSITIVITY: 13 NG/L (ref 0–14)
UA DIPSTICK W REFLEX MICRO PNL UR: NORMAL
URN SPEC COLLECT METH UR: NORMAL
UROBILINOGEN UR STRIP-ACNC: 0.2 E.U./DL
WBC # BLD AUTO: 8.6 K/UL (ref 4–11)

## 2024-10-25 PROCEDURE — 82803 BLOOD GASES ANY COMBINATION: CPT

## 2024-10-25 PROCEDURE — 71260 CT THORAX DX C+: CPT

## 2024-10-25 PROCEDURE — 84484 ASSAY OF TROPONIN QUANT: CPT

## 2024-10-25 PROCEDURE — 2580000003 HC RX 258: Performed by: PHYSICIAN ASSISTANT

## 2024-10-25 PROCEDURE — 6370000000 HC RX 637 (ALT 250 FOR IP): Performed by: PHYSICIAN ASSISTANT

## 2024-10-25 PROCEDURE — 80053 COMPREHEN METABOLIC PANEL: CPT

## 2024-10-25 PROCEDURE — 83690 ASSAY OF LIPASE: CPT

## 2024-10-25 PROCEDURE — 85025 COMPLETE CBC W/AUTO DIFF WBC: CPT

## 2024-10-25 PROCEDURE — 93005 ELECTROCARDIOGRAM TRACING: CPT | Performed by: PHYSICIAN ASSISTANT

## 2024-10-25 PROCEDURE — 83880 ASSAY OF NATRIURETIC PEPTIDE: CPT

## 2024-10-25 PROCEDURE — 36415 COLL VENOUS BLD VENIPUNCTURE: CPT

## 2024-10-25 PROCEDURE — 87880 STREP A ASSAY W/OPTIC: CPT

## 2024-10-25 PROCEDURE — 93010 ELECTROCARDIOGRAM REPORT: CPT | Performed by: INTERNAL MEDICINE

## 2024-10-25 PROCEDURE — 94640 AIRWAY INHALATION TREATMENT: CPT

## 2024-10-25 PROCEDURE — 96374 THER/PROPH/DIAG INJ IV PUSH: CPT

## 2024-10-25 PROCEDURE — 6360000004 HC RX CONTRAST MEDICATION: Performed by: PHYSICIAN ASSISTANT

## 2024-10-25 PROCEDURE — 74018 RADEX ABDOMEN 1 VIEW: CPT

## 2024-10-25 PROCEDURE — 81003 URINALYSIS AUTO W/O SCOPE: CPT

## 2024-10-25 PROCEDURE — 87636 SARSCOV2 & INF A&B AMP PRB: CPT

## 2024-10-25 PROCEDURE — 99285 EMERGENCY DEPT VISIT HI MDM: CPT

## 2024-10-25 PROCEDURE — 2700000000 HC OXYGEN THERAPY PER DAY

## 2024-10-25 PROCEDURE — 94761 N-INVAS EAR/PLS OXIMETRY MLT: CPT

## 2024-10-25 PROCEDURE — 6360000002 HC RX W HCPCS: Performed by: PHYSICIAN ASSISTANT

## 2024-10-25 RX ORDER — IPRATROPIUM BROMIDE AND ALBUTEROL SULFATE 2.5; .5 MG/3ML; MG/3ML
2 SOLUTION RESPIRATORY (INHALATION) ONCE
Status: COMPLETED | OUTPATIENT
Start: 2024-10-25 | End: 2024-10-25

## 2024-10-25 RX ORDER — BENZONATATE 100 MG/1
100 CAPSULE ORAL 3 TIMES DAILY PRN
Qty: 15 CAPSULE | Refills: 0 | Status: SHIPPED | OUTPATIENT
Start: 2024-10-25 | End: 2024-10-28

## 2024-10-25 RX ORDER — METHYLPREDNISOLONE 4 MG/1
TABLET ORAL
Qty: 1 KIT | Refills: 0 | Status: SHIPPED | OUTPATIENT
Start: 2024-10-25 | End: 2024-10-31

## 2024-10-25 RX ORDER — BENZONATATE 200 MG/1
200 CAPSULE ORAL 3 TIMES DAILY PRN
Qty: 30 CAPSULE | Refills: 0 | Status: SHIPPED | OUTPATIENT
Start: 2024-10-25 | End: 2024-11-01

## 2024-10-25 RX ORDER — IOPAMIDOL 755 MG/ML
75 INJECTION, SOLUTION INTRAVASCULAR
Status: COMPLETED | OUTPATIENT
Start: 2024-10-25 | End: 2024-10-25

## 2024-10-25 RX ADMIN — IOPAMIDOL 75 ML: 755 INJECTION, SOLUTION INTRAVENOUS at 14:07

## 2024-10-25 RX ADMIN — IPRATROPIUM BROMIDE AND ALBUTEROL SULFATE 2 DOSE: 2.5; .5 SOLUTION RESPIRATORY (INHALATION) at 12:05

## 2024-10-25 RX ADMIN — WATER 125 MG: 1 INJECTION INTRAMUSCULAR; INTRAVENOUS; SUBCUTANEOUS at 12:17

## 2024-10-25 ASSESSMENT — PAIN - FUNCTIONAL ASSESSMENT: PAIN_FUNCTIONAL_ASSESSMENT: NONE - DENIES PAIN

## 2024-10-25 NOTE — TELEPHONE ENCOUNTER
Spoke with Chela.  States she has not improved with steroids and antibiotics. She is now having increased abdominal pains also.  She was not able to speak in full sentences on the phone call due to her SOB. She denies fevers but was having cold sweats.    Instructed with severity of symptoms and not responding to outpatient treatment she is to go to the ED.  Jina Amor verbalized understanding and thanked for care.

## 2024-10-26 NOTE — ED PROVIDER NOTES
[facility]  ED  Emergency Department Encounter    Patient Name: Jina Amor  MRN: [id number]  YOB: 1965  Date of Evaluation: 10/25/2024  Provider: No primary care provider on file.  Note Started: 3:49 PM EDT 10/26/24    CHIEF COMPLAINT  Abdominal Pain (Severe abd pain this morning which have subsided, COPD exab took 40 predinsone)    SHARED SERVICE VISIT  Evaluated by JACK.  My supervising physician was available for consultation.     HISTORY OF PRESENT ILLNESS  Jina Amor is a 59 y.o. female who presents to the ED for evaluation of shortness of breath with episode of abdominal discomfort earlier today.  Patient states that she has had some cough for the past several days.  States that she does have history of COPD.  Former smoker.  Cough productive for clear sputum.  No hemoptysis.  She denies chest pain.  No fevers chills.  No leg pain or swelling.  Denies headache, lightheadedness physical weakness confusion.  No nasal congestion or sore throat.  Patient states that she did have episode of abdominal discomfort that improved after small bowel movement.  Discussed constipated.  No nausea or vomiting.  No urinary symptoms..    No other complaints, modifying factors or associated symptoms.     Nursing notes reviewed were all reviewed and agreed with or any disagreements were addressed in the HPI.    PMH:  Past Medical History:   Diagnosis Date    Acute on chronic respiratory failure with hypoxia 03/03/2023    Alopecia     Arthritis     Chronic hip pain     Chronic obstructive pulmonary disease (HCC) 03/22/2023    Chronic obstructive pulmonary disease with acute exacerbation (HCC) 01/18/2023    severe stage 4    Chronic respiratory failure     Depression     Esophageal varices (HCC)     GERD (gastroesophageal reflux disease)     Hyperlipidemia     Moderate malnutrition (HCC) 02/27/2023    Perioperative dehiscence of abdominal wound with evisceration 02/28/2023    Pneumonia due to  (e.g., bloody sputum, fever, worsening pain or shortness of breath, vomiting, weakness) that necessitate immediate return.     FINAL IMPRESSION  1. Abdominal pain, unspecified abdominal location    2. COPD exacerbation (HCC)      Patient referred to:  PCP    Schedule an appointment as soon as possible for a visit       [facility]  ED  [address] [address] [phone]    If symptoms worsen    Discharge Medications:   Discharge Medication List as of 10/25/2024  3:39 PM        START taking these medications    Details   benzonatate (TESSALON PERLES) 100 MG capsule Take 1 capsule by mouth 3 times daily as needed for Cough, Disp-15 capsule, R-0Normal           Discontinued Medications:  Discharge Medication List as of 10/25/2024  3:39 PM        Risk management discussed and shared decision making had with patient and/or surrogate. All questions were answered. Patient will follow up with PCP in 2 to 3 days for further evaluation/treatment.  All questions answered.  Patient will return to ED for new/worsening symptoms.    DISPOSITION:  Patient was discharged home in stable condition.    (Please note that portions of this note were completed with a voice recognition program.  Efforts were made to edit the dictations but occasionally words are mis-transcribed).          Forrest Trejo PA  10/26/24 [id number]

## 2024-10-28 ENCOUNTER — TELEPHONE (OUTPATIENT)
Dept: PULMONOLOGY | Age: 59
End: 2024-10-28

## 2024-10-28 DIAGNOSIS — B37.0 THRUSH: Primary | ICD-10-CM

## 2024-10-28 DIAGNOSIS — J44.1 COPD EXACERBATION (HCC): ICD-10-CM

## 2024-10-28 RX ORDER — PREDNISONE 20 MG/1
20 TABLET ORAL 2 TIMES DAILY
Qty: 10 TABLET | Refills: 0 | Status: SHIPPED | OUTPATIENT
Start: 2024-10-28 | End: 2024-11-02

## 2024-10-28 RX ORDER — NYSTATIN 100000 [USP'U]/ML
500000 SUSPENSION ORAL 4 TIMES DAILY
Qty: 200 ML | Refills: 0 | Status: SHIPPED | OUTPATIENT
Start: 2024-10-28 | End: 2024-11-07

## 2024-10-28 NOTE — TELEPHONE ENCOUNTER
Patient has questions about medication that the ED gave her over the weekend and she thinks she might have thrush. She would like a call back please.

## 2024-10-30 ENCOUNTER — OFFICE VISIT (OUTPATIENT)
Dept: FAMILY MEDICINE CLINIC | Age: 59
End: 2024-10-30
Payer: MEDICARE

## 2024-10-30 VITALS
OXYGEN SATURATION: 94 % | HEART RATE: 98 BPM | WEIGHT: 98.2 LBS | DIASTOLIC BLOOD PRESSURE: 82 MMHG | RESPIRATION RATE: 16 BRPM | SYSTOLIC BLOOD PRESSURE: 124 MMHG | HEIGHT: 61 IN | BODY MASS INDEX: 18.54 KG/M2

## 2024-10-30 DIAGNOSIS — K74.60 CIRRHOSIS OF LIVER WITHOUT ASCITES, UNSPECIFIED HEPATIC CIRRHOSIS TYPE (HCC): ICD-10-CM

## 2024-10-30 DIAGNOSIS — J44.1 COPD EXACERBATION (HCC): ICD-10-CM

## 2024-10-30 DIAGNOSIS — E87.1 HYPONATREMIA: ICD-10-CM

## 2024-10-30 DIAGNOSIS — J44.1 CHRONIC OBSTRUCTIVE PULMONARY DISEASE WITH ACUTE EXACERBATION (HCC): ICD-10-CM

## 2024-10-30 DIAGNOSIS — R53.81 PHYSICAL DECONDITIONING: ICD-10-CM

## 2024-10-30 DIAGNOSIS — R53.1 WEAKNESS: Primary | ICD-10-CM

## 2024-10-30 LAB
ALBUMIN SERPL-MCNC: 4.6 G/DL (ref 3.4–5)
ALBUMIN/GLOB SERPL: 1.8 {RATIO} (ref 1.1–2.2)
ALP SERPL-CCNC: 69 U/L (ref 40–129)
ALT SERPL-CCNC: 62 U/L (ref 10–40)
ANION GAP SERPL CALCULATED.3IONS-SCNC: 14 MMOL/L (ref 3–16)
AST SERPL-CCNC: 47 U/L (ref 15–37)
BASOPHILS # BLD: 0 K/UL (ref 0–0.2)
BASOPHILS NFR BLD: 0.2 %
BILIRUB SERPL-MCNC: 0.9 MG/DL (ref 0–1)
BUN SERPL-MCNC: 9 MG/DL (ref 7–20)
CALCIUM SERPL-MCNC: 10 MG/DL (ref 8.3–10.6)
CHLORIDE SERPL-SCNC: 95 MMOL/L (ref 99–110)
CO2 SERPL-SCNC: 21 MMOL/L (ref 21–32)
CREAT SERPL-MCNC: 0.7 MG/DL (ref 0.6–1.1)
DEPRECATED RDW RBC AUTO: 16.1 % (ref 12.4–15.4)
EOSINOPHIL # BLD: 0 K/UL (ref 0–0.6)
EOSINOPHIL NFR BLD: 0.1 %
GFR SERPLBLD CREATININE-BSD FMLA CKD-EPI: >90 ML/MIN/{1.73_M2}
GLUCOSE SERPL-MCNC: 136 MG/DL (ref 70–99)
HCT VFR BLD AUTO: 46.1 % (ref 36–48)
HGB BLD-MCNC: 15.3 G/DL (ref 12–16)
LYMPHOCYTES # BLD: 0.6 K/UL (ref 1–5.1)
LYMPHOCYTES NFR BLD: 6.6 %
MCH RBC QN AUTO: 31.9 PG (ref 26–34)
MCHC RBC AUTO-ENTMCNC: 33.3 G/DL (ref 31–36)
MCV RBC AUTO: 95.8 FL (ref 80–100)
MONOCYTES # BLD: 0.6 K/UL (ref 0–1.3)
MONOCYTES NFR BLD: 6.4 %
NEUTROPHILS # BLD: 8 K/UL (ref 1.7–7.7)
NEUTROPHILS NFR BLD: 86.7 %
PLATELET # BLD AUTO: 141 K/UL (ref 135–450)
PMV BLD AUTO: 8.6 FL (ref 5–10.5)
POTASSIUM SERPL-SCNC: 5.2 MMOL/L (ref 3.5–5.1)
PROT SERPL-MCNC: 7.1 G/DL (ref 6.4–8.2)
RBC # BLD AUTO: 4.81 M/UL (ref 4–5.2)
SODIUM SERPL-SCNC: 130 MMOL/L (ref 136–145)
WBC # BLD AUTO: 9.2 K/UL (ref 4–11)

## 2024-10-30 PROCEDURE — G8484 FLU IMMUNIZE NO ADMIN: HCPCS | Performed by: STUDENT IN AN ORGANIZED HEALTH CARE EDUCATION/TRAINING PROGRAM

## 2024-10-30 PROCEDURE — G8427 DOCREV CUR MEDS BY ELIG CLIN: HCPCS | Performed by: STUDENT IN AN ORGANIZED HEALTH CARE EDUCATION/TRAINING PROGRAM

## 2024-10-30 PROCEDURE — 3017F COLORECTAL CA SCREEN DOC REV: CPT | Performed by: STUDENT IN AN ORGANIZED HEALTH CARE EDUCATION/TRAINING PROGRAM

## 2024-10-30 PROCEDURE — 3074F SYST BP LT 130 MM HG: CPT | Performed by: STUDENT IN AN ORGANIZED HEALTH CARE EDUCATION/TRAINING PROGRAM

## 2024-10-30 PROCEDURE — 3079F DIAST BP 80-89 MM HG: CPT | Performed by: STUDENT IN AN ORGANIZED HEALTH CARE EDUCATION/TRAINING PROGRAM

## 2024-10-30 PROCEDURE — 99214 OFFICE O/P EST MOD 30 MIN: CPT | Performed by: STUDENT IN AN ORGANIZED HEALTH CARE EDUCATION/TRAINING PROGRAM

## 2024-10-30 PROCEDURE — 1036F TOBACCO NON-USER: CPT | Performed by: STUDENT IN AN ORGANIZED HEALTH CARE EDUCATION/TRAINING PROGRAM

## 2024-10-30 PROCEDURE — G8420 CALC BMI NORM PARAMETERS: HCPCS | Performed by: STUDENT IN AN ORGANIZED HEALTH CARE EDUCATION/TRAINING PROGRAM

## 2024-10-30 PROCEDURE — 36415 COLL VENOUS BLD VENIPUNCTURE: CPT | Performed by: STUDENT IN AN ORGANIZED HEALTH CARE EDUCATION/TRAINING PROGRAM

## 2024-10-30 PROCEDURE — 3023F SPIROM DOC REV: CPT | Performed by: STUDENT IN AN ORGANIZED HEALTH CARE EDUCATION/TRAINING PROGRAM

## 2024-10-30 RX ORDER — SPIRONOLACTONE 25 MG/1
12.5 TABLET ORAL DAILY
Qty: 90 TABLET | Refills: 3
Start: 2024-10-30

## 2024-10-30 NOTE — ASSESSMENT & PLAN NOTE
-Followed by GI.  Hyponatremic since June of this year.  Denies any alcohol use.  She is no longer taking furosemide.  She is still taking spironolactone.     Orders:    CBC with Auto Differential; Future    Comprehensive Metabolic Panel; Future    Comprehensive Metabolic Panel    CBC with Auto Differential    spironolactone (ALDACTONE) 25 MG tablet; Take 0.5 tablets by mouth daily    Basic Metabolic Panel; Future

## 2024-10-30 NOTE — PROGRESS NOTES
[facility]  10/30/2024    Jina Amor (:  1965) is a 59 y.o. female, here for evaluation of the following medical concerns:    Chief Complaint   Patient presents with    Establish Care     Pt is here to establish care        ASSESSMENT/ PLAN  Assessment & Plan  Weakness  -Physical deconditioning secondary to being sedentary for the last 3 weeks.  Referral to physical therapy to regain strength.  May benefit from pulmonary rehab as well when she is feeling better.    Orders:    CBC with Auto Differential; Future    Comprehensive Metabolic Panel; Future    [facility] [place] [facility]    Comprehensive Metabolic Panel    CBC with Auto Differential    Basic Metabolic Panel; Future    Physical deconditioning  -See plan above for weakness    Orders:    CBC with Auto Differential; Future    Comprehensive Metabolic Panel; Future    [facility] [place] [facility]    Comprehensive Metabolic Panel    CBC with Auto Differential    Basic Metabolic Panel; Future    Hyponatremia    - Likely secondary to cirrhosis and spironolactone.  Recheck sodium.  Will cut down on spironolactone to 12.5 mg p.o. daily recheck sodium in 1 week.    Orders:    CBC with Auto Differential; Future    Comprehensive Metabolic Panel; Future    Comprehensive Metabolic Panel    CBC with Auto Differential    Basic Metabolic Panel; Future    COPD exacerbation (HCC)  -On 3 L of oxygen at home for the past week.  Discussed weaning oxygen as she begins to feel better.  If oxygen is staying above 92% turn down oxygen by half liter.  Target oxygen of 88-92.  Continue to wean as her oxygen continues to stay above 92%.  ER for shortness of breath at rest.  Followed by pulmonology.  On prednisone and azithromycin.  Cough is improving day-to-day.  She did not wear her oxygen and from her vehicle and was slightly hypoxic upon arrival and tachycardic.  She was placed on 3 L of oxygen and

## 2024-11-06 ENCOUNTER — LAB (OUTPATIENT)
Dept: FAMILY MEDICINE CLINIC | Age: 59
End: 2024-11-06
Payer: MEDICARE

## 2024-11-06 DIAGNOSIS — J44.1 CHRONIC OBSTRUCTIVE PULMONARY DISEASE WITH ACUTE EXACERBATION (HCC): ICD-10-CM

## 2024-11-06 DIAGNOSIS — E87.1 HYPONATREMIA: ICD-10-CM

## 2024-11-06 DIAGNOSIS — E78.5 DYSLIPIDEMIA: Primary | Chronic | ICD-10-CM

## 2024-11-06 DIAGNOSIS — R53.81 PHYSICAL DECONDITIONING: ICD-10-CM

## 2024-11-06 DIAGNOSIS — K74.60 CIRRHOSIS OF LIVER WITHOUT ASCITES, UNSPECIFIED HEPATIC CIRRHOSIS TYPE (HCC): ICD-10-CM

## 2024-11-06 DIAGNOSIS — J44.1 COPD EXACERBATION (HCC): ICD-10-CM

## 2024-11-06 DIAGNOSIS — R53.1 WEAKNESS: ICD-10-CM

## 2024-11-06 LAB
ANION GAP SERPL CALCULATED.3IONS-SCNC: 13 MMOL/L (ref 3–16)
BUN SERPL-MCNC: 13 MG/DL (ref 7–20)
CALCIUM SERPL-MCNC: 8.8 MG/DL (ref 8.3–10.6)
CHLORIDE SERPL-SCNC: 99 MMOL/L (ref 99–110)
CHOLEST SERPL-MCNC: 159 MG/DL (ref 0–199)
CO2 SERPL-SCNC: 22 MMOL/L (ref 21–32)
CREAT SERPL-MCNC: 0.6 MG/DL (ref 0.6–1.1)
GFR SERPLBLD CREATININE-BSD FMLA CKD-EPI: >90 ML/MIN/{1.73_M2}
GLUCOSE SERPL-MCNC: 97 MG/DL (ref 70–99)
HDLC SERPL-MCNC: 67 MG/DL (ref 40–60)
LDLC SERPL CALC-MCNC: 82 MG/DL
POTASSIUM SERPL-SCNC: 3.8 MMOL/L (ref 3.5–5.1)
SODIUM SERPL-SCNC: 134 MMOL/L (ref 136–145)
TRIGL SERPL-MCNC: 49 MG/DL (ref 0–150)
VLDLC SERPL CALC-MCNC: 10 MG/DL

## 2024-11-06 PROCEDURE — 36415 COLL VENOUS BLD VENIPUNCTURE: CPT | Performed by: STUDENT IN AN ORGANIZED HEALTH CARE EDUCATION/TRAINING PROGRAM

## 2024-11-12 DIAGNOSIS — J44.1 CHRONIC OBSTRUCTIVE PULMONARY DISEASE WITH ACUTE EXACERBATION (HCC): ICD-10-CM

## 2024-11-12 RX ORDER — MONTELUKAST SODIUM 10 MG/1
10 TABLET ORAL DAILY
Qty: 90 TABLET | Refills: 0 | Status: SHIPPED | OUTPATIENT
Start: 2024-11-12

## 2024-11-14 DIAGNOSIS — I70.8 ATHEROSCLEROSIS OF BOTH ILIAC ARTERIES: ICD-10-CM

## 2024-11-14 RX ORDER — ATORVASTATIN CALCIUM 40 MG/1
40 TABLET, FILM COATED ORAL DAILY
Qty: 90 TABLET | Refills: 0 | Status: SHIPPED | OUTPATIENT
Start: 2024-11-14 | End: 2025-02-12

## 2024-11-14 NOTE — TELEPHONE ENCOUNTER
Jina Amor [phone] (home)    is requesting refill(s) of medication Atorvastatin to preferred [facility]    Last OV 10/30/24 (pertaining to medication)   Last refill 8/14/24 (per medication requested)  Next office visit scheduled or attempted Yes  Date 12/5/24

## 2024-11-19 ENCOUNTER — OFFICE VISIT (OUTPATIENT)
Dept: PULMONOLOGY | Age: 59
End: 2024-11-19
Payer: MEDICARE

## 2024-11-19 VITALS
BODY MASS INDEX: 19.71 KG/M2 | HEIGHT: 60 IN | WEIGHT: 100.38 LBS | RESPIRATION RATE: 16 BRPM | OXYGEN SATURATION: 90 % | TEMPERATURE: 98.2 F | DIASTOLIC BLOOD PRESSURE: 93 MMHG | HEART RATE: 100 BPM | SYSTOLIC BLOOD PRESSURE: 160 MMHG

## 2024-11-19 DIAGNOSIS — J44.9 COPD, SEVERE (HCC): Primary | ICD-10-CM

## 2024-11-19 DIAGNOSIS — Z87.891 FORMER SMOKER: ICD-10-CM

## 2024-11-19 PROCEDURE — G8428 CUR MEDS NOT DOCUMENT: HCPCS | Performed by: STUDENT IN AN ORGANIZED HEALTH CARE EDUCATION/TRAINING PROGRAM

## 2024-11-19 PROCEDURE — 3077F SYST BP >= 140 MM HG: CPT | Performed by: STUDENT IN AN ORGANIZED HEALTH CARE EDUCATION/TRAINING PROGRAM

## 2024-11-19 PROCEDURE — G8484 FLU IMMUNIZE NO ADMIN: HCPCS | Performed by: STUDENT IN AN ORGANIZED HEALTH CARE EDUCATION/TRAINING PROGRAM

## 2024-11-19 PROCEDURE — 99213 OFFICE O/P EST LOW 20 MIN: CPT | Performed by: STUDENT IN AN ORGANIZED HEALTH CARE EDUCATION/TRAINING PROGRAM

## 2024-11-19 PROCEDURE — 3023F SPIROM DOC REV: CPT | Performed by: STUDENT IN AN ORGANIZED HEALTH CARE EDUCATION/TRAINING PROGRAM

## 2024-11-19 PROCEDURE — 3017F COLORECTAL CA SCREEN DOC REV: CPT | Performed by: STUDENT IN AN ORGANIZED HEALTH CARE EDUCATION/TRAINING PROGRAM

## 2024-11-19 PROCEDURE — G8420 CALC BMI NORM PARAMETERS: HCPCS | Performed by: STUDENT IN AN ORGANIZED HEALTH CARE EDUCATION/TRAINING PROGRAM

## 2024-11-19 PROCEDURE — 3080F DIAST BP >= 90 MM HG: CPT | Performed by: STUDENT IN AN ORGANIZED HEALTH CARE EDUCATION/TRAINING PROGRAM

## 2024-11-19 PROCEDURE — 1036F TOBACCO NON-USER: CPT | Performed by: STUDENT IN AN ORGANIZED HEALTH CARE EDUCATION/TRAINING PROGRAM

## 2024-11-19 RX ORDER — ASCORBIC ACID 500 MG
1000 TABLET ORAL DAILY
COMMUNITY

## 2024-11-19 RX ORDER — GLUCOSAMINE/D3/BOSWELLIA SERRA 1500MG-400
TABLET ORAL
COMMUNITY

## 2024-11-19 RX ORDER — MULTIVIT-MIN/IRON/FOLIC ACID/K 18-600-40
2000 CAPSULE ORAL DAILY
COMMUNITY

## 2024-11-19 RX ORDER — BENZONATATE 100 MG/1
100 CAPSULE ORAL 3 TIMES DAILY PRN
Qty: 30 CAPSULE | Refills: 0 | Status: SHIPPED | OUTPATIENT
Start: 2024-11-19 | End: 2024-11-20 | Stop reason: SDUPTHER

## 2024-11-19 NOTE — PROGRESS NOTES
[facility] Follow-up  [address] [place] [address]  [phone]        Jina Amor (: 1965 ) is a 59 y.o. female here for an evaluation of   Chief Complaint   Patient presents with    Follow-up     3 mo    COPD         SUBJECTIVE/OBJECTIVE:    Patient is a 59-year-old female with significant past medical history of tobacco dependence and chronic hypoxic respiratory failure that presents to [facility] pulmonary clinic for follow-up visit.  Patient says that she is feeling better.  She had multiple doses of steroids and antibiotics for COPD exacerbation.  She is currently on Breztri with albuterol as needed for shortness of breath.     Patient quit smoking 16 years ago.  She was smoking around 2 packs/day for about 30 years.  She uses marijuana oil, she does not smoke marijuana.  She denies any other illicit drug use, no alcohol use.      Review of Systems   Constitutional:  Negative for activity change, appetite change, chills, diaphoresis and fatigue.   HENT:  Negative for congestion, sore throat and trouble swallowing.    Eyes:  Negative for pain, discharge, redness and itching.   Respiratory:  Negative for cough, shortness of breath, wheezing and stridor.    Cardiovascular:  Negative for chest pain, palpitations and leg swelling.   Gastrointestinal:  Negative for abdominal distention, abdominal pain, constipation, diarrhea, nausea and vomiting.   Endocrine: Negative for polydipsia, polyphagia and polyuria.   Genitourinary:  Negative for difficulty urinating.   Musculoskeletal:  Negative for back pain, myalgias and neck pain.   Skin:  Negative for color change.   Neurological:  Negative for dizziness, weakness and light-headedness.   Psychiatric/Behavioral:  Negative for agitation and behavioral problems.          Vitals:    24 1545   BP: (!) 160/93   Pulse: 100   Resp: 16   Temp: 98.2 °F (36.8 °C)   TempSrc: Temporal   SpO2: 90%   Weight: 45.5 kg (100 lb 6 oz)   Height:

## 2024-11-19 NOTE — PATIENT INSTRUCTIONS
Remember to bring a list of pulmonary medications and any CPAP or BiPAP machines to your next appointment with the office.     Please keep all of your future appointments scheduled by [facility] [place] [facility] office. Out of respect for other patients and providers, you may be asked to reschedule your appointment if you arrive later than your scheduled appointment time. Appointments cancelled less than 24hrs in advance will be considered a no show. Patients with three missed appointments within 1 year or four missed appointments within 2 years can be dismissed from the practice.     Please be aware that our physicians are required to work in the Intensive Care Unit at [facility].  Your appointment may need to be rescheduled if they are designated to work during your appointment time.      You may receive a survey regarding the care you received during your visit.  Your input is valuable to us.  We encourage you to complete and return your survey.  We hope you will choose us in the future for your healthcare needs.     Pt instructed of all future appointment dates & times, including radiology, labs, procedures & referrals. If procedures were scheduled preparation instructions provided. Instructions on future appointments with [facility] were given.     In the next few weeks, you will be receiving a survey from [facility] regarding your visit today.  We would greatly appreciate it if you would take just a few minutes to fill that out.  It is very important to us that our patients receive top notch care and our surveys help keep us accountable. However, if your experience was not a good one, we want to hear about that as well. This is a key way we can keep track of problems and strive to correct any for future visits.    Again, we appreciate your time and thank you for choosing [facility]!  Cleo WELCH

## 2024-11-19 NOTE — PROGRESS NOTES
MA Communication:  The following orders are received by verbal communication from   Guillermo Cardona MD    Orders include:  FU 6 mo         Airsupra

## 2024-11-20 ENCOUNTER — TELEPHONE (OUTPATIENT)
Dept: PULMONOLOGY | Age: 59
End: 2024-11-20

## 2024-11-20 DIAGNOSIS — J44.9 COPD, SEVERE (HCC): ICD-10-CM

## 2024-11-20 RX ORDER — BENZONATATE 100 MG/1
100 CAPSULE ORAL 3 TIMES DAILY PRN
Qty: 30 CAPSULE | Refills: 0 | Status: SHIPPED | OUTPATIENT
Start: 2024-11-20 | End: 2024-11-30

## 2024-11-20 ASSESSMENT — ENCOUNTER SYMPTOMS
EYE PAIN: 0
EYE REDNESS: 0
TROUBLE SWALLOWING: 0
BACK PAIN: 0
WHEEZING: 0
DIARRHEA: 0
STRIDOR: 0
SORE THROAT: 0
ABDOMINAL PAIN: 0
SHORTNESS OF BREATH: 0
COUGH: 0
COLOR CHANGE: 0
VOMITING: 0
EYE DISCHARGE: 0
ABDOMINAL DISTENTION: 0
EYE ITCHING: 0
CONSTIPATION: 0
NAUSEA: 0

## 2024-11-20 NOTE — TELEPHONE ENCOUNTER
Patient called stated Dr. Cardona was sending over prescription benzonatate (TESSALON PERLES) 100 MG capsule [[id number]]  to her pharmacy doesn't look like the script was sent , please advise and patient would like a call back.

## 2024-11-22 ENCOUNTER — TELEPHONE (OUTPATIENT)
Dept: FAMILY MEDICINE CLINIC | Age: 59
End: 2024-11-22

## 2024-11-22 RX ORDER — METOPROLOL SUCCINATE 25 MG/1
25 TABLET, EXTENDED RELEASE ORAL DAILY
Qty: 90 TABLET | Refills: 0 | Status: SHIPPED | OUTPATIENT
Start: 2024-11-22

## 2024-11-22 NOTE — TELEPHONE ENCOUNTER
Patient calling in stating that her blood pressure has been high,  told her she could stop taking her spirolactone she stopped taking it and her blood pressure has been high. She restarted it on Wednesday with a half tablet which helped, yesterday she took a full tablet and blood pressure was still up in the 160's and got leg cramps in the night. Today her bp is 165/80 and has been having a headache, no other symptoms. She is feeling better than she has been. Her typical blood pressure is in the 100's, she does not want to go to the emergency room she would just like a blood pressure medication.

## 2024-12-03 ENCOUNTER — HOSPITAL ENCOUNTER (OUTPATIENT)
Dept: PHYSICAL THERAPY | Age: 59
Setting detail: THERAPIES SERIES
Discharge: HOME | End: 2024-12-03
Attending: STUDENT IN AN ORGANIZED HEALTH CARE EDUCATION/TRAINING PROGRAM
Payer: MEDICARE

## 2024-12-03 DIAGNOSIS — M25.551 CHRONIC RIGHT HIP PAIN: Primary | ICD-10-CM

## 2024-12-03 DIAGNOSIS — R68.89 IMPAIRED TOLERANCE OF ACTIVITY: ICD-10-CM

## 2024-12-03 DIAGNOSIS — G89.29 CHRONIC RIGHT HIP PAIN: Primary | ICD-10-CM

## 2024-12-03 PROCEDURE — 97530 THERAPEUTIC ACTIVITIES: CPT

## 2024-12-03 PROCEDURE — 97161 PT EVAL LOW COMPLEX 20 MIN: CPT

## 2024-12-03 PROCEDURE — 97110 THERAPEUTIC EXERCISES: CPT

## 2024-12-03 NOTE — PLAN OF CARE
[facility]- Outpatient Rehabilitation and Therapy  [address] [place] [address] office: [phone] fax: [phone]     Physical Therapy Initial Evaluation Certification  Dear Trevor Doan DO    We had the pleasure of evaluating the following patient for physical therapy services at [facility].  A summary of our findings can be found in the initial assessment below.  This includes our plan of care.  If you have any questions or concerns regarding these findings, please do not hesitate to contact me at the office phone number listed above.  Thank you for the referral.     Physician Signature:_______________________________Date:__________________  By signing above (or electronic signature), therapist’s plan is approved by physician       Physical Therapy: TREATMENT/PROGRESS NOTE   Patient: Jina Amor (59 y.o. female)   Examination Date: 2024   :  1965 MRN: [id number]   Visit #: 1   Insurance Allowable Auth Needed   BMN, ~$90 Medicare as of 12/3 []Yes    [x]No    Insurance: Payor: MEDICARE / Plan: MEDICARE PART A AND B / Product Type: *No Product type* /   Insurance ID: [id number] - (Medicare)  Secondary Insurance (if applicable):    Treatment Diagnosis:     ICD-10-CM    1. Chronic right hip pain  M25.551     G89.29       2. Impaired tolerance of activity  R68.89          Medical Diagnosis:  Weakness [R53.1]  Physical deconditioning [R53.81]   Referring Physician: Trevor Doan DO  PCP: Trevor Doan DO     Plan of care signed (Y/N): No     Date of Patient follow up with Physician:      Progress Report/POC: EVAL today  POC update due: (10 visits /OR AUTH LIMITS, whichever is less)  2025                                             Medical History:  Comorbidities:  Anxiety  Depression  Other Cardio/Pulmonary Conditions: COPD  Relevant Medical History: hernia repairs, \"disc issue\", L hip fracture, R hip pain x ~5

## 2024-12-05 ENCOUNTER — OFFICE VISIT (OUTPATIENT)
Dept: FAMILY MEDICINE CLINIC | Age: 59
End: 2024-12-05

## 2024-12-05 VITALS
HEART RATE: 99 BPM | OXYGEN SATURATION: 94 % | BODY MASS INDEX: 20.1 KG/M2 | WEIGHT: 102.4 LBS | RESPIRATION RATE: 16 BRPM | HEIGHT: 60 IN | DIASTOLIC BLOOD PRESSURE: 84 MMHG | SYSTOLIC BLOOD PRESSURE: 136 MMHG

## 2024-12-05 DIAGNOSIS — Z12.31 ENCOUNTER FOR SCREENING MAMMOGRAM FOR MALIGNANT NEOPLASM OF BREAST: ICD-10-CM

## 2024-12-05 DIAGNOSIS — F33.0 MILD EPISODE OF RECURRENT MAJOR DEPRESSIVE DISORDER (HCC): ICD-10-CM

## 2024-12-05 DIAGNOSIS — K74.60 CIRRHOSIS OF LIVER WITHOUT ASCITES, UNSPECIFIED HEPATIC CIRRHOSIS TYPE (HCC): ICD-10-CM

## 2024-12-05 DIAGNOSIS — S03.00XA DISLOCATION OF TEMPOROMANDIBULAR JOINT, INITIAL ENCOUNTER: ICD-10-CM

## 2024-12-05 DIAGNOSIS — E87.1 HYPONATREMIA: Primary | ICD-10-CM

## 2024-12-05 RX ORDER — FLUTICASONE PROPIONATE 50 MCG
2 SPRAY, SUSPENSION (ML) NASAL DAILY
Qty: 16 G | Refills: 0 | Status: SHIPPED | OUTPATIENT
Start: 2024-12-05

## 2024-12-05 ASSESSMENT — ENCOUNTER SYMPTOMS
SHORTNESS OF BREATH: 0
RHINORRHEA: 0
COUGH: 0

## 2024-12-05 ASSESSMENT — PATIENT HEALTH QUESTIONNAIRE - PHQ9
2. FEELING DOWN, DEPRESSED OR HOPELESS: SEVERAL DAYS
SUM OF ALL RESPONSES TO PHQ QUESTIONS 1-9: 6
8. MOVING OR SPEAKING SO SLOWLY THAT OTHER PEOPLE COULD HAVE NOTICED. OR THE OPPOSITE, BEING SO FIGETY OR RESTLESS THAT YOU HAVE BEEN MOVING AROUND A LOT MORE THAN USUAL: SEVERAL DAYS
SUM OF ALL RESPONSES TO PHQ QUESTIONS 1-9: 6
5. POOR APPETITE OR OVEREATING: NOT AT ALL
4. FEELING TIRED OR HAVING LITTLE ENERGY: SEVERAL DAYS
SUM OF ALL RESPONSES TO PHQ9 QUESTIONS 1 & 2: 2
7. TROUBLE CONCENTRATING ON THINGS, SUCH AS READING THE NEWSPAPER OR WATCHING TELEVISION: SEVERAL DAYS
SUM OF ALL RESPONSES TO PHQ QUESTIONS 1-9: 6
6. FEELING BAD ABOUT YOURSELF - OR THAT YOU ARE A FAILURE OR HAVE LET YOURSELF OR YOUR FAMILY DOWN: NOT AT ALL
9. THOUGHTS THAT YOU WOULD BE BETTER OFF DEAD, OR OF HURTING YOURSELF: NOT AT ALL
1. LITTLE INTEREST OR PLEASURE IN DOING THINGS: SEVERAL DAYS
SUM OF ALL RESPONSES TO PHQ QUESTIONS 1-9: 6
10. IF YOU CHECKED OFF ANY PROBLEMS, HOW DIFFICULT HAVE THESE PROBLEMS MADE IT FOR YOU TO DO YOUR WORK, TAKE CARE OF THINGS AT HOME, OR GET ALONG WITH OTHER PEOPLE: NOT DIFFICULT AT ALL
3. TROUBLE FALLING OR STAYING ASLEEP: SEVERAL DAYS

## 2024-12-05 NOTE — ASSESSMENT & PLAN NOTE
Monitored by specialist- no acute findings meriting change in the plan    Orders:    CBC with Auto Differential; Future    Comprehensive Metabolic Panel; Future    TSH with Reflex; Future

## 2024-12-05 NOTE — PROGRESS NOTES
reflux disease without esophagitis   • Primary hypertension   • Hepatic cirrhosis (HCC)   • Hiatal hernia   • Normocytic normochromic anemia   • Pleural effusion   • COPD, severe (HCC)   • Major depressive disorder, recurrent, unspecified   • Protein calorie malnutrition   • Incisional hernia without obstruction or gangrene   • Non-recurrent unilateral inguinal hernia without obstruction or gangrene   • Abnormal EKG   • S/P repair of ventral hernia   • Anemia   • Asthenopia   • Abnormal finding of blood chemistry   • Sciatica   • Pelvic pain in female   • Other insomnia   • Nicotine-related disorder   • Nicotine dependence in remission   • Left anterior fascicular block   • Hyperhidrosis   • Fracture of fifth toe, right, closed   • Foot injury   • Early satiety   • Dyslipidemia   • Cortical cataract of both eyes   • Bowel habit changes   • Blurry vision   • Bilateral lower extremity edema   • Astigmatism of both eyes   • Claudication (HCC)   • Symptomatic cholelithiasis   • Recurrent incisional hernia   • Centrilobular emphysema (HCC)       PE  Vitals:    12/05/24 0934   BP: 136/84   Site: Left Upper Arm   Position: Sitting   Pulse: 99   Resp: 16   SpO2: 94%   Weight: 46.4 kg (102 lb 6.4 oz)   Height: 1.524 m (5')     Estimated body mass index is 20 kg/m² as calculated from the following:    Height as of this encounter: 1.524 m (5').    Weight as of this encounter: 46.4 kg (102 lb 6.4 oz).    Physical Exam  Constitutional:       General: She is not in acute distress.     Appearance: Normal appearance. She is normal weight.   HENT:      Head: Normocephalic and atraumatic.      Right Ear: Tympanic membrane, ear canal and external ear normal.      Left Ear: Tympanic membrane, ear canal and external ear normal.      Nose: No rhinorrhea.   Eyes:      Extraocular Movements: Extraocular movements intact.      Conjunctiva/sclera: Conjunctivae normal.      Pupils: Pupils are equal, round, and reactive to light.

## 2024-12-05 NOTE — ASSESSMENT & PLAN NOTE
Chronic, not at goal (unstable), declines medication or referral to counseling.  This time of year is harder for her due to being the anniversary of her 's death 6 years ago.    Orders:    CBC with Auto Differential; Future    Comprehensive Metabolic Panel; Future    TSH with Reflex; Future

## 2024-12-06 LAB
ALBUMIN SERPL-MCNC: 4.3 G/DL (ref 3.4–5)
ALBUMIN/GLOB SERPL: 2 {RATIO} (ref 1.1–2.2)
ALP SERPL-CCNC: 97 U/L (ref 40–129)
ALT SERPL-CCNC: 44 U/L (ref 10–40)
ANION GAP SERPL CALCULATED.3IONS-SCNC: 12 MMOL/L (ref 3–16)
AST SERPL-CCNC: 45 U/L (ref 15–37)
BASOPHILS # BLD: 0 K/UL (ref 0–0.2)
BASOPHILS NFR BLD: 0.9 %
BILIRUB SERPL-MCNC: 0.4 MG/DL (ref 0–1)
BUN SERPL-MCNC: 13 MG/DL (ref 7–20)
CALCIUM SERPL-MCNC: 9.3 MG/DL (ref 8.3–10.6)
CHLORIDE SERPL-SCNC: 102 MMOL/L (ref 99–110)
CO2 SERPL-SCNC: 24 MMOL/L (ref 21–32)
CREAT SERPL-MCNC: 0.6 MG/DL (ref 0.6–1.1)
DEPRECATED RDW RBC AUTO: 16.2 % (ref 12.4–15.4)
EOSINOPHIL # BLD: 0 K/UL (ref 0–0.6)
EOSINOPHIL NFR BLD: 1 %
GFR SERPLBLD CREATININE-BSD FMLA CKD-EPI: >90 ML/MIN/{1.73_M2}
GLUCOSE SERPL-MCNC: 99 MG/DL (ref 70–99)
HCT VFR BLD AUTO: 44.5 % (ref 36–48)
HGB BLD-MCNC: 14.3 G/DL (ref 12–16)
LYMPHOCYTES # BLD: 0.9 K/UL (ref 1–5.1)
LYMPHOCYTES NFR BLD: 17.6 %
MCH RBC QN AUTO: 31.7 PG (ref 26–34)
MCHC RBC AUTO-ENTMCNC: 32.2 G/DL (ref 31–36)
MCV RBC AUTO: 98.6 FL (ref 80–100)
MONOCYTES # BLD: 0.5 K/UL (ref 0–1.3)
MONOCYTES NFR BLD: 10.7 %
NEUTROPHILS # BLD: 3.4 K/UL (ref 1.7–7.7)
NEUTROPHILS NFR BLD: 69.8 %
PLATELET # BLD AUTO: 101 K/UL (ref 135–450)
PMV BLD AUTO: 9.6 FL (ref 5–10.5)
POTASSIUM SERPL-SCNC: 4.4 MMOL/L (ref 3.5–5.1)
PROT SERPL-MCNC: 6.5 G/DL (ref 6.4–8.2)
RBC # BLD AUTO: 4.51 M/UL (ref 4–5.2)
SODIUM SERPL-SCNC: 138 MMOL/L (ref 136–145)
TSH SERPL DL<=0.005 MIU/L-ACNC: 0.38 UIU/ML (ref 0.27–4.2)
WBC # BLD AUTO: 4.9 K/UL (ref 4–11)

## 2024-12-26 ENCOUNTER — TELEPHONE (OUTPATIENT)
Dept: PHYSICAL THERAPY | Age: 59
End: 2024-12-26

## 2024-12-26 NOTE — TELEPHONE ENCOUNTER
Called referring provider's office to request review, sign and return of outpatient rehabilitation plan of care. Spoke with EDILSON.   Comments (if applicable):

## 2024-12-31 ENCOUNTER — APPOINTMENT (OUTPATIENT)
Dept: PHYSICAL THERAPY | Age: 59
End: 2024-12-31
Attending: STUDENT IN AN ORGANIZED HEALTH CARE EDUCATION/TRAINING PROGRAM
Payer: MEDICARE

## 2024-12-31 RX ORDER — FLUTICASONE PROPIONATE 50 MCG
2 SPRAY, SUSPENSION (ML) NASAL DAILY
Qty: 48 G | Refills: 1 | Status: SHIPPED | OUTPATIENT
Start: 2024-12-31

## 2025-01-27 NOTE — PROGRESS NOTES
Interventional Cardiology Consultation     Jina Amor  1965    PCP: Trevor Doan DO  Referring Physician: Trevor Doan DO  Reason for Referral: atherosclerosis of both iliac arteries   Chief Complaint: \"I am good\"  Chief Complaint   Patient presents with    Follow-up    Other     Cramps in legs       Subjective:   History of Present Illness: The patient is 60 y.o. female with a past medical history significant for HTN, HLD, GERD, and COPD. Previous tobacco abuse. Patient initially seen to establish care. States she completed a CT per her PCP which showed calcifications in the aorta. Patient here today for follow up. Reports overall she has been doing good. Staying active without complaints. She denies chest pain, PND, orthopnea, dyspnea at rest, palpitations, syncope or edema.         Past Medical History:   Diagnosis Date    Acute on chronic respiratory failure with hypoxia 03/03/2023    Alopecia     Arthritis     Chronic hip pain     Chronic obstructive pulmonary disease (HCC) 03/22/2023    Chronic obstructive pulmonary disease with acute exacerbation (HCC) 01/18/2023    severe stage 4    Chronic respiratory failure     Depression     Esophageal varices (HCC)     GERD (gastroesophageal reflux disease)     Hyperlipidemia     Moderate malnutrition (HCC) 02/27/2023    Perioperative dehiscence of abdominal wound with evisceration 02/28/2023    Pneumonia due to Enterobacter species (HCC) 03/07/2023    PONV (postoperative nausea and vomiting)     Pressure ulcer of right buttock, stage 2 (HCC) 03/23/2023    healed March 2023    Sciatica     right, chronic    Small bowel obstruction (HCC) 02/18/2023    Thrombocytopenia, unspecified ([facility]) 10/30/2023    Tobacco abuse 01/18/2023    Umbilical hernia 01/18/2023    Wears glasses      Past Surgical History:   Procedure Laterality Date    ABDOMEN SURGERY N/A 02/28/2023    ABDOMINAL  WOUND CLOSURE performed by Pierre Harvey MD at [facility]  Physician Documentation                                                                           

 [facility]                                                                 

Name: Nancy Mtz                                                                                

Age: 25 yrs                                                                                       

Sex: Female                                                                                       

: 1994                                                                                   

MRN: [id number]                                                                                   

Arrival Date: 2019                                                                          

Time: 20:06                                                                                       

Account#: [id number]                                                                            

Bed 20                                                                                            

Private MD:                                                                                       

ED Physician Anthony Balderrama                                                                      

HPI:                                                                                              

                                                                                             

20:30 This 25 yrs old  Female presents to ER via EMS with complaints of Back Pain.   cp  

20:30 The patient presents with pain that is acute. The symptoms are located in the low back. cp  

      Onset: The symptoms/episode began/occurred today.                                           

20:30 intermittent down bilateral leg with intermittent tingling and weakness. Associated     cp  

      signs and symptoms: Pertinent negatives: abdominal pain, chest pain, constipation,          

      fever, incontinence, numbness, urinary retention. The problem was sustained when            

      lifting 30 lb weight at work today around 1300.                                             

20:30 Modifying factors: the patient symptoms are aggravated by movement. Severity of         cp  

      symptoms: in the emergency department the symptoms are actually worse, moderately.          

                                                                                                  

OB/GYN:                                                                                           

20:03 LMP N/A - Birth control method                                                          fc  

                                                                                                  

Historical:                                                                                       

- Allergies:                                                                                      

20:10 Latex, Natural Rubber;                                                                  fc  

20:10 Sulfa (Sulfonamide Antibiotics);                                                        fc  

- Home Meds:                                                                                      

20:10 multivitamin oral tab daily [Active];                                                   fc  

- PMHx:                                                                                           

20:10 Seizures; Disc Herniation;                                                              fc  

- PSHx:                                                                                           

20:10 None;                                                                                   fc  

                                                                                                  

- Immunization history:: Last tetanus immunization: up to date.                                   

- Social history:: Smoking status: Patient/guardian denies using tobacco,                         

  Patient/guardian denies using alcohol, street drugs.                                            

- Ebola Screening: : Patient negative for fever greater than or equal to 101.5 degrees            

  Fahrenheit, and additional compatible Ebola Virus Disease symptoms Patient denies               

  exposure to infectious person Patient denies travel to an Ebola-affected area in the            

  21 days before illness onset.                                                                   

                                                                                                  

                                                                                                  

ROS:                                                                                              

20:40 Constitutional: Negative for body aches, chills, fever, poor PO intake.                 cp  

20:40 Eyes: Negative for injury, pain, redness, and discharge.                                cp  

20:40 Cardiovascular: Negative for chest pain, edema, palpitations.                           cp  

20:40 Neck: Negative for pain with movement, pain at rest, stiffness.                         cp  

20:40 Respiratory: Negative for cough, shortness of breath, wheezing.                             

20:40 Abdomen/GI: Negative for abdominal pain, nausea, vomiting, and diarrhea, constipation,      

      bowel incontinence.                                                                         

20:40 Back: Positive for pain at rest, pain with movement, of the low back area.                  

20:40 MS/extremity: Negative for injury or acute deformity, decreased range of motion,            

      paresthesias.                                                                               

20:40 Skin: Negative for rash.                                                                    

20:40 Neuro: Negative for altered mental status, dizziness, headache, numbness, weakness.         

20:40 All other systems are negative.                                                             

                                                                                                  

Exam:                                                                                             

20:45 Constitutional: The patient appears in no acute distress, alert, awake, non-toxic, well cp  

      developed, well nourished, uncomfortable.                                                   

20:45 Head/Face:  Normocephalic, atraumatic.                                                  cp  

20:45 Eyes: Periorbital structures: appear normal, Conjunctiva: normal, no exudate, no            

      injection, Lids and lashes: appear normal, bilaterally.                                     

20:45 ENT: External ear(s): are unremarkable, Nose: is normal, Mouth: Lips: moist, Oral           

      mucosa: moist, Posterior pharynx: is normal, airway is patent, no erythema, no exudate.     

20:45 Neck: ROM/movement: is normal, is supple, without pain, no range of motions                 

      limitations, no nuchal rigidity.                                                            

20:45 Chest/axilla: Inspection: normal, Palpation: is normal, no crepitus, no tenderness.         

20:45 Cardiovascular: Rate: normal, Rhythm: regular.                                              

20:45 Respiratory: the patient does not display signs of respiratory distress,  Respirations:     

      normal, no use of accessory muscles, no retractions, no splinting, no tachypnea,            

      labored breathing, is not present.                                                          

20:45 Abdomen/GI: Inspection: abdomen appears normal, Bowel sounds: active, all quadrants,        

      Palpation: abdomen is soft and non-tender, in all quadrants.                                

20:45 Back: pain, that is moderate, of the  low back area, ROM is painful, with all movement.     

20:45 Neuro: Orientation: is normal, Mentation: is normal, Motor: moves all fours, strength       

      is normal, Sensation: is normal.                                                            

                                                                                                  

Vital Signs:                                                                                      

20:03  / 71; Pulse 84; Resp 16; Temp 99.2(O); Pulse Ox 100% on R/A; Weight 65.77 kg     fc  

      (R); Height 5 ft. 10 in. (177.80 cm) (R); Pain 5/10;                                        

20:52  / 71; Pulse 80; Resp 16; Temp 98.9; Pulse Ox 100% on R/A;                        ak1 

22:27  / 61; Pulse 72; Resp 16; Pulse Ox 100% on R/A;                                   jb4 

20:03 Body Mass Index 20.81 (65.77 kg, 177.80 cm)                                             fc  

                                                                                                  

MDM:                                                                                              

20:14 Patient medically screened.                                                             asher 

20:45 Differential diagnosis: chronic back pain, Pyelonephritis ruptured disc,                cp  

      Ureterolithiasis vertebral fracture, spinal stenosis, cauda equina.                         

22:23 Data reviewed: vital signs, nurses notes, lab test result(s), radiologic studies, plain cp  

      films, and as a result, I will discharge patient.                                           

22:23 Counseling: I had a detailed discussion with the patient and/or guardian regarding: the cp  

      historical points, exam findings, and any diagnostic results supporting the                 

      discharge/admit diagnosis, lab results, radiology results, the need for outpatient          

      follow up, a family practitioner, to return to the emergency department if symptoms         

      worsen or persist or if there are any questions or concerns that arise at home.             

22:23 Response to treatment: the patient's symptoms have markedly improved after treatment,   cp  

      and as a result, I will discharge patient. ED course: VSS. Pain improved and patient        

      observed ambulating in ED unassisted. Will discharge to home for continued monitoring.      

                                                                                                  

                                                                                             

20:42 Order name: Urine Dipstick--Ancillary (enter results); Complete Time: 22:23             ar5 

                                                                                             

20:42 Order name: Pregnancy Test, Urine                                                       EDMS

                                                                                             

20:23 Order name: XRAY Lumbar Spine (3 Views)                                                 cp  

                                                                                             

20:23 Order name: Urine Dipstick-Ancillary (obtain specimen); Complete Time: 20:25            cp  

                                                                                             

20:23 Order name: Urine Pregnancy Test (obtain specimen); Complete Time: 20:25                cp  

                                                                                                  

Administered Medications:                                                                         

20:30 Not Given (Patient Refused): morphine 4 mg IM once                                      ak1 

20:45 Not Given (Patient Refused): Ketorolac 60 mg IM once                                    ak1 

20:47 Drug: Tylenol 1000 mg Route: PO;                                                        ak1 

21:10 Follow up: Response: No adverse reaction; Pain is decreased                             jb4 

20:47 Drug: Motrin 800 mg Route: PO;                                                          ak1 

21:10 Follow up: Response: No adverse reaction; Pain is decreased                             jb4 

                                                                                                  

                                                                                                  

Disposition:                                                                                      

19 22:24 Discharged to Home. Impression: Low back pain.                                     

- Condition is Stable.                                                                            

- Discharge Instructions: Back Pain, Adult, Back Exercises, Easy-to-Read.                         

- Prescriptions for Medrol (Noe) 4 mg Oral Tablets, Dose Pack - take 1 tablet by ORAL             

  route as directed - follow package instructions; 1 packet.                                      

- Medication Reconciliation Form, Thank You Letter, Antibiotic Education, Prescription            

  Opioid Use form.                                                                                

- Follow up: Private Physician; When: 2 - 3 days; Reason: Recheck today's complaints.             

- Problem is new.                                                                                 

- Symptoms have improved.                                                                         

                                                                                                  

                                                                                                  

                                                                                                  

Addendum:                                                                                         

2019                                                                                        

     07:10 Co-signature as Attending Physician, Anthony Balderrama MD I agree with the assessment and  c
ha

           plan of care.                                                                          

                                                                                                  

Signatures:                                                                                       

Dispatcher [employer]                           EDAnthony Beltran MD MD cha Chretien, Felicia, RN                   RN   Dawna Cano RN                       RN   ak1                                                  

Anthony Avelar PA PA cp Bryson, James, RN                       RN   jb4                                                  

                                                                                                  

Corrections: (The following items were deleted from the chart)                                    

                                                                                             

22:52 22:24 2019 22:24 Discharged to Home. Impression: Low back pain. Condition is      jb4 

      Stable. Forms are Medication Reconciliation Form, Thank You Letter, Antibiotic              

      Education, Prescription Opioid Use. Follow up: Private Physician; When: 2 - 3 days;         

      Reason: Recheck today's complaints. Problem is new. Symptoms have improved. cp              

                                                                                                  

**************************************************************************************************

## 2025-01-28 ENCOUNTER — OFFICE VISIT (OUTPATIENT)
Dept: CARDIOLOGY CLINIC | Age: 60
End: 2025-01-28
Payer: MEDICARE

## 2025-01-28 ENCOUNTER — TELEPHONE (OUTPATIENT)
Dept: FAMILY MEDICINE CLINIC | Age: 60
End: 2025-01-28

## 2025-01-28 VITALS
OXYGEN SATURATION: 94 % | WEIGHT: 101 LBS | BODY MASS INDEX: 19.83 KG/M2 | DIASTOLIC BLOOD PRESSURE: 84 MMHG | HEIGHT: 60 IN | SYSTOLIC BLOOD PRESSURE: 130 MMHG | HEART RATE: 95 BPM

## 2025-01-28 DIAGNOSIS — E78.2 MIXED HYPERLIPIDEMIA: Primary | ICD-10-CM

## 2025-01-28 PROCEDURE — 3075F SYST BP GE 130 - 139MM HG: CPT | Performed by: INTERNAL MEDICINE

## 2025-01-28 PROCEDURE — 1036F TOBACCO NON-USER: CPT | Performed by: INTERNAL MEDICINE

## 2025-01-28 PROCEDURE — 3017F COLORECTAL CA SCREEN DOC REV: CPT | Performed by: INTERNAL MEDICINE

## 2025-01-28 PROCEDURE — G8427 DOCREV CUR MEDS BY ELIG CLIN: HCPCS | Performed by: INTERNAL MEDICINE

## 2025-01-28 PROCEDURE — 99213 OFFICE O/P EST LOW 20 MIN: CPT | Performed by: INTERNAL MEDICINE

## 2025-01-28 PROCEDURE — G8420 CALC BMI NORM PARAMETERS: HCPCS | Performed by: INTERNAL MEDICINE

## 2025-01-28 PROCEDURE — 3079F DIAST BP 80-89 MM HG: CPT | Performed by: INTERNAL MEDICINE

## 2025-01-28 RX ORDER — UBIDECARENONE 75 MG
50 CAPSULE ORAL DAILY
COMMUNITY

## 2025-01-28 NOTE — TELEPHONE ENCOUNTER
Patient is in need of second Hep B, 1st one was 12/5/2024, is it okay to give second its about three weeks past one month. Please advise.

## 2025-01-29 ENCOUNTER — LAB (OUTPATIENT)
Dept: FAMILY MEDICINE CLINIC | Age: 60
End: 2025-01-29

## 2025-02-12 DIAGNOSIS — J44.9 CHRONIC OBSTRUCTIVE PULMONARY DISEASE, UNSPECIFIED COPD TYPE (HCC): ICD-10-CM

## 2025-02-12 RX ORDER — BUDESONIDE, GLYCOPYRROLATE, AND FORMOTEROL FUMARATE 160; 9; 4.8 UG/1; UG/1; UG/1
2 AEROSOL, METERED RESPIRATORY (INHALATION) 2 TIMES DAILY
Qty: 32.1 G | Refills: 1 | Status: SHIPPED | OUTPATIENT
Start: 2025-02-12

## 2025-02-14 DIAGNOSIS — J44.1 CHRONIC OBSTRUCTIVE PULMONARY DISEASE WITH ACUTE EXACERBATION (HCC): ICD-10-CM

## 2025-02-14 DIAGNOSIS — I70.8 ATHEROSCLEROSIS OF BOTH ILIAC ARTERIES: ICD-10-CM

## 2025-02-14 RX ORDER — ATORVASTATIN CALCIUM 40 MG/1
40 TABLET, FILM COATED ORAL DAILY
Qty: 90 TABLET | Refills: 0 | Status: SHIPPED | OUTPATIENT
Start: 2025-02-14 | End: 2025-05-15

## 2025-02-14 RX ORDER — MONTELUKAST SODIUM 10 MG/1
10 TABLET ORAL DAILY
Qty: 90 TABLET | Refills: 0 | Status: SHIPPED | OUTPATIENT
Start: 2025-02-14

## 2025-02-14 NOTE — TELEPHONE ENCOUNTER
Refill Request         Last Seen: Last Seen Department: 12/5/2024  Last Seen by PCP: 12/5/2024    Last Written: 11/14/2024      Next Appointment:   Future Appointments   Date Time Provider Department Center   2/18/2025 10:00 AM [facility] 2 [facility]   3/5/2025  9:30 AM Claudia Bennett, APRN - CNP [facility]   5/20/2025  1:40 PM Guillermo Cardona MD [facility]       Requested Prescriptions     Pending Prescriptions Disp Refills    atorvastatin (LIPITOR) 40 MG tablet 90 tablet 0     Sig: Take 1 tablet by mouth daily

## 2025-02-19 ENCOUNTER — APPOINTMENT (OUTPATIENT)
Dept: CT IMAGING | Age: 60
DRG: 189 | End: 2025-02-19
Payer: MEDICARE

## 2025-02-19 ENCOUNTER — HOSPITAL ENCOUNTER (INPATIENT)
Age: 60
LOS: 5 days | Discharge: HOME | DRG: 189 | End: 2025-02-24
Attending: STUDENT IN AN ORGANIZED HEALTH CARE EDUCATION/TRAINING PROGRAM | Admitting: INTERNAL MEDICINE
Payer: MEDICARE

## 2025-02-19 ENCOUNTER — APPOINTMENT (OUTPATIENT)
Dept: GENERAL RADIOLOGY | Age: 60
DRG: 189 | End: 2025-02-19
Payer: MEDICARE

## 2025-02-19 DIAGNOSIS — J44.1 COPD EXACERBATION (HCC): ICD-10-CM

## 2025-02-19 DIAGNOSIS — J96.01 ACUTE RESPIRATORY FAILURE WITH HYPOXIA (HCC): Primary | ICD-10-CM

## 2025-02-19 PROBLEM — J96.21 ACUTE ON CHRONIC RESPIRATORY FAILURE WITH HYPOXIA (HCC): Status: ACTIVE | Noted: 2025-02-19

## 2025-02-19 LAB
ALBUMIN SERPL-MCNC: 4.2 G/DL (ref 3.4–5)
ALBUMIN/GLOB SERPL: 1.2 {RATIO} (ref 1.1–2.2)
ALP SERPL-CCNC: 99 U/L (ref 40–129)
ALT SERPL-CCNC: 44 U/L (ref 10–40)
ANION GAP SERPL CALCULATED.3IONS-SCNC: 11 MMOL/L (ref 3–16)
AST SERPL-CCNC: 46 U/L (ref 15–37)
BASE EXCESS BLDV CALC-SCNC: -0.3 MMOL/L (ref -3–3)
BASOPHILS # BLD: 0 K/UL (ref 0–0.2)
BASOPHILS NFR BLD: 0.2 %
BILIRUB SERPL-MCNC: 0.5 MG/DL (ref 0–1)
BUN SERPL-MCNC: 7 MG/DL (ref 7–20)
CALCIUM SERPL-MCNC: 9.4 MG/DL (ref 8.3–10.6)
CHLORIDE SERPL-SCNC: 95 MMOL/L (ref 99–110)
CO2 BLDV-SCNC: 26 MMOL/L
CO2 SERPL-SCNC: 26 MMOL/L (ref 21–32)
COHGB MFR BLDV: 6.8 % (ref 0–1.5)
CREAT SERPL-MCNC: 0.5 MG/DL (ref 0.6–1.2)
DEPRECATED RDW RBC AUTO: 15.4 % (ref 12.4–15.4)
EKG ATRIAL RATE: 111 BPM
EKG DIAGNOSIS: NORMAL
EKG P AXIS: 85 DEGREES
EKG P-R INTERVAL: 146 MS
EKG Q-T INTERVAL: 356 MS
EKG QRS DURATION: 122 MS
EKG QTC CALCULATION (BAZETT): 484 MS
EKG R AXIS: -75 DEGREES
EKG T AXIS: 64 DEGREES
EKG VENTRICULAR RATE: 111 BPM
EOSINOPHIL # BLD: 0 K/UL (ref 0–0.6)
EOSINOPHIL NFR BLD: 0 %
FLUAV RNA RESP QL NAA+PROBE: NOT DETECTED
FLUBV RNA RESP QL NAA+PROBE: NOT DETECTED
GFR SERPLBLD CREATININE-BSD FMLA CKD-EPI: >90 ML/MIN/{1.73_M2}
GLUCOSE SERPL-MCNC: 137 MG/DL (ref 70–99)
HCO3 BLDV-SCNC: 25 MMOL/L (ref 23–29)
HCT VFR BLD AUTO: 43.8 % (ref 36–48)
HGB BLD-MCNC: 14.8 G/DL (ref 12–16)
LACTATE BLDV-SCNC: 1.5 MMOL/L (ref 0.4–2)
LYMPHOCYTES # BLD: 0.7 K/UL (ref 1–5.1)
LYMPHOCYTES NFR BLD: 7.4 %
MCH RBC QN AUTO: 32.1 PG (ref 26–34)
MCHC RBC AUTO-ENTMCNC: 33.9 G/DL (ref 31–36)
MCV RBC AUTO: 94.6 FL (ref 80–100)
METHGB MFR BLDV: 0.1 %
MONOCYTES # BLD: 1.3 K/UL (ref 0–1.3)
MONOCYTES NFR BLD: 13.8 %
NEUTROPHILS # BLD: 7.3 K/UL (ref 1.7–7.7)
NEUTROPHILS NFR BLD: 78.6 %
O2 THERAPY: ABNORMAL
PCO2 BLDV: 42.9 MMHG (ref 40–50)
PH BLDV: 7.38 [PH] (ref 7.35–7.45)
PLATELET # BLD AUTO: 141 K/UL (ref 135–450)
PMV BLD AUTO: 8.7 FL (ref 5–10.5)
PO2 BLDV: 66.4 MMHG (ref 25–40)
POTASSIUM SERPL-SCNC: 3.8 MMOL/L (ref 3.5–5.1)
PROT SERPL-MCNC: 7.7 G/DL (ref 6.4–8.2)
RBC # BLD AUTO: 4.63 M/UL (ref 4–5.2)
SAO2 % BLDV: 94 %
SARS-COV-2 RNA RESP QL NAA+PROBE: NOT DETECTED
SODIUM SERPL-SCNC: 132 MMOL/L (ref 136–145)
TROPONIN, HIGH SENSITIVITY: 15 NG/L (ref 0–14)
TROPONIN, HIGH SENSITIVITY: 16 NG/L (ref 0–14)
WBC # BLD AUTO: 9.3 K/UL (ref 4–11)

## 2025-02-19 PROCEDURE — 71045 X-RAY EXAM CHEST 1 VIEW: CPT

## 2025-02-19 PROCEDURE — 2500000003 HC RX 250 WO HCPCS: Performed by: STUDENT IN AN ORGANIZED HEALTH CARE EDUCATION/TRAINING PROGRAM

## 2025-02-19 PROCEDURE — 6360000004 HC RX CONTRAST MEDICATION: Performed by: STUDENT IN AN ORGANIZED HEALTH CARE EDUCATION/TRAINING PROGRAM

## 2025-02-19 PROCEDURE — 80053 COMPREHEN METABOLIC PANEL: CPT

## 2025-02-19 PROCEDURE — 83605 ASSAY OF LACTIC ACID: CPT

## 2025-02-19 PROCEDURE — 96375 TX/PRO/DX INJ NEW DRUG ADDON: CPT

## 2025-02-19 PROCEDURE — 6370000000 HC RX 637 (ALT 250 FOR IP): Performed by: STUDENT IN AN ORGANIZED HEALTH CARE EDUCATION/TRAINING PROGRAM

## 2025-02-19 PROCEDURE — 84484 ASSAY OF TROPONIN QUANT: CPT

## 2025-02-19 PROCEDURE — 6360000002 HC RX W HCPCS: Performed by: STUDENT IN AN ORGANIZED HEALTH CARE EDUCATION/TRAINING PROGRAM

## 2025-02-19 PROCEDURE — 2580000003 HC RX 258: Performed by: STUDENT IN AN ORGANIZED HEALTH CARE EDUCATION/TRAINING PROGRAM

## 2025-02-19 PROCEDURE — 5A0935A ASSISTANCE WITH RESPIRATORY VENTILATION, LESS THAN 24 CONSECUTIVE HOURS, HIGH NASAL FLOW/VELOCITY: ICD-10-PCS | Performed by: INTERNAL MEDICINE

## 2025-02-19 PROCEDURE — 1200000000 HC SEMI PRIVATE

## 2025-02-19 PROCEDURE — 93010 ELECTROCARDIOGRAM REPORT: CPT | Performed by: INTERNAL MEDICINE

## 2025-02-19 PROCEDURE — 87636 SARSCOV2 & INF A&B AMP PRB: CPT

## 2025-02-19 PROCEDURE — 2700000000 HC OXYGEN THERAPY PER DAY

## 2025-02-19 PROCEDURE — 85025 COMPLETE CBC W/AUTO DIFF WBC: CPT

## 2025-02-19 PROCEDURE — 94640 AIRWAY INHALATION TREATMENT: CPT

## 2025-02-19 PROCEDURE — 36415 COLL VENOUS BLD VENIPUNCTURE: CPT

## 2025-02-19 PROCEDURE — 93005 ELECTROCARDIOGRAM TRACING: CPT | Performed by: STUDENT IN AN ORGANIZED HEALTH CARE EDUCATION/TRAINING PROGRAM

## 2025-02-19 PROCEDURE — 82803 BLOOD GASES ANY COMBINATION: CPT

## 2025-02-19 PROCEDURE — 71260 CT THORAX DX C+: CPT

## 2025-02-19 PROCEDURE — 96374 THER/PROPH/DIAG INJ IV PUSH: CPT

## 2025-02-19 PROCEDURE — 94761 N-INVAS EAR/PLS OXIMETRY MLT: CPT

## 2025-02-19 PROCEDURE — 99285 EMERGENCY DEPT VISIT HI MDM: CPT

## 2025-02-19 RX ORDER — SODIUM CHLORIDE, SODIUM LACTATE, POTASSIUM CHLORIDE, AND CALCIUM CHLORIDE .6; .31; .03; .02 G/100ML; G/100ML; G/100ML; G/100ML
1000 INJECTION, SOLUTION INTRAVENOUS ONCE
Status: COMPLETED | OUTPATIENT
Start: 2025-02-19 | End: 2025-02-19

## 2025-02-19 RX ORDER — METOCLOPRAMIDE HYDROCHLORIDE 5 MG/ML
10 INJECTION INTRAMUSCULAR; INTRAVENOUS ONCE
Status: COMPLETED | OUTPATIENT
Start: 2025-02-19 | End: 2025-02-20

## 2025-02-19 RX ORDER — IPRATROPIUM BROMIDE AND ALBUTEROL SULFATE 2.5; .5 MG/3ML; MG/3ML
2 SOLUTION RESPIRATORY (INHALATION) ONCE
Status: COMPLETED | OUTPATIENT
Start: 2025-02-19 | End: 2025-02-19

## 2025-02-19 RX ORDER — IPRATROPIUM BROMIDE AND ALBUTEROL SULFATE 2.5; .5 MG/3ML; MG/3ML
1 SOLUTION RESPIRATORY (INHALATION) ONCE
Status: COMPLETED | OUTPATIENT
Start: 2025-02-19 | End: 2025-02-19

## 2025-02-19 RX ORDER — DIPHENHYDRAMINE HYDROCHLORIDE 50 MG/ML
25 INJECTION INTRAMUSCULAR; INTRAVENOUS ONCE
Status: COMPLETED | OUTPATIENT
Start: 2025-02-19 | End: 2025-02-20

## 2025-02-19 RX ORDER — DIAZEPAM 10 MG/2ML
5 INJECTION, SOLUTION INTRAMUSCULAR; INTRAVENOUS ONCE
Status: COMPLETED | OUTPATIENT
Start: 2025-02-19 | End: 2025-02-19

## 2025-02-19 RX ORDER — IOPAMIDOL 755 MG/ML
75 INJECTION, SOLUTION INTRAVASCULAR
Status: COMPLETED | OUTPATIENT
Start: 2025-02-19 | End: 2025-02-19

## 2025-02-19 RX ORDER — ACETAMINOPHEN 500 MG
1000 TABLET ORAL
Status: COMPLETED | OUTPATIENT
Start: 2025-02-19 | End: 2025-02-20

## 2025-02-19 RX ADMIN — WATER 125 MG: 1 INJECTION INTRAMUSCULAR; INTRAVENOUS; SUBCUTANEOUS at 16:46

## 2025-02-19 RX ADMIN — DIAZEPAM 5 MG: 5 INJECTION, SOLUTION INTRAMUSCULAR; INTRAVENOUS at 21:31

## 2025-02-19 RX ADMIN — IPRATROPIUM BROMIDE AND ALBUTEROL SULFATE 2 DOSE: 2.5; .5 SOLUTION RESPIRATORY (INHALATION) at 21:39

## 2025-02-19 RX ADMIN — IOPAMIDOL 75 ML: 755 INJECTION, SOLUTION INTRAVENOUS at 19:04

## 2025-02-19 RX ADMIN — IPRATROPIUM BROMIDE AND ALBUTEROL SULFATE 1 DOSE: 2.5; .5 SOLUTION RESPIRATORY (INHALATION) at 16:50

## 2025-02-19 RX ADMIN — IPRATROPIUM BROMIDE AND ALBUTEROL SULFATE 1 DOSE: 2.5; .5 SOLUTION RESPIRATORY (INHALATION) at 23:15

## 2025-02-19 RX ADMIN — SODIUM CHLORIDE, POTASSIUM CHLORIDE, SODIUM LACTATE AND CALCIUM CHLORIDE 1000 ML: 600; 310; 30; 20 INJECTION, SOLUTION INTRAVENOUS at 16:53

## 2025-02-19 ASSESSMENT — PAIN SCALES - GENERAL
PAINLEVEL_OUTOF10: 5
PAINLEVEL_OUTOF10: 9

## 2025-02-19 ASSESSMENT — PAIN - FUNCTIONAL ASSESSMENT
PAIN_FUNCTIONAL_ASSESSMENT: NONE - DENIES PAIN
PAIN_FUNCTIONAL_ASSESSMENT: 0-10
PAIN_FUNCTIONAL_ASSESSMENT: PREVENTS OR INTERFERES SOME ACTIVE ACTIVITIES AND ADLS

## 2025-02-19 ASSESSMENT — PAIN DESCRIPTION - PAIN TYPE: TYPE: ACUTE PAIN

## 2025-02-19 ASSESSMENT — PAIN DESCRIPTION - DESCRIPTORS: DESCRIPTORS: ACHING

## 2025-02-19 ASSESSMENT — PAIN DESCRIPTION - LOCATION: LOCATION: HEAD

## 2025-02-19 ASSESSMENT — PAIN DESCRIPTION - ORIENTATION: ORIENTATION: RIGHT;LEFT;ANTERIOR;POSTERIOR

## 2025-02-20 LAB
ANION GAP SERPL CALCULATED.3IONS-SCNC: 11 MMOL/L (ref 3–16)
BASOPHILS # BLD: 0 K/UL (ref 0–0.2)
BASOPHILS NFR BLD: 0.1 %
BUN SERPL-MCNC: 6 MG/DL (ref 7–20)
CALCIUM SERPL-MCNC: 9.1 MG/DL (ref 8.3–10.6)
CHLORIDE SERPL-SCNC: 95 MMOL/L (ref 99–110)
CO2 SERPL-SCNC: 28 MMOL/L (ref 21–32)
CREAT SERPL-MCNC: 0.4 MG/DL (ref 0.6–1.2)
DEPRECATED RDW RBC AUTO: 15.2 % (ref 12.4–15.4)
EOSINOPHIL # BLD: 0 K/UL (ref 0–0.6)
EOSINOPHIL NFR BLD: 0 %
GFR SERPLBLD CREATININE-BSD FMLA CKD-EPI: >90 ML/MIN/{1.73_M2}
GLUCOSE SERPL-MCNC: 174 MG/DL (ref 70–99)
HCT VFR BLD AUTO: 41.9 % (ref 36–48)
HGB BLD-MCNC: 14 G/DL (ref 12–16)
LYMPHOCYTES # BLD: 0.5 K/UL (ref 1–5.1)
LYMPHOCYTES NFR BLD: 6.9 %
MCH RBC QN AUTO: 31.8 PG (ref 26–34)
MCHC RBC AUTO-ENTMCNC: 33.5 G/DL (ref 31–36)
MCV RBC AUTO: 95 FL (ref 80–100)
MONOCYTES # BLD: 0.6 K/UL (ref 0–1.3)
MONOCYTES NFR BLD: 8.9 %
NEUTROPHILS # BLD: 5.9 K/UL (ref 1.7–7.7)
NEUTROPHILS NFR BLD: 84.1 %
PLATELET # BLD AUTO: 112 K/UL (ref 135–450)
PMV BLD AUTO: 8.5 FL (ref 5–10.5)
POTASSIUM SERPL-SCNC: 3.8 MMOL/L (ref 3.5–5.1)
RBC # BLD AUTO: 4.41 M/UL (ref 4–5.2)
SODIUM SERPL-SCNC: 134 MMOL/L (ref 136–145)
TROPONIN, HIGH SENSITIVITY: 13 NG/L (ref 0–14)
WBC # BLD AUTO: 7 K/UL (ref 4–11)

## 2025-02-20 PROCEDURE — 6360000002 HC RX W HCPCS: Performed by: STUDENT IN AN ORGANIZED HEALTH CARE EDUCATION/TRAINING PROGRAM

## 2025-02-20 PROCEDURE — 6370000000 HC RX 637 (ALT 250 FOR IP): Performed by: STUDENT IN AN ORGANIZED HEALTH CARE EDUCATION/TRAINING PROGRAM

## 2025-02-20 PROCEDURE — 2500000003 HC RX 250 WO HCPCS: Performed by: STUDENT IN AN ORGANIZED HEALTH CARE EDUCATION/TRAINING PROGRAM

## 2025-02-20 PROCEDURE — 6360000002 HC RX W HCPCS: Performed by: NURSE PRACTITIONER

## 2025-02-20 PROCEDURE — 85025 COMPLETE CBC W/AUTO DIFF WBC: CPT

## 2025-02-20 PROCEDURE — 84484 ASSAY OF TROPONIN QUANT: CPT

## 2025-02-20 PROCEDURE — 1200000000 HC SEMI PRIVATE

## 2025-02-20 PROCEDURE — 2500000003 HC RX 250 WO HCPCS: Performed by: NURSE PRACTITIONER

## 2025-02-20 PROCEDURE — 6370000000 HC RX 637 (ALT 250 FOR IP): Performed by: NURSE PRACTITIONER

## 2025-02-20 PROCEDURE — 6370000000 HC RX 637 (ALT 250 FOR IP): Performed by: INTERNAL MEDICINE

## 2025-02-20 PROCEDURE — 2700000000 HC OXYGEN THERAPY PER DAY

## 2025-02-20 PROCEDURE — 94669 MECHANICAL CHEST WALL OSCILL: CPT

## 2025-02-20 PROCEDURE — 97535 SELF CARE MNGMENT TRAINING: CPT

## 2025-02-20 PROCEDURE — 80048 BASIC METABOLIC PNL TOTAL CA: CPT

## 2025-02-20 PROCEDURE — 97530 THERAPEUTIC ACTIVITIES: CPT

## 2025-02-20 PROCEDURE — 99222 1ST HOSP IP/OBS MODERATE 55: CPT | Performed by: STUDENT IN AN ORGANIZED HEALTH CARE EDUCATION/TRAINING PROGRAM

## 2025-02-20 PROCEDURE — 94640 AIRWAY INHALATION TREATMENT: CPT

## 2025-02-20 PROCEDURE — 97166 OT EVAL MOD COMPLEX 45 MIN: CPT

## 2025-02-20 PROCEDURE — 36415 COLL VENOUS BLD VENIPUNCTURE: CPT

## 2025-02-20 PROCEDURE — 94761 N-INVAS EAR/PLS OXIMETRY MLT: CPT

## 2025-02-20 RX ORDER — DIPHENHYDRAMINE HCL 25 MG
25 TABLET ORAL EVERY 6 HOURS PRN
Status: DISCONTINUED | OUTPATIENT
Start: 2025-02-20 | End: 2025-02-24 | Stop reason: HOSPADM

## 2025-02-20 RX ORDER — IBUPROFEN 400 MG/1
400 TABLET, FILM COATED ORAL EVERY 6 HOURS PRN
Status: DISCONTINUED | OUTPATIENT
Start: 2025-02-20 | End: 2025-02-24 | Stop reason: HOSPADM

## 2025-02-20 RX ORDER — METOPROLOL TARTRATE 1 MG/ML
5 INJECTION, SOLUTION INTRAVENOUS ONCE
Status: COMPLETED | OUTPATIENT
Start: 2025-02-20 | End: 2025-02-20

## 2025-02-20 RX ORDER — BUTALBITAL, ACETAMINOPHEN AND CAFFEINE 50; 325; 40 MG/1; MG/1; MG/1
1 TABLET ORAL EVERY 6 HOURS PRN
Status: DISCONTINUED | OUTPATIENT
Start: 2025-02-20 | End: 2025-02-24 | Stop reason: HOSPADM

## 2025-02-20 RX ORDER — IPRATROPIUM BROMIDE AND ALBUTEROL SULFATE 2.5; .5 MG/3ML; MG/3ML
1 SOLUTION RESPIRATORY (INHALATION)
Status: DISCONTINUED | OUTPATIENT
Start: 2025-02-20 | End: 2025-02-21

## 2025-02-20 RX ORDER — BENZONATATE 100 MG/1
100 CAPSULE ORAL 3 TIMES DAILY PRN
Status: DISCONTINUED | OUTPATIENT
Start: 2025-02-20 | End: 2025-02-24 | Stop reason: HOSPADM

## 2025-02-20 RX ORDER — SODIUM CHLORIDE 0.9 % (FLUSH) 0.9 %
5-40 SYRINGE (ML) INJECTION EVERY 12 HOURS SCHEDULED
Status: DISCONTINUED | OUTPATIENT
Start: 2025-02-20 | End: 2025-02-24 | Stop reason: HOSPADM

## 2025-02-20 RX ORDER — DIAZEPAM 2 MG/1
2 TABLET ORAL EVERY 6 HOURS PRN
Status: DISCONTINUED | OUTPATIENT
Start: 2025-02-20 | End: 2025-02-24 | Stop reason: HOSPADM

## 2025-02-20 RX ORDER — ENOXAPARIN SODIUM 100 MG/ML
30 INJECTION SUBCUTANEOUS DAILY
Status: DISCONTINUED | OUTPATIENT
Start: 2025-02-20 | End: 2025-02-24 | Stop reason: HOSPADM

## 2025-02-20 RX ORDER — ONDANSETRON 4 MG/1
4 TABLET, ORALLY DISINTEGRATING ORAL EVERY 8 HOURS PRN
Status: DISCONTINUED | OUTPATIENT
Start: 2025-02-20 | End: 2025-02-24 | Stop reason: HOSPADM

## 2025-02-20 RX ORDER — ALBUTEROL SULFATE 0.83 MG/ML
2.5 SOLUTION RESPIRATORY (INHALATION)
Status: DISCONTINUED | OUTPATIENT
Start: 2025-02-20 | End: 2025-02-20

## 2025-02-20 RX ORDER — LORAZEPAM 0.5 MG/1
0.5 TABLET ORAL ONCE
Status: COMPLETED | OUTPATIENT
Start: 2025-02-20 | End: 2025-02-20

## 2025-02-20 RX ORDER — ATORVASTATIN CALCIUM 40 MG/1
40 TABLET, FILM COATED ORAL DAILY
Status: DISCONTINUED | OUTPATIENT
Start: 2025-02-20 | End: 2025-02-24 | Stop reason: HOSPADM

## 2025-02-20 RX ORDER — ONDANSETRON 2 MG/ML
4 INJECTION INTRAMUSCULAR; INTRAVENOUS EVERY 6 HOURS PRN
Status: DISCONTINUED | OUTPATIENT
Start: 2025-02-20 | End: 2025-02-24 | Stop reason: HOSPADM

## 2025-02-20 RX ORDER — BUDESONIDE AND FORMOTEROL FUMARATE DIHYDRATE 160; 4.5 UG/1; UG/1
2 AEROSOL RESPIRATORY (INHALATION)
Status: DISCONTINUED | OUTPATIENT
Start: 2025-02-20 | End: 2025-02-24 | Stop reason: HOSPADM

## 2025-02-20 RX ORDER — TRAMADOL HYDROCHLORIDE 50 MG/1
25 TABLET ORAL EVERY 6 HOURS PRN
Status: DISCONTINUED | OUTPATIENT
Start: 2025-02-20 | End: 2025-02-24 | Stop reason: HOSPADM

## 2025-02-20 RX ORDER — ACETAMINOPHEN 650 MG/1
650 SUPPOSITORY RECTAL EVERY 6 HOURS PRN
Status: DISCONTINUED | OUTPATIENT
Start: 2025-02-20 | End: 2025-02-24 | Stop reason: HOSPADM

## 2025-02-20 RX ORDER — SODIUM CHLORIDE 0.9 % (FLUSH) 0.9 %
5-40 SYRINGE (ML) INJECTION PRN
Status: DISCONTINUED | OUTPATIENT
Start: 2025-02-20 | End: 2025-02-24 | Stop reason: HOSPADM

## 2025-02-20 RX ORDER — PANTOPRAZOLE SODIUM 40 MG/1
40 TABLET, DELAYED RELEASE ORAL
Status: DISCONTINUED | OUTPATIENT
Start: 2025-02-20 | End: 2025-02-24 | Stop reason: HOSPADM

## 2025-02-20 RX ORDER — ALBUTEROL SULFATE 0.83 MG/ML
2.5 SOLUTION RESPIRATORY (INHALATION) 4 TIMES DAILY PRN
Status: DISCONTINUED | OUTPATIENT
Start: 2025-02-20 | End: 2025-02-20

## 2025-02-20 RX ORDER — POLYETHYLENE GLYCOL 3350 17 G/17G
17 POWDER, FOR SOLUTION ORAL DAILY PRN
Status: DISCONTINUED | OUTPATIENT
Start: 2025-02-20 | End: 2025-02-24 | Stop reason: HOSPADM

## 2025-02-20 RX ORDER — HYDROCODONE BITARTRATE AND HOMATROPINE METHYLBROMIDE ORAL SOLUTION 5; 1.5 MG/5ML; MG/5ML
5 LIQUID ORAL EVERY 4 HOURS PRN
Status: DISCONTINUED | OUTPATIENT
Start: 2025-02-20 | End: 2025-02-24 | Stop reason: HOSPADM

## 2025-02-20 RX ORDER — AZITHROMYCIN 250 MG/1
250 TABLET, FILM COATED ORAL DAILY
Status: COMPLETED | OUTPATIENT
Start: 2025-02-20 | End: 2025-02-22

## 2025-02-20 RX ORDER — MONTELUKAST SODIUM 10 MG/1
10 TABLET ORAL DAILY
Status: DISCONTINUED | OUTPATIENT
Start: 2025-02-20 | End: 2025-02-24 | Stop reason: HOSPADM

## 2025-02-20 RX ORDER — PREDNISONE 20 MG/1
40 TABLET ORAL DAILY
Status: DISCONTINUED | OUTPATIENT
Start: 2025-02-20 | End: 2025-02-20

## 2025-02-20 RX ORDER — ALBUTEROL SULFATE 90 UG/1
2 INHALANT RESPIRATORY (INHALATION) EVERY 6 HOURS PRN
Status: DISCONTINUED | OUTPATIENT
Start: 2025-02-20 | End: 2025-02-24 | Stop reason: HOSPADM

## 2025-02-20 RX ORDER — SODIUM CHLORIDE 9 MG/ML
INJECTION, SOLUTION INTRAVENOUS PRN
Status: DISCONTINUED | OUTPATIENT
Start: 2025-02-20 | End: 2025-02-24 | Stop reason: HOSPADM

## 2025-02-20 RX ORDER — ACETAMINOPHEN 325 MG/1
650 TABLET ORAL EVERY 6 HOURS PRN
Status: DISCONTINUED | OUTPATIENT
Start: 2025-02-20 | End: 2025-02-24 | Stop reason: HOSPADM

## 2025-02-20 RX ADMIN — BENZONATATE 100 MG: 100 CAPSULE ORAL at 15:02

## 2025-02-20 RX ADMIN — SODIUM CHLORIDE, PRESERVATIVE FREE 10 ML: 5 INJECTION INTRAVENOUS at 23:31

## 2025-02-20 RX ADMIN — LORAZEPAM 0.5 MG: 0.5 TABLET ORAL at 16:29

## 2025-02-20 RX ADMIN — ACETAMINOPHEN 650 MG: 325 TABLET ORAL at 05:55

## 2025-02-20 RX ADMIN — ALBUTEROL SULFATE 2.5 MG: 2.5 SOLUTION RESPIRATORY (INHALATION) at 08:26

## 2025-02-20 RX ADMIN — BENZONATATE 100 MG: 100 CAPSULE ORAL at 08:23

## 2025-02-20 RX ADMIN — DIAZEPAM 2 MG: 2 TABLET ORAL at 17:40

## 2025-02-20 RX ADMIN — ALBUTEROL SULFATE 2.5 MG: 2.5 SOLUTION RESPIRATORY (INHALATION) at 04:34

## 2025-02-20 RX ADMIN — METOPROLOL TARTRATE 5 MG: 5 INJECTION INTRAVENOUS at 21:09

## 2025-02-20 RX ADMIN — BUTALBITAL, ACETAMINOPHEN AND CAFFEINE 1 TABLET: 325; 50; 40 TABLET ORAL at 08:23

## 2025-02-20 RX ADMIN — ATORVASTATIN CALCIUM 40 MG: 40 TABLET, FILM COATED ORAL at 07:54

## 2025-02-20 RX ADMIN — TIOTROPIUM BROMIDE INHALATION SPRAY 2 PUFF: 3.12 SPRAY, METERED RESPIRATORY (INHALATION) at 08:25

## 2025-02-20 RX ADMIN — METOPROLOL TARTRATE 5 MG: 5 INJECTION INTRAVENOUS at 23:31

## 2025-02-20 RX ADMIN — Medication 5 ML: at 21:09

## 2025-02-20 RX ADMIN — POLYETHYLENE GLYCOL 3350 17 G: 17 POWDER, FOR SOLUTION ORAL at 10:16

## 2025-02-20 RX ADMIN — DIPHENHYDRAMINE HYDROCHLORIDE 25 MG: 25 TABLET ORAL at 08:23

## 2025-02-20 RX ADMIN — ALBUTEROL SULFATE 2.5 MG: 2.5 SOLUTION RESPIRATORY (INHALATION) at 12:03

## 2025-02-20 RX ADMIN — ACETAMINOPHEN 1000 MG: 500 TABLET ORAL at 00:27

## 2025-02-20 RX ADMIN — TRAMADOL HYDROCHLORIDE 25 MG: 50 TABLET, COATED ORAL at 16:19

## 2025-02-20 RX ADMIN — SODIUM CHLORIDE, PRESERVATIVE FREE 10 ML: 5 INJECTION INTRAVENOUS at 20:20

## 2025-02-20 RX ADMIN — SODIUM CHLORIDE, PRESERVATIVE FREE 10 ML: 5 INJECTION INTRAVENOUS at 07:55

## 2025-02-20 RX ADMIN — PREDNISONE 40 MG: 20 TABLET ORAL at 07:54

## 2025-02-20 RX ADMIN — WATER 40 MG: 1 INJECTION INTRAMUSCULAR; INTRAVENOUS; SUBCUTANEOUS at 17:10

## 2025-02-20 RX ADMIN — IPRATROPIUM BROMIDE AND ALBUTEROL SULFATE 1 DOSE: 2.5; .5 SOLUTION RESPIRATORY (INHALATION) at 19:23

## 2025-02-20 RX ADMIN — SODIUM CHLORIDE, PRESERVATIVE FREE 10 ML: 5 INJECTION INTRAVENOUS at 00:25

## 2025-02-20 RX ADMIN — MONTELUKAST 10 MG: 10 TABLET, FILM COATED ORAL at 07:54

## 2025-02-20 RX ADMIN — ALBUTEROL SULFATE 2.5 MG: 2.5 SOLUTION RESPIRATORY (INHALATION) at 15:33

## 2025-02-20 RX ADMIN — PANTOPRAZOLE SODIUM 40 MG: 40 TABLET, DELAYED RELEASE ORAL at 05:55

## 2025-02-20 RX ADMIN — ONDANSETRON 4 MG: 2 INJECTION, SOLUTION INTRAMUSCULAR; INTRAVENOUS at 10:16

## 2025-02-20 RX ADMIN — ENOXAPARIN SODIUM 30 MG: 100 INJECTION SUBCUTANEOUS at 07:55

## 2025-02-20 RX ADMIN — Medication 2 PUFF: at 08:25

## 2025-02-20 RX ADMIN — AZITHROMYCIN 250 MG: 250 TABLET, FILM COATED ORAL at 07:54

## 2025-02-20 RX ADMIN — METOCLOPRAMIDE 10 MG: 5 INJECTION, SOLUTION INTRAMUSCULAR; INTRAVENOUS at 00:28

## 2025-02-20 RX ADMIN — DIPHENHYDRAMINE HYDROCHLORIDE 25 MG: 50 INJECTION INTRAMUSCULAR; INTRAVENOUS at 00:27

## 2025-02-20 RX ADMIN — BUTALBITAL, ACETAMINOPHEN AND CAFFEINE 1 TABLET: 325; 50; 40 TABLET ORAL at 15:02

## 2025-02-20 RX ADMIN — DIPHENHYDRAMINE HYDROCHLORIDE 25 MG: 25 TABLET ORAL at 15:02

## 2025-02-20 RX ADMIN — Medication 2 PUFF: at 19:24

## 2025-02-20 RX ADMIN — IBUPROFEN 400 MG: 400 TABLET, FILM COATED ORAL at 10:53

## 2025-02-20 ASSESSMENT — PAIN SCALES - GENERAL
PAINLEVEL_OUTOF10: 5
PAINLEVEL_OUTOF10: 9
PAINLEVEL_OUTOF10: 9
PAINLEVEL_OUTOF10: 7
PAINLEVEL_OUTOF10: 3
PAINLEVEL_OUTOF10: 9
PAINLEVEL_OUTOF10: 5

## 2025-02-20 ASSESSMENT — PAIN DESCRIPTION - LOCATION
LOCATION: HEAD

## 2025-02-20 ASSESSMENT — PAIN DESCRIPTION - ORIENTATION
ORIENTATION: RIGHT;LEFT;POSTERIOR;ANTERIOR
ORIENTATION: RIGHT;LEFT;ANTERIOR;POSTERIOR
ORIENTATION: RIGHT;LEFT;POSTERIOR;ANTERIOR

## 2025-02-20 ASSESSMENT — PAIN - FUNCTIONAL ASSESSMENT
PAIN_FUNCTIONAL_ASSESSMENT: PREVENTS OR INTERFERES SOME ACTIVE ACTIVITIES AND ADLS
PAIN_FUNCTIONAL_ASSESSMENT: ACTIVITIES ARE NOT PREVENTED

## 2025-02-20 ASSESSMENT — PAIN DESCRIPTION - DESCRIPTORS
DESCRIPTORS: ACHING
DESCRIPTORS: ACHING

## 2025-02-20 NOTE — CARE COORDINATION
Case Management Assessment  Initial Evaluation    Date/Time of Evaluation: 2/20/2025 2:48 PM  Assessment Completed by: Claudia Adkins RN    If patient is discharged prior to next notation, then this note serves as note for discharge by case management.    Patient Name: Jina Amor                   YOB: 1965  Diagnosis: COPD exacerbation (HCC) [J44.1]  Acute respiratory failure with hypoxia (HCC) [J96.01]  Acute on chronic respiratory failure with hypoxia (HCC) [J96.21]                   Date / Time: 2/19/2025  3:47 PM    Patient Admission Status: Inpatient   Readmission Risk (Low < 19, Mod (19-27), High > 27): Readmission Risk Score: 12.6    Current PCP: Trevor Doan, DO  PCP verified by CM? Yes    Chart Reviewed: Yes      History Provided by: Patient  Patient Orientation: Alert and Oriented    Patient Cognition: Alert    Hospitalization in the last 30 days (Readmission):  No    If yes, Readmission Assessment in CM Navigator will be completed.    Advance Directives:      Code Status: Full Code   Patient's Primary Decision Maker is: Legal Next of Kin    Primary Decision Maker: Jina Robledo - Child - [phone]    Secondary Decision Maker: Dennys Robledo - Son-in-Law - [phone]    Discharge Planning:    Patient lives with: Alone Type of Home: House  Primary Care Giver: Self  Patient Support Systems include: Children   Current Financial resources: Medicare  Current community resources: None  Current services prior to admission: None            Current DME:              Type of Home Care services:  None    ADLS  Prior functional level: Independent in ADLs/IADLs  Current functional level: Independent in ADLs/IADLs    PT AM-PAC:   /24  OT AM-PAC:   /24    Family can provide assistance at DC: Yes  Would you like Case Management to discuss the discharge plan with any other family members/significant others, and if so, who? No  Plans to Return to Present Housing: Yes  Other Identified

## 2025-02-20 NOTE — ACP (ADVANCE CARE PLANNING)
Advance Care Planning   General Advance Care Planning (ACP) Conversation    Date of Conversation: 2/19/2025  Conducted with: Patient with Decision Making Capacity  Other persons present: None    Healthcare Decision Maker:    Primary Decision Maker: Jina Robledo - Child - [phone]    Secondary Decision Maker: Dennys Robledo - Son-in-Law - [phone]    Today we documented Decision Maker(s) consistent with ACP documents on file.  Content/Action Overview:  Has ACP document(s) on file - reflects the patient's care preferences  Reviewed DNR/DNI and patient elects Full Code (Attempt Resuscitation)      Length of Voluntary ACP Conversation in minutes:  <16 minutes (Non-Billable)    Claudia Adkins RN

## 2025-02-20 NOTE — H&P
Hospital Medicine History & Physical      Date of Admission: 2/19/2025    Date of Service:  Pt seen/examined on 2/19/2025    [x]Admitted to Inpatient with expected LOS greater than two midnights due to medical therapy.    []Placed in Observation status.    Chief Admission Complaint: Shortness of breath    Presenting Admission History:      60 y.o. female, with past medical history of COPD, HLD, GERD and marijuana use, who presented to [facility] with shortness of breath.  History obtained from the patient and review of EMR.  Patient stated she has been experiencing shortness of breath and generalized bodyaches since Monday.  She does report some intermittent diarrhea as well.  Patient stated that she did require oxamide nasal cannula previously, however has not needed it in years.  However, patient stated today when she checked her oxygen it was 83% on room air at home.  She did use her albuterol without any relief.  On arrival to the emergency department the patient was still 83% on room air and placed on 4 L nasal cannula. In the emergency department a chest x-ray was obtained that revealed bibasilar atelectasis/scarring.  Large hernia again noted.  Hyperinflation.  CT chest pulmonary embolism was also obtained that revealed no CT evidence of pulmonary embolism.  Diffuse centrilobular emphysema.  Mild patchy increased density in the peripheral lower lungs bilaterally, consistent with an inflammatory/infectious process.  Possible bronchitis.  Patient's COVID, influenza A and B were all negative.  the patient was given Valium, Solu-Medrol and 1 L of LR while in the emergency department.  She has been on azithromycin from her PCP and this was continued.  Upon further evaluation, the patient was found to have an elevated troponin of 16 with repeat troponin of 15.  This is likely secondary hypoxia, patient denies any chest pain at this time.  She was admitted for further evaluation and treatment.

## 2025-02-20 NOTE — RT PROTOCOL NOTE
RT Inhaler-Nebulizer Bronchodilator Protocol Note    There is a bronchodilator order in the chart from a provider indicating to follow the RT Bronchodilator Protocol and there is an “Initiate RT Inhaler-Nebulizer Bronchodilator Protocol” order as well (see protocol at bottom of note).    CXR Findings:  XR CHEST PORTABLE    Result Date: 2/19/2025  Bibasilar atelectasis/scarring. Large hiatal hernia again noted. Hyperinflation. Normal cardiomediastinal silhouette. Elevated right hemidiaphragm. Electronically signed by Ángel Scruggs      The findings from the last RT Protocol Assessment were as follows:   History Pulmonary Disease: Chronic pulmonary disease  Respiratory Pattern: Mild dyspnea at rest, irregular pattern, or RR 21-25 bpm  Breath Sounds: Inspiratory and expiratory or bilateral wheezing and/or rhonchi  Cough: Strong, productive  Indication for Bronchodilator Therapy: Decreased or absent breath sounds, On home bronchodilators, Wheezing associated with pulm disorder  Bronchodilator Assessment Score: 13    Aerosolized bronchodilator medication orders have been revised according to the RT Inhaler-Nebulizer Bronchodilator Protocol below.    Respiratory Therapist to perform RT Therapy Protocol Assessment initially then follow the protocol.  Repeat RT Therapy Protocol Assessment PRN for score 0-3 or on second treatment, BID, and PRN for scores above 3.    No Indications - adjust the frequency to every 6 hours PRN wheezing or bronchospasm, if no treatments needed after 48 hours then discontinue using Per Protocol order mode.     If indication present, adjust the RT bronchodilator orders based on the Bronchodilator Assessment Score as indicated below.  Use Inhaler orders unless patient has one or more of the following: on home nebulizer, not able to hold breath for 10 seconds, is not alert and oriented, cannot activate and use MDI correctly, or respiratory rate 25 breaths per minute or more, then use the

## 2025-02-20 NOTE — PLAN OF CARE
Problem: Discharge Planning  Goal: Discharge to home or other facility with appropriate resources  Outcome: Progressing  Flowsheets (Taken 2/19/2025 7112)  Discharge to home or other facility with appropriate resources: Identify barriers to discharge with patient and caregiver     Problem: Pain  Goal: Verbalizes/displays adequate comfort level or baseline comfort level  Outcome: Progressing     Problem: Safety - Adult  Goal: Free from fall injury  Outcome: Progressing

## 2025-02-20 NOTE — ED NOTES
Jina Amor is a 60 y.o. female admitted for  Principal Problem:    Acute on chronic respiratory failure with hypoxia (HCC)  Resolved Problems:    * No resolved hospital problems. *  .   Patient Home via EMS transportation with   Chief Complaint   Patient presents with    Shortness of Breath     Patient reports SOB generalized body aches since Monday, diarrhea starting this morning. Pt denies needing home oxygen. Pt is 83% on RA. No relief with at home albuterol   .  Patient is alert and Person, Place, Time, and Situation  Patient's baseline mobility: Baseline Mobility: Independent   Code Status: Prior   Cardiac Rhythm:    O2 Flow Rate (L/min): 7 L/min  Is patient on baseline Oxygen: no,  how many Liters:   Abnormal Assessment Findings: see results    Isolation:       NIH Score:    C-SSRS:    Bedside swallow:        Active LDA's:   Peripheral IV 02/19/25 Proximal;Right;Dorsal Forearm (Active)     Patient admitted with a dacosta:  If the dacosta is chronic was it exchanged:  Reason for dacosta:   Patient admitted with Central Line: . PICC line placement confirmed: YES OR NO:[id number]}   Reason for Central line:   Was central line Inserted from an outside facility:        Family/Caregiver Present no Any Concerns:    Restraints   Sitter          Vitals: MEWS Score: 5    Vitals:    02/19/25 2037 02/19/25 2138 02/19/25 2313 02/19/25 2314   BP: (!) 154/94   (!) 161/99   Pulse: 99 (!) 114  (!) 120   Resp: 19 20  30   Temp:       SpO2: 92% 93% 90% (!) 89%   Weight:       Height:           Last documented pain score (0-10 scale) Pain Level: 5  Pain medication administered Yes- see MAR.    Pertinent or High Risk Medications/Drips: .    Pending Blood Product Administration:     Abnormal labs:   Abnormal Labs Reviewed   CBC WITH AUTO DIFFERENTIAL - Abnormal; Notable for the following components:       Result Value    Lymphocytes Absolute 0.7 (*)     All other components within normal limits   COMPREHENSIVE METABOLIC PANEL W/ REFLEX

## 2025-02-20 NOTE — ED PROVIDER NOTES
[facility] EMERGENCY DEPARTMENT      CHIEF COMPLAINT  Shortness of Breath (Patient reports SOB generalized body aches since Monday, diarrhea starting this morning. Pt denies needing home oxygen. Pt is 83% on RA. No relief with at home albuterol)       HISTORY OF PRESENT ILLNESS  Jina Amor is a 60 y.o. female  who presents to the ED complaining of shortness of breath, generalized body aches that started 2 days ago.  Also reports diarrhea this morning.  States has been using her albuterol home, over the last couple days with improvement, however this morning it did not help.  She required oxamide nasal cannula previously, however has not needed it for years, according to the patient.  On arrival she was 83% on room air.  Endorses chest tightness.    No other complaints, modifying factors or associated symptoms.     I have reviewed the following from the nursing documentation.    Past Medical History:   Diagnosis Date    Acute on chronic respiratory failure with hypoxia (HCC) 03/03/2023    Alopecia     Arthritis     Chronic hip pain     Chronic obstructive pulmonary disease (HCC) 03/22/2023    Chronic obstructive pulmonary disease with acute exacerbation (HCC) 01/18/2023    severe stage 4    Chronic respiratory failure (HCC)     Depression     Esophageal varices (HCC)     GERD (gastroesophageal reflux disease)     Hyperlipidemia     Moderate malnutrition 02/27/2023    Perioperative dehiscence of abdominal wound with evisceration 02/28/2023    Pneumonia due to Enterobacter species ([facility]) 03/07/2023    PONV (postoperative nausea and vomiting)     Pressure ulcer of right buttock, stage 2 (HCC) 03/23/2023    healed March 2023    Sciatica     right, chronic    Small bowel obstruction (HCC) 02/18/2023    Thrombocytopenia, unspecified 10/30/2023    Tobacco abuse 01/18/2023    Umbilical hernia 01/18/2023    Wears glasses      Past Surgical History:   Procedure Laterality Date    ABDOMEN SURGERY N/A 02/28/2023

## 2025-02-21 PROCEDURE — 6370000000 HC RX 637 (ALT 250 FOR IP): Performed by: STUDENT IN AN ORGANIZED HEALTH CARE EDUCATION/TRAINING PROGRAM

## 2025-02-21 PROCEDURE — 2500000003 HC RX 250 WO HCPCS: Performed by: NURSE PRACTITIONER

## 2025-02-21 PROCEDURE — 6370000000 HC RX 637 (ALT 250 FOR IP): Performed by: INTERNAL MEDICINE

## 2025-02-21 PROCEDURE — 94669 MECHANICAL CHEST WALL OSCILL: CPT

## 2025-02-21 PROCEDURE — 6360000002 HC RX W HCPCS: Performed by: STUDENT IN AN ORGANIZED HEALTH CARE EDUCATION/TRAINING PROGRAM

## 2025-02-21 PROCEDURE — 2500000003 HC RX 250 WO HCPCS: Performed by: STUDENT IN AN ORGANIZED HEALTH CARE EDUCATION/TRAINING PROGRAM

## 2025-02-21 PROCEDURE — 99232 SBSQ HOSP IP/OBS MODERATE 35: CPT | Performed by: STUDENT IN AN ORGANIZED HEALTH CARE EDUCATION/TRAINING PROGRAM

## 2025-02-21 PROCEDURE — 6360000002 HC RX W HCPCS: Performed by: NURSE PRACTITIONER

## 2025-02-21 PROCEDURE — 2700000000 HC OXYGEN THERAPY PER DAY

## 2025-02-21 PROCEDURE — 1200000000 HC SEMI PRIVATE

## 2025-02-21 PROCEDURE — 94761 N-INVAS EAR/PLS OXIMETRY MLT: CPT

## 2025-02-21 PROCEDURE — 94640 AIRWAY INHALATION TREATMENT: CPT

## 2025-02-21 PROCEDURE — 6370000000 HC RX 637 (ALT 250 FOR IP): Performed by: NURSE PRACTITIONER

## 2025-02-21 RX ORDER — LEVALBUTEROL 1.25 MG/.5ML
1.25 SOLUTION, CONCENTRATE RESPIRATORY (INHALATION)
Status: DISCONTINUED | OUTPATIENT
Start: 2025-02-21 | End: 2025-02-24 | Stop reason: HOSPADM

## 2025-02-21 RX ORDER — FLUTICASONE PROPIONATE 50 MCG
1 SPRAY, SUSPENSION (ML) NASAL DAILY
Status: DISCONTINUED | OUTPATIENT
Start: 2025-02-21 | End: 2025-02-24 | Stop reason: HOSPADM

## 2025-02-21 RX ORDER — CALCIUM CARBONATE 500 MG/1
500 TABLET, CHEWABLE ORAL 3 TIMES DAILY PRN
Status: DISCONTINUED | OUTPATIENT
Start: 2025-02-21 | End: 2025-02-24 | Stop reason: HOSPADM

## 2025-02-21 RX ORDER — FAMOTIDINE 20 MG/1
20 TABLET, FILM COATED ORAL DAILY PRN
Status: DISCONTINUED | OUTPATIENT
Start: 2025-02-21 | End: 2025-02-24 | Stop reason: HOSPADM

## 2025-02-21 RX ADMIN — IPRATROPIUM BROMIDE AND ALBUTEROL SULFATE 1 DOSE: 2.5; .5 SOLUTION RESPIRATORY (INHALATION) at 12:18

## 2025-02-21 RX ADMIN — DIAZEPAM 2 MG: 2 TABLET ORAL at 20:48

## 2025-02-21 RX ADMIN — TRAMADOL HYDROCHLORIDE 25 MG: 50 TABLET, COATED ORAL at 11:54

## 2025-02-21 RX ADMIN — ONDANSETRON 4 MG: 2 INJECTION, SOLUTION INTRAMUSCULAR; INTRAVENOUS at 15:38

## 2025-02-21 RX ADMIN — AZITHROMYCIN 250 MG: 250 TABLET, FILM COATED ORAL at 09:19

## 2025-02-21 RX ADMIN — Medication 5 ML: at 04:53

## 2025-02-21 RX ADMIN — PANTOPRAZOLE SODIUM 40 MG: 40 TABLET, DELAYED RELEASE ORAL at 06:04

## 2025-02-21 RX ADMIN — Medication 5 ML: at 11:55

## 2025-02-21 RX ADMIN — FAMOTIDINE 20 MG: 20 TABLET, FILM COATED ORAL at 18:36

## 2025-02-21 RX ADMIN — ONDANSETRON 4 MG: 2 INJECTION, SOLUTION INTRAMUSCULAR; INTRAVENOUS at 09:19

## 2025-02-21 RX ADMIN — POLYETHYLENE GLYCOL 3350 17 G: 17 POWDER, FOR SOLUTION ORAL at 11:55

## 2025-02-21 RX ADMIN — SODIUM CHLORIDE, PRESERVATIVE FREE 10 ML: 5 INJECTION INTRAVENOUS at 20:53

## 2025-02-21 RX ADMIN — Medication 2 PUFF: at 08:42

## 2025-02-21 RX ADMIN — SODIUM CHLORIDE, PRESERVATIVE FREE 10 ML: 5 INJECTION INTRAVENOUS at 09:20

## 2025-02-21 RX ADMIN — IPRATROPIUM BROMIDE AND ALBUTEROL SULFATE 1 DOSE: 2.5; .5 SOLUTION RESPIRATORY (INHALATION) at 08:42

## 2025-02-21 RX ADMIN — IBUPROFEN 400 MG: 400 TABLET, FILM COATED ORAL at 04:33

## 2025-02-21 RX ADMIN — WATER 40 MG: 1 INJECTION INTRAMUSCULAR; INTRAVENOUS; SUBCUTANEOUS at 04:33

## 2025-02-21 RX ADMIN — Medication 5 ML: at 20:48

## 2025-02-21 RX ADMIN — ONDANSETRON 4 MG: 2 INJECTION, SOLUTION INTRAMUSCULAR; INTRAVENOUS at 01:11

## 2025-02-21 RX ADMIN — BUTALBITAL, ACETAMINOPHEN AND CAFFEINE 1 TABLET: 325; 50; 40 TABLET ORAL at 22:52

## 2025-02-21 RX ADMIN — Medication 5 ML: at 00:57

## 2025-02-21 RX ADMIN — LEVALBUTEROL 1.25 MG: 1.25 SOLUTION, CONCENTRATE RESPIRATORY (INHALATION) at 19:34

## 2025-02-21 RX ADMIN — ATORVASTATIN CALCIUM 40 MG: 40 TABLET, FILM COATED ORAL at 09:19

## 2025-02-21 RX ADMIN — BUTALBITAL, ACETAMINOPHEN AND CAFFEINE 1 TABLET: 325; 50; 40 TABLET ORAL at 09:19

## 2025-02-21 RX ADMIN — MONTELUKAST 10 MG: 10 TABLET, FILM COATED ORAL at 09:19

## 2025-02-21 RX ADMIN — Medication 2 PUFF: at 19:34

## 2025-02-21 RX ADMIN — ENOXAPARIN SODIUM 30 MG: 100 INJECTION SUBCUTANEOUS at 09:19

## 2025-02-21 RX ADMIN — WATER 40 MG: 1 INJECTION INTRAMUSCULAR; INTRAVENOUS; SUBCUTANEOUS at 15:38

## 2025-02-21 RX ADMIN — BENZONATATE 100 MG: 100 CAPSULE ORAL at 09:19

## 2025-02-21 RX ADMIN — BUTALBITAL, ACETAMINOPHEN AND CAFFEINE 1 TABLET: 325; 50; 40 TABLET ORAL at 15:39

## 2025-02-21 RX ADMIN — Medication 2 PUFF: at 21:49

## 2025-02-21 RX ADMIN — IBUPROFEN 400 MG: 400 TABLET, FILM COATED ORAL at 15:39

## 2025-02-21 RX ADMIN — Medication 2 PUFF: at 15:19

## 2025-02-21 RX ADMIN — FLUTICASONE PROPIONATE 1 SPRAY: 50 SPRAY, METERED NASAL at 12:50

## 2025-02-21 ASSESSMENT — PAIN DESCRIPTION - DESCRIPTORS
DESCRIPTORS: ACHING
DESCRIPTORS: ACHING

## 2025-02-21 ASSESSMENT — PAIN SCALES - GENERAL
PAINLEVEL_OUTOF10: 8
PAINLEVEL_OUTOF10: 7
PAINLEVEL_OUTOF10: 8

## 2025-02-21 ASSESSMENT — PAIN DESCRIPTION - LOCATION
LOCATION: HEAD

## 2025-02-21 NOTE — CARE COORDINATION
Chart reviewed day 2. Care provided by pulm and IM. Pt is from home alone. She reports being IPTA. She used to be on O2 with [employer]. This was discontinued about 2 years ago. Pt may need O2 again. She is on 4L per NC now. She is on IV steroids. Will continue to follow course for needs. Claudia Adkins RN

## 2025-02-21 NOTE — FLOWSHEET NOTE
02/20/25 2018   Treatment Team Notification   Reason for Communication Abnormal vitals;Change in status  (/105, HR 130s, coughing blood post chest vest therapy)   Name of Team Member Notified Drea Arana   Treatment Team Role Advanced Practice Nurse   Method of Communication Secure Message   Response See orders  (See MAR, one time dose metoprolol IV, PRN Hycodan Q4)   Notification Time 2018

## 2025-02-22 PROCEDURE — 6360000002 HC RX W HCPCS: Performed by: NURSE PRACTITIONER

## 2025-02-22 PROCEDURE — 6370000000 HC RX 637 (ALT 250 FOR IP): Performed by: NURSE PRACTITIONER

## 2025-02-22 PROCEDURE — 94640 AIRWAY INHALATION TREATMENT: CPT

## 2025-02-22 PROCEDURE — 2500000003 HC RX 250 WO HCPCS: Performed by: NURSE PRACTITIONER

## 2025-02-22 PROCEDURE — 94669 MECHANICAL CHEST WALL OSCILL: CPT

## 2025-02-22 PROCEDURE — 94761 N-INVAS EAR/PLS OXIMETRY MLT: CPT

## 2025-02-22 PROCEDURE — 6370000000 HC RX 637 (ALT 250 FOR IP): Performed by: INTERNAL MEDICINE

## 2025-02-22 PROCEDURE — 2700000000 HC OXYGEN THERAPY PER DAY

## 2025-02-22 PROCEDURE — 97116 GAIT TRAINING THERAPY: CPT

## 2025-02-22 PROCEDURE — 99232 SBSQ HOSP IP/OBS MODERATE 35: CPT | Performed by: STUDENT IN AN ORGANIZED HEALTH CARE EDUCATION/TRAINING PROGRAM

## 2025-02-22 PROCEDURE — 6360000002 HC RX W HCPCS: Performed by: STUDENT IN AN ORGANIZED HEALTH CARE EDUCATION/TRAINING PROGRAM

## 2025-02-22 PROCEDURE — 1200000000 HC SEMI PRIVATE

## 2025-02-22 PROCEDURE — 97162 PT EVAL MOD COMPLEX 30 MIN: CPT

## 2025-02-22 PROCEDURE — 2500000003 HC RX 250 WO HCPCS: Performed by: STUDENT IN AN ORGANIZED HEALTH CARE EDUCATION/TRAINING PROGRAM

## 2025-02-22 PROCEDURE — 6370000000 HC RX 637 (ALT 250 FOR IP): Performed by: STUDENT IN AN ORGANIZED HEALTH CARE EDUCATION/TRAINING PROGRAM

## 2025-02-22 RX ORDER — SENNA AND DOCUSATE SODIUM 50; 8.6 MG/1; MG/1
2 TABLET, FILM COATED ORAL DAILY
Status: DISCONTINUED | OUTPATIENT
Start: 2025-02-22 | End: 2025-02-24 | Stop reason: HOSPADM

## 2025-02-22 RX ORDER — LABETALOL HYDROCHLORIDE 5 MG/ML
5 INJECTION, SOLUTION INTRAVENOUS EVERY 4 HOURS PRN
Status: DISCONTINUED | OUTPATIENT
Start: 2025-02-22 | End: 2025-02-24 | Stop reason: HOSPADM

## 2025-02-22 RX ORDER — KETOROLAC TROMETHAMINE 30 MG/ML
15 INJECTION, SOLUTION INTRAMUSCULAR; INTRAVENOUS ONCE
Status: COMPLETED | OUTPATIENT
Start: 2025-02-22 | End: 2025-02-22

## 2025-02-22 RX ORDER — PREDNISONE 20 MG/1
40 TABLET ORAL DAILY
Status: DISCONTINUED | OUTPATIENT
Start: 2025-02-22 | End: 2025-02-24 | Stop reason: HOSPADM

## 2025-02-22 RX ADMIN — Medication 2 PUFF: at 08:27

## 2025-02-22 RX ADMIN — PREDNISONE 40 MG: 20 TABLET ORAL at 13:59

## 2025-02-22 RX ADMIN — TRAMADOL HYDROCHLORIDE 25 MG: 50 TABLET, COATED ORAL at 22:46

## 2025-02-22 RX ADMIN — Medication 6 MG: at 21:15

## 2025-02-22 RX ADMIN — TRAMADOL HYDROCHLORIDE 25 MG: 50 TABLET, COATED ORAL at 04:42

## 2025-02-22 RX ADMIN — Medication 5 ML: at 09:08

## 2025-02-22 RX ADMIN — BENZONATATE 100 MG: 100 CAPSULE ORAL at 00:01

## 2025-02-22 RX ADMIN — ONDANSETRON 4 MG: 2 INJECTION, SOLUTION INTRAMUSCULAR; INTRAVENOUS at 15:34

## 2025-02-22 RX ADMIN — KETOROLAC TROMETHAMINE 15 MG: 30 INJECTION, SOLUTION INTRAMUSCULAR at 01:33

## 2025-02-22 RX ADMIN — WATER 40 MG: 1 INJECTION INTRAMUSCULAR; INTRAVENOUS; SUBCUTANEOUS at 04:40

## 2025-02-22 RX ADMIN — IBUPROFEN 400 MG: 400 TABLET, FILM COATED ORAL at 00:01

## 2025-02-22 RX ADMIN — TRAMADOL HYDROCHLORIDE 25 MG: 50 TABLET, COATED ORAL at 13:59

## 2025-02-22 RX ADMIN — Medication 5 ML: at 00:01

## 2025-02-22 RX ADMIN — IBUPROFEN 400 MG: 400 TABLET, FILM COATED ORAL at 09:08

## 2025-02-22 RX ADMIN — LEVALBUTEROL 1.25 MG: 1.25 SOLUTION, CONCENTRATE RESPIRATORY (INHALATION) at 08:27

## 2025-02-22 RX ADMIN — BENZONATATE 100 MG: 100 CAPSULE ORAL at 09:08

## 2025-02-22 RX ADMIN — LEVALBUTEROL 1.25 MG: 1.25 SOLUTION, CONCENTRATE RESPIRATORY (INHALATION) at 21:56

## 2025-02-22 RX ADMIN — SENNOSIDES AND DOCUSATE SODIUM 2 TABLET: 50; 8.6 TABLET ORAL at 16:15

## 2025-02-22 RX ADMIN — DIAZEPAM 2 MG: 2 TABLET ORAL at 04:42

## 2025-02-22 RX ADMIN — Medication 2 PUFF: at 21:56

## 2025-02-22 RX ADMIN — MONTELUKAST 10 MG: 10 TABLET, FILM COATED ORAL at 09:08

## 2025-02-22 RX ADMIN — Medication 5 ML: at 04:42

## 2025-02-22 RX ADMIN — PANTOPRAZOLE SODIUM 40 MG: 40 TABLET, DELAYED RELEASE ORAL at 06:17

## 2025-02-22 RX ADMIN — Medication 6 MG: at 01:33

## 2025-02-22 RX ADMIN — SODIUM CHLORIDE, PRESERVATIVE FREE 10 ML: 5 INJECTION INTRAVENOUS at 21:15

## 2025-02-22 RX ADMIN — IBUPROFEN 400 MG: 400 TABLET, FILM COATED ORAL at 15:34

## 2025-02-22 RX ADMIN — ONDANSETRON 4 MG: 2 INJECTION, SOLUTION INTRAMUSCULAR; INTRAVENOUS at 09:07

## 2025-02-22 RX ADMIN — BUTALBITAL, ACETAMINOPHEN AND CAFFEINE 1 TABLET: 325; 50; 40 TABLET ORAL at 16:15

## 2025-02-22 RX ADMIN — BUTALBITAL, ACETAMINOPHEN AND CAFFEINE 1 TABLET: 325; 50; 40 TABLET ORAL at 04:42

## 2025-02-22 RX ADMIN — Medication 5 ML: at 21:15

## 2025-02-22 RX ADMIN — LEVALBUTEROL 1.25 MG: 1.25 SOLUTION, CONCENTRATE RESPIRATORY (INHALATION) at 12:26

## 2025-02-22 RX ADMIN — FLUTICASONE PROPIONATE 1 SPRAY: 50 SPRAY, METERED NASAL at 09:09

## 2025-02-22 RX ADMIN — ENOXAPARIN SODIUM 30 MG: 100 INJECTION SUBCUTANEOUS at 09:07

## 2025-02-22 RX ADMIN — BENZONATATE 100 MG: 100 CAPSULE ORAL at 22:51

## 2025-02-22 RX ADMIN — LEVALBUTEROL 1.25 MG: 1.25 SOLUTION, CONCENTRATE RESPIRATORY (INHALATION) at 15:16

## 2025-02-22 RX ADMIN — Medication 5 ML: at 16:15

## 2025-02-22 RX ADMIN — AZITHROMYCIN 250 MG: 250 TABLET, FILM COATED ORAL at 09:08

## 2025-02-22 RX ADMIN — SODIUM CHLORIDE, PRESERVATIVE FREE 10 ML: 5 INJECTION INTRAVENOUS at 09:09

## 2025-02-22 RX ADMIN — ATORVASTATIN CALCIUM 40 MG: 40 TABLET, FILM COATED ORAL at 09:08

## 2025-02-22 ASSESSMENT — PAIN SCALES - GENERAL
PAINLEVEL_OUTOF10: 8
PAINLEVEL_OUTOF10: 5
PAINLEVEL_OUTOF10: 9
PAINLEVEL_OUTOF10: 6
PAINLEVEL_OUTOF10: 7
PAINLEVEL_OUTOF10: 9
PAINLEVEL_OUTOF10: 7

## 2025-02-22 ASSESSMENT — PAIN - FUNCTIONAL ASSESSMENT
PAIN_FUNCTIONAL_ASSESSMENT: PREVENTS OR INTERFERES SOME ACTIVE ACTIVITIES AND ADLS
PAIN_FUNCTIONAL_ASSESSMENT: PREVENTS OR INTERFERES SOME ACTIVE ACTIVITIES AND ADLS

## 2025-02-22 ASSESSMENT — PAIN DESCRIPTION - DESCRIPTORS
DESCRIPTORS: POUNDING;ACHING
DESCRIPTORS: POUNDING

## 2025-02-22 ASSESSMENT — PAIN DESCRIPTION - LOCATION
LOCATION: HEAD

## 2025-02-22 ASSESSMENT — PAIN DESCRIPTION - ORIENTATION
ORIENTATION: OTHER (COMMENT)
ORIENTATION: OTHER (COMMENT)

## 2025-02-22 ASSESSMENT — PAIN DESCRIPTION - PAIN TYPE: TYPE: ACUTE PAIN

## 2025-02-22 ASSESSMENT — PAIN DESCRIPTION - ONSET: ONSET: ON-GOING

## 2025-02-22 ASSESSMENT — PAIN DESCRIPTION - FREQUENCY: FREQUENCY: INTERMITTENT

## 2025-02-22 NOTE — PLAN OF CARE
Problem: Discharge Planning  Goal: Discharge to home or other facility with appropriate resources  2/22/2025 1441 by Elsa Chavez RN  Outcome: Progressing  Flowsheets (Taken 2/22/2025 0916)  Discharge to home or other facility with appropriate resources: Identify barriers to discharge with patient and caregiver  2/22/2025 0126 by Chio Crow RN  Outcome: Progressing     Problem: Pain  Goal: Verbalizes/displays adequate comfort level or baseline comfort level  2/22/2025 1441 by Elsa Chavez RN  Outcome: Progressing  Flowsheets (Taken 2/22/2025 1158)  Verbalizes/displays adequate comfort level or baseline comfort level: Encourage patient to monitor pain and request assistance  2/22/2025 0126 by Chio Crow RN  Outcome: Progressing     Problem: Safety - Adult  Goal: Free from fall injury  2/22/2025 1441 by Elsa Chavez RN  Outcome: Progressing  2/22/2025 0126 by Chio Crow RN  Outcome: Progressing     Problem: Neurosensory - Adult  Goal: Achieves stable or improved neurological status  Outcome: Progressing  Goal: Absence of seizures  Outcome: Progressing  Goal: Remains free of injury related to seizures activity  Outcome: Progressing  Goal: Achieves maximal functionality and self care  Outcome: Progressing     Problem: Musculoskeletal - Adult  Goal: Return mobility to safest level of function  Outcome: Progressing  Goal: Maintain proper alignment of affected body part  Outcome: Progressing  Goal: Return ADL status to a safe level of function  Outcome: Progressing

## 2025-02-23 LAB
ALBUMIN SERPL-MCNC: 3.9 G/DL (ref 3.4–5)
ANION GAP SERPL CALCULATED.3IONS-SCNC: 11 MMOL/L (ref 3–16)
BUN SERPL-MCNC: 9 MG/DL (ref 7–20)
CALCIUM SERPL-MCNC: 9.2 MG/DL (ref 8.3–10.6)
CHLORIDE SERPL-SCNC: 88 MMOL/L (ref 99–110)
CO2 SERPL-SCNC: 36 MMOL/L (ref 21–32)
CREAT SERPL-MCNC: 0.5 MG/DL (ref 0.6–1.2)
DEPRECATED RDW RBC AUTO: 15 % (ref 12.4–15.4)
GFR SERPLBLD CREATININE-BSD FMLA CKD-EPI: >90 ML/MIN/{1.73_M2}
GLUCOSE SERPL-MCNC: 135 MG/DL (ref 70–99)
HCT VFR BLD AUTO: 43.8 % (ref 36–48)
HGB BLD-MCNC: 14.4 G/DL (ref 12–16)
MAGNESIUM SERPL-MCNC: 2.02 MG/DL (ref 1.8–2.4)
MCH RBC QN AUTO: 31.2 PG (ref 26–34)
MCHC RBC AUTO-ENTMCNC: 32.8 G/DL (ref 31–36)
MCV RBC AUTO: 95.1 FL (ref 80–100)
PHOSPHATE SERPL-MCNC: 2.8 MG/DL (ref 2.5–4.9)
PLATELET # BLD AUTO: 135 K/UL (ref 135–450)
PMV BLD AUTO: 8 FL (ref 5–10.5)
POTASSIUM SERPL-SCNC: 4.1 MMOL/L (ref 3.5–5.1)
RBC # BLD AUTO: 4.6 M/UL (ref 4–5.2)
SODIUM SERPL-SCNC: 135 MMOL/L (ref 136–145)
WBC # BLD AUTO: 11.6 K/UL (ref 4–11)

## 2025-02-23 PROCEDURE — 1200000000 HC SEMI PRIVATE

## 2025-02-23 PROCEDURE — 83735 ASSAY OF MAGNESIUM: CPT

## 2025-02-23 PROCEDURE — 99232 SBSQ HOSP IP/OBS MODERATE 35: CPT | Performed by: STUDENT IN AN ORGANIZED HEALTH CARE EDUCATION/TRAINING PROGRAM

## 2025-02-23 PROCEDURE — 6370000000 HC RX 637 (ALT 250 FOR IP): Performed by: NURSE PRACTITIONER

## 2025-02-23 PROCEDURE — 6370000000 HC RX 637 (ALT 250 FOR IP): Performed by: INTERNAL MEDICINE

## 2025-02-23 PROCEDURE — 6360000002 HC RX W HCPCS: Performed by: NURSE PRACTITIONER

## 2025-02-23 PROCEDURE — 85027 COMPLETE CBC AUTOMATED: CPT

## 2025-02-23 PROCEDURE — 2700000000 HC OXYGEN THERAPY PER DAY

## 2025-02-23 PROCEDURE — 36415 COLL VENOUS BLD VENIPUNCTURE: CPT

## 2025-02-23 PROCEDURE — 2500000003 HC RX 250 WO HCPCS: Performed by: NURSE PRACTITIONER

## 2025-02-23 PROCEDURE — 6370000000 HC RX 637 (ALT 250 FOR IP): Performed by: STUDENT IN AN ORGANIZED HEALTH CARE EDUCATION/TRAINING PROGRAM

## 2025-02-23 PROCEDURE — 94669 MECHANICAL CHEST WALL OSCILL: CPT

## 2025-02-23 PROCEDURE — 94761 N-INVAS EAR/PLS OXIMETRY MLT: CPT

## 2025-02-23 PROCEDURE — 80069 RENAL FUNCTION PANEL: CPT

## 2025-02-23 PROCEDURE — 6360000002 HC RX W HCPCS: Performed by: INTERNAL MEDICINE

## 2025-02-23 PROCEDURE — 6360000002 HC RX W HCPCS: Performed by: STUDENT IN AN ORGANIZED HEALTH CARE EDUCATION/TRAINING PROGRAM

## 2025-02-23 PROCEDURE — 94640 AIRWAY INHALATION TREATMENT: CPT

## 2025-02-23 RX ORDER — LACTULOSE 10 G/15ML
20 SOLUTION ORAL 3 TIMES DAILY
Status: DISCONTINUED | OUTPATIENT
Start: 2025-02-23 | End: 2025-02-24 | Stop reason: HOSPADM

## 2025-02-23 RX ORDER — TRAZODONE HYDROCHLORIDE 50 MG/1
50 TABLET ORAL ONCE
Status: COMPLETED | OUTPATIENT
Start: 2025-02-24 | End: 2025-02-24

## 2025-02-23 RX ADMIN — BUTALBITAL, ACETAMINOPHEN AND CAFFEINE 1 TABLET: 325; 50; 40 TABLET ORAL at 01:57

## 2025-02-23 RX ADMIN — Medication 5 ML: at 01:57

## 2025-02-23 RX ADMIN — BENZONATATE 100 MG: 100 CAPSULE ORAL at 08:57

## 2025-02-23 RX ADMIN — Medication 6 MG: at 19:58

## 2025-02-23 RX ADMIN — DIAZEPAM 2 MG: 2 TABLET ORAL at 20:01

## 2025-02-23 RX ADMIN — LACTULOSE 20 G: 20 SOLUTION ORAL at 13:28

## 2025-02-23 RX ADMIN — ONDANSETRON 4 MG: 2 INJECTION, SOLUTION INTRAMUSCULAR; INTRAVENOUS at 08:57

## 2025-02-23 RX ADMIN — TRAMADOL HYDROCHLORIDE 25 MG: 50 TABLET, COATED ORAL at 19:58

## 2025-02-23 RX ADMIN — TRAMADOL HYDROCHLORIDE 25 MG: 50 TABLET, COATED ORAL at 05:45

## 2025-02-23 RX ADMIN — FLUTICASONE PROPIONATE 1 SPRAY: 50 SPRAY, METERED NASAL at 08:58

## 2025-02-23 RX ADMIN — PREDNISONE 40 MG: 20 TABLET ORAL at 08:57

## 2025-02-23 RX ADMIN — BUTALBITAL, ACETAMINOPHEN AND CAFFEINE 1 TABLET: 325; 50; 40 TABLET ORAL at 21:57

## 2025-02-23 RX ADMIN — Medication 5 ML: at 13:29

## 2025-02-23 RX ADMIN — LEVALBUTEROL 1.25 MG: 1.25 SOLUTION, CONCENTRATE RESPIRATORY (INHALATION) at 08:34

## 2025-02-23 RX ADMIN — DIPHENHYDRAMINE HYDROCHLORIDE 25 MG: 25 TABLET ORAL at 04:19

## 2025-02-23 RX ADMIN — DIPHENHYDRAMINE HYDROCHLORIDE 25 MG: 25 TABLET ORAL at 21:58

## 2025-02-23 RX ADMIN — MONTELUKAST 10 MG: 10 TABLET, FILM COATED ORAL at 08:57

## 2025-02-23 RX ADMIN — LEVALBUTEROL 1.25 MG: 1.25 SOLUTION, CONCENTRATE RESPIRATORY (INHALATION) at 12:04

## 2025-02-23 RX ADMIN — ATORVASTATIN CALCIUM 40 MG: 40 TABLET, FILM COATED ORAL at 08:57

## 2025-02-23 RX ADMIN — DIAZEPAM 2 MG: 2 TABLET ORAL at 13:31

## 2025-02-23 RX ADMIN — Medication 2 PUFF: at 08:34

## 2025-02-23 RX ADMIN — LACTULOSE 20 G: 20 SOLUTION ORAL at 19:58

## 2025-02-23 RX ADMIN — Medication 2 PUFF: at 20:23

## 2025-02-23 RX ADMIN — ENOXAPARIN SODIUM 30 MG: 100 INJECTION SUBCUTANEOUS at 08:57

## 2025-02-23 RX ADMIN — FAMOTIDINE 20 MG: 20 TABLET, FILM COATED ORAL at 22:02

## 2025-02-23 RX ADMIN — SENNOSIDES AND DOCUSATE SODIUM 2 TABLET: 50; 8.6 TABLET ORAL at 08:57

## 2025-02-23 RX ADMIN — LABETALOL HYDROCHLORIDE 5 MG: 5 INJECTION INTRAVENOUS at 08:58

## 2025-02-23 RX ADMIN — DIAZEPAM 2 MG: 2 TABLET ORAL at 00:11

## 2025-02-23 RX ADMIN — IBUPROFEN 400 MG: 400 TABLET, FILM COATED ORAL at 00:11

## 2025-02-23 RX ADMIN — BENZONATATE 100 MG: 100 CAPSULE ORAL at 17:25

## 2025-02-23 RX ADMIN — BUTALBITAL, ACETAMINOPHEN AND CAFFEINE 1 TABLET: 325; 50; 40 TABLET ORAL at 08:57

## 2025-02-23 RX ADMIN — LEVALBUTEROL 1.25 MG: 1.25 SOLUTION, CONCENTRATE RESPIRATORY (INHALATION) at 20:23

## 2025-02-23 RX ADMIN — LEVALBUTEROL 1.25 MG: 1.25 SOLUTION, CONCENTRATE RESPIRATORY (INHALATION) at 16:11

## 2025-02-23 RX ADMIN — PANTOPRAZOLE SODIUM 40 MG: 40 TABLET, DELAYED RELEASE ORAL at 05:45

## 2025-02-23 RX ADMIN — Medication 5 ML: at 19:58

## 2025-02-23 RX ADMIN — SODIUM CHLORIDE, PRESERVATIVE FREE 10 ML: 5 INJECTION INTRAVENOUS at 19:59

## 2025-02-23 RX ADMIN — SODIUM CHLORIDE, PRESERVATIVE FREE 10 ML: 5 INJECTION INTRAVENOUS at 09:03

## 2025-02-23 RX ADMIN — Medication 5 ML: at 05:51

## 2025-02-23 ASSESSMENT — PAIN DESCRIPTION - FREQUENCY
FREQUENCY: CONTINUOUS

## 2025-02-23 ASSESSMENT — PAIN DESCRIPTION - PAIN TYPE
TYPE: ACUTE PAIN

## 2025-02-23 ASSESSMENT — PAIN SCALES - GENERAL
PAINLEVEL_OUTOF10: 6
PAINLEVEL_OUTOF10: 9
PAINLEVEL_OUTOF10: 6
PAINLEVEL_OUTOF10: 0
PAINLEVEL_OUTOF10: 8
PAINLEVEL_OUTOF10: 7
PAINLEVEL_OUTOF10: 8
PAINLEVEL_OUTOF10: 6
PAINLEVEL_OUTOF10: 7
PAINLEVEL_OUTOF10: 6
PAINLEVEL_OUTOF10: 9
PAINLEVEL_OUTOF10: 6

## 2025-02-23 ASSESSMENT — PAIN DESCRIPTION - DESCRIPTORS
DESCRIPTORS: POUNDING
DESCRIPTORS: ACHING;POUNDING
DESCRIPTORS: POUNDING
DESCRIPTORS: ACHING
DESCRIPTORS: POUNDING

## 2025-02-23 ASSESSMENT — PAIN DESCRIPTION - ONSET
ONSET: ON-GOING

## 2025-02-23 ASSESSMENT — PAIN DESCRIPTION - LOCATION
LOCATION: HEAD
LOCATION: ABDOMEN;CHEST

## 2025-02-23 ASSESSMENT — PAIN DESCRIPTION - ORIENTATION
ORIENTATION: MID;LOWER
ORIENTATION: OTHER (COMMENT)

## 2025-02-23 NOTE — PLAN OF CARE
Problem: Discharge Planning  Goal: Discharge to home or other facility with appropriate resources  2/22/2025 2349 by Amy Alba RN  Outcome: Progressing  2/22/2025 1441 by Elsa Chavez RN  Outcome: Progressing  Flowsheets (Taken 2/22/2025 0916)  Discharge to home or other facility with appropriate resources: Identify barriers to discharge with patient and caregiver     Problem: Pain  Goal: Verbalizes/displays adequate comfort level or baseline comfort level  2/22/2025 2349 by Amy Alba RN  Outcome: Progressing  Flowsheets (Taken 2/22/2025 1530 by Elsa Chavez RN)  Verbalizes/displays adequate comfort level or baseline comfort level: Encourage patient to monitor pain and request assistance  2/22/2025 1441 by Elsa Chavez RN  Outcome: Progressing  Flowsheets (Taken 2/22/2025 1158)  Verbalizes/displays adequate comfort level or baseline comfort level: Encourage patient to monitor pain and request assistance     Problem: Safety - Adult  Goal: Free from fall injury  2/22/2025 2350 by Amy Alba RN  Outcome: Progressing  2/22/2025 2349 by Amy Alba RN  Outcome: Progressing  2/22/2025 1441 by Elsa Chavez RN  Outcome: Progressing     Problem: Respiratory - Adult  Goal: Achieves optimal ventilation and oxygenation  Outcome: Progressing

## 2025-02-23 NOTE — PLAN OF CARE
Problem: Discharge Planning  Goal: Discharge to home or other facility with appropriate resources  2/23/2025 1102 by Elsa Chavez RN  Outcome: Progressing  Flowsheets (Taken 2/23/2025 0907)  Discharge to home or other facility with appropriate resources: Identify barriers to discharge with patient and caregiver  2/22/2025 2349 by Amy Alba RN  Outcome: Progressing     Problem: Pain  Goal: Verbalizes/displays adequate comfort level or baseline comfort level  2/23/2025 1102 by Elsa Chavez RN  Outcome: Progressing  2/22/2025 2349 by Amy Alba RN  Outcome: Progressing  Flowsheets (Taken 2/22/2025 1530 by Elsa Chavez RN)  Verbalizes/displays adequate comfort level or baseline comfort level: Encourage patient to monitor pain and request assistance     Problem: Safety - Adult  Goal: Free from fall injury  2/23/2025 1102 by Elsa Chavez RN  Outcome: Progressing  2/22/2025 2350 by Amy Alba RN  Outcome: Progressing  2/22/2025 2349 by Amy Alba RN  Outcome: Progressing     Problem: Respiratory - Adult  Goal: Achieves optimal ventilation and oxygenation  2/23/2025 1102 by Elsa Chavez RN  Outcome: Progressing  Flowsheets (Taken 2/23/2025 0907)  Achieves optimal ventilation and oxygenation: Assess for changes in respiratory status  2/22/2025 2350 by Amy Alba RN  Outcome: Progressing     Problem: Neurosensory - Adult  Goal: Achieves stable or improved neurological status  Outcome: Progressing  Flowsheets (Taken 2/23/2025 0907)  Achieves stable or improved neurological status: Assess for and report changes in neurological status  Goal: Absence of seizures  Outcome: Progressing  Flowsheets (Taken 2/23/2025 0907)  Absence of seizures: Monitor for seizure activity.  If seizure occurs, document type and location of movements and any associated apnea  Goal: Remains free of injury related to seizures activity  Outcome: Progressing  Flowsheets (Taken 2/23/2025 0907)  Remains free of injury related to seizure activity:

## 2025-02-24 ENCOUNTER — TELEPHONE (OUTPATIENT)
Dept: PULMONOLOGY | Age: 60
End: 2025-02-24

## 2025-02-24 VITALS
WEIGHT: 95 LBS | SYSTOLIC BLOOD PRESSURE: 142 MMHG | RESPIRATION RATE: 18 BRPM | BODY MASS INDEX: 18.65 KG/M2 | HEIGHT: 60 IN | DIASTOLIC BLOOD PRESSURE: 87 MMHG | HEART RATE: 108 BPM | TEMPERATURE: 98.1 F | OXYGEN SATURATION: 90 %

## 2025-02-24 PROBLEM — J96.01 ACUTE RESPIRATORY FAILURE WITH HYPOXIA (HCC): Status: ACTIVE | Noted: 2025-02-24

## 2025-02-24 PROCEDURE — 2500000003 HC RX 250 WO HCPCS: Performed by: NURSE PRACTITIONER

## 2025-02-24 PROCEDURE — 99232 SBSQ HOSP IP/OBS MODERATE 35: CPT | Performed by: INTERNAL MEDICINE

## 2025-02-24 PROCEDURE — 99221 1ST HOSP IP/OBS SF/LOW 40: CPT | Performed by: OTOLARYNGOLOGY

## 2025-02-24 PROCEDURE — 6360000002 HC RX W HCPCS: Performed by: NURSE PRACTITIONER

## 2025-02-24 PROCEDURE — 6370000000 HC RX 637 (ALT 250 FOR IP): Performed by: INTERNAL MEDICINE

## 2025-02-24 PROCEDURE — 31575 DIAGNOSTIC LARYNGOSCOPY: CPT | Performed by: OTOLARYNGOLOGY

## 2025-02-24 PROCEDURE — 6360000002 HC RX W HCPCS: Performed by: STUDENT IN AN ORGANIZED HEALTH CARE EDUCATION/TRAINING PROGRAM

## 2025-02-24 PROCEDURE — 94640 AIRWAY INHALATION TREATMENT: CPT

## 2025-02-24 PROCEDURE — 94668 MNPJ CHEST WALL SBSQ: CPT

## 2025-02-24 PROCEDURE — 6370000000 HC RX 637 (ALT 250 FOR IP): Performed by: STUDENT IN AN ORGANIZED HEALTH CARE EDUCATION/TRAINING PROGRAM

## 2025-02-24 PROCEDURE — 94667 MNPJ CHEST WALL 1ST: CPT

## 2025-02-24 PROCEDURE — 0CJS8ZZ INSPECTION OF LARYNX, VIA NATURAL OR ARTIFICIAL OPENING ENDOSCOPIC: ICD-10-PCS | Performed by: OTOLARYNGOLOGY

## 2025-02-24 PROCEDURE — 6370000000 HC RX 637 (ALT 250 FOR IP): Performed by: NURSE PRACTITIONER

## 2025-02-24 PROCEDURE — 94761 N-INVAS EAR/PLS OXIMETRY MLT: CPT

## 2025-02-24 PROCEDURE — 2700000000 HC OXYGEN THERAPY PER DAY

## 2025-02-24 PROCEDURE — 94002 VENT MGMT INPAT INIT DAY: CPT

## 2025-02-24 RX ORDER — TRAMADOL HYDROCHLORIDE 50 MG/1
25 TABLET ORAL EVERY 6 HOURS PRN
Qty: 4 TABLET | Refills: 0 | Status: SHIPPED | OUTPATIENT
Start: 2025-02-24 | End: 2025-02-26

## 2025-02-24 RX ORDER — AZITHROMYCIN 250 MG/1
250 TABLET, FILM COATED ORAL DAILY
Qty: 2 TABLET | Refills: 0 | Status: SHIPPED | OUTPATIENT
Start: 2025-02-24

## 2025-02-24 RX ORDER — PREDNISONE 10 MG/1
TABLET ORAL
Qty: 50 TABLET | Refills: 0 | Status: SHIPPED | OUTPATIENT
Start: 2025-02-24 | End: 2025-03-16

## 2025-02-24 RX ORDER — BENZONATATE 100 MG/1
100 CAPSULE ORAL 3 TIMES DAILY PRN
Qty: 30 CAPSULE | Refills: 0 | Status: SHIPPED | OUTPATIENT
Start: 2025-02-24 | End: 2025-03-03

## 2025-02-24 RX ADMIN — BENZONATATE 100 MG: 100 CAPSULE ORAL at 04:13

## 2025-02-24 RX ADMIN — IBUPROFEN 400 MG: 400 TABLET, FILM COATED ORAL at 09:57

## 2025-02-24 RX ADMIN — Medication 5 ML: at 00:26

## 2025-02-24 RX ADMIN — LEVALBUTEROL 1.25 MG: 1.25 SOLUTION, CONCENTRATE RESPIRATORY (INHALATION) at 07:25

## 2025-02-24 RX ADMIN — LEVALBUTEROL 1.25 MG: 1.25 SOLUTION, CONCENTRATE RESPIRATORY (INHALATION) at 12:28

## 2025-02-24 RX ADMIN — DIPHENHYDRAMINE HYDROCHLORIDE 25 MG: 25 TABLET ORAL at 04:13

## 2025-02-24 RX ADMIN — SENNOSIDES AND DOCUSATE SODIUM 2 TABLET: 50; 8.6 TABLET ORAL at 09:57

## 2025-02-24 RX ADMIN — Medication 5 ML: at 07:16

## 2025-02-24 RX ADMIN — ATORVASTATIN CALCIUM 40 MG: 40 TABLET, FILM COATED ORAL at 09:57

## 2025-02-24 RX ADMIN — TRAZODONE HYDROCHLORIDE 50 MG: 50 TABLET ORAL at 00:25

## 2025-02-24 RX ADMIN — PANTOPRAZOLE SODIUM 40 MG: 40 TABLET, DELAYED RELEASE ORAL at 07:16

## 2025-02-24 RX ADMIN — MONTELUKAST 10 MG: 10 TABLET, FILM COATED ORAL at 09:58

## 2025-02-24 RX ADMIN — SODIUM CHLORIDE, PRESERVATIVE FREE 10 ML: 5 INJECTION INTRAVENOUS at 09:58

## 2025-02-24 RX ADMIN — FLUTICASONE PROPIONATE 1 SPRAY: 50 SPRAY, METERED NASAL at 09:58

## 2025-02-24 RX ADMIN — PREDNISONE 40 MG: 20 TABLET ORAL at 09:57

## 2025-02-24 RX ADMIN — Medication 2 PUFF: at 19:20

## 2025-02-24 RX ADMIN — LEVALBUTEROL 1.25 MG: 1.25 SOLUTION, CONCENTRATE RESPIRATORY (INHALATION) at 16:04

## 2025-02-24 RX ADMIN — Medication 5 ML: at 15:45

## 2025-02-24 RX ADMIN — ENOXAPARIN SODIUM 30 MG: 100 INJECTION SUBCUTANEOUS at 09:57

## 2025-02-24 RX ADMIN — BUTALBITAL, ACETAMINOPHEN AND CAFFEINE 1 TABLET: 325; 50; 40 TABLET ORAL at 09:58

## 2025-02-24 RX ADMIN — Medication 2 PUFF: at 07:25

## 2025-02-24 ASSESSMENT — PAIN SCALES - GENERAL
PAINLEVEL_OUTOF10: 8
PAINLEVEL_OUTOF10: 0

## 2025-02-24 ASSESSMENT — PAIN DESCRIPTION - LOCATION: LOCATION: HEAD

## 2025-02-24 NOTE — DISCHARGE SUMMARY
Discharge Summary    Name:  Jina Amor /Age/Sex: 1965 (60 y.o. female)   Admit Date: 2025  Discharge Date: 25   MRN & CSN:  [id number] & [id number] Encounter Date and Time 25 11:57 AM EST    Attending:  Musa Cochran MD Discharging Provider: MUSA COCHRAN MD       Hospital Course:       \"60 y.o. female, with past medical history of COPD, HLD, GERD and marijuana use, who presented to [facility] with shortness of breath.\"        AECOPD, with possible bronchitis on CTPA.  Steroids (pulmonary had been planning a long prednisone taper, I discharged with 20 days), inhaled bronchodilators, and azithromycin per pulmonary.     Acute hypoxic respiratory failure, due to above.  Wean to RA as tolerated in the coming weeks as outpatient, still on 4LNC here.  The patient has no supplemental O2 requirement at baseline.  She says she was on oxygen for years, then she passed a 6MWT a couple years ago and \"they took away my oxygen.\"  She has since purchased an oxygen supply on her own, but says she seldom uses it.     Suspected vocal cord dysfunction.  Patient has inconsistent choking spells with stridor and dysphonia.  Pulmonary recommended ENT consultation of flexible laryngoscopy.  Their input is pending at the time of this note, I will make an addendum if there are any important updates or changes to the plan.      Situational anxiety.  Had to use cautious diazepam PRN while here, not to be continued upon discharge.      HLD.  Statin.         Discharge Diagnosis:   Acute on chronic respiratory failure with hypoxia (HCC)      Discharge Instructions:     Follow up with PCP within 1-2 weeks.  Follow up with pulmonary per their instructions.    Diet: ADULT DIET; Regular  Activity: activity as tolerated  Discharged to:  home  Condition on discharge: Stable    Objective Findings at Discharge:   BP (!) 142/91   Pulse (!) 116   Temp 98.1 °F (36.7 °C) (Oral)   Resp 20   Ht 1.524 m (5')   Wt

## 2025-02-24 NOTE — TELEPHONE ENCOUNTER
----- Message from Dr. Guillermo Cardona MD sent at 2/23/2025  2:18 PM EST -----  Hello,    Can we make f/u appt for patient in 3 weeks with myself or Susan. Thank you.    KD

## 2025-02-24 NOTE — CARE COORDINATION
LOS 5.  Care managed by [facility], Pulm. Here w Resp Fail, COPD. From home alone. Will need 02- has order- need walk test.  RN notified. Alize aware. CM following. Luz Burleson RN

## 2025-02-24 NOTE — DISCHARGE INSTRUCTIONS
WHEN YOU GET HOME CALL [facility] [phone]. THEY WILL DELIVER YOUR HOME OXYGEN THIS EVENING AFTER YOUR CALL    Unable to prescribe PRN Hycodan cough medication with discharge. You will need to follow up with your PCP.    Follow up with PCP within 1-2 weeks.  Follow up with pulmonary per their instructions.

## 2025-02-24 NOTE — CONSULTS
Consult Placed     Who: ELKE JACQUES  Date:2/21/2025  Time:1710     Electronically signed by Srikanth Godfrey on 2/21/2025 at 5:10 PM    
Consult Placed  Who:Dr. Cardona  Date:2/20/25  Time:8:21     Electronically signed by Chanelle Skinner RN on 2/20/2025 at 8:21 AM    
tenderness.   Musculoskeletal:         General: No swelling.      Cervical back: Normal range of motion.   Skin:     General: Skin is warm and dry.   Neurological:      Mental Status: She is alert and oriented to person, place, and time.            Data Reviewed:   LABS:  :   Recent Labs     02/19/25  1612 02/20/25  0636   WBC 9.3 7.0   HGB 14.8 14.0   HCT 43.8 41.9   MCV 94.6 95.0    112*     BMP:   Recent Labs     02/19/25  1612 02/20/25  0636   * 134*   K 3.8 3.8   CL 95* 95*   CO2 26 28   BUN 7 6*   CREATININE 0.5* 0.4*     PROFILE:   Recent Labs     02/19/25  1612   AST 46*   ALT 44*   BILITOT 0.5   ALKPHOS 99     PT/INR: No results for input(s): \"PROTIME\", \"INR\" in the last 72 hours.  APTT:No results for input(s): \"APTT\" in the last 72 hours.  :No results for input(s): \"NITRITE\", \"COLORU\", \"PHUR\", \"LABCAST\", \"WBCUA\", \"RBCUA\", \"MUCUS\", \"TRICHOMONAS\", \"YEAST\", \"BACTERIA\", \"CLARITYU\", \"SPECGRAV\", \"LEUKOCYTESUR\", \"UROBILINOGEN\", \"BILIRUBINUR\", \"BLOODU\", \"GLUCOSEU\", \"AMORPHOUS\" in the last 72 hours.    Invalid input(s): \"KETONESU\"  No results for input(s): \"PHART\", \"TSL9QXV\", \"PO2ART\" in the last 72 hours.       CT PE 2/19/2025  Hiatal hernia.  No lymphadenopathy.  No pericardial or pleural effusion.  Centrilobular emphysema.  Tree-in-bud bilaterally.  Left upper lobe nodularity.  Multiple nodules bilaterally.      Spirometry 4/21/2021  Moderate obstructive defect with mildly reduced diffusion.  Lung volumes and bronchodilator response were not assessed.     ECHO 7/31/23   Conclusions      Summary   Normal left ventricle systolic function with an estimated ejection fraction   of 55%.   No regional wall motion abnormalities are seen.   Grade I diastolic dysfunction with normal filing pressure.   Trace mitral regurgitation.   Mild tricuspid regurgitation.   Systolic pulmonary artery pressure (SPAP) is normal and estimated at 27 mmHg   (right atrial pressure 3 mmHg).     Chest x-ray 3/3/2023  Low lung 
old records

## 2025-02-24 NOTE — PLAN OF CARE
Problem: Discharge Planning  Goal: Discharge to home or other facility with appropriate resources  2/24/2025 1041 by Maria M Pryor RN  Outcome: Progressing  2/24/2025 0035 by Amy Alba RN  Outcome: Progressing     Problem: Pain  Goal: Verbalizes/displays adequate comfort level or baseline comfort level  2/24/2025 1041 by Maria M Pryor RN  Outcome: Progressing  2/24/2025 0035 by Amy Alba RN  Outcome: Progressing     Problem: Safety - Adult  Goal: Free from fall injury  2/24/2025 1041 by Maria M Pryor RN  Outcome: Progressing  2/24/2025 0035 by Amy Alba RN  Outcome: Progressing     Problem: Gastrointestinal - Adult  Goal: Minimal or absence of nausea and vomiting  2/24/2025 0035 by Amy Alba RN  Outcome: Progressing  Goal: Maintains adequate nutritional intake  2/24/2025 0035 by Amy Alba RN  Outcome: Progressing

## 2025-02-24 NOTE — PLAN OF CARE
Problem: Discharge Planning  Goal: Discharge to home or other facility with appropriate resources  2/24/2025 0035 by Amy Alba RN  Outcome: Progressing  2/23/2025 1102 by Elsa Chavez RN  Outcome: Progressing  Flowsheets (Taken 2/23/2025 0907)  Discharge to home or other facility with appropriate resources: Identify barriers to discharge with patient and caregiver     Problem: Pain  Goal: Verbalizes/displays adequate comfort level or baseline comfort level  2/24/2025 0035 by Amy Alba RN  Outcome: Progressing  2/23/2025 1102 by Elsa Chavez RN  Outcome: Progressing     Problem: Safety - Adult  Goal: Free from fall injury  2/24/2025 0035 by Amy Alba RN  Outcome: Progressing  2/23/2025 1102 by Elsa Chavez RN  Outcome: Progressing     Problem: Respiratory - Adult  Goal: Achieves optimal ventilation and oxygenation  2/23/2025 1102 by Elsa Chavez RN  Outcome: Progressing  Flowsheets (Taken 2/23/2025 0907)  Achieves optimal ventilation and oxygenation: Assess for changes in respiratory status     Problem: Gastrointestinal - Adult  Goal: Maintains adequate nutritional intake  2/24/2025 0035 by Amy Alba RN  Outcome: Progressing  2/23/2025 1102 by Elsa Chavez RN  Outcome: Progressing     Problem: Gastrointestinal - Adult  Goal: Minimal or absence of nausea and vomiting  2/24/2025 0035 by Amy Alba RN  Outcome: Progressing  2/23/2025 1102 by Elsa Chavez RN  Outcome: Progressing

## 2025-02-24 NOTE — CARE COORDINATION
CASE MANAGEMENT DISCHARGE SUMMARY      Discharge to: Home    Precertification completed: na  Hospital Exemption Notification (HENS) completed: na    IMM given: (date) 2/24    New Durable Medical Equipment ordered/agency: [employer] [facility]    Transportation:    Arranging LYFT when pt ready for DC       Confirmed discharge plan with:     Patient: yes          Facility/Agency, name:  [facility] faxed   Phone number for report to facility: darin     RN, name: Maria M    Note: Discharging nurse to complete [facility], reconcile [facility], and place final copy with patient's discharge packet. RN to ensure that written prescriptions for  Level II medications are sent with patient to the facility as per protocol.     Luz Burleson RN

## 2025-02-26 NOTE — PROGRESS NOTES
Hospital Medicine Progress Note      Subjective:  She is tolerating hospitalization poorly.  Stressed, crying, having some behaviors, feuding.        General appearance: No apparent distress, appears stated age and cooperative.  HEENT: Pupils equal, round.  Conjunctivae/corneas clear.  Neck: No jugular venous distention.   Respiratory:  no longer with increased respiratory effort.  Bilaterally without Rales/Rhonchi.  Mild bilateral expiratory wheezing.  She sometimes gets stridorous and choked when she is anxious.   Cardiovascular: Normal rate and regular rhythm with normal S1/S2 without murmurs, rubs or gallops.  Abdomen: Soft, non-tender, non-distended with normal bowel sounds.  Musculoskeletal: No edema.  Without deformity.  Skin: No jaundice.  No rashes or lesions.  Neurologic:  Neurovascularly intact without any focal sensory/motor deficits. Cranial nerves: II-XII intact.  Psychiatric: Alert and oriented, normal insight.  Anxious, labile mood.       Presenting Admission History:  \"60 y.o. female, with past medical history of COPD, HLD, GERD and marijuana use, [facility].\"      Assessment/Plan:        AECOPD, with possible bronchitis on CTPA.  Steroids (pulmonary planning a long prednisone taper), inhaled bronchodilators, and azithromycin per pulmonary.    Acute hypoxic respiratory failure, due to above.  Wean to RA as tolerated.  The patient has no supplemental O2 requirement at baseline.  She says she was on oxygen for years, then she passed a 6MWT a couple years ago and \"they took away my oxygen.\"  She has since purchased an oxygen supply on her own, but says she seldom uses it.    Suspected vocal cord dysfunction.  Patient has inconsistent choking spells with stridor and dysphonia.  Pulmonary recommended ENT consultation of flexible laryngoscopy.     Situational anxiety.  Cautious diazepam PRN while here, not to be continued upon discharge.     HLD.  Statin. 
  Hospital Medicine Progress Note      Subjective:  She seems to be breathing much more easily today.  Less anxious too.        General appearance: No apparent distress, appears stated age and cooperative.  HEENT: Pupils equal, round.  Conjunctivae/corneas clear.  Neck: No jugular venous distention.   Respiratory:  no longer with increased respiratory effort.  Bilaterally without Rales/Rhonchi.  Bilateral expiratory wheezing has resolved.  She also tends to get stridorous and choked when she is anxious.   Cardiovascular: Normal rate and regular rhythm with normal S1/S2 without murmurs, rubs or gallops.  Abdomen: Soft, non-tender, non-distended with normal bowel sounds.  Musculoskeletal: No edema.  Without deformity.  Skin: No jaundice.  No rashes or lesions.  Neurologic:  Neurovascularly intact without any focal sensory/motor deficits. Cranial nerves: II-XII intact.  Psychiatric: Alert and oriented, normal insight.      Presenting Admission History:  \"60 y.o. female, with past medical history of COPD, HLD, GERD and marijuana use, who presented to [facility] with shortness of breath.\"      Assessment/Plan:        AECOPD, with possible bronchitis on CTPA.  Steroids, inhaled bronchodilators, and azithromycin per pulmonary.    Acute hypoxic respiratory failure, due to above.  Wean to RA as tolerated.  The patient has no supplemental O2 requirement at baseline.  She says she was on oxygen for years, then she passed a 6MWT a couple years ago and \"they took away my oxygen.\"  She has since purchased an oxygen supply on her own, but says she seldom uses it.    Suspected vocal cord dysfunction.  Patient has inconsistent choking spells with stridor and dysphonia.  Pulmonary recommended ENT consultation of flexible laryngoscopy.     Situational anxiety.  Cautious diazepam PRN while here, not to be continued upon discharge.     HLD.  Statin.       DVT Prophylaxis: enoxaparin  Disposition: PT/OT not indicated.  Home when she 
  Physician Progress Note      PATIENT:               ANJUM CORDERO  CSN #:                  [id number]  :                       1965  ADMIT DATE:       2025 3:47 PM  DISCH DATE:  RESPONDING  PROVIDER #:        Ash Cochran MD          QUERY TEXT:    Pt admitted with Shortness of breath and has respiratory failure documented.   If possible, please document in progress notes and discharge summary further   specificity regarding the acuity of respiratory failure:    The medical record reflects the following:    Risk Factors: COPD exacerbation, Emphysema , cannabis Use    Clinical Indicators: ED on  -"Patient presents to the ED complaining of   shortness of breath, generalized body aches. RESPIRATORY: Diminished breath   sounds bilaterally. No chest wall tenderness.  Mild respiratory distress on 4   L nasal cannula\".    H&P on -"Acute on chronic respiratory failure with hypoxia in setting of   COPD exacerbation. spo2 83% on RA, now requiring 4 LNC. DuoNeb given in ED x   2-Solu-Medrol given in ED , continuous pulse ox    Pulmonology CN on -"Patient with significant past medical history of   tobacco dependence and chronic hypoxic respiratory failure that [facility] and cough with productive sputum. Acute Hypoxic   Respiratory Failure- Supplemental O2 therapy with goal saturation 88-92%, she   is on 4L\".    IM PN on -"Acute hypoxic respiratory failure, due to above.  Wean to RA as   tolerated.  The patient has no supplemental O2 requirement at baseline.  She   says she was on oxygen for years, then she passed a 6MWT a couple years ago   and \"they took away my oxygen.\"  She has since purchased an oxygen supply on   her own, but says she seldom uses it. Pt denies needing home oxygen.\".    Pulmonology PN on -\"Acute Hypoxic Respiratory Failure - Supplemental O2   therapy with goal saturation 88-92%, she is on 4L NC. She may need set-up for   oxygen therapy on 
  Physician Progress Note      PATIENT:               ANJUM CORDERO  CSN #:                  [id number]  :                       1965  ADMIT DATE:       2025 3:47 PM  DISCH DATE:        2025 9:19 PM  RESPONDING  PROVIDER #:        Ash Cochran MD          QUERY TEXT:    Patient admitted with Acute hypoxic respiratory failure. Noted documentation   of atypical pneumonia in Pulmonology PN on 25 with CXR Bibasilar   atelectasis/scarring . In order to support the diagnosis of atypical   pneumonia, please include additional clinical indicators in your   documentation.  Or please document if the diagnosis of atypical pneumonia has   been ruled out after further study.    The medical record reflects the following:    Risk Factors: Acute hypoxic respiratory failure, COPD, Emphysema , Smoking    Clinical Indicators: ED on -\"On reevaluation patient is still   significantly short of breath and given 2 more DuoNebs.  CTPA also obtained   which shows likely bronchitis, no PE or pneumonia.  Patient is already on   azithromycin from PCP, so I did not add on additional antibiotics at this   time\".    Pulmonology CN on  - \"Patient started to experience shortness of breath   and cough with productive sputum. Atypical pneumonia , Former smoker , cont   smoking cessation\".    Pulmonology PN on - \"AECOPD. Atypical pneumonia -Agree with long   prednisone taper. Patient being set up with home oxygen -Patient to follow-up   with Dr. Rojo as output - cont smoking cessation\".    DS on -\"AECOPD, with possible bronchitis on CTPA.  Steroids (pulmonary had   been planning a long prednisone taper, I discharged with 20 days), inhaled   bronchodilators, and azithromycin per pulmonary\".    CXR on -\"Bibasilar atelectasis/scarring\".    CT chest  on -\"Diffuse centrilobular emphysema. Mild patchy increased   density in the peripheral lower lungs bilaterally, consistent with an 
4 Eyes Skin Assessment and Patient belongings     The patient is being assess for  Admission    I agree that 2 Nurses have performed a thorough Head to Toe Skin Assessment on the patient. ALL assessment sites listed below have been assessed.       Areas assessed by both nurses:   [x]   Head, Face, and Ears   [x]   Shoulders, Back, and Chest  [x]   Arms, Elbows, and Hands   [x]   Coccyx, Sacrum, and IschIum  [x]   Legs, Feet, and Heels        Does the Patient have Skin Breakdown?  No noted wounds; blanchable redness on buttocks and coccyx (mepilex applied) blanchable redness on heels        Dedrick Prevention initiated:  No   Wound Care Orders initiated:  No      [facility] nurse consulted for Pressure Injury (Stage 3,4, Unstageable, DTI, NWPT, and Complex wounds), New and Established Ostomies:  No      I agree that 2 Nurses have reviewed patient belongings with the patient/family and documented in the flowsheet upon admission or transfer to the unit.     Belongings  Dental Appliances: None  Vision - Corrective Lenses: Eyeglasses (@ the car of son-in law)  Hearing Aid: None  Clothing: Footwear, Jacket/Coat, Other (Comment) (onesies)  Jewelry: Necklace, Ring, At bedside (2 necklaces and 2 rings)  Electronic Devices: Cell Phone, , At bedside  Weapons (Notify Protective Services/Security): None  Home Medications: None  Valuables Given To: Patient  Provide Name(s) of Who Valuable(s) Were Given To: Jina Amor       Nurse 1 eSignature: Electronically signed by OWEN ULRICH RN on 2/20/25 at 1:31 AM EST    **SHARE this note so that the co-signing nurse is able to place an eSignature**    Nurse 2 eSignature: Electronically signed by Ariane Arambula RN on 2/20/25 at 1:45 AM EST   
Attempted to visit with Pt for Epic consult. Pt declined visit. Assured her of our continued availability for support if needed.    Pierre Dunbar  
High flow tubing changed out to NC with humidification. Nail polish removed from index finger.  
Patient admitted to room 366 from ED.  Patient oriented to room, call light, bed rails, phone, lights and bathroom.  Patient instructed about the schedule of the day including: vital sign frequency, lab draws, possible tests, frequency of MD and staff rounds, including RN/MD rounding together at bedside, daily weights, and I &O's.  Patient instructed about prescribed diet, how to use 8MENU, and television. bed alarm in place, patient aware of placement and reason. Telemetry box  in place, patient aware of placement and reason.  Bed locked, in lowest position, side rails up 2/4, call light within reach.  Will continue to monitor.     
Patient discharged. IV removed, telemetry box and leads removed and returned. Lockbox emptied. All belongings gathered and returned to patient. Discharge instructions reviewed with patient, all questions answered by RN. Medications picked up from outpatient pharmacy. Pt requested to be picked up after dinner. She stated if she got discharged before dinner she wouldn't eat. Lyft to be here to  pt at 9000. Pt aware she is to call [facility] at [phone] when she gets home. No further needs.     
Patient with nocturia. Pt needing to urinate roughly every 30 minutes with good output. Pt states this is normal for her at night and believes it is due to drinking too much liquid before bedtime. Confirmed with pharmacist that no medications should cause frequent urination.  
Patient: Jina Amor MRN: [id number]  Date of  Admission: 2/19/2025   YOB: 1965  Age: 60 y.o.  Sex: female    Unit: [address] SURG  Room/Bed: [facility]/0366- Admitting Physician: FIOR DORADO    Attending Physician:  Michele Cochran MD         Pulmonary Service Note      SUBJECTIVE:    Patient has no complaints.  She is still on 4 L oxygen therapy        OBJECTIVE    Medications    Continuous Infusions:   sodium chloride         Scheduled Meds:   lactulose  20 g Oral TID    melatonin  6 mg Oral Nightly    predniSONE  40 mg Oral Daily    sennosides-docusate sodium  2 tablet Oral Daily    fluticasone  1 spray Each Nostril Daily    levalbuterol  1.25 mg Nebulization Q4H WA RT    atorvastatin  40 mg Oral Daily    budesonide-formoterol  2 puff Inhalation BID RT    montelukast  10 mg Oral Daily    pantoprazole  40 mg Oral QAM AC    sodium chloride flush  5-40 mL IntraVENous 2 times per day    enoxaparin  30 mg SubCUTAneous Daily       PRN Meds:  labetalol, calcium carbonate, famotidine, albuterol sulfate HFA, sodium chloride flush, sodium chloride, ondansetron **OR** ondansetron, polyethylene glycol, acetaminophen **OR** acetaminophen, butalbital-acetaminophen-caffeine, diphenhydrAMINE, benzonatate, ibuprofen, traMADol, diazePAM, HYDROcodone homatropine    Physical    Patient Vitals for the past 24 hrs:   BP Temp Temp src Pulse Resp SpO2   02/23/25 1130 (!) 179/108 98.1 °F (36.7 °C) Oral 100 20 92 %   02/23/25 0834 -- -- -- -- -- 92 %   02/23/25 0752 (!) 169/95 98.2 °F (36.8 °C) Oral 90 20 95 %   02/23/25 0615 -- -- -- -- 20 --   02/23/25 0408 (!) 165/97 98.2 °F (36.8 °C) Oral (!) 106 20 --   02/23/25 0000 (!) 141/91 97.9 °F (36.6 °C) Oral (!) 107 18 95 %   02/22/25 2316 -- -- -- -- 18 --   02/22/25 2211 -- -- -- -- -- 90 %   02/22/25 2102 (!) 151/99 98.2 °F (36.8 °C) Oral (!) 118 20 90 %   02/22/25 1530 (!) 163/101 97.7 °F (36.5 °C) Oral (!) 112 20 91 %        Physical Exam  Constitutional:       
Patient: Jina Amor MRN: [id number]  Date of  Admission: 2/19/2025   YOB: 1965  Age: 60 y.o.  Sex: female    Unit: [address] SURG  Room/Bed: 0366/0366-01 Admitting Physician: FIOR DORADO    Attending Physician:  Michele Cochran MD         Pulmonary Service Note      SUBJECTIVE:      Chela offers no complaints  She is being discharged home today      OBJECTIVE    Medications    Continuous Infusions:   sodium chloride         Scheduled Meds:   lactulose  20 g Oral TID    melatonin  6 mg Oral Nightly    predniSONE  40 mg Oral Daily    sennosides-docusate sodium  2 tablet Oral Daily    fluticasone  1 spray Each Nostril Daily    levalbuterol  1.25 mg Nebulization Q4H WA RT    atorvastatin  40 mg Oral Daily    budesonide-formoterol  2 puff Inhalation BID RT    montelukast  10 mg Oral Daily    pantoprazole  40 mg Oral QAM AC    sodium chloride flush  5-40 mL IntraVENous 2 times per day    enoxaparin  30 mg SubCUTAneous Daily       PRN Meds:  labetalol, calcium carbonate, famotidine, albuterol sulfate HFA, sodium chloride flush, sodium chloride, ondansetron **OR** ondansetron, polyethylene glycol, acetaminophen **OR** acetaminophen, butalbital-acetaminophen-caffeine, diphenhydrAMINE, benzonatate, ibuprofen, traMADol, diazePAM, HYDROcodone homatropine    Physical    Patient Vitals for the past 24 hrs:   BP Temp Temp src Pulse Resp SpO2   02/24/25 1228 -- -- -- -- -- (!) 89 %   02/24/25 1145 137/88 98.4 °F (36.9 °C) Oral (!) 106 18 92 %   02/24/25 0945 (!) 142/91 98.1 °F (36.7 °C) Oral (!) 116 20 (!) 88 %   02/24/25 0730 -- -- -- -- -- (!) 88 %   02/24/25 0619 -- -- -- 98 -- --   02/24/25 0400 (!) 145/94 97.7 °F (36.5 °C) Oral (!) 112 20 (!) 89 %   02/24/25 0002 (!) 141/100 98.6 °F (37 °C) Oral (!) 104 20 92 %   02/23/25 2028 -- -- -- -- 20 --   02/23/25 2025 -- -- -- (!) 115 20 90 %   02/23/25 1932 (!) 145/84 98.8 °F (37.1 °C) Oral (!) 113 20 (!) 89 %   02/23/25 1745 -- -- -- -- -- 90 %   02/23/25 1730 
Physical Therapy    Therapy orders received and Pt chart reviewed. Attempted to see pt this afternoon. Pt refused due to feeling ill. Pt reports she has been nauseous and does not want to walk right now. Offered to just stand or transfer to chair. Pt reports she \"was up in chair all day yesterday\".  Will re-attempt at a later date         Sherice Ruffin, PT        
Physical Therapy  Facility/Department: [place] [address] - [facility] SURG  Physical Therapy Initial Assessment/Treatment    Name: Jina Amor  : 1965  MRN: [id number]  Date of Service: 2025    Discharge Recommendations:  Continue to assess pending progress (home vs SNF)        Therapy discharge recommendations are subject to collaboration from the patient’s interdisciplinary healthcare team, including MD and case management recommendations.    Barriers to Home Discharge:   [] Steps to access home entry or bed/bath:   [x] Unable to transfer, ambulate, or propel wheelchair household distances without assist   [] Limited available assist at home upon discharge    [] Patient or family requests d/c to post-acute facility    [x] Poor cognition/safety awareness for d/c to home alone    [] Unable to maintain ordered weight bearing status    [] Patient with salient signs of long-standing immobility   [] Decreased independence with ADLs   [x] Increased risk for falls   [] Other:    If pt is unable to be seen after this session, please let this note serve as discharge summary.  Please see case management note for discharge disposition.  Thank you.    Patient Diagnosis(es): The primary encounter diagnosis was Acute respiratory failure with hypoxia (HCC). A diagnosis of COPD exacerbation ([facility]) was also pertinent to this visit.  Past Medical History:  has a past medical history of Acute on chronic respiratory failure with hypoxia (HCC), Alopecia, Arthritis, Chronic hip pain, Chronic obstructive pulmonary disease (HCC), Chronic obstructive pulmonary disease with acute exacerbation (HCC), Chronic respiratory failure (HCC), Depression, Esophageal varices (HCC), GERD (gastroesophageal reflux disease), Hyperlipidemia, Moderate malnutrition, Perioperative dehiscence of abdominal wound with evisceration, Pneumonia due to Enterobacter species (HCC), PONV (postoperative nausea and vomiting), Pressure ulcer of right buttock, stage 2 (HCC), 
WALK TEST    Pt 91% on 2L NC sitting at bedside. NC removed, pt stood up at bedside and took a few steps, sat dropped to 85-86% on RA. Sitting on side of bed with 2L NC, O2 now back up to 91%.     -pt requesting an [employer] portable oxygen system for home also.   
Walk test     RA at rest: 88%.    During test: Pt 89% on 2 L/min NC. O2 NC removed from pt.     Pt decreased to 84% ambulating on RA. Pt appeared more tachypneic.     NC placed back on pt at 2L/min NC. O2 sat was 88%. O2 then increased to 3L/ minute NC. O2 sat now 91% on 3L/min NC. Pt appears to be breathing slightly easier  
93 %        Physical Exam  Constitutional:       General: She is not in acute distress.     Appearance: She is not toxic-appearing.   HENT:      Head: Normocephalic and atraumatic.      Nose: Nose normal.      Mouth/Throat:      Pharynx: No oropharyngeal exudate.   Eyes:      General: No scleral icterus.        Right eye: No discharge.         Left eye: No discharge.   Cardiovascular:      Rate and Rhythm: Normal rate and regular rhythm.      Heart sounds: No murmur heard.     No friction rub. No gallop.   Pulmonary:      Effort: Pulmonary effort is normal.      Comments: Diminished breath sounds in all lung fields  Abdominal:      General: Abdomen is flat. Bowel sounds are normal.      Palpations: Abdomen is soft.   Musculoskeletal:         General: Normal range of motion.      Cervical back: Normal range of motion.   Skin:     General: Skin is warm and dry.   Neurological:      General: No focal deficit present.      Mental Status: She is alert and oriented to person, place, and time.   Psychiatric:         Mood and Affect: Mood normal.              Weight  Weight change:       Labs:   Recent Labs     02/19/25  1612 02/20/25  0636   WBC 9.3 7.0   HGB 14.8 14.0   HCT 43.8 41.9    112*      Recent Labs     02/19/25  1612 02/20/25  0636   * 134*   K 3.8 3.8   CL 95* 95*   CO2 26 28   BUN 7 6*   GLUCOSE 137* 174*       Additional labs      Radiology, images personally reviewed.   CT PE 2/19/2025  Hiatal hernia.  No lymphadenopathy.  No pericardial or pleural effusion.  Centrilobular emphysema.  Tree-in-bud bilaterally.  Left upper lobe nodularity.  Multiple nodules bilaterally.     Spirometry 4/21/2021  Moderate obstructive defect with mildly reduced diffusion.  Lung volumes and bronchodilator response were not assessed.     ECHO 7/31/23   Conclusions      Summary   Normal left ventricle systolic function with an estimated ejection fraction   of 55%.   No regional wall motion abnormalities are seen.   
Conclusions      Summary   Normal left ventricle systolic function with an estimated ejection fraction   of 55%.   No regional wall motion abnormalities are seen.   Grade I diastolic dysfunction with normal filing pressure.   Trace mitral regurgitation.   Mild tricuspid regurgitation.   Systolic pulmonary artery pressure (SPAP) is normal and estimated at 27 mmHg   (right atrial pressure 3 mmHg).     Chest x-ray 3/3/2023  Low lung volumes bilaterally.  Costophrenic angles are difficult to see bilaterally.  No consolidation seen.     CT chest abdomen pelvis 2/18/2023  Left lower lobe hernia with atelectasis.  No lymphadenopathy.  No pleural or pericardial effusions.  No consolidations.  Centrilobular emphysema.     6 MWT 8/23/23  6-minute walk test  Starting room air sat was 95%.  Patient's heart rate was 95 and respiratory rate of 16.  Dyspnea modified Jim score of 3.  Patient was able to complete the 6-minute walk test but at the minute for marked the patient started having audible wheezing and coughing.  Patient's lowest O2 saturation was 92%.     Impression  No desaturation.  Lowest O2 saturation was 92% on room air.  Patient was only able to complete 840 feet.  This was 46% of expected distance.     PFT 8/23/23  Severe obstructive defect with no significant bronchodilator response and air trapping/hyperinflation.  The diffusion is mildly reduced.        Assessment/Plan   Patient is a 60-year-old female with significant past medical history of tobacco dependence and chronic hypoxic respiratory failure that presents to [facility]  for SOB.      Assessment:  AECOPD  Atypical pneumonia  Acute Hypoxic Respiratory Failure  Former smoker  Centrilobular emphysema     Plan:  - Cont Solumedrol 40mg IV BID, will decrease to Prednisone qd tomorrow  - Z suman  - Switch to Xopenex q4h to help with anxiety  - ENT consulted for possible VCD  - Supplemental O2 therapy with goal saturation 88-92%, she is on 4L and unable to 
Independent  Active : Yes    Objective  Temp: 98 °F (36.7 °C)  Pulse: (!) 113  Heart Rate Source: Monitor  Respirations: 20  SpO2: 94 %  O2 Device: Nasal cannula  BP: (!) 170/106  MAP (Calculated): 127  BP Location: Left upper arm  BP Method: Automatic  Patient Position: Semi fowlers  Comment: 4LO2         Safety Devices  Type of Devices: Call light within reach;Left in bed;Nurse notified (Pt declined gait belt d/t hernia.)  Bed Mobility Training  Bed Mobility Training: Yes  Supine to Sit: Modified independent  Sit to Supine: Modified independent  Balance  Sitting: Intact  Standing: Without support (supervision)  Transfer Training  Transfer Training: Yes  Sit to Stand: Modified independent  Stand to Sit: Modified independent  Stand Pivot Transfers: Modified independent (RW)  Toilet Transfer: Modified independent     AROM: Within functional limits  Strength: Within functional limits  Coordination: Within functional limits  Tone: Normal  Sensation: Intact  ADL  Feeding: Independent  Grooming: Independent  Grooming Skilled Clinical Factors: washed hands at sink  LE Dressing Skilled Clinical Factors: Refused brief  Putting On/Taking Off Footwear: Independent  Putting On/Taking Off Footwear Skilled Clinical Factors: for socks  Toileting: Independent  Functional Mobility: Independent  Functional Mobility Skilled Clinical Factors: Independent for bathroom ambulation and toilet transfer without device.       Cognition  Overall Cognitive Status: [facility]  Cognition Comment: Tearful at times and reports feeling anxious.  She reported less anxiety after ambulation.  Orientation  Overall Orientation Status: Within Normal Limits      Education Given To: Patient  Education Provided: Role of Therapy;Transfer Training;Plan of Care;Equipment;Mobility Training;Energy Conservation;ADL Adaptive Strategies  Education Provided Comments: Pt educated on importance of OOB mobility, prevention of complications of bedrest, PLB, energy 
    02/19/25  1612 02/20/25  0636   * 134*   K 3.8 3.8   CL 95* 95*   CO2 26 28   BUN 7 6*   CREATININE 0.5* 0.4*   CALCIUM 9.4 9.1     Recent Labs     02/19/25  1612 02/19/25  1654 02/20/25  0039   TROPHS 16* 15* 13     No results for input(s): \"LABA1C\" in the last 72 hours.  Recent Labs     02/19/25  1612   AST 46*   ALT 44*   BILITOT 0.5   ALKPHOS 99     Recent Labs     02/19/25  1612   LACTA 1.5       Urine Cultures: No results found for: \"LABURIN\"  Blood Cultures:   Lab Results   Component Value Date/Time    BC No Growth after 4 days of incubation. 02/18/2023 11:55 AM     Lab Results   Component Value Date/Time    BLOODCULT2 No Growth after 4 days of incubation. 02/18/2023 11:56 AM     Organism:   Lab Results   Component Value Date/Time    ORG Enterobacter hormaechei 03/04/2023 09:20 PM         MUSA HERNANDEZ MD        V 10.25    Date of Admission: 2/19/2025  Hospital Day: 4      Chief Complaint   Patient presents with    Shortness of Breath     Patient reports SOB generalized body aches since Monday, diarrhea starting this morning. Pt denies needing home oxygen. Pt is 83% on RA. No relief with at home albuterol         Current Principal Problem:  Acute on chronic respiratory failure with hypoxia (HCC)      Consults:      IP CONSULT TO PULMONOLOGY  IP CONSULT TO OTOLARYNGOLOGY      
Change in code status    [] Decision to escalate care    [] Major surgery/procedure with associated risk factors    --------------------------------------------------  C. Data (any 2)    [] Data Review (any 3)    [] Consultant notes from yesterday/today    [] All available current labs reviewed interpreted for clinical significance    [] Appropriate follow-up labs were ordered  [] Collateral history obtained     [] Independent Interpretation of tests (any 1)    [] Telemetry (Rhythm Strip) personally reviewed and interpreted        [] Imaging personally reviewed and interpreted     [] Discussion (any 1)  [] Multi-Disciplinary Rounds with Case Management  [] Discussed management of the case with           Labs:  Personally reviewed on 2/20/2025 and interpreted for clinical significance as documented above.     Recent Labs     02/19/25  1612 02/20/25  0636   WBC 9.3 7.0   HGB 14.8 14.0   HCT 43.8 41.9    112*     Recent Labs     02/19/25  1612 02/20/25  0636   * 134*   K 3.8 3.8   CL 95* 95*   CO2 26 28   BUN 7 6*   CREATININE 0.5* 0.4*   CALCIUM 9.4 9.1     Recent Labs     02/19/25  1612 02/19/25  1654 02/20/25  0039   TROPHS 16* 15* 13     No results for input(s): \"LABA1C\" in the last 72 hours.  Recent Labs     02/19/25  1612   AST 46*   ALT 44*   BILITOT 0.5   ALKPHOS 99     Recent Labs     02/19/25  1612   LACTA 1.5       Urine Cultures: No results found for: \"LABURIN\"  Blood Cultures:   Lab Results   Component Value Date/Time    BC No Growth after 4 days of incubation. 02/18/2023 11:55 AM     Lab Results   Component Value Date/Time    BLOODCULT2 No Growth after 4 days of incubation. 02/18/2023 11:56 AM     Organism:   Lab Results   Component Value Date/Time    ORG Enterobacter hormaechei 03/04/2023 09:20 PM         MUSA HERNANDEZ MD        V 10.25    Date of Admission: 2/19/2025  Hospital Day: 2      Chief Complaint   Patient presents with    Shortness of Breath     Patient reports SOB generalized

## 2025-03-01 ENCOUNTER — PATIENT MESSAGE (OUTPATIENT)
Dept: PULMONOLOGY | Age: 60
End: 2025-03-01

## 2025-03-03 ENCOUNTER — TELEPHONE (OUTPATIENT)
Dept: PULMONOLOGY | Age: 60
End: 2025-03-03

## 2025-03-03 DIAGNOSIS — J44.1 CHRONIC OBSTRUCTIVE PULMONARY DISEASE WITH ACUTE EXACERBATION (HCC): ICD-10-CM

## 2025-03-03 RX ORDER — ALBUTEROL SULFATE 90 UG/1
2 INHALANT RESPIRATORY (INHALATION) EVERY 6 HOURS PRN
Qty: 18 G | Refills: 3 | Status: SHIPPED | OUTPATIENT
Start: 2025-03-03

## 2025-03-03 NOTE — TELEPHONE ENCOUNTER
Patient called and was just released from hospital last week and was wanting her albuterol rescue inhalers [facility].   Last seen: 11/19/24  Next appt: 3/19/25 w/ Freed.   Last ordered: 10/30/23 w/ 3 refills.   Order pended.

## 2025-03-05 ENCOUNTER — OFFICE VISIT (OUTPATIENT)
Dept: FAMILY MEDICINE CLINIC | Age: 60
End: 2025-03-05
Payer: MEDICARE

## 2025-03-05 VITALS
BODY MASS INDEX: 20.58 KG/M2 | HEART RATE: 108 BPM | DIASTOLIC BLOOD PRESSURE: 84 MMHG | OXYGEN SATURATION: 94 % | WEIGHT: 104.8 LBS | HEIGHT: 60 IN | SYSTOLIC BLOOD PRESSURE: 136 MMHG

## 2025-03-05 DIAGNOSIS — S00.522A BLISTER (NONTHERMAL) OF ORAL CAVITY, INITIAL ENCOUNTER: ICD-10-CM

## 2025-03-05 DIAGNOSIS — Z09 FOLLOW-UP EXAM: Primary | ICD-10-CM

## 2025-03-05 PROCEDURE — G8427 DOCREV CUR MEDS BY ELIG CLIN: HCPCS | Performed by: NURSE PRACTITIONER

## 2025-03-05 PROCEDURE — 99213 OFFICE O/P EST LOW 20 MIN: CPT | Performed by: NURSE PRACTITIONER

## 2025-03-05 PROCEDURE — G8420 CALC BMI NORM PARAMETERS: HCPCS | Performed by: NURSE PRACTITIONER

## 2025-03-05 PROCEDURE — 3079F DIAST BP 80-89 MM HG: CPT | Performed by: NURSE PRACTITIONER

## 2025-03-05 PROCEDURE — 1036F TOBACCO NON-USER: CPT | Performed by: NURSE PRACTITIONER

## 2025-03-05 PROCEDURE — 3075F SYST BP GE 130 - 139MM HG: CPT | Performed by: NURSE PRACTITIONER

## 2025-03-05 PROCEDURE — 3017F COLORECTAL CA SCREEN DOC REV: CPT | Performed by: NURSE PRACTITIONER

## 2025-03-05 PROCEDURE — 1111F DSCHRG MED/CURRENT MED MERGE: CPT | Performed by: NURSE PRACTITIONER

## 2025-03-05 RX ORDER — LIDOCAINE HYDROCHLORIDE 20 MG/ML
15 SOLUTION OROPHARYNGEAL PRN
Qty: 100 ML | Refills: 0 | Status: SHIPPED | OUTPATIENT
Start: 2025-03-05 | End: 2025-03-10

## 2025-03-05 ASSESSMENT — PATIENT HEALTH QUESTIONNAIRE - PHQ9
7. TROUBLE CONCENTRATING ON THINGS, SUCH AS READING THE NEWSPAPER OR WATCHING TELEVISION: NOT AT ALL
5. POOR APPETITE OR OVEREATING: NOT AT ALL
1. LITTLE INTEREST OR PLEASURE IN DOING THINGS: NOT AT ALL
SUM OF ALL RESPONSES TO PHQ QUESTIONS 1-9: 0
2. FEELING DOWN, DEPRESSED OR HOPELESS: NOT AT ALL
SUM OF ALL RESPONSES TO PHQ QUESTIONS 1-9: 0
6. FEELING BAD ABOUT YOURSELF - OR THAT YOU ARE A FAILURE OR HAVE LET YOURSELF OR YOUR FAMILY DOWN: NOT AT ALL
4. FEELING TIRED OR HAVING LITTLE ENERGY: NOT AT ALL
10. IF YOU CHECKED OFF ANY PROBLEMS, HOW DIFFICULT HAVE THESE PROBLEMS MADE IT FOR YOU TO DO YOUR WORK, TAKE CARE OF THINGS AT HOME, OR GET ALONG WITH OTHER PEOPLE: NOT DIFFICULT AT ALL
3. TROUBLE FALLING OR STAYING ASLEEP: NOT AT ALL
SUM OF ALL RESPONSES TO PHQ QUESTIONS 1-9: 0
SUM OF ALL RESPONSES TO PHQ QUESTIONS 1-9: 0
8. MOVING OR SPEAKING SO SLOWLY THAT OTHER PEOPLE COULD HAVE NOTICED. OR THE OPPOSITE, BEING SO FIGETY OR RESTLESS THAT YOU HAVE BEEN MOVING AROUND A LOT MORE THAN USUAL: NOT AT ALL
9. THOUGHTS THAT YOU WOULD BE BETTER OFF DEAD, OR OF HURTING YOURSELF: NOT AT ALL

## 2025-03-05 ASSESSMENT — ENCOUNTER SYMPTOMS
COUGH: 1
SHORTNESS OF BREATH: 1
WHEEZING: 1
SORE THROAT: 1

## 2025-03-05 NOTE — ASSESSMENT & PLAN NOTE
Pt here for hospital fu   Taking all medications  Wearing o2 as directed  Will return for establishment of care and med refills when she is stronger

## 2025-03-05 NOTE — PROGRESS NOTES
Post-Discharge Transitional Care Management Services or Hospital Follow Up taking all medications wearing oxygen as directed reports feeling better daily but continues to have fatigue. Conchita independently has supportive family and friends    Reports sore in mouth she noticed this am slight painful   No fever reported     Jina Amor   YOB: 1965    Date of Office Visit:  3/5/2025  Date of Hospital Admission: 2/19/25  Date of Hospital Discharge: 2/24/25  Readmission Risk Score(high >=14%. Medium >=10%):Readmission Risk Score: 11.2      Care management risk score Rising risk (score 2-5) and Complex Care (Scores >=6): No Risk Score On File     Non face to face  following discharge, date last encounter closed (first attempt may have been earlier): *No documented post hospital discharge outreach found in the last 14 days *No documented post hospital discharge outreach found in the last 14 days    Call initiated 2 business days of discharge: *No response recorded in the last 14 days     Patient Active Problem List   Diagnosis    Vitamin D deficiency    Mixed hyperlipidemia    Alopecia    Major depressive disorder    Cyst of ovary, left    Gastroesophageal reflux disease without esophagitis    Primary hypertension    Hepatic cirrhosis (HCC)    Hiatal hernia    Normocytic normochromic anemia    Pleural effusion    COPD, severe (HCC)    Major depressive disorder, recurrent, unspecified    Protein calorie malnutrition    Incisional hernia without obstruction or gangrene    Non-recurrent unilateral inguinal hernia without obstruction or gangrene    Abnormal EKG    S/P repair of ventral hernia    Anemia    Asthenopia    Abnormal finding of blood chemistry    Sciatica    Pelvic pain in female    Other insomnia    Nicotine-related disorder    Nicotine dependence in remission    Left anterior fascicular block    Hyperhidrosis    Fracture of fifth toe, right, closed    Foot injury    Early satiety    Dyslipidemia

## 2025-03-07 RX ORDER — PREDNISONE 5 MG/1
5 TABLET ORAL DAILY
Qty: 5 TABLET | Refills: 0 | Status: SHIPPED | OUTPATIENT
Start: 2025-03-07 | End: 2025-03-12

## 2025-03-11 ENCOUNTER — TELEPHONE (OUTPATIENT)
Dept: PULMONOLOGY | Age: 60
End: 2025-03-11

## 2025-03-11 DIAGNOSIS — J44.9 COPD, SEVERE (HCC): Primary | ICD-10-CM

## 2025-03-17 RX ORDER — BENZONATATE 100 MG/1
100 CAPSULE ORAL 3 TIMES DAILY PRN
Qty: 30 CAPSULE | Refills: 0 | Status: SHIPPED | OUTPATIENT
Start: 2025-03-17 | End: 2025-03-24

## 2025-03-19 ENCOUNTER — OFFICE VISIT (OUTPATIENT)
Dept: PULMONOLOGY | Age: 60
End: 2025-03-19
Payer: MEDICARE

## 2025-03-19 VITALS
DIASTOLIC BLOOD PRESSURE: 91 MMHG | WEIGHT: 98 LBS | SYSTOLIC BLOOD PRESSURE: 121 MMHG | HEIGHT: 60 IN | TEMPERATURE: 98.9 F | BODY MASS INDEX: 19.24 KG/M2 | OXYGEN SATURATION: 94 % | RESPIRATION RATE: 16 BRPM | HEART RATE: 113 BPM

## 2025-03-19 DIAGNOSIS — I10 PRIMARY HYPERTENSION: ICD-10-CM

## 2025-03-19 DIAGNOSIS — J44.9 COPD, SEVERE (HCC): Primary | ICD-10-CM

## 2025-03-19 DIAGNOSIS — J43.2 CENTRILOBULAR EMPHYSEMA (HCC): ICD-10-CM

## 2025-03-19 DIAGNOSIS — Z87.891 FORMER SMOKER: ICD-10-CM

## 2025-03-19 PROCEDURE — 3023F SPIROM DOC REV: CPT | Performed by: NURSE PRACTITIONER

## 2025-03-19 PROCEDURE — 99214 OFFICE O/P EST MOD 30 MIN: CPT | Performed by: NURSE PRACTITIONER

## 2025-03-19 PROCEDURE — G8427 DOCREV CUR MEDS BY ELIG CLIN: HCPCS | Performed by: NURSE PRACTITIONER

## 2025-03-19 PROCEDURE — 1111F DSCHRG MED/CURRENT MED MERGE: CPT | Performed by: NURSE PRACTITIONER

## 2025-03-19 PROCEDURE — G8420 CALC BMI NORM PARAMETERS: HCPCS | Performed by: NURSE PRACTITIONER

## 2025-03-19 PROCEDURE — 3017F COLORECTAL CA SCREEN DOC REV: CPT | Performed by: NURSE PRACTITIONER

## 2025-03-19 PROCEDURE — 3074F SYST BP LT 130 MM HG: CPT | Performed by: NURSE PRACTITIONER

## 2025-03-19 PROCEDURE — 1036F TOBACCO NON-USER: CPT | Performed by: NURSE PRACTITIONER

## 2025-03-19 PROCEDURE — 3080F DIAST BP >= 90 MM HG: CPT | Performed by: NURSE PRACTITIONER

## 2025-03-19 ASSESSMENT — ENCOUNTER SYMPTOMS
WHEEZING: 0
COUGH: 1
RHINORRHEA: 0
CHEST TIGHTNESS: 0
EYE PAIN: 0
SORE THROAT: 0
SHORTNESS OF BREATH: 1
ABDOMINAL PAIN: 0

## 2025-03-19 NOTE — PROGRESS NOTES
MA Communication:  The following orders are received by verbal communication from Susan Carrero CNP    Orders include:    Follow up w/Dulce  on May    
chest imaging report reviewed.   I have personally reviewed and summarized the old records and/or obtained further history from someone other than the patient.    Reviewed present meds and side effects. Reviewed proper inhaler usage.  She is to continue Breztri and using albuterol as needed for increased SOB, wheezing.   Discussed when to call with worsening symptoms such as increased shortness of breath, productive cough, wheezing or symptoms not responding to treatment plan.     Blood pressure today is slightly elevated. Continue to monitor, continue current medication regimen per PCP.        Smoking cessation counseling provided. Individualized cessation plan includes continued smoking cessation.     Follow up in May as scheduled with Dr. Dulce CLARK, APRN - CNP

## 2025-03-19 NOTE — PATIENT INSTRUCTIONS
Call with worsening symptoms such as increased shortness of breath, productive cough, wheezing or symptoms not responding to treatment plan.     Follow up with ENT as discussed    Return to clinic as scheduled in May with Dr. Cardona.

## 2025-04-04 PROBLEM — Z09 FOLLOW-UP EXAM: Status: RESOLVED | Noted: 2025-03-05 | Resolved: 2025-04-04

## 2025-04-08 ENCOUNTER — OFFICE VISIT (OUTPATIENT)
Dept: FAMILY MEDICINE CLINIC | Age: 60
End: 2025-04-08
Payer: MEDICARE

## 2025-04-08 VITALS
OXYGEN SATURATION: 95 % | WEIGHT: 98.6 LBS | BODY MASS INDEX: 19.26 KG/M2 | SYSTOLIC BLOOD PRESSURE: 132 MMHG | HEART RATE: 114 BPM | DIASTOLIC BLOOD PRESSURE: 84 MMHG | RESPIRATION RATE: 16 BRPM

## 2025-04-08 DIAGNOSIS — Z76.89 ENCOUNTER TO ESTABLISH CARE: ICD-10-CM

## 2025-04-08 DIAGNOSIS — L30.9 DERMATITIS: Primary | ICD-10-CM

## 2025-04-08 DIAGNOSIS — K21.9 GASTROESOPHAGEAL REFLUX DISEASE WITHOUT ESOPHAGITIS: ICD-10-CM

## 2025-04-08 DIAGNOSIS — J44.1 COPD EXACERBATION (HCC): ICD-10-CM

## 2025-04-08 DIAGNOSIS — F33.1 MODERATE EPISODE OF RECURRENT MAJOR DEPRESSIVE DISORDER (HCC): ICD-10-CM

## 2025-04-08 PROCEDURE — G8420 CALC BMI NORM PARAMETERS: HCPCS | Performed by: NURSE PRACTITIONER

## 2025-04-08 PROCEDURE — 3075F SYST BP GE 130 - 139MM HG: CPT | Performed by: NURSE PRACTITIONER

## 2025-04-08 PROCEDURE — G8427 DOCREV CUR MEDS BY ELIG CLIN: HCPCS | Performed by: NURSE PRACTITIONER

## 2025-04-08 PROCEDURE — 99213 OFFICE O/P EST LOW 20 MIN: CPT | Performed by: NURSE PRACTITIONER

## 2025-04-08 PROCEDURE — 1036F TOBACCO NON-USER: CPT | Performed by: NURSE PRACTITIONER

## 2025-04-08 PROCEDURE — 3023F SPIROM DOC REV: CPT | Performed by: NURSE PRACTITIONER

## 2025-04-08 PROCEDURE — 3079F DIAST BP 80-89 MM HG: CPT | Performed by: NURSE PRACTITIONER

## 2025-04-08 PROCEDURE — 3017F COLORECTAL CA SCREEN DOC REV: CPT | Performed by: NURSE PRACTITIONER

## 2025-04-08 ASSESSMENT — PATIENT HEALTH QUESTIONNAIRE - PHQ9
3. TROUBLE FALLING OR STAYING ASLEEP: NOT AT ALL
2. FEELING DOWN, DEPRESSED OR HOPELESS: NOT AT ALL
10. IF YOU CHECKED OFF ANY PROBLEMS, HOW DIFFICULT HAVE THESE PROBLEMS MADE IT FOR YOU TO DO YOUR WORK, TAKE CARE OF THINGS AT HOME, OR GET ALONG WITH OTHER PEOPLE: NOT DIFFICULT AT ALL
9. THOUGHTS THAT YOU WOULD BE BETTER OFF DEAD, OR OF HURTING YOURSELF: NOT AT ALL
8. MOVING OR SPEAKING SO SLOWLY THAT OTHER PEOPLE COULD HAVE NOTICED. OR THE OPPOSITE, BEING SO FIGETY OR RESTLESS THAT YOU HAVE BEEN MOVING AROUND A LOT MORE THAN USUAL: NOT AT ALL
6. FEELING BAD ABOUT YOURSELF - OR THAT YOU ARE A FAILURE OR HAVE LET YOURSELF OR YOUR FAMILY DOWN: NOT AT ALL
5. POOR APPETITE OR OVEREATING: NOT AT ALL
4. FEELING TIRED OR HAVING LITTLE ENERGY: NOT AT ALL
SUM OF ALL RESPONSES TO PHQ QUESTIONS 1-9: 0
1. LITTLE INTEREST OR PLEASURE IN DOING THINGS: NOT AT ALL
7. TROUBLE CONCENTRATING ON THINGS, SUCH AS READING THE NEWSPAPER OR WATCHING TELEVISION: NOT AT ALL
SUM OF ALL RESPONSES TO PHQ QUESTIONS 1-9: 0

## 2025-04-08 NOTE — PROGRESS NOTES
PROGRESS NOTE  Date of Service:  4/8/2025    SUBJECTIVE:  Patient ID: Jina Amor is a 60 y.o. female    HPI: new patient exam  Here to have paperwork completed for disability  Forms completed and faxed to requested agency   Pt has o2 contiually  Pt does have sob   Medication reviewed  Ct reviewed  Labs reviewed  No change in any medications at this time       Past Medical History:   Diagnosis Date    Acute on chronic respiratory failure with hypoxia 03/03/2023    Alopecia     Arthritis     Chronic hip pain     Chronic obstructive pulmonary disease (HCC) 03/22/2023    Chronic obstructive pulmonary disease with acute exacerbation (HCC) 01/18/2023    severe stage 4    Chronic respiratory failure     Depression     Esophageal varices     GERD (gastroesophageal reflux disease)     Hyperlipidemia     Moderate malnutrition 02/27/2023    Perioperative dehiscence of abdominal wound with evisceration 02/28/2023    Pneumonia due to Enterobacter species ([facility]) 03/07/2023    PONV (postoperative nausea and vomiting)     Pressure ulcer of right buttock, stage 2 (HCC) 03/23/2023    healed March 2023    Sciatica     right, chronic    Small bowel obstruction (HCC) 02/18/2023    Thrombocytopenia, unspecified 10/30/2023    Tobacco abuse 01/18/2023    Umbilical hernia 01/18/2023    Wears glasses       Past Surgical History:   Procedure Laterality Date    ABDOMEN SURGERY N/A 02/28/2023    ABDOMINAL  WOUND CLOSURE performed by Pierre Harvey MD at [facility] OR    COLONOSCOPY      COLONOSCOPY N/A 6/20/2024    COLONOSCOPY performed by Sue Woodard MD at [facility] ASC ENDOSCOPY    ENDOMETRIAL ABLATION      ENDOSCOPY, COLON, DIAGNOSTIC      HERNIA REPAIR N/A 8/3/2023    ROBOTIC LEFT  INGUINAL HERNIA REPAIR WITH MESH,  OPEN INCISIONAL HERNIA REPAIR WITH MESH performed by Pierre Harvey MD at [facility] OR    LAPAROTOMY N/A 02/22/2023    UMBILLICAL HERNIA REPAIR performed by Pierre Harvey MD at [facility] OR    PAIN MANAGEMENT PROCEDURE

## 2025-04-09 PROBLEM — L30.9 DERMATITIS: Status: ACTIVE | Noted: 2025-04-09

## 2025-04-09 PROBLEM — Z76.89 ENCOUNTER TO ESTABLISH CARE: Status: ACTIVE | Noted: 2025-04-09

## 2025-04-09 NOTE — ASSESSMENT & PLAN NOTE
Constant 02 use  Followed by pulmonology no recent changes in medications  Paperwork submitted today for continuation of disability based on 02 use for health and breathing

## 2025-04-09 NOTE — ASSESSMENT & PLAN NOTE
Chronic not at goal  No thoughts of self harm or harming others   Discussed therapist as saws to support pt

## 2025-04-29 ENCOUNTER — TRANSCRIBE ORDERS (OUTPATIENT)
Dept: ADMINISTRATIVE | Age: 60
End: 2025-04-29

## 2025-04-29 DIAGNOSIS — K70.30 ALCOHOLIC CIRRHOSIS OF LIVER WITHOUT ASCITES (HCC): Primary | ICD-10-CM

## 2025-05-14 ENCOUNTER — RESULTS FOLLOW-UP (OUTPATIENT)
Dept: FAMILY MEDICINE CLINIC | Age: 60
End: 2025-05-14

## 2025-05-14 ENCOUNTER — HOSPITAL ENCOUNTER (OUTPATIENT)
Dept: WOMENS IMAGING | Age: 60
Discharge: HOME | End: 2025-05-14
Attending: STUDENT IN AN ORGANIZED HEALTH CARE EDUCATION/TRAINING PROGRAM
Payer: MEDICARE

## 2025-05-14 ENCOUNTER — HOSPITAL ENCOUNTER (OUTPATIENT)
Dept: ULTRASOUND IMAGING | Age: 60
Discharge: HOME | End: 2025-05-14
Attending: INTERNAL MEDICINE
Payer: MEDICARE

## 2025-05-14 VITALS — BODY MASS INDEX: 19.04 KG/M2 | HEIGHT: 60 IN | WEIGHT: 97 LBS

## 2025-05-14 DIAGNOSIS — K70.30 ALCOHOLIC CIRRHOSIS OF LIVER WITHOUT ASCITES (HCC): ICD-10-CM

## 2025-05-14 DIAGNOSIS — Z12.31 ENCOUNTER FOR SCREENING MAMMOGRAM FOR MALIGNANT NEOPLASM OF BREAST: ICD-10-CM

## 2025-05-14 LAB
ALBUMIN SERPL-MCNC: 4.3 G/DL (ref 3.4–5)
ALBUMIN/GLOB SERPL: 1.7 {RATIO} (ref 1.1–2.2)
ALP SERPL-CCNC: 62 U/L (ref 40–129)
ALT SERPL-CCNC: 34 U/L (ref 10–40)
ANION GAP SERPL CALCULATED.3IONS-SCNC: 11 MMOL/L (ref 3–16)
AST SERPL-CCNC: 38 U/L (ref 15–37)
BASOPHILS # BLD: 0 K/UL (ref 0–0.2)
BASOPHILS NFR BLD: 0.4 %
BILIRUB SERPL-MCNC: 0.4 MG/DL (ref 0–1)
BUN SERPL-MCNC: 15 MG/DL (ref 7–20)
CALCIUM SERPL-MCNC: 9.2 MG/DL (ref 8.3–10.6)
CHLORIDE SERPL-SCNC: 98 MMOL/L (ref 99–110)
CO2 SERPL-SCNC: 27 MMOL/L (ref 21–32)
CREAT SERPL-MCNC: 0.5 MG/DL (ref 0.6–1.2)
DEPRECATED RDW RBC AUTO: 16 % (ref 12.4–15.4)
EOSINOPHIL # BLD: 0.1 K/UL (ref 0–0.6)
EOSINOPHIL NFR BLD: 0.8 %
GFR SERPLBLD CREATININE-BSD FMLA CKD-EPI: >90 ML/MIN/{1.73_M2}
GLUCOSE SERPL-MCNC: 101 MG/DL (ref 70–99)
HCT VFR BLD AUTO: 38.8 % (ref 36–48)
HGB BLD-MCNC: 12.6 G/DL (ref 12–16)
INR PPP: 1.05 (ref 0.85–1.15)
LYMPHOCYTES # BLD: 0.6 K/UL (ref 1–5.1)
LYMPHOCYTES NFR BLD: 9.9 %
MCH RBC QN AUTO: 28.1 PG (ref 26–34)
MCHC RBC AUTO-ENTMCNC: 32.5 G/DL (ref 31–36)
MCV RBC AUTO: 86.3 FL (ref 80–100)
MONOCYTES # BLD: 0.5 K/UL (ref 0–1.3)
MONOCYTES NFR BLD: 7.5 %
NEUTROPHILS # BLD: 5.2 K/UL (ref 1.7–7.7)
NEUTROPHILS NFR BLD: 81.4 %
PLATELET # BLD AUTO: 98 K/UL (ref 135–450)
PMV BLD AUTO: 9.1 FL (ref 5–10.5)
POTASSIUM SERPL-SCNC: 3.7 MMOL/L (ref 3.5–5.1)
PROT SERPL-MCNC: 6.8 G/DL (ref 6.4–8.2)
PROTHROMBIN TIME: 14 SEC (ref 11.9–14.9)
RBC # BLD AUTO: 4.5 M/UL (ref 4–5.2)
SODIUM SERPL-SCNC: 136 MMOL/L (ref 136–145)
WBC # BLD AUTO: 6.4 K/UL (ref 4–11)

## 2025-05-14 PROCEDURE — 77063 BREAST TOMOSYNTHESIS BI: CPT

## 2025-05-14 PROCEDURE — 76700 US EXAM ABDOM COMPLETE: CPT

## 2025-05-15 DIAGNOSIS — J44.1 CHRONIC OBSTRUCTIVE PULMONARY DISEASE WITH ACUTE EXACERBATION (HCC): ICD-10-CM

## 2025-05-15 RX ORDER — MONTELUKAST SODIUM 10 MG/1
10 TABLET ORAL DAILY
Qty: 90 TABLET | Refills: 0 | Status: SHIPPED | OUTPATIENT
Start: 2025-05-15

## 2025-05-16 LAB — AFP-TM SERPL-MCNC: 4.5 UG/L

## 2025-05-19 ENCOUNTER — TELEPHONE (OUTPATIENT)
Dept: PULMONOLOGY | Age: 60
End: 2025-05-19

## 2025-05-19 NOTE — TELEPHONE ENCOUNTER
Talked with patient this morning and she stated this message would be coming to ignore it since we talked

## 2025-05-19 NOTE — TELEPHONE ENCOUNTER
Pt called and stated that they have questions for provider. They stated that their phone is unable to complete call to office and stating \"number blocked due to spam calling\" Pt requested telephone encounter be routed to provider to give call back.  Please advise.

## 2025-05-20 ENCOUNTER — OFFICE VISIT (OUTPATIENT)
Dept: PULMONOLOGY | Age: 60
End: 2025-05-20
Payer: MEDICARE

## 2025-05-20 VITALS
BODY MASS INDEX: 19.83 KG/M2 | TEMPERATURE: 98.1 F | DIASTOLIC BLOOD PRESSURE: 86 MMHG | WEIGHT: 101 LBS | OXYGEN SATURATION: 93 % | HEIGHT: 60 IN | HEART RATE: 99 BPM | SYSTOLIC BLOOD PRESSURE: 146 MMHG | RESPIRATION RATE: 16 BRPM

## 2025-05-20 DIAGNOSIS — Z87.891 FORMER SMOKER: ICD-10-CM

## 2025-05-20 DIAGNOSIS — J44.9 COPD, SEVERE (HCC): Primary | ICD-10-CM

## 2025-05-20 DIAGNOSIS — J96.11 CHRONIC RESPIRATORY FAILURE WITH HYPOXIA (HCC): ICD-10-CM

## 2025-05-20 PROCEDURE — 3023F SPIROM DOC REV: CPT | Performed by: STUDENT IN AN ORGANIZED HEALTH CARE EDUCATION/TRAINING PROGRAM

## 2025-05-20 PROCEDURE — 1036F TOBACCO NON-USER: CPT | Performed by: STUDENT IN AN ORGANIZED HEALTH CARE EDUCATION/TRAINING PROGRAM

## 2025-05-20 PROCEDURE — 3079F DIAST BP 80-89 MM HG: CPT | Performed by: STUDENT IN AN ORGANIZED HEALTH CARE EDUCATION/TRAINING PROGRAM

## 2025-05-20 PROCEDURE — G8420 CALC BMI NORM PARAMETERS: HCPCS | Performed by: STUDENT IN AN ORGANIZED HEALTH CARE EDUCATION/TRAINING PROGRAM

## 2025-05-20 PROCEDURE — G8427 DOCREV CUR MEDS BY ELIG CLIN: HCPCS | Performed by: STUDENT IN AN ORGANIZED HEALTH CARE EDUCATION/TRAINING PROGRAM

## 2025-05-20 PROCEDURE — G2211 COMPLEX E/M VISIT ADD ON: HCPCS | Performed by: STUDENT IN AN ORGANIZED HEALTH CARE EDUCATION/TRAINING PROGRAM

## 2025-05-20 PROCEDURE — 3017F COLORECTAL CA SCREEN DOC REV: CPT | Performed by: STUDENT IN AN ORGANIZED HEALTH CARE EDUCATION/TRAINING PROGRAM

## 2025-05-20 PROCEDURE — 99213 OFFICE O/P EST LOW 20 MIN: CPT | Performed by: STUDENT IN AN ORGANIZED HEALTH CARE EDUCATION/TRAINING PROGRAM

## 2025-05-20 PROCEDURE — 3077F SYST BP >= 140 MM HG: CPT | Performed by: STUDENT IN AN ORGANIZED HEALTH CARE EDUCATION/TRAINING PROGRAM

## 2025-05-20 RX ORDER — BUDESONIDE, GLYCOPYRROLATE, AND FORMOTEROL FUMARATE 160; 9; 4.8 UG/1; UG/1; UG/1
2 AEROSOL, METERED RESPIRATORY (INHALATION) 2 TIMES DAILY
Qty: 2 EACH | Refills: 0 | Status: SHIPPED | COMMUNITY
Start: 2025-05-20 | End: 2025-07-19

## 2025-05-20 RX ORDER — SODIUM FLUORIDE 6 MG/ML
PASTE, DENTIFRICE DENTAL 2 TIMES DAILY
COMMUNITY
Start: 2025-03-12

## 2025-05-20 ASSESSMENT — ENCOUNTER SYMPTOMS
STRIDOR: 0
TROUBLE SWALLOWING: 0
SORE THROAT: 0
NAUSEA: 0
EYE ITCHING: 0
SHORTNESS OF BREATH: 0
COLOR CHANGE: 0
EYE DISCHARGE: 0
VOMITING: 0
EYE REDNESS: 0
ABDOMINAL PAIN: 0
BACK PAIN: 0
COUGH: 0
CONSTIPATION: 0
DIARRHEA: 0
EYE PAIN: 0
WHEEZING: 0
ABDOMINAL DISTENTION: 0

## 2025-05-20 NOTE — PATIENT INSTRUCTIONS
Remember to bring a list of pulmonary medications and any CPAP or BiPAP machines to your next appointment with the office.     Please keep all of your future appointments scheduled by [facility] office. Out of respect for other patients and providers, you may be asked to reschedule your appointment if you arrive later than your scheduled appointment time. Appointments cancelled less than 24hrs in advance will be considered a no show. Patients with three missed appointments within 1 year or four missed appointments within 2 years can be dismissed from the practice.     Please be aware that our physicians are required to work in the Intensive Care Unit at [facility].  Your appointment may need to be rescheduled if they are designated to work during your appointment time.      You may receive a survey regarding the care you received during your visit.  Your input is valuable to us.  We encourage you to complete and return your survey.  We hope you will choose us in the future for your healthcare needs.     Pt instructed of all future appointment dates & times, including radiology, labs, procedures & referrals. If procedures were scheduled preparation instructions provided. Instructions on future appointments with [facility] were given.     In the next few weeks, you will be receiving a survey from [facility] regarding your visit today.  We would greatly appreciate it if you would take just a few minutes to fill that out.  It is very important to us that our patients receive top notch care and our surveys help keep us accountable. However, if your experience was not a good one, we want to hear about that as well. This is a key way we can keep track of problems and strive to correct any for future visits.    Again, we appreciate your time and thank you for choosing [facility]!    Cleo Laura

## 2025-05-20 NOTE — PROGRESS NOTES
MA Communication:  The following orders are received by verbal communication from   Guillermo Cardona MD    Orders include:  fu 6 MO       bREZTRI SAMPLES

## 2025-05-20 NOTE — PROGRESS NOTES
[facility] Follow-up  [address] [place] [address]  [phone]        Jina Amor (: 1965 ) is a 60 y.o. female here for an evaluation of   Chief Complaint   Patient presents with    Follow-up    COPD         SUBJECTIVE/OBJECTIVE:    Patient is a 60-year-old female with significant past medical history of tobacco dependence and chronic hypoxic respiratory failure that presents to [facility] pulmonary clinic for follow-up visit.  Patient reports having 2 COPD exacerbations since last clinic visit.  She is still on Breztri with albuterol as needed for shortness of breath.  She is also on oxygen therapy.  She has no other complaints.     Patient quit smoking 16 years ago.  She was smoking around 2 packs/day for about 30 years.  She uses marijuana oil, she does not smoke marijuana.  She denies any other illicit drug use, no alcohol use.         Review of Systems   Constitutional:  Negative for activity change, appetite change, chills, diaphoresis and fatigue.   HENT:  Negative for congestion, sore throat and trouble swallowing.    Eyes:  Negative for pain, discharge, redness and itching.   Respiratory:  Negative for cough, shortness of breath, wheezing and stridor.    Cardiovascular:  Negative for chest pain, palpitations and leg swelling.   Gastrointestinal:  Negative for abdominal distention, abdominal pain, constipation, diarrhea, nausea and vomiting.   Endocrine: Negative for polydipsia, polyphagia and polyuria.   Genitourinary:  Negative for difficulty urinating.   Musculoskeletal:  Negative for back pain, myalgias and neck pain.   Skin:  Negative for color change.   Neurological:  Negative for dizziness, weakness and light-headedness.   Psychiatric/Behavioral:  Negative for agitation and behavioral problems.          Vitals:    25 1356   BP: (!) 146/86   BP Site: Left Upper Arm   Patient Position: Sitting   BP Cuff Size: Small Adult   Pulse: 99   Resp: 16   Temp: 98.1

## 2025-05-22 ENCOUNTER — RESULTS FOLLOW-UP (OUTPATIENT)
Dept: GASTROENTEROLOGY | Age: 60
End: 2025-05-22

## 2025-06-16 ENCOUNTER — OFFICE VISIT (OUTPATIENT)
Dept: FAMILY MEDICINE CLINIC | Age: 60
End: 2025-06-16

## 2025-06-16 VITALS
BODY MASS INDEX: 19.24 KG/M2 | WEIGHT: 98 LBS | OXYGEN SATURATION: 96 % | SYSTOLIC BLOOD PRESSURE: 128 MMHG | HEIGHT: 60 IN | DIASTOLIC BLOOD PRESSURE: 84 MMHG | HEART RATE: 105 BPM

## 2025-06-16 DIAGNOSIS — I70.8 ATHEROSCLEROSIS OF BOTH ILIAC ARTERIES: ICD-10-CM

## 2025-06-16 DIAGNOSIS — Z00.00 MEDICARE ANNUAL WELLNESS VISIT, SUBSEQUENT: Primary | ICD-10-CM

## 2025-06-16 DIAGNOSIS — J44.1 CHRONIC OBSTRUCTIVE PULMONARY DISEASE WITH ACUTE EXACERBATION (HCC): ICD-10-CM

## 2025-06-16 DIAGNOSIS — Z23 IMMUNIZATION DUE: ICD-10-CM

## 2025-06-16 RX ORDER — MONTELUKAST SODIUM 10 MG/1
10 TABLET ORAL DAILY
Qty: 90 TABLET | Refills: 0 | Status: SHIPPED | OUTPATIENT
Start: 2025-06-16

## 2025-06-16 RX ORDER — ATORVASTATIN CALCIUM 40 MG/1
40 TABLET, FILM COATED ORAL DAILY
Qty: 90 TABLET | Refills: 0 | Status: SHIPPED | OUTPATIENT
Start: 2025-06-16 | End: 2025-09-14

## 2025-06-16 ASSESSMENT — PATIENT HEALTH QUESTIONNAIRE - PHQ9
SUM OF ALL RESPONSES TO PHQ QUESTIONS 1-9: 0
8. MOVING OR SPEAKING SO SLOWLY THAT OTHER PEOPLE COULD HAVE NOTICED. OR THE OPPOSITE, BEING SO FIGETY OR RESTLESS THAT YOU HAVE BEEN MOVING AROUND A LOT MORE THAN USUAL: NOT AT ALL
7. TROUBLE CONCENTRATING ON THINGS, SUCH AS READING THE NEWSPAPER OR WATCHING TELEVISION: NOT AT ALL
3. TROUBLE FALLING OR STAYING ASLEEP: NOT AT ALL
1. LITTLE INTEREST OR PLEASURE IN DOING THINGS: NOT AT ALL
9. THOUGHTS THAT YOU WOULD BE BETTER OFF DEAD, OR OF HURTING YOURSELF: NOT AT ALL
4. FEELING TIRED OR HAVING LITTLE ENERGY: NOT AT ALL
5. POOR APPETITE OR OVEREATING: NOT AT ALL
2. FEELING DOWN, DEPRESSED OR HOPELESS: NOT AT ALL
SUM OF ALL RESPONSES TO PHQ QUESTIONS 1-9: 0
10. IF YOU CHECKED OFF ANY PROBLEMS, HOW DIFFICULT HAVE THESE PROBLEMS MADE IT FOR YOU TO DO YOUR WORK, TAKE CARE OF THINGS AT HOME, OR GET ALONG WITH OTHER PEOPLE: NOT DIFFICULT AT ALL
6. FEELING BAD ABOUT YOURSELF - OR THAT YOU ARE A FAILURE OR HAVE LET YOURSELF OR YOUR FAMILY DOWN: NOT AT ALL

## 2025-06-16 NOTE — PATIENT INSTRUCTIONS
day. Dark green, deep orange, red, or yellow fruits and vegetables are especially good for you. Examples include spinach, carrots, peaches, and berries.     Foods high in fiber can reduce your cholesterol and provide important vitamins and minerals. High-fiber foods include whole-grain cereals and breads, oatmeal, beans, brown rice, citrus fruits, and apples.     Eat lean proteins. Heart-healthy proteins include seafood, lean meats and poultry, eggs, beans, peas, nuts, seeds, and soy products.     Limit drinks and foods with added sugar. These include candy, desserts, and soda pop.   Heart-healthy lifestyle    If your doctor recommends it, get more exercise. For many people, walking is a good choice. Or you may want to swim, bike, or do other activities. Bit by bit, increase the time you're active every day. Try for at least 30 minutes on most days of the week.     Try to quit or cut back on using tobacco and other nicotine products. This includes smoking and vaping. If you need help quitting, talk to your doctor about stop-smoking programs and medicines. These can increase your chances of quitting for good. Quitting is one of the most important things you can do to protect your heart. It is never too late to quit. Try to avoid secondhand smoke too.     Stay at a weight that's healthy for you. Talk to your doctor if you need help losing weight.     Try to get 7 to 9 hours of sleep each night.     Limit alcohol to 2 drinks a day for men and 1 drink a day for women. Too much alcohol can cause health problems.     Manage other health problems such as diabetes, high blood pressure, and high cholesterol. If you think you may have a problem with alcohol or drug use, talk to your doctor.   Medicines    Take your medicines exactly as prescribed. Call your doctor if you think you are having a problem with your medicine.     If your doctor recommends aspirin, take the amount directed each day. Make sure you take aspirin and

## 2025-06-16 NOTE — PROGRESS NOTES
Medicare Annual Wellness Visit    Jina Amor is here for Medicare AWV (Patient stated her skin on arms are thin and always getting marks on arms)    Assessment & Plan   Medicare annual wellness visit, subsequent  Atherosclerosis of both iliac arteries  -     atorvastatin (LIPITOR) 40 MG tablet; Take 1 tablet by mouth daily, Disp-90 tablet, R-0Normal  Chronic obstructive pulmonary disease with acute exacerbation (HCC)  -     montelukast (SINGULAIR) 10 MG tablet; Take 1 tablet by mouth daily, Disp-90 tablet, R-0Normal  Immunization due  -     Hep B, ENGERIX-B, (age 20 yrs+), IM, 1mL, 3-dose       No follow-ups on file.     Subjective   The following acute and/or chronic problems were also addressed today:  Asthma and elevated lipid panel .    Patient's complete Health Risk Assessment and screening values have been reviewed and are found in Flowsheets. The following problems were reviewed today and where indicated follow up appointments were made and/or referrals ordered.    Positive Risk Factor Screenings with Interventions:        Drug Use:   Substance and Sexual Activity   Drug Use Yes    Types: Marijuana (Weed)    Comment: thc, medical card daily     Interventions:  \none required                        Recent ct reviewed         Objective   Vitals:    06/16/25 0935   BP: 128/84   BP Site: Right Upper Arm   Patient Position: Sitting   Pulse: (!) 105   SpO2: 96%   Weight: 44.5 kg (98 lb)   Height: 1.524 m (5')      Body mass index is 19.14 kg/m².      General Appearance: alert and oriented to person, place and time, well developed and well- nourished, in no acute distress  Skin: warm and dry, no rash or erythema thin skin noted   Pulmonary/Chest: clear to auscultation bilaterally- no wheezes, rales or rhonchi, normal air movement, no respiratory distress  Cardiovascular: normal rate, regular rhythm, normal S1 and S2, no murmurs, rubs, clicks, or gallops, distal pulses intact, no carotid bruits  Musculoskeletal:

## 2025-06-20 RX ORDER — FLUTICASONE PROPIONATE 50 MCG
SPRAY, SUSPENSION (ML) NASAL
Refills: 0 | OUTPATIENT
Start: 2025-06-20

## 2025-06-20 RX ORDER — AZELASTINE 1 MG/ML
SPRAY, METERED NASAL
Refills: 0 | OUTPATIENT
Start: 2025-06-20

## 2025-06-23 ENCOUNTER — LAB (OUTPATIENT)
Dept: FAMILY MEDICINE CLINIC | Age: 60
End: 2025-06-23
Payer: MEDICARE

## 2025-06-23 DIAGNOSIS — R31.9 HEMATURIA, UNSPECIFIED TYPE: Primary | ICD-10-CM

## 2025-06-23 LAB
BILIRUBIN, POC: NEGATIVE
BLOOD URINE, POC: NORMAL
CLARITY, POC: NORMAL
COLOR, POC: NORMAL
GLUCOSE URINE, POC: NEGATIVE MG/DL
KETONES, POC: NEGATIVE MG/DL
LEUKOCYTE EST, POC: NEGATIVE
NITRITE, POC: NEGATIVE
PH, POC: 6
PROTEIN, POC: NEGATIVE MG/DL
SPECIFIC GRAVITY, POC: <=1.005
UROBILINOGEN, POC: 0.2 MG/DL

## 2025-06-23 PROCEDURE — 81002 URINALYSIS NONAUTO W/O SCOPE: CPT | Performed by: NURSE PRACTITIONER

## 2025-06-30 RX ORDER — FLUTICASONE PROPIONATE 50 MCG
2 SPRAY, SUSPENSION (ML) NASAL DAILY
Qty: 48 G | Refills: 1 | Status: SHIPPED | OUTPATIENT
Start: 2025-06-30

## 2025-06-30 RX ORDER — AZELASTINE 1 MG/ML
1 SPRAY, METERED NASAL 2 TIMES DAILY
Qty: 1 EACH | Refills: 1 | Status: SHIPPED | OUTPATIENT
Start: 2025-06-30

## 2025-07-01 ENCOUNTER — OFFICE VISIT (OUTPATIENT)
Dept: OBGYN CLINIC | Age: 60
End: 2025-07-01
Payer: MEDICARE

## 2025-07-01 VITALS
DIASTOLIC BLOOD PRESSURE: 82 MMHG | TEMPERATURE: 97.9 F | OXYGEN SATURATION: 94 % | HEIGHT: 60 IN | BODY MASS INDEX: 19.28 KG/M2 | SYSTOLIC BLOOD PRESSURE: 136 MMHG | HEART RATE: 91 BPM | WEIGHT: 98.2 LBS

## 2025-07-01 DIAGNOSIS — R35.0 URINARY FREQUENCY: ICD-10-CM

## 2025-07-01 DIAGNOSIS — R33.9 INCOMPLETE EMPTYING OF BLADDER: Primary | ICD-10-CM

## 2025-07-01 PROCEDURE — 3075F SYST BP GE 130 - 139MM HG: CPT | Performed by: OBSTETRICS & GYNECOLOGY

## 2025-07-01 PROCEDURE — 3017F COLORECTAL CA SCREEN DOC REV: CPT | Performed by: OBSTETRICS & GYNECOLOGY

## 2025-07-01 PROCEDURE — 3079F DIAST BP 80-89 MM HG: CPT | Performed by: OBSTETRICS & GYNECOLOGY

## 2025-07-01 PROCEDURE — G8420 CALC BMI NORM PARAMETERS: HCPCS | Performed by: OBSTETRICS & GYNECOLOGY

## 2025-07-01 PROCEDURE — 1036F TOBACCO NON-USER: CPT | Performed by: OBSTETRICS & GYNECOLOGY

## 2025-07-01 PROCEDURE — G8427 DOCREV CUR MEDS BY ELIG CLIN: HCPCS | Performed by: OBSTETRICS & GYNECOLOGY

## 2025-07-01 PROCEDURE — 99203 OFFICE O/P NEW LOW 30 MIN: CPT | Performed by: OBSTETRICS & GYNECOLOGY

## 2025-07-01 SDOH — ECONOMIC STABILITY: FOOD INSECURITY: WITHIN THE PAST 12 MONTHS, THE FOOD YOU BOUGHT JUST DIDN'T LAST AND YOU DIDN'T HAVE MONEY TO GET MORE.: NEVER TRUE

## 2025-07-01 SDOH — ECONOMIC STABILITY: FOOD INSECURITY: WITHIN THE PAST 12 MONTHS, YOU WORRIED THAT YOUR FOOD WOULD RUN OUT BEFORE YOU GOT MONEY TO BUY MORE.: NEVER TRUE

## 2025-07-01 ASSESSMENT — PATIENT HEALTH QUESTIONNAIRE - PHQ9
SUM OF ALL RESPONSES TO PHQ QUESTIONS 1-9: 0
1. LITTLE INTEREST OR PLEASURE IN DOING THINGS: NOT AT ALL
2. FEELING DOWN, DEPRESSED OR HOPELESS: NOT AT ALL
SUM OF ALL RESPONSES TO PHQ QUESTIONS 1-9: 0

## 2025-07-01 NOTE — PROGRESS NOTES
Have you been to the ER, urgent care clinic since your last visit?  Hospitalized since your last visit?   NO    Have you seen or consulted any other health care providers outside our system since your last visit?   NO    
Mental Status: She is alert and oriented to person, place, and time.   Psychiatric:         Mood and Affect: Mood normal.         Behavior: Behavior normal.         Thought Content: Thought content normal.         Assessment/Plan:     Jina Amor is a 60 y.o. female who presents for to discuss urinary concerns     1. Incomplete emptying of bladder     - Patient reports symptoms of pink tinged urine, the sensation that she is not completely emptying her bladder and urinary frequency     - Benign exam without evidence of caruncle or prolapse     - Differential reviewed     - Risks, benefits and alternatives were reviewed   - Urinalysis with Microscopic ([place] ONLY)  - Culture, Urine     - Reviewed management options including treatment of UTI if present versus consideration of Urology referral if negative culture     - Return precautions reviewed     - Will follow-up with results     2. Urinary frequency     - Patient reports symptoms of pink tinged urine, the sensation that she is not completely emptying her bladder and urinary frequency     - Benign exam without evidence of caruncle or prolapse     - Differential reviewed     - Risks, benefits and alternatives were reviewed   - Urinalysis with Microscopic ([place] ONLY)  - Culture, Urine     - Reviewed management options including treatment of UTI if present versus consideration of Urology referral if negative culture     - Return precautions reviewed     - Will follow-up with results        Gaby Ramos DO

## 2025-07-02 LAB
BACTERIA URNS QL MICRO: ABNORMAL /HPF
BILIRUB UR QL STRIP.AUTO: NEGATIVE
CLARITY UR: CLEAR
COLOR UR: YELLOW
EPI CELLS #/AREA URNS AUTO: 0 /HPF (ref 0–5)
GLUCOSE UR STRIP.AUTO-MCNC: NEGATIVE MG/DL
HGB UR QL STRIP.AUTO: NEGATIVE
HYALINE CASTS #/AREA URNS AUTO: 0 /LPF (ref 0–8)
KETONES UR STRIP.AUTO-MCNC: NEGATIVE MG/DL
LEUKOCYTE ESTERASE UR QL STRIP.AUTO: ABNORMAL
NITRITE UR QL STRIP.AUTO: NEGATIVE
PH UR STRIP.AUTO: 7 [PH] (ref 5–8)
PROT UR STRIP.AUTO-MCNC: NEGATIVE MG/DL
RBC CLUMPS #/AREA URNS AUTO: 0 /HPF (ref 0–4)
SP GR UR STRIP.AUTO: 1.01 (ref 1–1.03)
UA DIPSTICK W REFLEX MICRO PNL UR: YES
URN SPEC COLLECT METH UR: ABNORMAL
UROBILINOGEN UR STRIP-ACNC: 0.2 E.U./DL
WBC #/AREA URNS AUTO: 2 /HPF (ref 0–5)

## 2025-07-03 ENCOUNTER — RESULTS FOLLOW-UP (OUTPATIENT)
Dept: OBGYN | Age: 60
End: 2025-07-03

## 2025-07-03 DIAGNOSIS — R31.9 HEMATURIA, UNSPECIFIED TYPE: ICD-10-CM

## 2025-07-03 DIAGNOSIS — R33.9 URINARY RETENTION: Primary | ICD-10-CM

## 2025-07-03 LAB — BACTERIA UR CULT: NORMAL

## 2025-07-07 RX ORDER — FLUTICASONE PROPIONATE 50 MCG
2 SPRAY, SUSPENSION (ML) NASAL DAILY
Qty: 48 G | Refills: 1 | Status: SHIPPED | OUTPATIENT
Start: 2025-07-07

## 2025-07-07 NOTE — TELEPHONE ENCOUNTER
Patient is asking if this can be sent in as a 90 day to [facility] stated 90 days and 30 days  cost the same amount

## 2025-07-16 ENCOUNTER — OFFICE VISIT (OUTPATIENT)
Dept: OBGYN CLINIC | Age: 60
End: 2025-07-16

## 2025-07-16 VITALS
HEART RATE: 132 BPM | HEIGHT: 60 IN | BODY MASS INDEX: 19.48 KG/M2 | SYSTOLIC BLOOD PRESSURE: 137 MMHG | DIASTOLIC BLOOD PRESSURE: 82 MMHG | WEIGHT: 99.2 LBS

## 2025-07-16 DIAGNOSIS — Z01.419 ENCNTR FOR GYN EXAM (GENERAL) (ROUTINE) W/O ABN FINDINGS: Primary | ICD-10-CM

## 2025-07-16 RX ORDER — TRETINOIN 0.25 MG/G
CREAM TOPICAL
COMMUNITY
Start: 2025-06-04

## 2025-07-16 ASSESSMENT — ENCOUNTER SYMPTOMS
NAUSEA: 0
SORE THROAT: 0
ABDOMINAL DISTENTION: 1
ABDOMINAL PAIN: 0
SHORTNESS OF BREATH: 0
VOMITING: 0
CONSTIPATION: 0
COUGH: 0
DIARRHEA: 1

## 2025-07-16 NOTE — PROGRESS NOTES
Mammogram up to date     - Self breast exams/awareness discussed with the patient     - Colonoscopy up to date      - Healthy lifestyle habits discussed     - Will follow-up in 1 year for annual exam      Gaby Ramos DO   Body Location Override (Optional - Billing Will Still Be Based On Selected Body Map Location If Applicable): right chest Detail Level: Detailed Depth Of Biopsy: dermis Was A Bandage Applied: Yes Size Of Lesion In Cm: 0.4 Biopsy Type: H and E Biopsy Method: Dermablade Anesthesia Type: 1% lidocaine with epinephrine Anesthesia Volume In Cc (Will Not Render If 0): 0.5 Additional Anesthesia Volume In Cc (Will Not Render If 0): 0 Hemostasis: Drysol Wound Care: Aquaphor Dressing: Band-Aid Destruction After The Procedure: No Type Of Destruction Used: Curettage Curettage Text: The wound bed was treated with curettage after the biopsy was performed. Cryotherapy Text: The wound bed was treated with cryotherapy after the biopsy was performed. Electrodesiccation Text: The wound bed was treated with electrodesiccation after the biopsy was performed. Electrodesiccation And Curettage Text: The wound bed was treated with electrodesiccation and curettage after the biopsy was performed. Silver Nitrate Text: The wound bed was treated with silver nitrate after the biopsy was performed. Lab: [facility] Consent: Written consent was obtained and risks were reviewed including but not limited to scarring, infection, bleeding, scabbing, incomplete removal, nerve damage and allergy to anesthesia. Post-Care Instructions: I reviewed with the patient in detail post-care instructions. Patient is to keep the biopsy site dry overnight, and then apply bacitracin twice daily until healed. Patient may apply hydrogen peroxide soaks to remove any crusting. Notification Instructions: Patient will be notified of biopsy results. However, patient instructed to call the office if not contacted within 2 weeks. Billing Type: [employer] Information: Selecting Yes will display possible errors in your note based on the variables you have selected. This validation is only offered as a suggestion for you. PLEASE NOTE THAT THE VALIDATION TEXT WILL BE REMOVED WHEN YOU FINALIZE YOUR NOTE. IF YOU WANT TO FAX A PRELIMINARY NOTE YOU WILL NEED TO TOGGLE THIS TO 'NO' IF YOU DO NOT WANT IT IN YOUR FAXED NOTE.

## 2025-07-18 DIAGNOSIS — J44.1 CHRONIC OBSTRUCTIVE PULMONARY DISEASE WITH ACUTE EXACERBATION (HCC): ICD-10-CM

## 2025-07-18 RX ORDER — AZELASTINE 1 MG/ML
1 SPRAY, METERED NASAL 2 TIMES DAILY
Qty: 90 ML | Refills: 1 | Status: SHIPPED | OUTPATIENT
Start: 2025-07-18

## 2025-07-18 RX ORDER — ALBUTEROL SULFATE 90 UG/1
2 INHALANT RESPIRATORY (INHALATION) EVERY 6 HOURS PRN
Qty: 3 EACH | Refills: 1 | Status: SHIPPED | OUTPATIENT
Start: 2025-07-18

## 2025-07-18 NOTE — TELEPHONE ENCOUNTER
Patient called requesting a [facility] sent [facility] for al[facility]erol.  Order is pended if appropriate.     Last visit 05/20/2025  Next visit 11/20/2025

## 2025-08-18 ENCOUNTER — TELEPHONE (OUTPATIENT)
Dept: FAMILY MEDICINE CLINIC | Age: 60
End: 2025-08-18

## 2025-08-18 ENCOUNTER — ANESTHESIA EVENT (OUTPATIENT)
Dept: ENDOSCOPY | Age: 60
End: 2025-08-18
Payer: MEDICARE

## 2025-08-18 DIAGNOSIS — M43.10 DEGENERATIVE SPONDYLOLISTHESIS: Primary | ICD-10-CM

## 2025-08-19 ENCOUNTER — HOSPITAL ENCOUNTER (OUTPATIENT)
Age: 60
Setting detail: OUTPATIENT SURGERY
Discharge: HOME | End: 2025-08-19
Attending: INTERNAL MEDICINE | Admitting: INTERNAL MEDICINE
Payer: MEDICARE

## 2025-08-19 ENCOUNTER — ANESTHESIA (OUTPATIENT)
Dept: ENDOSCOPY | Age: 60
End: 2025-08-19
Payer: MEDICARE

## 2025-08-19 VITALS
HEIGHT: 60 IN | RESPIRATION RATE: 18 BRPM | DIASTOLIC BLOOD PRESSURE: 80 MMHG | SYSTOLIC BLOOD PRESSURE: 148 MMHG | WEIGHT: 97 LBS | TEMPERATURE: 97.8 F | BODY MASS INDEX: 19.04 KG/M2 | OXYGEN SATURATION: 94 % | HEART RATE: 90 BPM

## 2025-08-19 PROCEDURE — 3609012300 HC EGD BAND LIGATION ESOPHGEAL/GASTRIC VARICES: Performed by: INTERNAL MEDICINE

## 2025-08-19 PROCEDURE — 2720000010 HC SURG SUPPLY STERILE: Performed by: INTERNAL MEDICINE

## 2025-08-19 PROCEDURE — 7100000011 HC PHASE II RECOVERY - ADDTL 15 MIN: Performed by: INTERNAL MEDICINE

## 2025-08-19 PROCEDURE — 6360000002 HC RX W HCPCS: Performed by: STUDENT IN AN ORGANIZED HEALTH CARE EDUCATION/TRAINING PROGRAM

## 2025-08-19 PROCEDURE — 7100000010 HC PHASE II RECOVERY - FIRST 15 MIN: Performed by: INTERNAL MEDICINE

## 2025-08-19 PROCEDURE — 2709999900 HC NON-CHARGEABLE SUPPLY: Performed by: INTERNAL MEDICINE

## 2025-08-19 PROCEDURE — 6360000002 HC RX W HCPCS: Performed by: ANESTHESIOLOGY

## 2025-08-19 PROCEDURE — 2580000003 HC RX 258: Performed by: NURSE ANESTHETIST, CERTIFIED REGISTERED

## 2025-08-19 PROCEDURE — 3700000000 HC ANESTHESIA ATTENDED CARE: Performed by: INTERNAL MEDICINE

## 2025-08-19 PROCEDURE — 2500000003 HC RX 250 WO HCPCS: Performed by: STUDENT IN AN ORGANIZED HEALTH CARE EDUCATION/TRAINING PROGRAM

## 2025-08-19 PROCEDURE — 6360000002 HC RX W HCPCS: Performed by: NURSE ANESTHETIST, CERTIFIED REGISTERED

## 2025-08-19 PROCEDURE — 2580000003 HC RX 258: Performed by: ANESTHESIOLOGY

## 2025-08-19 PROCEDURE — 6370000000 HC RX 637 (ALT 250 FOR IP): Performed by: STUDENT IN AN ORGANIZED HEALTH CARE EDUCATION/TRAINING PROGRAM

## 2025-08-19 PROCEDURE — 3700000001 HC ADD 15 MINUTES (ANESTHESIA): Performed by: INTERNAL MEDICINE

## 2025-08-19 RX ORDER — SODIUM CHLORIDE 0.9 % (FLUSH) 0.9 %
5-40 SYRINGE (ML) INJECTION PRN
Status: DISCONTINUED | OUTPATIENT
Start: 2025-08-19 | End: 2025-08-19 | Stop reason: HOSPADM

## 2025-08-19 RX ORDER — DIPHENHYDRAMINE HYDROCHLORIDE 50 MG/ML
12.5 INJECTION, SOLUTION INTRAMUSCULAR; INTRAVENOUS
Status: DISCONTINUED | OUTPATIENT
Start: 2025-08-19 | End: 2025-08-19 | Stop reason: HOSPADM

## 2025-08-19 RX ORDER — PROPOFOL 10 MG/ML
INJECTION, EMULSION INTRAVENOUS
Status: DISCONTINUED | OUTPATIENT
Start: 2025-08-19 | End: 2025-08-19 | Stop reason: SDUPTHER

## 2025-08-19 RX ORDER — OXYCODONE HYDROCHLORIDE 5 MG/1
5 TABLET ORAL PRN
Status: DISCONTINUED | OUTPATIENT
Start: 2025-08-19 | End: 2025-08-19 | Stop reason: HOSPADM

## 2025-08-19 RX ORDER — IPRATROPIUM BROMIDE AND ALBUTEROL SULFATE 2.5; .5 MG/3ML; MG/3ML
1 SOLUTION RESPIRATORY (INHALATION)
Status: DISCONTINUED | OUTPATIENT
Start: 2025-08-19 | End: 2025-08-19 | Stop reason: HOSPADM

## 2025-08-19 RX ORDER — SCOPOLAMINE 1 MG/3D
1 PATCH, EXTENDED RELEASE TRANSDERMAL ONCE
Status: DISCONTINUED | OUTPATIENT
Start: 2025-08-19 | End: 2025-08-19 | Stop reason: HOSPADM

## 2025-08-19 RX ORDER — PROCHLORPERAZINE EDISYLATE 5 MG/ML
5 INJECTION INTRAMUSCULAR; INTRAVENOUS
Status: DISCONTINUED | OUTPATIENT
Start: 2025-08-19 | End: 2025-08-19 | Stop reason: HOSPADM

## 2025-08-19 RX ORDER — LABETALOL HYDROCHLORIDE 5 MG/ML
10 INJECTION, SOLUTION INTRAVENOUS
Status: DISCONTINUED | OUTPATIENT
Start: 2025-08-19 | End: 2025-08-19 | Stop reason: HOSPADM

## 2025-08-19 RX ORDER — DROPERIDOL 2.5 MG/ML
0.62 INJECTION, SOLUTION INTRAMUSCULAR; INTRAVENOUS
Status: DISCONTINUED | OUTPATIENT
Start: 2025-08-19 | End: 2025-08-19 | Stop reason: HOSPADM

## 2025-08-19 RX ORDER — SODIUM CHLORIDE, SODIUM LACTATE, POTASSIUM CHLORIDE, CALCIUM CHLORIDE 600; 310; 30; 20 MG/100ML; MG/100ML; MG/100ML; MG/100ML
INJECTION, SOLUTION INTRAVENOUS
Status: DISCONTINUED | OUTPATIENT
Start: 2025-08-19 | End: 2025-08-19 | Stop reason: SDUPTHER

## 2025-08-19 RX ORDER — LIDOCAINE HYDROCHLORIDE 20 MG/ML
INJECTION, SOLUTION INFILTRATION; PERINEURAL
Status: DISCONTINUED | OUTPATIENT
Start: 2025-08-19 | End: 2025-08-19 | Stop reason: SDUPTHER

## 2025-08-19 RX ORDER — MIDAZOLAM HYDROCHLORIDE 1 MG/ML
2 INJECTION, SOLUTION INTRAMUSCULAR; INTRAVENOUS
Status: COMPLETED | OUTPATIENT
Start: 2025-08-19 | End: 2025-08-19

## 2025-08-19 RX ORDER — SODIUM CHLORIDE 9 MG/ML
INJECTION, SOLUTION INTRAVENOUS PRN
Status: DISCONTINUED | OUTPATIENT
Start: 2025-08-19 | End: 2025-08-19 | Stop reason: HOSPADM

## 2025-08-19 RX ORDER — SODIUM CHLORIDE 0.9 % (FLUSH) 0.9 %
5-40 SYRINGE (ML) INJECTION EVERY 12 HOURS SCHEDULED
Status: DISCONTINUED | OUTPATIENT
Start: 2025-08-19 | End: 2025-08-19 | Stop reason: HOSPADM

## 2025-08-19 RX ORDER — SODIUM CHLORIDE, SODIUM LACTATE, POTASSIUM CHLORIDE, CALCIUM CHLORIDE 600; 310; 30; 20 MG/100ML; MG/100ML; MG/100ML; MG/100ML
INJECTION, SOLUTION INTRAVENOUS CONTINUOUS
Status: DISCONTINUED | OUTPATIENT
Start: 2025-08-19 | End: 2025-08-19 | Stop reason: HOSPADM

## 2025-08-19 RX ORDER — SPIRONOLACTONE 25 MG/1
25 TABLET ORAL DAILY
COMMUNITY

## 2025-08-19 RX ORDER — OXYCODONE HYDROCHLORIDE 5 MG/1
10 TABLET ORAL PRN
Status: DISCONTINUED | OUTPATIENT
Start: 2025-08-19 | End: 2025-08-19 | Stop reason: HOSPADM

## 2025-08-19 RX ADMIN — PROPOFOL 25 MG: 10 INJECTION, EMULSION INTRAVENOUS at 07:40

## 2025-08-19 RX ADMIN — SODIUM CHLORIDE, SODIUM LACTATE, POTASSIUM CHLORIDE, AND CALCIUM CHLORIDE: .6; .31; .03; .02 INJECTION, SOLUTION INTRAVENOUS at 07:30

## 2025-08-19 RX ADMIN — MIDAZOLAM 2 MG: 1 INJECTION INTRAMUSCULAR; INTRAVENOUS at 07:26

## 2025-08-19 RX ADMIN — LIDOCAINE HYDROCHLORIDE 50 MG: 20 INJECTION, SOLUTION INFILTRATION; PERINEURAL at 07:33

## 2025-08-19 RX ADMIN — PROPOFOL 50 MG: 10 INJECTION, EMULSION INTRAVENOUS at 07:34

## 2025-08-19 RX ADMIN — PROPOFOL 50 MG: 10 INJECTION, EMULSION INTRAVENOUS at 07:35

## 2025-08-19 RX ADMIN — FAMOTIDINE 20 MG: 10 INJECTION, SOLUTION INTRAVENOUS at 07:22

## 2025-08-19 RX ADMIN — SODIUM CHLORIDE, POTASSIUM CHLORIDE, SODIUM LACTATE AND CALCIUM CHLORIDE: 600; 310; 30; 20 INJECTION, SOLUTION INTRAVENOUS at 07:09

## 2025-08-19 ASSESSMENT — PAIN - FUNCTIONAL ASSESSMENT: PAIN_FUNCTIONAL_ASSESSMENT: 0-10

## 2025-08-19 ASSESSMENT — PAIN SCALES - GENERAL
PAINLEVEL_OUTOF10: 0
PAINLEVEL_OUTOF10: 0

## 2025-08-19 ASSESSMENT — PAIN DESCRIPTION - DESCRIPTORS: DESCRIPTORS: ACHING

## 2025-08-27 ENCOUNTER — TRANSCRIBE ORDERS (OUTPATIENT)
Dept: ADMINISTRATIVE | Age: 60
End: 2025-08-27

## 2025-08-27 DIAGNOSIS — R31.0 GROSS HEMATURIA: Primary | ICD-10-CM

## (undated) DEVICE — SMARTBAND LIG KT

## (undated) DEVICE — MINOR SET UP: Brand: MEDLINE INDUSTRIES, INC.

## (undated) DEVICE — SEAL

## (undated) DEVICE — SUTURE MCRYL + SZ 4-0 L18IN ABSRB UD L19MM PS-2 3/8 CIR MCP496G

## (undated) DEVICE — PENCIL ES CRD L10FT HND SWCHING ROCK SWCH W/ EDGE COAT BLDE

## (undated) DEVICE — SOLUTION IRRIG 1000ML 0.9% SOD CHL USP POUR PLAS BTL

## (undated) DEVICE — Device

## (undated) DEVICE — CANNULA NSL AD TBNG L7FT PVC STR NONFLARED PRNG O2 DEL W STD

## (undated) DEVICE — ENDOSCOPIC KIT 2 12 FT OP4 DE2 GWN SYR

## (undated) DEVICE — SUTURE PDS + SZ 0 L36IN ABSRB VLT CT 1 L36MM 1 2 CIR PDP346H

## (undated) DEVICE — GLOVE,SURG,SENSICARE SLT,LF,PF,7: Brand: MEDLINE

## (undated) DEVICE — SUTURE PDS II SZ 0 L60IN ABSRB VLT L48MM CTX 1/2 CIR Z990G

## (undated) DEVICE — COLUMN DRAPE

## (undated) DEVICE — ELECTRODE,ECG,STRESS,FOAM,3PK: Brand: MEDLINE

## (undated) DEVICE — CONMED SCOPE SAVER BITE BLOCK, 20X27 MM: Brand: SCOPE SAVER

## (undated) DEVICE — GLOVE,SURG,SENSICARE SLT,LF,PF,8: Brand: MEDLINE

## (undated) DEVICE — 3M™ IOBAN™ 2 ANTIMICROBIAL INCISE DRAPE 6650EZ: Brand: IOBAN™ 2

## (undated) DEVICE — 1LYRTR 16FR10ML 100%SILI SNAP: Brand: MEDLINE INDUSTRIES, INC.

## (undated) DEVICE — REDUCER: Brand: ENDOWRIST

## (undated) DEVICE — SUTURE VCRL + SZ 3-0 L18IN ABSRB UD SH 1/2 CIR TAPERCUT NDL VCP864D

## (undated) DEVICE — BLADELESS OBTURATOR: Brand: WECK VISTA

## (undated) DEVICE — GOWN,REINF,POLY,AURORA,XLNG/XXL,STRL: Brand: MEDLINE

## (undated) DEVICE — GLOVE,SURG,SENSICARE SLT,LF,PF,7.5: Brand: MEDLINE

## (undated) DEVICE — SUTURE ABSORBABLE MONOFILAMENT 0 CTX 60 IN VIO PDS + PDP990G

## (undated) DEVICE — SUTURE VCRL + SZ 2-0 L27IN ABSRB VLT SH 1/2 CIR TAPERPOINT VCP317H

## (undated) DEVICE — SOLUTION IV IRRIG POUR BRL 0.9% SODIUM CHL 2F7124

## (undated) DEVICE — ELECTRODE EKG WHT FOAM TEARDROP SHP MEDGEL

## (undated) DEVICE — TIP COVER ACCESSORY

## (undated) DEVICE — TOTAL TRAY, DB, 100% SILI FOLEY, 16FR 10: Brand: MEDLINE

## (undated) DEVICE — SUTURE STRATAFIX SPRL MCRYL + SZ 3 0 L8IN ABSRB UD L26MM SH SXMP1B427

## (undated) DEVICE — LIQUIBAND RAPID ADHESIVE 36/CS 0.8ML: Brand: MEDLINE

## (undated) DEVICE — YANKAUER,OPEN TIP,W/O VENT,STERILE: Brand: MEDLINE INDUSTRIES, INC.

## (undated) DEVICE — ARM DRAPE

## (undated) DEVICE — SPONGE LAP W18XL18IN WHT COT 4 PLY FLD STRUNG RADPQ DISP ST 2 PER PACK

## (undated) DEVICE — MEDI-VAC NON-CONDUCTIVE SUCTION TUBING: Brand: CARDINAL HEALTH

## (undated) DEVICE — CHLORAPREP 26ML ORANGE

## (undated) DEVICE — YANKAUER,BULB TIP,W/O VENT,RIGID,STERILE: Brand: MEDLINE

## (undated) DEVICE — WOUND RETRACTOR AND PROTECTOR: Brand: ALEXIS WOUND PROTECTOR-RETRACTOR

## (undated) DEVICE — CANNULA SEAL

## (undated) DEVICE — DISSECTOR ULTRASONIC L39CM CRV JAW CRDLSS SONICISION

## (undated) DEVICE — MEGA SUTURECUT ND: Brand: ENDOWRIST

## (undated) DEVICE — BLADE ES L6IN ELASTOMERIC COAT EXT DURABLE BEND UPTO 90DEG

## (undated) DEVICE — SHEET, T, LAPAROTOMY, STERILE: Brand: MEDLINE

## (undated) DEVICE — TUBING, SUCTION, 3/16" X 12', STRAIGHT: Brand: MEDLINE

## (undated) DEVICE — BITE BLK L LUMN SZ 20X27MM GRN PLAS FLX SIDE PRT SMOOTH

## (undated) DEVICE — SUTURE PERMAHAND SZ 2-0 L18IN NONABSORBABLE BLK L26MM SH C012D

## (undated) DEVICE — SUTURE VCRL + SZ 0 L27IN ABSRB VLT L26MM UR-6 5/8 CIR VCP603H

## (undated) DEVICE — SKIN AFFIX SURG ADHESIVE 72/CS 0.55ML: Brand: MEDLINE

## (undated) DEVICE — ELECTRODE ECG MONITR FOAM TEAR DROP ADLT RED

## (undated) DEVICE — GAUZE,PACKING STRIP,PLAIN,1/2"X5YD,STRL: Brand: CURAD

## (undated) DEVICE — SCISSORS SURG DIA8MM MPLR CRV ENDOWRIST

## (undated) DEVICE — FENESTRATED BIPOLAR FORCEPS: Brand: ENDOWRIST

## (undated) DEVICE — SYRINGE BLB 50CC IRRIG PLIABLE FNGR FLNG GRAD FLSK DISP

## (undated) DEVICE — SUTURE ETHBND EXCEL SZ 0 L18IN NONABSORBABLE GRN L22MM MO-7 CX41D

## (undated) DEVICE — SUTURE PERMAHAND SZ 3-0 L18IN NONABSORBABLE BLK L26MM SH C013D